# Patient Record
Sex: FEMALE | Race: WHITE | NOT HISPANIC OR LATINO | Employment: UNEMPLOYED | URBAN - METROPOLITAN AREA
[De-identification: names, ages, dates, MRNs, and addresses within clinical notes are randomized per-mention and may not be internally consistent; named-entity substitution may affect disease eponyms.]

---

## 2017-06-26 DIAGNOSIS — K21.9 GASTROESOPHAGEAL REFLUX DISEASE WITHOUT ESOPHAGITIS: ICD-10-CM

## 2017-06-26 RX ORDER — PANTOPRAZOLE SODIUM 40 MG/1
TABLET, DELAYED RELEASE ORAL
Qty: 30 TABLET | Refills: 0 | Status: SHIPPED | OUTPATIENT
Start: 2017-06-26 | End: 2017-08-16 | Stop reason: SDUPTHER

## 2017-07-27 DIAGNOSIS — K21.9 GASTROESOPHAGEAL REFLUX DISEASE WITHOUT ESOPHAGITIS: ICD-10-CM

## 2017-07-28 RX ORDER — PANTOPRAZOLE SODIUM 40 MG/1
TABLET, DELAYED RELEASE ORAL
Qty: 30 TABLET | Refills: 0 | OUTPATIENT
Start: 2017-07-28

## 2017-08-16 DIAGNOSIS — K21.9 GASTROESOPHAGEAL REFLUX DISEASE WITHOUT ESOPHAGITIS: ICD-10-CM

## 2017-08-16 RX ORDER — PANTOPRAZOLE SODIUM 40 MG/1
TABLET, DELAYED RELEASE ORAL
Qty: 30 TABLET | Refills: 0 | Status: SHIPPED | OUTPATIENT
Start: 2017-08-16 | End: 2017-09-07 | Stop reason: SDUPTHER

## 2017-09-07 DIAGNOSIS — K21.9 GASTROESOPHAGEAL REFLUX DISEASE WITHOUT ESOPHAGITIS: ICD-10-CM

## 2017-09-07 RX ORDER — PANTOPRAZOLE SODIUM 40 MG/1
TABLET, DELAYED RELEASE ORAL
Qty: 30 TABLET | Refills: 0 | Status: SHIPPED | OUTPATIENT
Start: 2017-09-07

## 2017-10-10 DIAGNOSIS — K21.9 GASTROESOPHAGEAL REFLUX DISEASE WITHOUT ESOPHAGITIS: ICD-10-CM

## 2017-10-11 RX ORDER — PANTOPRAZOLE SODIUM 40 MG/1
TABLET, DELAYED RELEASE ORAL
Qty: 30 TABLET | Refills: 0 | Status: SHIPPED | OUTPATIENT
Start: 2017-10-11 | End: 2020-08-18 | Stop reason: SDUPTHER

## 2018-10-01 ENCOUNTER — LAB VISIT (OUTPATIENT)
Dept: LAB | Facility: HOSPITAL | Age: 58
End: 2018-10-01
Payer: COMMERCIAL

## 2018-10-01 ENCOUNTER — OFFICE VISIT (OUTPATIENT)
Dept: DERMATOLOGY | Facility: CLINIC | Age: 58
End: 2018-10-01
Payer: COMMERCIAL

## 2018-10-01 DIAGNOSIS — L65.9 HAIR LOSS DISORDER: Primary | ICD-10-CM

## 2018-10-01 DIAGNOSIS — L65.9 HAIR LOSS DISORDER: ICD-10-CM

## 2018-10-01 LAB
25(OH)D3+25(OH)D2 SERPL-MCNC: 38 NG/ML
BASOPHILS # BLD AUTO: 0.07 K/UL
BASOPHILS NFR BLD: 1 %
DIFFERENTIAL METHOD: ABNORMAL
EOSINOPHIL # BLD AUTO: 0.5 K/UL
EOSINOPHIL NFR BLD: 6.9 %
ERYTHROCYTE [DISTWIDTH] IN BLOOD BY AUTOMATED COUNT: 13.9 %
HCT VFR BLD AUTO: 32.4 %
HGB BLD-MCNC: 9.9 G/DL
IMM GRANULOCYTES # BLD AUTO: 0.01 K/UL
IMM GRANULOCYTES NFR BLD AUTO: 0.1 %
LYMPHOCYTES # BLD AUTO: 1.7 K/UL
LYMPHOCYTES NFR BLD: 25 %
MCH RBC QN AUTO: 25 PG
MCHC RBC AUTO-ENTMCNC: 30.6 G/DL
MCV RBC AUTO: 82 FL
MONOCYTES # BLD AUTO: 0.5 K/UL
MONOCYTES NFR BLD: 7.8 %
NEUTROPHILS # BLD AUTO: 3.9 K/UL
NEUTROPHILS NFR BLD: 59.2 %
NRBC BLD-RTO: 0 /100 WBC
PLATELET # BLD AUTO: 264 K/UL
PMV BLD AUTO: 11 FL
RBC # BLD AUTO: 3.96 M/UL
TSH SERPL DL<=0.005 MIU/L-ACNC: 1.7 UIU/ML
WBC # BLD AUTO: 6.67 K/UL

## 2018-10-01 PROCEDURE — 99999 PR PBB SHADOW E&M-EST. PATIENT-LVL II: CPT | Mod: PBBFAC,,, | Performed by: NURSE PRACTITIONER

## 2018-10-01 PROCEDURE — 84443 ASSAY THYROID STIM HORMONE: CPT

## 2018-10-01 PROCEDURE — 82306 VITAMIN D 25 HYDROXY: CPT

## 2018-10-01 PROCEDURE — 36415 COLL VENOUS BLD VENIPUNCTURE: CPT

## 2018-10-01 PROCEDURE — 85025 COMPLETE CBC W/AUTO DIFF WBC: CPT

## 2018-10-01 PROCEDURE — 99202 OFFICE O/P NEW SF 15 MIN: CPT | Mod: S$GLB,,, | Performed by: NURSE PRACTITIONER

## 2018-10-01 RX ORDER — KETOCONAZOLE 20 MG/ML
SHAMPOO, SUSPENSION TOPICAL
Qty: 120 ML | Refills: 5 | Status: SHIPPED | OUTPATIENT
Start: 2018-10-01 | End: 2020-08-18

## 2018-10-01 NOTE — PROGRESS NOTES
Subjective:       Patient ID:  Bhavna Hancock is a 57 y.o. female who presents for   Chief Complaint   Patient presents with    Hair Loss     lots , [id number] , otc tx     Alopecia  - Initial  Affected locations: scalp  Duration: 2 years  Signs and Symptoms: tingling feeling on scalp   Severity: mild  Timing: constant  Aggravated by: nothing  Treatments tried: biotin, minixodil shampoo,       Reports last tsh was few months ago and patient reports was NL    Review of Systems   Genitourinary: Negative for irregular periods (post menapausal ).   Skin: Negative for daily sunscreen use and activity-related sunscreen use.   Endocrine:        H/o hypothyroid     Hematologic/Lymphatic:        Recently dx w/ Anemia. Dx x1 year ago.         Objective:    Physical Exam   Constitutional: She appears well-developed and well-nourished. No distress.   Neurological: She is alert and oriented to person, place, and time. She is not disoriented.   Psychiatric: She has a normal mood and affect.   Skin:   Areas Examined (abnormalities noted in diagram):   Scalp / Hair Palpated and Inspected  Head / Face Inspection Performed  Neck Inspection Performed  Nails and Digits Inspection Performed                  Diagram Legend     Erythematous scaling macule/papule c/w actinic keratosis       Vascular papule c/w angioma      Pigmented verrucoid papule/plaque c/w seborrheic keratosis      Yellow umbilicated papule c/w sebaceous hyperplasia      Irregularly shaped tan macule c/w lentigo     1-2 mm smooth white papules consistent with Milia      Movable subcutaneous cyst with punctum c/w epidermal inclusion cyst      Subcutaneous movable cyst c/w pilar cyst      Firm pink to brown papule c/w dermatofibroma      Pedunculated fleshy papule(s) c/w skin tag(s)      Evenly pigmented macule c/w junctional nevus     Mildly variegated pigmented, slightly irregular-bordered macule c/w mildly atypical nevus      Flesh colored to evenly pigmented  papule c/w intradermal nevus       Pink pearly papule/plaque c/w basal cell carcinoma      Erythematous hyperkeratotic cursted plaque c/w SCC      Surgical scar with no sign of skin cancer recurrence      Open and closed comedones      Inflammatory papules and pustules      Verrucoid papule consistent consistent with wart     Erythematous eczematous patches and plaques     Dystrophic onycholytic nail with subungual debris c/w onychomycosis     Umbilicated papule    Erythematous-base heme-crusted tan verrucoid plaque consistent with inflamed seborrheic keratosis     Erythematous Silvery Scaling Plaque c/w Psoriasis     See annotation        Assessment / Plan:        Hair loss disorder  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 Must use consistently and if you stop using, the hair it stimulated to grow may fall out over time. Generic acceptable.                Follow-up in about 6 months (around 4/1/2019).

## 2018-10-16 ENCOUNTER — TELEPHONE (OUTPATIENT)
Dept: DERMATOLOGY | Facility: CLINIC | Age: 58
End: 2018-10-16

## 2018-11-21 DIAGNOSIS — R10.2 PELVIC PAIN SYNDROME: Primary | ICD-10-CM

## 2020-08-18 ENCOUNTER — OFFICE VISIT (OUTPATIENT)
Dept: PAIN MEDICINE | Facility: CLINIC | Age: 60
End: 2020-08-18
Payer: COMMERCIAL

## 2020-08-18 VITALS
SYSTOLIC BLOOD PRESSURE: 122 MMHG | HEART RATE: 95 BPM | BODY MASS INDEX: 28.47 KG/M2 | HEIGHT: 65 IN | DIASTOLIC BLOOD PRESSURE: 66 MMHG | WEIGHT: 170.88 LBS

## 2020-08-18 DIAGNOSIS — G89.29 CHRONIC PAIN OF BOTH SHOULDERS: ICD-10-CM

## 2020-08-18 DIAGNOSIS — M25.512 CHRONIC PAIN OF BOTH SHOULDERS: ICD-10-CM

## 2020-08-18 DIAGNOSIS — M25.511 CHRONIC PAIN OF BOTH SHOULDERS: ICD-10-CM

## 2020-08-18 DIAGNOSIS — M67.912 BILATERAL ROTATOR CUFF DYSFUNCTION: ICD-10-CM

## 2020-08-18 DIAGNOSIS — M67.911 BILATERAL ROTATOR CUFF DYSFUNCTION: ICD-10-CM

## 2020-08-18 PROCEDURE — 99999 PR PBB SHADOW E&M-EST. PATIENT-LVL V: CPT | Mod: PBBFAC,,, | Performed by: PAIN MEDICINE

## 2020-08-18 PROCEDURE — 99204 PR OFFICE/OUTPT VISIT, NEW, LEVL IV, 45-59 MIN: ICD-10-PCS | Mod: S$GLB,,, | Performed by: PAIN MEDICINE

## 2020-08-18 PROCEDURE — 99204 OFFICE O/P NEW MOD 45 MIN: CPT | Mod: S$GLB,,, | Performed by: PAIN MEDICINE

## 2020-08-18 PROCEDURE — 99999 PR PBB SHADOW E&M-EST. PATIENT-LVL V: ICD-10-PCS | Mod: PBBFAC,,, | Performed by: PAIN MEDICINE

## 2020-08-18 RX ORDER — LOTEPREDNOL ETABONATE 3.8 MG/G
GEL OPHTHALMIC
COMMUNITY
Start: 2020-07-29 | End: 2022-02-21 | Stop reason: SDUPTHER

## 2020-08-18 RX ORDER — INSULIN LISPRO 100 [IU]/ML
INJECTION, SOLUTION INTRAVENOUS; SUBCUTANEOUS
Status: ON HOLD | COMMUNITY
Start: 2020-07-07 | End: 2021-02-03 | Stop reason: HOSPADM

## 2020-08-18 RX ORDER — LEVOTHYROXINE SODIUM 75 UG/1
TABLET ORAL
COMMUNITY
Start: 2020-06-28

## 2020-08-18 RX ORDER — TROSPIUM CHLORIDE 20 MG/1
20 TABLET, FILM COATED ORAL DAILY
COMMUNITY
Start: 2020-08-11

## 2020-08-18 RX ORDER — NAPROXEN SODIUM 220 MG
TABLET ORAL
COMMUNITY
Start: 2020-06-18

## 2020-08-18 RX ORDER — ESTRADIOL 1 MG/1
TABLET ORAL
COMMUNITY
Start: 2018-04-12

## 2020-08-18 RX ORDER — SULFAMETHOXAZOLE AND TRIMETHOPRIM 800; 160 MG/1; MG/1
TABLET ORAL
COMMUNITY
Start: 2020-06-11 | End: 2020-11-18

## 2020-08-18 RX ORDER — CYCLOSPORINE 0.5 MG/ML
EMULSION OPHTHALMIC
COMMUNITY
Start: 2020-07-01

## 2020-08-18 RX ORDER — BLOOD-GLUCOSE SENSOR
EACH MISCELLANEOUS
COMMUNITY
Start: 2020-07-31

## 2020-08-18 RX ORDER — AMOXICILLIN 875 MG/1
TABLET, FILM COATED ORAL
Status: ON HOLD | COMMUNITY
Start: 2020-06-11 | End: 2020-10-05 | Stop reason: CLARIF

## 2020-08-18 RX ORDER — MEDROXYPROGESTERONE ACETATE 2.5 MG/1
TABLET ORAL
COMMUNITY
Start: 2018-04-06

## 2020-08-18 RX ORDER — BLOOD-GLUCOSE TRANSMITTER
EACH MISCELLANEOUS
COMMUNITY
Start: 2020-07-29

## 2020-08-18 NOTE — PROGRESS NOTES
Subjective:     Patient ID: Bhavna Hancock is a 59 y.o. female    Chief Complaint: Shoulder Pain      Referred by: Self, Zeb      HPI:    Initial Encounter (8/18/20):  Bhavna Hancock is a 59 y.o. female who presents today with bilateral shoulder pain.  This pain has been present for years.  No specific inciting event or injury noted.  Patient has been previously diagnosed with bilateral subacromial bursitis.  She has undergone multiple injections into the bilateral shoulders without significant relief.  She has undergone physical therapy without significant relief.  The pain is located in the bilateral shoulder region.  The pain does not radiate.  Pain is worsened with overhead activities.  She denies any associated numbness, tingling, weakness, bowel bladder dysfunction.  She has a very active person and is concerned that her shoulder pain is limiting her ability to do activities that she likes to do.   This pain is described in detail below.    Physical Therapy:  Yes.    Non-pharmacologic Treatment:  Rest helps         · TENS?  No    Pain Medications:         · Currently taking:  Ibuprofen    · Has tried in the past:  Tylenol    · Has not tried:  Opioids, , Muscle relaxants, TCAs, SNRIs, anticonvulsants, topical creams    Blood thinners:  None    Interventional Therapies:  Previous shoulder injections    Relevant Surgeries:  None    Affecting sleep?  Yes    Affecting daily activities? yes    Depressive symptoms? no          · SI/HI? No    Work status: Unemployed    Pain Scores:    Best:       3/10  Worst:     10/10  Usually:   7/10  Today:    2/10    Review of Systems   Constitutional: Negative for activity change, appetite change, chills, fatigue, fever and unexpected weight change.   HENT: Negative for hearing loss.    Eyes: Negative for visual disturbance.   Respiratory: Negative for chest tightness and shortness of breath.    Cardiovascular: Negative for chest pain.   Gastrointestinal: Negative  for abdominal pain, constipation, diarrhea, nausea and vomiting.   Genitourinary: Negative for difficulty urinating.   Musculoskeletal: Positive for arthralgias and myalgias. Negative for back pain, gait problem and neck pain.   Skin: Negative for rash.   Neurological: Negative for dizziness, weakness, light-headedness, numbness and headaches.   Psychiatric/Behavioral: Positive for sleep disturbance. Negative for hallucinations and suicidal ideas. The patient is not nervous/anxious.        Past Medical History:   Diagnosis Date    Abdominal pain     Diabetes     GERD (gastroesophageal reflux disease)     Hx of colonic polyps     Hyperlipidemia     Hypertension     Nausea and vomiting        Past Surgical History:   Procedure Laterality Date    CRANIOTOMY  2013    DEBRIDEMENT TENNIS ELBOW      hip ascites      TRIGGER FINGER RELEASE         Social History     Socioeconomic History    Marital status:      Spouse name: Not on file    Number of children: Not on file    Years of education: Not on file    Highest education level: Not on file   Occupational History    Not on file   Social Needs    Financial resource strain: Not on file    Food insecurity     Worry: Not on file     Inability: Not on file    Transportation needs     Medical: Not on file     Non-medical: Not on file   Tobacco Use    Smoking status: Former Smoker   Substance and Sexual Activity    Alcohol use: No    Drug use: No    Sexual activity: Not on file   Lifestyle    Physical activity     Days per week: Not on file     Minutes per session: Not on file    Stress: Not on file   Relationships    Social connections     Talks on phone: Not on file     Gets together: Not on file     Attends Mandaeism service: Not on file     Active member of club or organization: Not on file     Attends meetings of clubs or organizations: Not on file     Relationship status: Not on file   Other Topics Concern    Not on file   Social History  Narrative    Not on file       Review of patient's allergies indicates:   Allergen Reactions    Codeine        Current Outpatient Medications on File Prior to Visit   Medication Sig Dispense Refill    amoxicillin (AMOXIL) 875 MG tablet TK 1 T PO BID      aspirin (ECOTRIN) 81 MG EC tablet Take 81 mg by mouth once daily.      blood sugar diagnostic (ONETOUCH ULTRA BLUE TEST STRIP) Strp Use to test blood sugar 5 times a day      DEXCOM G6 SENSOR Priscilla TEST BS FID      DEXCOM G6 TRANSMITTER Priscilla USE TO CHECK FID      estradioL (ESTRACE) 1 MG tablet TK 1 T PO QD      fish oil-omega-3 fatty acids 300-1,000 mg capsule Take 2 g by mouth once daily.      ibuprofen (ADVIL,MOTRIN) 600 MG tablet Take 1 tablet (600 mg total) by mouth every 6 (six) hours as needed for Pain. 20 tablet 0    insulin glargine (LANTUS) 100 unit/mL injection Inject 35 Units into the skin once daily. 30 units nightly       insulin lispro (HUMALOG KWIKPEN INSULIN) 100 unit/mL pen INJECT 14 UNITS UNDER THE SKIN WITH EVERY MEAL      insulin regular 100 unit/mL Inj injection Inject into the skin 3 (three) times daily before meals. 8 units per meal      insulin syringe-needle U-100 1/2 mL 30 gauge Syrg USE ONE SYRINGE PER DAY UTD      levothyroxine (SYNTHROID) 75 MCG tablet TAKE 1 TABLET BY MOUTH EVERY DAY      lisinopril (PRINIVIL,ZESTRIL) 2.5 MG tablet Take 2.5 mg by mouth once daily.      LOTEMAX SM 0.38 % DrpG INT 1 GTT IN OU BID UTD      medroxyPROGESTERone (PROVERA) 2.5 MG tablet TK 1 T PO QD      metformin (GLUCOPHAGE) 500 MG tablet Take 500 mg by mouth daily with breakfast.      pantoprazole (PROTONIX) 40 MG tablet TAKE 1 TABLET(40 MG) BY MOUTH EVERY DAY 30 tablet 0    RESTASIS 0.05 % ophthalmic emulsion INSTILL ONE DROP IN OU BID      simvastatin (ZOCOR) 40 MG tablet Take 40 mg by mouth every evening.      sulfamethoxazole-trimethoprim 800-160mg (BACTRIM DS) 800-160 mg Tab TK 1 T PO BID      trospium (SANCTURA) 20 mg Tab  "tablet TK 1 T PO BID 1 HOUR B MEALS      venlafaxine (EFFEXOR-XR) 150 MG Cp24 Take 225 mg by mouth once daily.       [DISCONTINUED] ketoconazole (NIZORAL) 2 % shampoo Wash hair with medicated shampoo at least 2x/week - let sit on scalp at least 5 minutes prior to rinsing 120 mL 5    [DISCONTINUED] levothyroxine (SYNTHROID) 25 MCG tablet Take 50 mcg by mouth once daily.      [DISCONTINUED] pantoprazole (PROTONIX) 40 MG tablet TAKE 1 TABLET(40 MG) BY MOUTH EVERY DAY 30 tablet 0     No current facility-administered medications on file prior to visit.        Objective:      /66 (BP Location: Right arm, Patient Position: Sitting, BP Method: Medium (Automatic))   Pulse 95   Ht 5' 5" (1.651 m)   Wt 77.5 kg (170 lb 13.7 oz)   BMI 28.43 kg/m²     Exam:  GEN:  Well developed, well nourished.  No acute distress.  Normal pain behavior.  HEENT:  No trauma.  Mucous membranes moist.  Nares patent bilaterally.  PSYCH: Normal affect. Thought content appropriate.  CHEST:  Breathing symmetric.  No audible wheezing.  ABD: Soft, non-distended.  SKIN:  Warm, pink, dry.  No rash on exposed areas.    EXT:  No cyanosis, clubbing, or edema.  No color change or changes in nail or hair growth.  NEURO/MUSCULOSKELETAL:  Fully alert, oriented, and appropriate. Speech normal albin. No cranial nerve deficits.   Gait:   normal.  5/5 motor strength throughout upper extremities.   Sensory:   no  sensory deficit in the upper extremities.   Reflexes:   2 + and symmetric throughout.   absent  Domingo's bilaterally.  C-Spine:   full  ROM without pain on  any movements.  negative  facet loading bilaterally.   negative  Spurling's bilaterally.    No  TTP over cervical facet joints, cervical paraspinal muscles, shoulders, elbows or hands.      Positive Maya bilaterally  Full active range of motion of bilateral shoulders with pain towards terminal degrees of abduction and flexion  Apprehension test positive bilaterally  Tenderness to " palpation at the insertion of the bilateral supraspinatus tendons  Shoulder pain reproduced with bilateral shoulder abduction against resistance        Imaging:  No pertinent imaging at this time    Assessment:       Encounter Diagnoses   Name Primary?    Chronic pain of both shoulders     Bilateral rotator cuff dysfunction          Plan:       Bhavna was seen today for shoulder pain.    Diagnoses and all orders for this visit:    Chronic pain of both shoulders    Bilateral rotator cuff dysfunction  -     Ambulatory referral/consult to Orthopedics; Future        Bhavna Hancock is a 59 y.o. female with chronic bilateral shoulder pain likely related to bilateral rotator cuff dysfunction..    1.  Referred to Orthopedic surgery (Jeanna) for further evaluation of chronic rotator cuff dysfunction.  2.  Return to clinic as needed.  Patient may be a candidate for shoulder radiofrequency ablation if no surgical options are deemed appropriate.

## 2020-09-02 DIAGNOSIS — M25.512 BILATERAL SHOULDER PAIN, UNSPECIFIED CHRONICITY: Primary | ICD-10-CM

## 2020-09-02 DIAGNOSIS — M25.511 BILATERAL SHOULDER PAIN, UNSPECIFIED CHRONICITY: Primary | ICD-10-CM

## 2020-09-03 ENCOUNTER — OFFICE VISIT (OUTPATIENT)
Dept: ORTHOPEDICS | Facility: CLINIC | Age: 60
End: 2020-09-03
Attending: ORTHOPAEDIC SURGERY
Payer: COMMERCIAL

## 2020-09-03 VITALS
TEMPERATURE: 98 F | HEART RATE: 87 BPM | HEIGHT: 65 IN | SYSTOLIC BLOOD PRESSURE: 122 MMHG | WEIGHT: 175.25 LBS | RESPIRATION RATE: 17 BRPM | OXYGEN SATURATION: 97 % | DIASTOLIC BLOOD PRESSURE: 60 MMHG | BODY MASS INDEX: 29.2 KG/M2

## 2020-09-03 DIAGNOSIS — M67.912 BILATERAL ROTATOR CUFF DYSFUNCTION: ICD-10-CM

## 2020-09-03 DIAGNOSIS — M67.911 BILATERAL ROTATOR CUFF DYSFUNCTION: ICD-10-CM

## 2020-09-03 DIAGNOSIS — G89.29 CHRONIC PAIN OF BOTH SHOULDERS: ICD-10-CM

## 2020-09-03 DIAGNOSIS — M25.511 CHRONIC PAIN OF BOTH SHOULDERS: ICD-10-CM

## 2020-09-03 DIAGNOSIS — M25.512 CHRONIC PAIN OF BOTH SHOULDERS: ICD-10-CM

## 2020-09-03 PROCEDURE — 99204 PR OFFICE/OUTPT VISIT, NEW, LEVL IV, 45-59 MIN: ICD-10-PCS | Mod: S$GLB,,, | Performed by: ORTHOPAEDIC SURGERY

## 2020-09-03 PROCEDURE — 99999 PR PBB SHADOW E&M-EST. PATIENT-LVL V: ICD-10-PCS | Mod: PBBFAC,,, | Performed by: ORTHOPAEDIC SURGERY

## 2020-09-03 PROCEDURE — 99999 PR PBB SHADOW E&M-EST. PATIENT-LVL V: CPT | Mod: PBBFAC,,, | Performed by: ORTHOPAEDIC SURGERY

## 2020-09-03 PROCEDURE — 99204 OFFICE O/P NEW MOD 45 MIN: CPT | Mod: S$GLB,,, | Performed by: ORTHOPAEDIC SURGERY

## 2020-09-03 NOTE — LETTER
September 3, 2020      Larry Fisher Jr., MD  [address]  [address]  [place] [address]           [facility]  [address]  [facility] [place] [address]  Phone: [phone]          Patient: Bhavna Hancock   MR Number: [id number]   YOB: 1960   Date of Visit: 9/3/2020       Dear Dr. Larry Fisher Jr.:    Thank you for referring Bhavna Hancock to me for evaluation. Attached you will find relevant portions of my assessment and plan of care.    If you have questions, please do not hesitate to call me. I look forward to following Bhavna Hancock along with you.    Sincerely,    Maddie Meeks MD    Enclosure  CC:  No Recipients    If you would like to receive this communication electronically, please contact [email or web address] or [phone] to request more information on [employer] Link access.    For providers and/or their staff who would like to refer a patient to [facility], please contact us through our one-stop-shop provider referral line, [facility], at [phone].    If you feel you have received this communication in error or would no longer like to receive these types of communications, please e-mail [email or web address]

## 2020-09-03 NOTE — PROGRESS NOTES
Chief Complaint   Patient presents with    Right Shoulder - Pain    Left Shoulder - Pain     This patient was seen in consultation at the request of Dr. Larry Fisher*     HPI (09/03/2020): Bhavna Hancock is a 59 y.o. female who presents today complaining of bilateral shoulder pain  Duration of symptoms:  About 1  year   Trauma or new activity Does a lot of lifting   Pain is intermittent  Aggravating factors: motion of the shoulder - raising the arm overhead, lifting, reaching behind her back   Relieving factors: rest   Night pain is present and is disruptive to sleep  Associated symptoms: None  Prior treatment:  OTC NSAIDs  and PT with mild improvement in pain.     Pain does interfere with sleep and activities of daily living .    This is the extent of the patient's complaints at this time.     Hand dominance: Right     Occupation: Not currently working, previously worked as an [employer] tech at [employer]    Review of Systems   All other systems reviewed and are negative.        Review of patient's allergies indicates:   Allergen Reactions    Codeine          Current Outpatient Medications:     amoxicillin (AMOXIL) 875 MG tablet, TK 1 T PO BID, Disp: , Rfl:     aspirin (ECOTRIN) 81 MG EC tablet, Take 81 mg by mouth once daily., Disp: , Rfl:     blood sugar diagnostic (ONETOUCH ULTRA BLUE TEST STRIP) Strp, Use to test blood sugar 5 times a day, Disp: , Rfl:     DEXCOM G6 SENSOR Priscilla, TEST BS FID, Disp: , Rfl:     DEXCOM G6 TRANSMITTER Priscilla, USE TO CHECK FID, Disp: , Rfl:     estradioL (ESTRACE) 1 MG tablet, TK 1 T PO QD, Disp: , Rfl:     fish oil-omega-3 fatty acids 300-1,000 mg capsule, Take 2 g by mouth once daily., Disp: , Rfl:     ibuprofen (ADVIL,MOTRIN) 600 MG tablet, Take 1 tablet (600 mg total) by mouth every 6 (six) hours as needed for Pain., Disp: 20 tablet, Rfl: 0    insulin glargine (LANTUS) 100 unit/mL injection, Inject 35 Units into the skin once daily. 30 units nightly , Disp: , Rfl:      insulin lispro (HUMALOG KWIKPEN INSULIN) 100 unit/mL pen, INJECT 14 UNITS UNDER THE SKIN WITH EVERY MEAL, Disp: , Rfl:     insulin regular 100 unit/mL Inj injection, Inject into the skin 3 (three) times daily before meals. 8 units per meal, Disp: , Rfl:     insulin syringe-needle U-100 1/2 mL 30 gauge Syrg, USE ONE SYRINGE PER DAY UTD, Disp: , Rfl:     levothyroxine (SYNTHROID) 75 MCG tablet, TAKE 1 TABLET BY MOUTH EVERY DAY, Disp: , Rfl:     lisinopril (PRINIVIL,ZESTRIL) 2.5 MG tablet, Take 2.5 mg by mouth once daily., Disp: , Rfl:     LOTEMAX SM 0.38 % DrpG, INT 1 GTT IN OU BID UTD, Disp: , Rfl:     medroxyPROGESTERone (PROVERA) 2.5 MG tablet, TK 1 T PO QD, Disp: , Rfl:     metformin (GLUCOPHAGE) 500 MG tablet, Take 500 mg by mouth daily with breakfast., Disp: , Rfl:     pantoprazole (PROTONIX) 40 MG tablet, TAKE 1 TABLET(40 MG) BY MOUTH EVERY DAY, Disp: 30 tablet, Rfl: 0    RESTASIS 0.05 % ophthalmic emulsion, INSTILL ONE DROP IN OU BID, Disp: , Rfl:     simvastatin (ZOCOR) 40 MG tablet, Take 40 mg by mouth every evening., Disp: , Rfl:     sulfamethoxazole-trimethoprim 800-160mg (BACTRIM DS) 800-160 mg Tab, TK 1 T PO BID, Disp: , Rfl:     trospium (SANCTURA) 20 mg Tab tablet, TK 1 T PO BID 1 HOUR B MEALS, Disp: , Rfl:     venlafaxine (EFFEXOR-XR) 150 MG Cp24, Take 225 mg by mouth once daily. , Disp: , Rfl:     Past Medical History:   Diagnosis Date    Abdominal pain     Diabetes     GERD (gastroesophageal reflux disease)     Hx of colonic polyps     Hyperlipidemia     Hypertension     Nausea and vomiting        Patient Active Problem List   Diagnosis    History of adenomatous polyp of colon    Chronic pain of both shoulders    Bilateral rotator cuff dysfunction       Past Surgical History:   Procedure Laterality Date    CRANIOTOMY  2013    DEBRIDEMENT TENNIS ELBOW      hip ascites      TRIGGER FINGER RELEASE         Social History     Tobacco Use    Smoking status: Former Smoker  "  Substance Use Topics    Alcohol use: No    Drug use: No       Family History   Problem Relation Age of Onset    Colon cancer Father 77        Rectal    Stomach cancer Neg Hx     Esophageal cancer Neg Hx        Physical Exam:   Vitals:    09/03/20 0934   BP: 122/60   Pulse: 87   Resp: 17   Temp: 97.9 °F (36.6 °C)   TempSrc: Oral   SpO2: 97%   Weight: 79.5 kg (175 lb 4.3 oz)   Height: 5' 5" (1.651 m)   PainSc: 0-No pain   PainLoc: Shoulder       General: Weight: 79.5 kg (175 lb 4.3 oz) Body mass index is 29.17 kg/m².  Patient is alert, awake and oriented to time, place and person. Mood and affect are appropriate.  Patient does not appear to be in any distress, denies any constitutional symptoms and appears stated age.   HEENT: Pupils are equal and round, sclera are not injected. External examination of ears and nose reveals no abnormalities. Cranial nerves II-X are grossly intact  Neck:  examination demonstrates painless  active range of motion. Spurling's sign is negative  Skin: no rashes, abrasions or open wounds on the affected extremity   Resp: No respiratory distress or audible wheezing   CV: 2+  pulses, all extremities warm and well perfused   Left and Right Shoulder    Shoulder Range of Motion    Right     Left   (Active/Passive)       Forward Elevation     150/160            150/160  External rotation (arm at side)  20/20             20/20   Internal rotation behind the back  hip             hip     Range of motion is painful     Scapular winging no  Scapular dyskinesia no    Examination of the back shows no atrophy     Acromioclavicular joint is not tender  Crossbody test: negative    Neer's positive  Hawkin's positive    Ovidio's positive  Drop arm negative  Belly press negative      Cuff Strength     Right     Left   Supraspinatus        5-/5    5-/5  Infraspinatus     5/5    5/5  Subscapularis     5/5    5/5    Deltoid testing            5/5    5/5    Speeds positive   Josergasons negative      Elbow " examination demonstrates no tenderness to palpation and has normal range of motion.     ltsi C5-T1  + epl, io, fds, fdp   2+ RP      Imaging:  3 views of the bilateral shoulder:  negative for degenerative changes of the AC joint. The humeral head is well centered on the AP and axillary views. No sclerosis or calcification at the rotator cuff insertion on the greater tuberosity. There is not significant degenerative change of the glenohumeral joint or posterior subluxation of the humeral head. No acute changes or fracture.      I personally reviewed and interpreted the patient's imaging obtained today in clinic     Assessment: 59 y.o. female with right subacromial bursitis  biceps tendinitis  rotator cuff tendinitis    I explained my diagnostic impression and the reasoning behind it in detail, using layman's terms.  Models and/or pictures were used to help in the explanation.      Plan:   - Has failed treatment with medications, rest, activity modification, and formal PT.  Would like to avoid injection given her dx of DM which is reasonable. Will get MRI of the bilateral shoulders. Further treatment plan pending results. RTC after MRI complete.       All questions were answered in detail. The patient is in full agreement with the treatment plan and will proceed accordingly.    A note notifying Dr. Lrary Fisher* of my findings was sent via the electronic medical record     This note was created by combination of typed  and M-Modal dictation. Transcription and phonetic errors may be present.  If there are any questions, please contact me.

## 2020-09-09 ENCOUNTER — HOSPITAL ENCOUNTER (OUTPATIENT)
Dept: RADIOLOGY | Facility: HOSPITAL | Age: 60
Discharge: HOME | End: 2020-09-09
Attending: ORTHOPAEDIC SURGERY
Payer: COMMERCIAL

## 2020-09-09 DIAGNOSIS — M25.511 CHRONIC PAIN OF BOTH SHOULDERS: ICD-10-CM

## 2020-09-09 DIAGNOSIS — M25.512 CHRONIC PAIN OF BOTH SHOULDERS: ICD-10-CM

## 2020-09-09 DIAGNOSIS — G89.29 CHRONIC PAIN OF BOTH SHOULDERS: ICD-10-CM

## 2020-09-09 PROCEDURE — 73221 MRI JOINT UPR EXTREM W/O DYE: CPT | Mod: TC,PO,RT

## 2020-09-09 PROCEDURE — 73221 MRI JOINT UPR EXTREM W/O DYE: CPT | Mod: TC,PO,LT

## 2020-09-09 PROCEDURE — 73221 MRI JOINT UPR EXTREM W/O DYE: CPT | Mod: 26,RT,, | Performed by: RADIOLOGY

## 2020-09-09 PROCEDURE — 73221 MRI SHOULDER WITHOUT CONTRAST LEFT: ICD-10-PCS | Mod: 26,LT,, | Performed by: RADIOLOGY

## 2020-09-09 PROCEDURE — 73221 MRI JOINT UPR EXTREM W/O DYE: CPT | Mod: 26,LT,, | Performed by: RADIOLOGY

## 2020-09-09 PROCEDURE — 73221 MRI SHOULDER WITHOUT CONTRAST RIGHT: ICD-10-PCS | Mod: 26,RT,, | Performed by: RADIOLOGY

## 2020-09-11 ENCOUNTER — TELEPHONE (OUTPATIENT)
Dept: ORTHOPEDICS | Facility: CLINIC | Age: 60
End: 2020-09-11

## 2020-09-14 ENCOUNTER — OFFICE VISIT (OUTPATIENT)
Dept: ORTHOPEDICS | Facility: CLINIC | Age: 60
End: 2020-09-14
Payer: COMMERCIAL

## 2020-09-14 VITALS
BODY MASS INDEX: 29.38 KG/M2 | SYSTOLIC BLOOD PRESSURE: 118 MMHG | WEIGHT: 176.38 LBS | HEIGHT: 65 IN | DIASTOLIC BLOOD PRESSURE: 60 MMHG | HEART RATE: 85 BPM | RESPIRATION RATE: 18 BRPM | OXYGEN SATURATION: 98 %

## 2020-09-14 DIAGNOSIS — M67.911 BILATERAL ROTATOR CUFF DYSFUNCTION: Primary | ICD-10-CM

## 2020-09-14 DIAGNOSIS — M67.912 BILATERAL ROTATOR CUFF DYSFUNCTION: Primary | ICD-10-CM

## 2020-09-14 PROCEDURE — 20610 LARGE JOINT ASPIRATION/INJECTION: R SUBACROMIAL BURSA: ICD-10-PCS | Mod: RT,S$GLB,, | Performed by: ORTHOPAEDIC SURGERY

## 2020-09-14 PROCEDURE — 99999 PR PBB SHADOW E&M-EST. PATIENT-LVL V: CPT | Mod: PBBFAC,,, | Performed by: ORTHOPAEDIC SURGERY

## 2020-09-14 PROCEDURE — 99999 PR PBB SHADOW E&M-EST. PATIENT-LVL V: ICD-10-PCS | Mod: PBBFAC,,, | Performed by: ORTHOPAEDIC SURGERY

## 2020-09-14 PROCEDURE — 99213 OFFICE O/P EST LOW 20 MIN: CPT | Mod: 25,S$GLB,, | Performed by: ORTHOPAEDIC SURGERY

## 2020-09-14 PROCEDURE — 20610 DRAIN/INJ JOINT/BURSA W/O US: CPT | Mod: RT,S$GLB,, | Performed by: ORTHOPAEDIC SURGERY

## 2020-09-14 PROCEDURE — 99213 PR OFFICE/OUTPT VISIT, EST, LEVL III, 20-29 MIN: ICD-10-PCS | Mod: 25,S$GLB,, | Performed by: ORTHOPAEDIC SURGERY

## 2020-09-14 RX ADMIN — TRIAMCINOLONE ACETONIDE 40 MG: 40 INJECTION, SUSPENSION INTRA-ARTICULAR; INTRAMUSCULAR at 11:09

## 2020-09-14 RX ADMIN — LIDOCAINE HYDROCHLORIDE 5 ML: 10 INJECTION INFILTRATION; PERINEURAL at 11:09

## 2020-09-14 NOTE — PROGRESS NOTES
"Follow up visit    Interval history (09/14/2020): Here for follow up of B shoulder pain   Improved with PT but she did not keep up with HEP and pain worsened  Has not had injection   Mild improvement in pain w NSAIDs  Here for MRI results        HPI (09/03/2020): Bhavna Hancock is a 59 y.o. female who presents today complaining of bilateral shoulder pain  Duration of symptoms:  About 1  year   Trauma or new activity Does a lot of lifting   Pain is intermittent  Aggravating factors: motion of the shoulder - raising the arm overhead, lifting, reaching behind her back   Relieving factors: rest   Night pain is present and is disruptive to sleep  Associated symptoms: None  Prior treatment:  OTC NSAIDs  and PT with mild improvement in pain.     Pain does interfere with sleep and activities of daily living .     This is the extent of the patient's complaints at this time.      Hand dominance: Right     Occupation: Not currently working, previously worked as an ER tech at [id number]    Review of Systems   Unremarkable, no changes since last visit.     No change in medical, surgical, family or surgical history except as stated above            Review of patient's allergies indicates:   Allergen Reactions    Codeine         Physical Exam:   Vitals:    09/14/20 1115   BP: 118/60   Pulse: 85   Resp: 18   SpO2: 98%   Weight: 80 kg (176 lb 5.9 oz)   Height: 5' 5" (1.651 m)   PainSc:   6   PainLoc: Shoulder         General: Weight: 79.5 kg (175 lb 4.3 oz) Body mass index is 29.17 kg/m².  Patient is alert, awake and oriented to time, place and person. Mood and affect are appropriate.  Patient does not appear to be in any distress, denies any constitutional symptoms and appears stated age.   HEENT: Pupils are equal and round, sclera are not injected. External examination of ears and nose reveals no abnormalities. Cranial nerves II-X are grossly intact  Neck:  examination demonstrates painless  active range of motion. Spurling's sign " is negative  Skin: no rashes, abrasions or open wounds on the affected extremity   Resp: No respiratory distress or audible wheezing   CV: 2+  pulses, all extremities warm and well perfused   Left and Right Shoulder     Shoulder Range of Motion                           Right                                       Left   (Active/Passive)                                        Forward Elevation                                           150/160                                 150/160  External rotation (arm at side)                        20/20                                     20/20       Internal rotation behind the back                      hip                                         hip              Range of motion is painful  - localizes pain to posterior shoulder and subdeltoid fossa     Scapular winging no  Scapular dyskinesia no     Examination of the back shows no atrophy      Acromioclavicular joint is not tender  Crossbody test: negative     Neer's positive  Hawkin's positive     Ovidio's positive  Drop arm negative  Belly press negative        Cuff Strength                                                 Right                                       Left   Supraspinatus                                                 5-/5                                          5-/5  Infraspinatus                                                   5/5                                           5/5  Subscapularis                                                 5/5                                           5/5     Deltoid testing                                                 5/5                                           5/5     Speeds positive - pain mostly posterior  Yergasons negative        Elbow examination demonstrates no tenderness to palpation and has normal range of motion.      ltsi C5-T1  + epl, io, fds, fdp   2+ RP      Imaging:  3 views of the bilateral shoulder:  negative for degenerative changes of the AC joint.  The humeral head is well centered on the AP and axillary views. No sclerosis or calcification at the rotator cuff insertion on the greater tuberosity. There is not significant degenerative change of the glenohumeral joint or posterior subluxation of the humeral head. No acute changes or fracture.    MRI: tendinitis of the cuff tendons and biceps       I personally reviewed and interpreted the patient's imaging obtained prior to visit     Assessment: 59 y.o. female with right subacromial bursitis  biceps tendinitis  rotator cuff tendinitis     I explained my diagnostic impression and the reasoning behind it in detail, using layman's terms.  Models and/or pictures were used to help in the explanation.        Plan:   - Injection of the right subacromial space  performed, please see procedure note for more details.  Prior to the injection risks and benefits of corticosteroid injection were discussed with the patient including pain, infection, bleeding, skin color changes, swelling, steroid flare. We discussed that over time injections can result in chondral damage, acceleration of arthritis formation, damage to tendons and damage to joints.  The patient consented for the procedure.  Post-injection instructions were given to the patient in writing.I discussed the risk of increased blood glucose following steroid injection in the setting of diabetes- the patient will monitor closely for the next 24 hours and call her primary care physician with any questions or concerns regarding blood glucose control   - RTC for left injection  And wrist pain        All questions were answered in detail. The patient is in full agreement with the treatment plan and will proceed accordingly.     A note notifying Dr. Larry Fisher* of my findings was sent via the electronic medical record      This note was created by combination of typed  and M-Modal dictation. Transcription and phonetic errors may be present.  If there are  any questions, please contact me.         Current Outpatient Medications:     amoxicillin (AMOXIL) 875 MG tablet, TK 1 T PO BID, Disp: , Rfl:     aspirin (ECOTRIN) 81 MG EC tablet, Take 81 mg by mouth once daily., Disp: , Rfl:     blood sugar diagnostic (ONETOUCH ULTRA BLUE TEST STRIP) Strp, Use to test blood sugar 5 times a day, Disp: , Rfl:     DEXCOM G6 SENSOR Priscilla, TEST BS FID, Disp: , Rfl:     DEXCOM G6 TRANSMITTER Priscilla, USE TO CHECK FID, Disp: , Rfl:     estradioL (ESTRACE) 1 MG tablet, TK 1 T PO QD, Disp: , Rfl:     fish oil-omega-3 fatty acids 300-1,000 mg capsule, Take 2 g by mouth once daily., Disp: , Rfl:     ibuprofen (ADVIL,MOTRIN) 600 MG tablet, Take 1 tablet (600 mg total) by mouth every 6 (six) hours as needed for Pain., Disp: 20 tablet, Rfl: 0    insulin glargine (LANTUS) 100 unit/mL injection, Inject 35 Units into the skin once daily. 30 units nightly , Disp: , Rfl:     insulin lispro (HUMALOG KWIKPEN INSULIN) 100 unit/mL pen, INJECT 14 UNITS UNDER THE SKIN WITH EVERY MEAL, Disp: , Rfl:     insulin regular 100 unit/mL Inj injection, Inject into the skin 3 (three) times daily before meals. 8 units per meal, Disp: , Rfl:     insulin syringe-needle U-100 1/2 mL 30 gauge Syrg, USE ONE SYRINGE PER DAY UTD, Disp: , Rfl:     levothyroxine (SYNTHROID) 75 MCG tablet, TAKE 1 TABLET BY MOUTH EVERY DAY, Disp: , Rfl:     lisinopril (PRINIVIL,ZESTRIL) 2.5 MG tablet, Take 2.5 mg by mouth once daily., Disp: , Rfl:     LOTEMAX SM 0.38 % DrpG, INT 1 GTT IN OU BID UTD, Disp: , Rfl:     medroxyPROGESTERone (PROVERA) 2.5 MG tablet, TK 1 T PO QD, Disp: , Rfl:     metformin (GLUCOPHAGE) 500 MG tablet, Take 500 mg by mouth daily with breakfast., Disp: , Rfl:     pantoprazole (PROTONIX) 40 MG tablet, TAKE 1 TABLET(40 MG) BY MOUTH EVERY DAY, Disp: 30 tablet, Rfl: 0    RESTASIS 0.05 % ophthalmic emulsion, INSTILL ONE DROP IN OU BID, Disp: , Rfl:     simvastatin (ZOCOR) 40 MG tablet, Take 40 mg by mouth  every evening., Disp: , Rfl:     sulfamethoxazole-trimethoprim 800-160mg (BACTRIM DS) 800-160 mg Tab, TK 1 T PO BID, Disp: , Rfl:     trospium (SANCTURA) 20 mg Tab tablet, TK 1 T PO BID 1 HOUR B MEALS, Disp: , Rfl:     venlafaxine (EFFEXOR-XR) 150 MG Cp24, Take 225 mg by mouth once daily. , Disp: , Rfl:           Past Medical History:   Diagnosis Date    Abdominal pain      Diabetes      GERD (gastroesophageal reflux disease)      Hx of colonic polyps      Hyperlipidemia      Hypertension      Nausea and vomiting               Patient Active Problem List   Diagnosis    History of adenomatous polyp of colon    Chronic pain of both shoulders    Bilateral rotator cuff dysfunction               Past Surgical History:   Procedure Laterality Date    CRANIOTOMY   2013    DEBRIDEMENT TENNIS ELBOW        hip ascites        TRIGGER FINGER RELEASE             Social History           Tobacco Use    Smoking status: Former Smoker   Substance Use Topics    Alcohol use: No    Drug use: No               Family History   Problem Relation Age of Onset    Colon cancer Father 77         Rectal    Stomach cancer Neg Hx      Esophageal cancer Neg Hx

## 2020-09-14 NOTE — PROCEDURES
Large Joint Aspiration/Injection: R subacromial bursa    Date/Time: 9/14/2020 11:00 AM  Performed by: Maddie Meeks MD  Authorized by: Maddie Meeks MD     Consent Done?:  Yes (Verbal)  Indications:  Pain  Timeout: prior to procedure the correct patient, procedure, and site was verified    Prep: patient was prepped and draped in usual sterile fashion      Local anesthesia used?: Yes    Local anesthetic:  Topical anesthetic    Details:  Needle Size:  22 G  Approach:  Posterior  Location:  Shoulder  Site:  R subacromial bursa  Medications:  5 mL lidocaine HCL 10 mg/ml (1%) 10 mg/mL (1 %); 40 mg triamcinolone acetonide 40 mg/mL  Patient tolerance:  Patient tolerated the procedure well with no immediate complications

## 2020-09-17 RX ORDER — LIDOCAINE HYDROCHLORIDE 10 MG/ML
5 INJECTION INFILTRATION; PERINEURAL
Status: SHIPPED | OUTPATIENT
Start: 2020-09-14

## 2020-09-17 RX ORDER — TRIAMCINOLONE ACETONIDE 40 MG/ML
40 INJECTION, SUSPENSION INTRA-ARTICULAR; INTRAMUSCULAR
Status: SHIPPED | OUTPATIENT
Start: 2020-09-14

## 2020-09-23 ENCOUNTER — TELEPHONE (OUTPATIENT)
Dept: ORTHOPEDICS | Facility: CLINIC | Age: 60
End: 2020-09-23

## 2020-09-23 NOTE — TELEPHONE ENCOUNTER
Spoke with Shanae.   Explained that Dr Veloz and [facility] perform total knee replacement surgery.   This type of injury is usually treated by the orthopedic trauma team.   Offered pt an appointment with Ariel Goodwin NP tomorrow    Shanae and pt states they would prefer to see Dr Iglesias as they have heard a lot of good things about him.    Scheduled pt with Dr Iglesias as requested.

## 2020-09-23 NOTE — TELEPHONE ENCOUNTER
----- Message from Yessy Allen LPN sent at 9/23/2020  9:32 AM CDT -----    ----- Message -----  From: Ji Arteaga  Sent: 9/23/2020   9:23 AM CDT  To: [facility]    Pt sister asking for a callback regarding to pt making an appt please contact pt                   Contact info

## 2020-09-24 ENCOUNTER — OFFICE VISIT (OUTPATIENT)
Dept: ORTHOPEDICS | Facility: CLINIC | Age: 60
End: 2020-09-24
Payer: COMMERCIAL

## 2020-09-24 ENCOUNTER — TELEPHONE (OUTPATIENT)
Dept: ORTHOPEDICS | Facility: CLINIC | Age: 60
End: 2020-09-24

## 2020-09-24 DIAGNOSIS — Z01.818 PRE-OP TESTING: Primary | ICD-10-CM

## 2020-09-24 DIAGNOSIS — S82.032A CLOSED DISPLACED TRANSVERSE FRACTURE OF LEFT PATELLA, INITIAL ENCOUNTER: Primary | ICD-10-CM

## 2020-09-24 PROCEDURE — 99999 PR PBB SHADOW E&M-EST. PATIENT-LVL V: ICD-10-PCS | Mod: PBBFAC,,, | Performed by: ORTHOPAEDIC SURGERY

## 2020-09-24 PROCEDURE — 99214 PR OFFICE/OUTPT VISIT, EST, LEVL IV, 30-39 MIN: ICD-10-PCS | Mod: S$GLB,,, | Performed by: ORTHOPAEDIC SURGERY

## 2020-09-24 PROCEDURE — 99999 PR PBB SHADOW E&M-EST. PATIENT-LVL V: CPT | Mod: PBBFAC,,, | Performed by: ORTHOPAEDIC SURGERY

## 2020-09-24 PROCEDURE — 99214 OFFICE O/P EST MOD 30 MIN: CPT | Mod: S$GLB,,, | Performed by: ORTHOPAEDIC SURGERY

## 2020-09-24 RX ORDER — SULFAMETHOXAZOLE AND TRIMETHOPRIM 800; 160 MG/1; MG/1
1 TABLET ORAL 2 TIMES DAILY
Qty: 14 TABLET | Refills: 0 | Status: SHIPPED | OUTPATIENT
Start: 2020-09-24 | End: 2020-10-01

## 2020-09-24 NOTE — TELEPHONE ENCOUNTER
Spoke with pt.   Advised that she cannot have a total knee replacement sx for treatment of a patella fx.   These are two totally different surgeries

## 2020-09-24 NOTE — TELEPHONE ENCOUNTER
----- Message from Ji Arteaga sent at 9/24/2020 12:13 PM CDT -----  Pt asking for a callback regarding to asking a few questions she would like to ask about her surgery next week please contact pt           Contact info [phone]

## 2020-09-27 PROBLEM — S82.032A CLOSED DISPLACED TRANSVERSE FRACTURE OF LEFT PATELLA: Status: ACTIVE | Noted: 2020-09-27

## 2020-09-27 RX ORDER — MUPIROCIN 20 MG/G
OINTMENT TOPICAL
Status: CANCELLED | OUTPATIENT
Start: 2020-09-27

## 2020-09-27 RX ORDER — SODIUM CHLORIDE 9 MG/ML
INJECTION, SOLUTION INTRAVENOUS CONTINUOUS
Status: CANCELLED | OUTPATIENT
Start: 2020-09-27

## 2020-09-28 NOTE — H&P
Orthopedics  H&P        Subjective:       Chief Complaint:   Chief Complaint   Patient presents with    Left Knee - Injury        HPI: 60 yo F fell onto left knee from standing height on 9/22/2020.  She was seen initially in the [place] ED, diagnosed with a displaced left patella fracture and placed into to knee immobilizer.  No other injuries/complaints.  No prior history of injury to that knee.    Past Medical History:   Diagnosis Date    Abdominal pain     Diabetes     GERD (gastroesophageal reflux disease)     Hx of colonic polyps     Hyperlipidemia     Hypertension     Nausea and vomiting        Past Surgical History:   Procedure Laterality Date    CRANIOTOMY  2013    DEBRIDEMENT TENNIS ELBOW      hip ascites      TRIGGER FINGER RELEASE         Review of patient's allergies indicates:   Allergen Reactions    Codeine        Current Outpatient Medications   Medication Sig    amoxicillin (AMOXIL) 875 MG tablet TK 1 T PO BID    aspirin (ECOTRIN) 81 MG EC tablet Take 81 mg by mouth once daily.    blood sugar diagnostic (ONETOUCH ULTRA BLUE TEST STRIP) Strp Use to test blood sugar 5 times a day    DEXCOM G6 SENSOR Priscilla TEST BS FID    DEXCOM G6 TRANSMITTER Priscilla USE TO CHECK FID    estradioL (ESTRACE) 1 MG tablet TK 1 T PO QD    fish oil-omega-3 fatty acids 300-1,000 mg capsule Take 2 g by mouth once daily.    ibuprofen (ADVIL,MOTRIN) 600 MG tablet Take 1 tablet (600 mg total) by mouth every 6 (six) hours as needed for Pain.    insulin glargine (LANTUS) 100 unit/mL injection Inject 35 Units into the skin once daily. 30 units nightly     insulin lispro (HUMALOG KWIKPEN INSULIN) 100 unit/mL pen INJECT 14 UNITS UNDER THE SKIN WITH EVERY MEAL    insulin regular 100 unit/mL Inj injection Inject into the skin 3 (three) times daily before meals. 8 units per meal    insulin syringe-needle U-100 1/2 mL 30 gauge Syrg USE ONE SYRINGE PER DAY UTD    levothyroxine (SYNTHROID) 75 MCG tablet TAKE 1  TABLET BY MOUTH EVERY DAY    lisinopril (PRINIVIL,ZESTRIL) 2.5 MG tablet Take 2.5 mg by mouth once daily.    LOTEMAX SM 0.38 % DrpG INT 1 GTT IN OU BID UTD    medroxyPROGESTERone (PROVERA) 2.5 MG tablet TK 1 T PO QD    metformin (GLUCOPHAGE) 500 MG tablet Take 500 mg by mouth daily with breakfast.    oxyCODONE-acetaminophen (PERCOCET) 5-325 mg per tablet Take 1 tablet by mouth every 8 (eight) hours as needed for Pain.    pantoprazole (PROTONIX) 40 MG tablet TAKE 1 TABLET(40 MG) BY MOUTH EVERY DAY    RESTASIS 0.05 % ophthalmic emulsion INSTILL ONE DROP IN OU BID    simvastatin (ZOCOR) 40 MG tablet Take 40 mg by mouth every evening.    sulfamethoxazole-trimethoprim 800-160mg (BACTRIM DS) 800-160 mg Tab TK 1 T PO BID    trospium (SANCTURA) 20 mg Tab tablet TK 1 T PO BID 1 HOUR B MEALS    venlafaxine (EFFEXOR-XR) 150 MG Cp24 Take 225 mg by mouth once daily.     sulfamethoxazole-trimethoprim 800-160mg (BACTRIM DS) 800-160 mg Tab Take 1 tablet by mouth 2 (two) times daily. for 7 days     No current facility-administered medications for this visit.      Facility-Administered Medications Ordered in Other Visits   Medication    lidocaine HCL 10 mg/ml (1%) injection 5 mL    triamcinolone acetonide injection 40 mg     Family History     Problem Relation (Age of Onset)    Colon cancer Father (77)        Tobacco Use    Smoking status: Former Smoker   Substance and Sexual Activity    Alcohol use: No    Drug use: No    Sexual activity: Not on file     ROS   ROS:  Patient denies constitutional symptoms, cardiac symptoms, respiratory symptoms, GI symptoms.  The remainder of the musculoskeletal ROS is included in the HPI.    Objective:     PE:    AA&O x 4.  NAD  HEENT:  NCAT, sclera nonicteric  Lungs:  Respirations are equal and unlabored.  CV:  2+ bilateral upper and lower extremity pulses.  Skin:  Intact throughout.    MS:  Left knee with anterior abrasion with good bleeding base at inferior portion of patellar  tendon, superficial.  2 small areas over patella with partial scab.  No surrounding erythema.  Moderate joint effusion.  NVI distal.    Rads:  Transverse left patella fracture with significant displacement.    Assessment/Plan:     Closed left patella fracture with significant displacement.  This needs operative fixation.  I will wait until next week to allow the overlying abrasions to heal a bit before surgery as they pose an increased risk for poor wound healing and infection.  The risks, benefits and alternatives to surgery were discussed with the patient at great length.  These include bleeding, infection, vessel/nerve damage, pain, numbness, tingling, complex regional pain syndrome, stiffness, patellofemoral arthritis, extension lag, hardware/surgical failure, need for further surgery, malunion, nonunion, DVT, PE, arthritis and death.  Patient states an understanding and wishes to proceed with surgery.   All questions were answered.  No guarantees were implied or stated.  Informed consent was obtained.        Patrice Iglesias MD  Orthopedics

## 2020-09-28 NOTE — H&P (VIEW-ONLY)
Orthopedics  H&P        Subjective:       Chief Complaint:   Chief Complaint   Patient presents with    Left Knee - Injury        HPI: 60 yo F fell onto left knee from standing height on 9/22/2020.  She was seen initially in the [place] ED, diagnosed with a displaced left patella fracture and placed into to knee immobilizer.  No other injuries/complaints.  No prior history of injury to that knee.    Past Medical History:   Diagnosis Date    Abdominal pain     Diabetes     GERD (gastroesophageal reflux disease)     Hx of colonic polyps     Hyperlipidemia     Hypertension     Nausea and vomiting        Past Surgical History:   Procedure Laterality Date    CRANIOTOMY  2013    DEBRIDEMENT TENNIS ELBOW      hip ascites      TRIGGER FINGER RELEASE         Review of patient's allergies indicates:   Allergen Reactions    Codeine        Current Outpatient Medications   Medication Sig    amoxicillin (AMOXIL) 875 MG tablet TK 1 T PO BID    aspirin (ECOTRIN) 81 MG EC tablet Take 81 mg by mouth once daily.    blood sugar diagnostic (ONETOUCH ULTRA BLUE TEST STRIP) Strp Use to test blood sugar 5 times a day    DEXCOM G6 SENSOR Priscilla TEST BS FID    DEXCOM G6 TRANSMITTER Priscilla USE TO CHECK FID    estradioL (ESTRACE) 1 MG tablet TK 1 T PO QD    fish oil-omega-3 fatty acids 300-1,000 mg capsule Take 2 g by mouth once daily.    ibuprofen (ADVIL,MOTRIN) 600 MG tablet Take 1 tablet (600 mg total) by mouth every 6 (six) hours as needed for Pain.    insulin glargine (LANTUS) 100 unit/mL injection Inject 35 Units into the skin once daily. 30 units nightly     insulin lispro (HUMALOG KWIKPEN INSULIN) 100 unit/mL pen INJECT 14 UNITS UNDER THE SKIN WITH EVERY MEAL    insulin regular 100 unit/mL Inj injection Inject into the skin 3 (three) times daily before meals. 8 units per meal    insulin syringe-needle U-100 1/2 mL 30 gauge Syrg USE ONE SYRINGE PER DAY UTD    levothyroxine (SYNTHROID) 75 MCG tablet TAKE 1  TABLET BY MOUTH EVERY DAY    lisinopril (PRINIVIL,ZESTRIL) 2.5 MG tablet Take 2.5 mg by mouth once daily.    LOTEMAX SM 0.38 % DrpG INT 1 GTT IN OU BID UTD    medroxyPROGESTERone (PROVERA) 2.5 MG tablet TK 1 T PO QD    metformin (GLUCOPHAGE) 500 MG tablet Take 500 mg by mouth daily with breakfast.    oxyCODONE-acetaminophen (PERCOCET) 5-325 mg per tablet Take 1 tablet by mouth every 8 (eight) hours as needed for Pain.    pantoprazole (PROTONIX) 40 MG tablet TAKE 1 TABLET(40 MG) BY MOUTH EVERY DAY    RESTASIS 0.05 % ophthalmic emulsion INSTILL ONE DROP IN OU BID    simvastatin (ZOCOR) 40 MG tablet Take 40 mg by mouth every evening.    sulfamethoxazole-trimethoprim 800-160mg (BACTRIM DS) 800-160 mg Tab TK 1 T PO BID    trospium (SANCTURA) 20 mg Tab tablet TK 1 T PO BID 1 HOUR B MEALS    venlafaxine (EFFEXOR-XR) 150 MG Cp24 Take 225 mg by mouth once daily.     sulfamethoxazole-trimethoprim 800-160mg (BACTRIM DS) 800-160 mg Tab Take 1 tablet by mouth 2 (two) times daily. for 7 days     No current facility-administered medications for this visit.      Facility-Administered Medications Ordered in Other Visits   Medication    lidocaine HCL 10 mg/ml (1%) injection 5 mL    triamcinolone acetonide injection 40 mg     Family History     Problem Relation (Age of Onset)    Colon cancer Father (77)        Tobacco Use    Smoking status: Former Smoker   Substance and Sexual Activity    Alcohol use: No    Drug use: No    Sexual activity: Not on file     ROS   ROS:  Patient denies constitutional symptoms, cardiac symptoms, respiratory symptoms, GI symptoms.  The remainder of the musculoskeletal ROS is included in the HPI.    Objective:     PE:    AA&O x 4.  NAD  HEENT:  NCAT, sclera nonicteric  Lungs:  Respirations are equal and unlabored.  CV:  2+ bilateral upper and lower extremity pulses.  Skin:  Intact throughout.    MS:  Left knee with anterior abrasion with good bleeding base at inferior portion of patellar  tendon, superficial.  2 small areas over patella with partial scab.  No surrounding erythema.  Moderate joint effusion.  NVI distal.    Rads:  Transverse left patella fracture with significant displacement.    Assessment/Plan:     Closed left patella fracture with significant displacement.  This needs operative fixation.  I will wait until next week to allow the overlying abrasions to heal a bit before surgery as they pose an increased risk for poor wound healing and infection.  The risks, benefits and alternatives to surgery were discussed with the patient at great length.  These include bleeding, infection, vessel/nerve damage, pain, numbness, tingling, complex regional pain syndrome, stiffness, patellofemoral arthritis, extension lag, hardware/surgical failure, need for further surgery, malunion, nonunion, DVT, PE, arthritis and death.  Patient states an understanding and wishes to proceed with surgery.   All questions were answered.  No guarantees were implied or stated.  Informed consent was obtained.        Patrice Iglesias MD  Orthopedics

## 2020-09-28 NOTE — PROGRESS NOTES
Orthopedics  H&P        Subjective:       Chief Complaint:   Chief Complaint   Patient presents with    Left Knee - Injury        HPI: 58 yo F fell onto left knee from standing height on 9/22/2020.  She was seen initially in the [facility] ED, diagnosed with a displaced left patella fracture and placed into to knee immobilizer.  No other injuries/complaints.  No prior history of injury to that knee.    Past Medical History:   Diagnosis Date    Abdominal pain     Diabetes     GERD (gastroesophageal reflux disease)     Hx of colonic polyps     Hyperlipidemia     Hypertension     Nausea and vomiting        Past Surgical History:   Procedure Laterality Date    CRANIOTOMY  2013    DEBRIDEMENT TENNIS ELBOW      hip ascites      TRIGGER FINGER RELEASE         Review of patient's allergies indicates:   Allergen Reactions    Codeine        Current Outpatient Medications   Medication Sig    amoxicillin (AMOXIL) 875 MG tablet TK 1 T PO BID    aspirin (ECOTRIN) 81 MG EC tablet Take 81 mg by mouth once daily.    blood sugar diagnostic (ONETOUCH ULTRA BLUE TEST STRIP) Strp Use to test blood sugar 5 times a day    DEXCOM G6 SENSOR Priscilla TEST BS FID    DEXCOM G6 TRANSMITTER Priscilla USE TO CHECK FID    estradioL (ESTRACE) 1 MG tablet TK 1 T PO QD    fish oil-omega-3 fatty acids 300-1,000 mg capsule Take 2 g by mouth once daily.    ibuprofen (ADVIL,MOTRIN) 600 MG tablet Take 1 tablet (600 mg total) by mouth every 6 (six) hours as needed for Pain.    insulin glargine (LANTUS) 100 unit/mL injection Inject 35 Units into the skin once daily. 30 units nightly     insulin lispro (HUMALOG KWIKPEN INSULIN) 100 unit/mL pen INJECT 14 UNITS UNDER THE SKIN WITH EVERY MEAL    insulin regular 100 unit/mL Inj injection Inject into the skin 3 (three) times daily before meals. 8 units per meal    insulin syringe-needle U-100 1/2 mL 30 gauge Syrg USE ONE SYRINGE PER DAY UTD    levothyroxine (SYNTHROID) 75 MCG tablet TAKE 1  TABLET BY MOUTH EVERY DAY    lisinopril (PRINIVIL,ZESTRIL) 2.5 MG tablet Take 2.5 mg by mouth once daily.    LOTEMAX SM 0.38 % DrpG INT 1 GTT IN OU BID UTD    medroxyPROGESTERone (PROVERA) 2.5 MG tablet TK 1 T PO QD    metformin (GLUCOPHAGE) 500 MG tablet Take 500 mg by mouth daily with breakfast.    oxyCODONE-acetaminophen (PERCOCET) 5-325 mg per tablet Take 1 tablet by mouth every 8 (eight) hours as needed for Pain.    pantoprazole (PROTONIX) 40 MG tablet TAKE 1 TABLET(40 MG) BY MOUTH EVERY DAY    RESTASIS 0.05 % ophthalmic emulsion INSTILL ONE DROP IN OU BID    simvastatin (ZOCOR) 40 MG tablet Take 40 mg by mouth every evening.    sulfamethoxazole-trimethoprim 800-160mg (BACTRIM DS) 800-160 mg Tab TK 1 T PO BID    trospium (SANCTURA) 20 mg Tab tablet TK 1 T PO BID 1 HOUR B MEALS    venlafaxine (EFFEXOR-XR) 150 MG Cp24 Take 225 mg by mouth once daily.     sulfamethoxazole-trimethoprim 800-160mg (BACTRIM DS) 800-160 mg Tab Take 1 tablet by mouth 2 (two) times daily. for 7 days     No current facility-administered medications for this visit.      Facility-Administered Medications Ordered in Other Visits   Medication    lidocaine HCL 10 mg/ml (1%) injection 5 mL    triamcinolone acetonide injection 40 mg     Family History     Problem Relation (Age of Onset)    Colon cancer Father (77)        Tobacco Use    Smoking status: Former Smoker   Substance and Sexual Activity    Alcohol use: No    Drug use: No    Sexual activity: Not on file     ROS   ROS:  Patient denies constitutional symptoms, cardiac symptoms, respiratory symptoms, GI symptoms.  The remainder of the musculoskeletal ROS is included in the HPI.    Objective:     PE:    AA&O x 4.  NAD  HEENT:  NCAT, sclera nonicteric  Lungs:  Respirations are equal and unlabored.  CV:  2+ bilateral upper and lower extremity pulses.  Skin:  Intact throughout.    MS:  Left knee with anterior abrasion with good bleeding base at inferior portion of patellar  tendon, superficial.  2 small areas over patella with partial scab.  No surrounding erythema.  Moderate joint effusion.  NVI distal.    Rads:  Transverse left patella fracture with significant displacement.    Assessment/Plan:     Closed left patella fracture with significant displacement.  This needs operative fixation.  I will wait until next week to allow the overlying abrasions to heal a bit before surgery as they pose an increased risk for poor wound healing and infection.  The risks, benefits and alternatives to surgery were discussed with the patient at great length.  These include bleeding, infection, vessel/nerve damage, pain, numbness, tingling, complex regional pain syndrome, stiffness, patellofemoral arthritis, extension lag, hardware/surgical failure, need for further surgery, malunion, nonunion, DVT, PE, arthritis and death.  Patient states an understanding and wishes to proceed with surgery.   All questions were answered.  No guarantees were implied or stated.  Informed consent was obtained.        Patrice Iglesias MD  Orthopedics

## 2020-09-29 ENCOUNTER — OFFICE VISIT (OUTPATIENT)
Dept: ORTHOPEDICS | Facility: CLINIC | Age: 60
End: 2020-09-29
Payer: COMMERCIAL

## 2020-09-29 VITALS
HEART RATE: 95 BPM | RESPIRATION RATE: 18 BRPM | SYSTOLIC BLOOD PRESSURE: 97 MMHG | DIASTOLIC BLOOD PRESSURE: 64 MMHG | TEMPERATURE: 97 F

## 2020-09-29 DIAGNOSIS — S82.032D CLOSED DISPLACED TRANSVERSE FRACTURE OF LEFT PATELLA WITH ROUTINE HEALING, SUBSEQUENT ENCOUNTER: Primary | ICD-10-CM

## 2020-09-29 PROCEDURE — 99499 UNLISTED E&M SERVICE: CPT | Mod: S$GLB,,, | Performed by: PHYSICIAN ASSISTANT

## 2020-09-29 PROCEDURE — 99999 PR PBB SHADOW E&M-EST. PATIENT-LVL V: CPT | Mod: PBBFAC,,, | Performed by: PHYSICIAN ASSISTANT

## 2020-09-29 PROCEDURE — 99999 PR PBB SHADOW E&M-EST. PATIENT-LVL V: ICD-10-PCS | Mod: PBBFAC,,, | Performed by: PHYSICIAN ASSISTANT

## 2020-09-29 PROCEDURE — 99499 NO LOS: ICD-10-PCS | Mod: S$GLB,,, | Performed by: PHYSICIAN ASSISTANT

## 2020-09-29 RX ORDER — SULFAMETHOXAZOLE AND TRIMETHOPRIM 800; 160 MG/1; MG/1
1 TABLET ORAL 2 TIMES DAILY
Qty: 8 TABLET | Refills: 0 | Status: SHIPPED | OUTPATIENT
Start: 2020-09-29 | End: 2020-10-03

## 2020-09-29 NOTE — PROGRESS NOTES
HPI: 60 yo F PMHX of HTN, DM( last A1C 8.3 )fell onto left knee from standing height on 9/22/2020.  She was seen initially in the [facility] ED, diagnosed with a displaced left patella fracture and placed into to knee immobilizer. She followed up in clinic on 09/24/2020, but was found to have abrasions to her knee. She is here for a wound check in preporation for surgery.  No other injuries/complaints.  No prior history of injury to that knee.    Past Medical History:   Diagnosis Date    Abdominal pain     Diabetes     GERD (gastroesophageal reflux disease)     Hx of colonic polyps     Hyperlipidemia     Hypertension     Nausea and vomiting        Past Surgical History:   Procedure Laterality Date    CRANIOTOMY  2013    DEBRIDEMENT TENNIS ELBOW      hip ascites      TRIGGER FINGER RELEASE         Review of patient's allergies indicates:   Allergen Reactions    Codeine        Current Outpatient Medications   Medication Sig    amoxicillin (AMOXIL) 875 MG tablet TK 1 T PO BID    aspirin (ECOTRIN) 81 MG EC tablet Take 81 mg by mouth once daily.    blood sugar diagnostic (ONETOUCH ULTRA BLUE TEST STRIP) Strp Use to test blood sugar 5 times a day    DEXCOM G6 SENSOR Priscilla TEST BS FID    DEXCOM G6 TRANSMITTER Priscilla USE TO CHECK FID    estradioL (ESTRACE) 1 MG tablet TK 1 T PO QD    fish oil-omega-3 fatty acids 300-1,000 mg capsule Take 2 g by mouth once daily.    ibuprofen (ADVIL,MOTRIN) 600 MG tablet Take 1 tablet (600 mg total) by mouth every 6 (six) hours as needed for Pain.    insulin glargine (LANTUS) 100 unit/mL injection Inject 35 Units into the skin once daily. 30 units nightly     insulin lispro (HUMALOG KWIKPEN INSULIN) 100 unit/mL pen INJECT 14 UNITS UNDER THE SKIN WITH EVERY MEAL    insulin regular 100 unit/mL Inj injection Inject into the skin 3 (three) times daily before meals. 8 units per meal    insulin syringe-needle U-100 1/2 mL 30 gauge Syrg USE ONE SYRINGE PER DAY UTD     levothyroxine (SYNTHROID) 75 MCG tablet TAKE 1 TABLET BY MOUTH EVERY DAY    lisinopril (PRINIVIL,ZESTRIL) 2.5 MG tablet Take 2.5 mg by mouth once daily.    LOTEMAX SM 0.38 % DrpG INT 1 GTT IN OU BID UTD    medroxyPROGESTERone (PROVERA) 2.5 MG tablet TK 1 T PO QD    metformin (GLUCOPHAGE) 500 MG tablet Take 500 mg by mouth daily with breakfast.    oxyCODONE-acetaminophen (PERCOCET) 5-325 mg per tablet Take 1 tablet by mouth every 8 (eight) hours as needed for Pain.    pantoprazole (PROTONIX) 40 MG tablet TAKE 1 TABLET(40 MG) BY MOUTH EVERY DAY    RESTASIS 0.05 % ophthalmic emulsion INSTILL ONE DROP IN OU BID    simvastatin (ZOCOR) 40 MG tablet Take 40 mg by mouth every evening.    sulfamethoxazole-trimethoprim 800-160mg (BACTRIM DS) 800-160 mg Tab TK 1 T PO BID    sulfamethoxazole-trimethoprim 800-160mg (BACTRIM DS) 800-160 mg Tab Take 1 tablet by mouth 2 (two) times daily. for 7 days    trospium (SANCTURA) 20 mg Tab tablet TK 1 T PO BID 1 HOUR B MEALS    venlafaxine (EFFEXOR-XR) 150 MG Cp24 Take 225 mg by mouth once daily.      No current facility-administered medications for this visit.      Facility-Administered Medications Ordered in Other Visits   Medication    lidocaine HCL 10 mg/ml (1%) injection 5 mL    triamcinolone acetonide injection 40 mg     Family History     Problem Relation (Age of Onset)    Colon cancer Father (77)        Tobacco Use    Smoking status: Former Smoker   Substance and Sexual Activity    Alcohol use: No    Drug use: No    Sexual activity: Not on file     ROS   ROS:  Patient denies constitutional symptoms, cardiac symptoms, respiratory symptoms, GI symptoms.  The remainder of the musculoskeletal ROS is included in the HPI.    Objective:     PE:    AA&O x 4.  NAD  HEENT:  NCAT, sclera nonicteric  Lungs:  Respirations are equal and unlabored.  CV:  2+ bilateral upper and lower extremity pulses.  Skin:  Intact throughout.    MS:  Left knee with anterior abrasion with good  bleeding base at inferior portion of patellar tendon, superficial.  2 small areas over patella with partial scab.  No surrounding erythema.  Moderate joint effusion.  NVI distal.    Assessment/Plan:   Discussed treatment options with patient. Operative vs non-operative treatment discussed with patient. Recommend operative fixation per . Patient agreed. Plan is for ORIF on 10/05/2020. Patient informed to be NPO after midnight.      - continue brace in extension  - willc ontionue bactrim.   - rest ice and elevation to reduce swelling.    Discussed proper and consistent elevation of lower extremities, above the level of the heart, while at rest, to help control/improve edema and decrease pain.  - Pain medication: refill needed, no    - Discussed pain medication refill policy.      - consents signed, previously  - lab, chest x-ray and EKG ordered previously , results in chart  - not taking blood thinners       -Discussed COVID19 with the patient, they are aware of our current policies and procedures, were given the option of delaying surgery, and they elect to proceed. Covid testing to be done 48 hours prior to surgery.  Appointment made,     Pre, doris, and post operative procedure and expectations were discussed.  Questions were answered. The patient has been educated and is ready to proceed with surgery.  Approximately 30 minutes was spent discussing surgical outcomes, plans, procedures, pre, doris, and post operative expectations and care. The risks, benefits and alternatives to surgery were discussed with the patient at great length.  These include bleeding, infection, vessel/nerve damage, pain, numbness, tingling, complex regional pain syndrome, hardware/surgical failure, need for further surgery, malunion, nonunion, DVT, PE, arthritis and death. He also understands that the risks of surgery may be greater for some patients due to health or lifestyle issues, such as a current condition or a history of  heart disease, obesity, clotting disorders, recurrent infections, smoking, sedentary lifestyle, or noncompliance with medications, therapy, or followup. The degree of the increased risk is hard to estimate with any degree of precision.  Patient states an understanding and wishes to proceed with surgery.   All questions were answered.  No guarantees were implied or stated.  Informed consent was obtained  The patient will contact us if the have any questions, concerns, and changes in their medical condition prior to surgery.

## 2020-10-02 ENCOUNTER — ANESTHESIA EVENT (OUTPATIENT)
Dept: SURGERY | Facility: HOSPITAL | Age: 60
End: 2020-10-02
Payer: COMMERCIAL

## 2020-10-02 ENCOUNTER — TELEPHONE (OUTPATIENT)
Dept: ORTHOPEDICS | Facility: CLINIC | Age: 60
End: 2020-10-02

## 2020-10-02 NOTE — TELEPHONE ENCOUNTER
Spoke with pt.    Reminded her of her COVID appointment tomorrow.   Advised pt NPO after midnight on Sunday.  Arrival time of 5 am Monday at [facility].   Pt verbalized understanding

## 2020-10-02 NOTE — ANESTHESIA PREPROCEDURE EVALUATION
10/02/2020  Bhavna Hancock is a 59 y.o., female presents for ORIF of Left Patella fracture.      Patient Active Problem List   Diagnosis    History of adenomatous polyp of colon    Chronic pain of both shoulders    Bilateral rotator cuff dysfunction    Closed displaced transverse fracture of left patella       Past Surgical History:   Procedure Laterality Date    CRANIOTOMY  2013    DEBRIDEMENT TENNIS ELBOW      hip ascites      TRIGGER FINGER RELEASE          Tobacco Use:  The patient  reports that she has quit smoking. She does not have any smokeless tobacco history on file.     No results found for this or any previous visit.          Lab Results   Component Value Date    WBC 6.67 10/01/2018    HGB 9.9 (L) 10/01/2018    HCT 32.4 (L) 10/01/2018    MCV 82 10/01/2018     10/01/2018     BMP  Lab Results   Component Value Date     05/13/2015    K 4.3 05/13/2015     05/13/2015    CO2 22 (L) 05/13/2015    BUN 10 05/13/2015    CREATININE 1.0 05/13/2015    CALCIUM 9.7 05/13/2015    ANIONGAP 11 05/13/2015     (H) 05/13/2015       No results found for this or any previous visit.          Anesthesia Evaluation    I have reviewed the Patient Summary Reports.    I have reviewed the Nursing Notes. I have reviewed the NPO Status.      Review of Systems  Anesthesia Hx:  No problems with previous Anesthesia    Social:  No Alcohol Use, Non-Smoker    Hematology/Oncology:     Oncology Normal    -- Anemia:   EENT/Dental:EENT/Dental Normal   Pulmonary:  Pulmonary Normal    Renal/:  Renal/ Normal     Musculoskeletal:  Musculoskeletal Normal    Neurological:  Neurology Normal    Dermatological:  Skin Normal    Psych:  Psychiatric Normal           Physical Exam  General:  Well nourished    Airway/Jaw/Neck:  Airway Findings: Mouth Opening: Normal Tongue: Normal  General Airway Assessment:  Adult  Mallampati: III  Improves to II with phonation.  TM Distance: Normal, at least 6 cm  Jaw/Neck Findings:     Neck ROM: Normal ROM      Dental:  Dental Findings: In tact   Chest/Lungs:  Chest/Lungs Findings: Clear to auscultation, Normal Respiratory Rate     Heart/Vascular:  Heart Findings: Rate: Normal  Rhythm: Regular Rhythm  Sounds: Normal        Mental Status:  Mental Status Findings:  Cooperative, Alert and Oriented         Anesthesia Plan  Type of Anesthesia, risks & benefits discussed:  Anesthesia Type:  general  Patient's Preference:   Intra-op Monitoring Plan: standard ASA monitors  Intra-op Monitoring Plan Comments:   Post Op Pain Control Plan: per primary service following discharge from PACU, IV/PO Opioids PRN, multimodal analgesia and peripheral nerve block  Post Op Pain Control Plan Comments:   Induction:   IV  Beta Blocker:  Patient is not currently on a Beta-Blocker (No further documentation required).       Informed Consent: Patient understands risks and agrees with Anesthesia plan.  Questions answered. Anesthesia consent signed with patient.  ASA Score: 3     Day of Surgery Review of History & Physical: I have interviewed and examined the patient. I have reviewed the patient's H&P dated:            Ready For Surgery From Anesthesia Perspective.

## 2020-10-03 ENCOUNTER — LAB VISIT (OUTPATIENT)
Dept: SPORTS MEDICINE | Facility: CLINIC | Age: 60
End: 2020-10-03
Payer: COMMERCIAL

## 2020-10-03 DIAGNOSIS — Z01.818 PRE-OP TESTING: ICD-10-CM

## 2020-10-03 PROCEDURE — U0003 INFECTIOUS AGENT DETECTION BY NUCLEIC ACID (DNA OR RNA); SEVERE ACUTE RESPIRATORY SYNDROME CORONAVIRUS 2 (SARS-COV-2) (CORONAVIRUS DISEASE [COVID-19]), AMPLIFIED PROBE TECHNIQUE, MAKING USE OF HIGH THROUGHPUT TECHNOLOGIES AS DESCRIBED BY CMS-2020-01-R: HCPCS

## 2020-10-04 LAB — SARS-COV-2 RNA RESP QL NAA+PROBE: NOT DETECTED

## 2020-10-05 ENCOUNTER — HOSPITAL ENCOUNTER (OUTPATIENT)
Facility: HOSPITAL | Age: 60
Discharge: HOME | End: 2020-10-05
Attending: ORTHOPAEDIC SURGERY | Admitting: ORTHOPAEDIC SURGERY
Payer: COMMERCIAL

## 2020-10-05 ENCOUNTER — ANESTHESIA (OUTPATIENT)
Dept: SURGERY | Facility: HOSPITAL | Age: 60
End: 2020-10-05
Payer: COMMERCIAL

## 2020-10-05 VITALS
WEIGHT: 170 LBS | RESPIRATION RATE: 18 BRPM | BODY MASS INDEX: 28.32 KG/M2 | SYSTOLIC BLOOD PRESSURE: 154 MMHG | OXYGEN SATURATION: 100 % | DIASTOLIC BLOOD PRESSURE: 84 MMHG | TEMPERATURE: 98 F | HEIGHT: 65 IN | HEART RATE: 71 BPM

## 2020-10-05 DIAGNOSIS — S82.032A CLOSED DISPLACED TRANSVERSE FRACTURE OF LEFT PATELLA, INITIAL ENCOUNTER: ICD-10-CM

## 2020-10-05 DIAGNOSIS — S82.032D CLOSED DISPLACED TRANSVERSE FRACTURE OF LEFT PATELLA WITH ROUTINE HEALING, SUBSEQUENT ENCOUNTER: Primary | ICD-10-CM

## 2020-10-05 DIAGNOSIS — Z01.818 PREOPERATIVE CLEARANCE: ICD-10-CM

## 2020-10-05 LAB
POCT GLUCOSE: 150 MG/DL (ref 70–110)
POCT GLUCOSE: 216 MG/DL (ref 70–110)

## 2020-10-05 PROCEDURE — 27201423 OPTIME MED/SURG SUP & DEVICES STERILE SUPPLY: Performed by: ORTHOPAEDIC SURGERY

## 2020-10-05 PROCEDURE — D9220A PRA ANESTHESIA: ICD-10-PCS | Mod: ,,, | Performed by: ANESTHESIOLOGY

## 2020-10-05 PROCEDURE — 71000016 HC POSTOP RECOV ADDL HR: Performed by: ORTHOPAEDIC SURGERY

## 2020-10-05 PROCEDURE — 25000003 PHARM REV CODE 250: Performed by: ANESTHESIOLOGY

## 2020-10-05 PROCEDURE — 36000711: Performed by: ORTHOPAEDIC SURGERY

## 2020-10-05 PROCEDURE — 71000044 HC DOSC ROUTINE RECOVERY FIRST HOUR: Performed by: ORTHOPAEDIC SURGERY

## 2020-10-05 PROCEDURE — 37000009 HC ANESTHESIA EA ADD 15 MINS: Performed by: ORTHOPAEDIC SURGERY

## 2020-10-05 PROCEDURE — 37000008 HC ANESTHESIA 1ST 15 MINUTES: Performed by: ORTHOPAEDIC SURGERY

## 2020-10-05 PROCEDURE — 25000003 PHARM REV CODE 250: Performed by: STUDENT IN AN ORGANIZED HEALTH CARE EDUCATION/TRAINING PROGRAM

## 2020-10-05 PROCEDURE — 76942 ECHO GUIDE FOR BIOPSY: CPT | Mod: 59 | Performed by: STUDENT IN AN ORGANIZED HEALTH CARE EDUCATION/TRAINING PROGRAM

## 2020-10-05 PROCEDURE — 63600175 PHARM REV CODE 636 W HCPCS: Performed by: ANESTHESIOLOGY

## 2020-10-05 PROCEDURE — 76942: ICD-10-PCS | Mod: 26,,, | Performed by: ANESTHESIOLOGY

## 2020-10-05 PROCEDURE — D9220A PRA ANESTHESIA: Mod: ,,, | Performed by: ANESTHESIOLOGY

## 2020-10-05 PROCEDURE — 27524 TREAT KNEECAP FRACTURE: CPT | Mod: LT,,, | Performed by: ORTHOPAEDIC SURGERY

## 2020-10-05 PROCEDURE — 64448 NJX AA&/STRD FEM NRV NFS IMG: CPT | Mod: 59,LT,, | Performed by: ANESTHESIOLOGY

## 2020-10-05 PROCEDURE — C1769 GUIDE WIRE: HCPCS | Performed by: ORTHOPAEDIC SURGERY

## 2020-10-05 PROCEDURE — 25000003 PHARM REV CODE 250: Performed by: ORTHOPAEDIC SURGERY

## 2020-10-05 PROCEDURE — 93010 EKG 12-LEAD: ICD-10-PCS | Mod: ,,, | Performed by: INTERNAL MEDICINE

## 2020-10-05 PROCEDURE — 93010 ELECTROCARDIOGRAM REPORT: CPT | Mod: ,,, | Performed by: INTERNAL MEDICINE

## 2020-10-05 PROCEDURE — S0028 INJECTION, FAMOTIDINE, 20 MG: HCPCS | Performed by: ANESTHESIOLOGY

## 2020-10-05 PROCEDURE — 64448 NJX AA&/STRD FEM NRV NFS IMG: CPT | Performed by: STUDENT IN AN ORGANIZED HEALTH CARE EDUCATION/TRAINING PROGRAM

## 2020-10-05 PROCEDURE — 63600175 PHARM REV CODE 636 W HCPCS: Performed by: STUDENT IN AN ORGANIZED HEALTH CARE EDUCATION/TRAINING PROGRAM

## 2020-10-05 PROCEDURE — 36000710: Performed by: ORTHOPAEDIC SURGERY

## 2020-10-05 PROCEDURE — 63600175 PHARM REV CODE 636 W HCPCS: Performed by: ORTHOPAEDIC SURGERY

## 2020-10-05 PROCEDURE — 64448: ICD-10-PCS | Mod: 59,LT,, | Performed by: ANESTHESIOLOGY

## 2020-10-05 PROCEDURE — C1751 CATH, INF, PER/CENT/MIDLINE: HCPCS | Performed by: STUDENT IN AN ORGANIZED HEALTH CARE EDUCATION/TRAINING PROGRAM

## 2020-10-05 PROCEDURE — 71000015 HC POSTOP RECOV 1ST HR: Performed by: ORTHOPAEDIC SURGERY

## 2020-10-05 PROCEDURE — 76942 ECHO GUIDE FOR BIOPSY: CPT | Mod: 26,,, | Performed by: ANESTHESIOLOGY

## 2020-10-05 PROCEDURE — 82962 GLUCOSE BLOOD TEST: CPT | Performed by: ORTHOPAEDIC SURGERY

## 2020-10-05 PROCEDURE — 27524 PR OPEN RX PATELLA FX: ICD-10-PCS | Mod: LT,,, | Performed by: ORTHOPAEDIC SURGERY

## 2020-10-05 PROCEDURE — C1713 ANCHOR/SCREW BN/BN,TIS/BN: HCPCS | Performed by: ORTHOPAEDIC SURGERY

## 2020-10-05 PROCEDURE — 93005 ELECTROCARDIOGRAM TRACING: CPT | Mod: 59

## 2020-10-05 DEVICE — IMPLANTABLE DEVICE: Type: IMPLANTABLE DEVICE | Site: PATELLA | Status: FUNCTIONAL

## 2020-10-05 DEVICE — CABLE 1.0 W/CRIMP: Type: IMPLANTABLE DEVICE | Site: PATELLA | Status: FUNCTIONAL

## 2020-10-05 DEVICE — SCREW CANN 4.0MM 32MM: Type: IMPLANTABLE DEVICE | Site: PATELLA | Status: FUNCTIONAL

## 2020-10-05 RX ORDER — CLINDAMYCIN PHOSPHATE 900 MG/50ML
900 INJECTION, SOLUTION INTRAVENOUS
Status: DISCONTINUED | OUTPATIENT
Start: 2020-10-05 | End: 2020-10-12 | Stop reason: HOSPADM

## 2020-10-05 RX ORDER — ROCURONIUM BROMIDE 10 MG/ML
INJECTION, SOLUTION INTRAVENOUS
Status: DISCONTINUED | OUTPATIENT
Start: 2020-10-05 | End: 2020-10-05

## 2020-10-05 RX ORDER — ROPIVACAINE HYDROCHLORIDE 5 MG/ML
INJECTION, SOLUTION EPIDURAL; INFILTRATION; PERINEURAL
Status: DISCONTINUED | OUTPATIENT
Start: 2020-10-05 | End: 2020-10-05

## 2020-10-05 RX ORDER — MIDAZOLAM HYDROCHLORIDE 1 MG/ML
0.5 INJECTION INTRAMUSCULAR; INTRAVENOUS
Status: DISCONTINUED | OUTPATIENT
Start: 2020-10-05 | End: 2020-10-12 | Stop reason: HOSPADM

## 2020-10-05 RX ORDER — CEFAZOLIN SODIUM 1 G/3ML
2 INJECTION, POWDER, FOR SOLUTION INTRAMUSCULAR; INTRAVENOUS
Status: DISCONTINUED | OUTPATIENT
Start: 2020-10-05 | End: 2020-10-12 | Stop reason: HOSPADM

## 2020-10-05 RX ORDER — ONDANSETRON 8 MG/1
8 TABLET, ORALLY DISINTEGRATING ORAL EVERY 8 HOURS PRN
Status: DISCONTINUED | OUTPATIENT
Start: 2020-10-05 | End: 2020-10-12 | Stop reason: HOSPADM

## 2020-10-05 RX ORDER — ACETAMINOPHEN 10 MG/ML
1000 INJECTION, SOLUTION INTRAVENOUS ONCE
Status: CANCELLED | OUTPATIENT
Start: 2020-10-05 | End: 2020-10-05

## 2020-10-05 RX ORDER — ACETAMINOPHEN 325 MG/1
650 TABLET ORAL EVERY 4 HOURS PRN
Status: DISCONTINUED | OUTPATIENT
Start: 2020-10-05 | End: 2020-10-05

## 2020-10-05 RX ORDER — NEOSTIGMINE METHYLSULFATE 0.5 MG/ML
INJECTION, SOLUTION INTRAVENOUS
Status: DISCONTINUED | OUTPATIENT
Start: 2020-10-05 | End: 2020-10-05

## 2020-10-05 RX ORDER — ASPIRIN 81 MG/1
81 TABLET ORAL 2 TIMES DAILY
Qty: 84 TABLET | Refills: 0 | Status: SHIPPED | OUTPATIENT
Start: 2020-10-05 | End: 2020-11-16

## 2020-10-05 RX ORDER — METHOCARBAMOL 750 MG/1
750 TABLET, FILM COATED ORAL 3 TIMES DAILY PRN
Qty: 21 TABLET | Refills: 0 | Status: SHIPPED | OUTPATIENT
Start: 2020-10-05 | End: 2020-10-20

## 2020-10-05 RX ORDER — FENTANYL CITRATE 50 UG/ML
INJECTION, SOLUTION INTRAMUSCULAR; INTRAVENOUS
Status: DISCONTINUED | OUTPATIENT
Start: 2020-10-05 | End: 2020-10-05

## 2020-10-05 RX ORDER — ONDANSETRON 2 MG/ML
4 INJECTION INTRAMUSCULAR; INTRAVENOUS DAILY PRN
Status: DISCONTINUED | OUTPATIENT
Start: 2020-10-05 | End: 2020-10-12 | Stop reason: HOSPADM

## 2020-10-05 RX ORDER — CLINDAMYCIN PHOSPHATE 900 MG/50ML
INJECTION, SOLUTION INTRAVENOUS
Status: DISCONTINUED | OUTPATIENT
Start: 2020-10-05 | End: 2020-10-05

## 2020-10-05 RX ORDER — OXYCODONE HYDROCHLORIDE 5 MG/1
5 TABLET ORAL EVERY 4 HOURS PRN
Status: DISCONTINUED | OUTPATIENT
Start: 2020-10-05 | End: 2020-10-12 | Stop reason: HOSPADM

## 2020-10-05 RX ORDER — ONDANSETRON 2 MG/ML
INJECTION INTRAMUSCULAR; INTRAVENOUS
Status: DISCONTINUED | OUTPATIENT
Start: 2020-10-05 | End: 2020-10-05

## 2020-10-05 RX ORDER — SCOLOPAMINE TRANSDERMAL SYSTEM 1 MG/1
1 PATCH, EXTENDED RELEASE TRANSDERMAL
Status: DISCONTINUED | OUTPATIENT
Start: 2020-10-05 | End: 2020-10-12 | Stop reason: HOSPADM

## 2020-10-05 RX ORDER — ACETAMINOPHEN 500 MG
1000 TABLET ORAL EVERY 6 HOURS
Status: CANCELLED | OUTPATIENT
Start: 2020-10-05 | End: 2020-10-08

## 2020-10-05 RX ORDER — MIDAZOLAM HYDROCHLORIDE 1 MG/ML
INJECTION INTRAMUSCULAR; INTRAVENOUS
Status: DISCONTINUED | OUTPATIENT
Start: 2020-10-05 | End: 2020-10-05

## 2020-10-05 RX ORDER — ACETAMINOPHEN 500 MG
1000 TABLET ORAL EVERY 6 HOURS
Status: ON HOLD | COMMUNITY
Start: 2020-10-05 | End: 2021-02-02 | Stop reason: HOSPADM

## 2020-10-05 RX ORDER — OXYCODONE HYDROCHLORIDE 5 MG/1
10 TABLET ORAL EVERY 4 HOURS PRN
Status: DISCONTINUED | OUTPATIENT
Start: 2020-10-05 | End: 2020-10-12 | Stop reason: HOSPADM

## 2020-10-05 RX ORDER — PHENYLEPHRINE HYDROCHLORIDE 10 MG/ML
INJECTION INTRAVENOUS
Status: DISCONTINUED | OUTPATIENT
Start: 2020-10-05 | End: 2020-10-05

## 2020-10-05 RX ORDER — FENTANYL CITRATE 50 UG/ML
25 INJECTION, SOLUTION INTRAMUSCULAR; INTRAVENOUS EVERY 5 MIN PRN
Status: DISCONTINUED | OUTPATIENT
Start: 2020-10-05 | End: 2020-10-12 | Stop reason: HOSPADM

## 2020-10-05 RX ORDER — PROPOFOL 10 MG/ML
VIAL (ML) INTRAVENOUS
Status: DISCONTINUED | OUTPATIENT
Start: 2020-10-05 | End: 2020-10-05

## 2020-10-05 RX ORDER — CELECOXIB 200 MG/1
200 CAPSULE ORAL DAILY
Status: DISCONTINUED | OUTPATIENT
Start: 2020-10-06 | End: 2020-10-12 | Stop reason: HOSPADM

## 2020-10-05 RX ORDER — SODIUM CHLORIDE 9 MG/ML
INJECTION, SOLUTION INTRAVENOUS CONTINUOUS PRN
Status: DISCONTINUED | OUTPATIENT
Start: 2020-10-05 | End: 2020-10-05

## 2020-10-05 RX ORDER — OXYCODONE HYDROCHLORIDE 5 MG/1
5 TABLET ORAL EVERY 6 HOURS PRN
Qty: 20 TABLET | Refills: 0 | Status: ON HOLD | OUTPATIENT
Start: 2020-10-05 | End: 2020-11-25 | Stop reason: HOSPADM

## 2020-10-05 RX ORDER — LIDOCAINE HYDROCHLORIDE 10 MG/ML
1 INJECTION, SOLUTION EPIDURAL; INFILTRATION; INTRACAUDAL; PERINEURAL ONCE
Status: DISCONTINUED | OUTPATIENT
Start: 2020-10-05 | End: 2020-10-12 | Stop reason: HOSPADM

## 2020-10-05 RX ORDER — MUPIROCIN 20 MG/G
OINTMENT TOPICAL
Status: DISCONTINUED | OUTPATIENT
Start: 2020-10-05 | End: 2020-10-12 | Stop reason: HOSPADM

## 2020-10-05 RX ORDER — ACETAMINOPHEN 10 MG/ML
INJECTION, SOLUTION INTRAVENOUS
Status: DISCONTINUED | OUTPATIENT
Start: 2020-10-05 | End: 2020-10-05

## 2020-10-05 RX ORDER — KETAMINE HCL IN 0.9 % NACL 50 MG/5 ML
SYRINGE (ML) INTRAVENOUS
Status: DISCONTINUED | OUTPATIENT
Start: 2020-10-05 | End: 2020-10-05

## 2020-10-05 RX ORDER — CELECOXIB 200 MG/1
200 CAPSULE ORAL DAILY
Qty: 14 CAPSULE | Refills: 0 | Status: SHIPPED | OUTPATIENT
Start: 2020-10-05 | End: 2020-10-19

## 2020-10-05 RX ORDER — OXYCODONE HYDROCHLORIDE 5 MG/1
5 TABLET ORAL
Status: DISCONTINUED | OUTPATIENT
Start: 2020-10-05 | End: 2020-10-12 | Stop reason: HOSPADM

## 2020-10-05 RX ORDER — FAMOTIDINE 10 MG/ML
INJECTION INTRAVENOUS
Status: DISCONTINUED | OUTPATIENT
Start: 2020-10-05 | End: 2020-10-05

## 2020-10-05 RX ORDER — ROPIVACAINE HYDROCHLORIDE 2 MG/ML
8 INJECTION, SOLUTION EPIDURAL; INFILTRATION; PERINEURAL CONTINUOUS
Status: DISCONTINUED | OUTPATIENT
Start: 2020-10-05 | End: 2020-10-05

## 2020-10-05 RX ORDER — SODIUM CHLORIDE 0.9 % (FLUSH) 0.9 %
10 SYRINGE (ML) INJECTION
Status: DISCONTINUED | OUTPATIENT
Start: 2020-10-05 | End: 2020-10-12 | Stop reason: HOSPADM

## 2020-10-05 RX ORDER — CEFAZOLIN SODIUM 1 G/3ML
INJECTION, POWDER, FOR SOLUTION INTRAMUSCULAR; INTRAVENOUS
Status: DISCONTINUED | OUTPATIENT
Start: 2020-10-05 | End: 2020-10-05

## 2020-10-05 RX ORDER — VANCOMYCIN HYDROCHLORIDE 1 G/20ML
INJECTION, POWDER, LYOPHILIZED, FOR SOLUTION INTRAVENOUS
Status: DISCONTINUED | OUTPATIENT
Start: 2020-10-05 | End: 2020-10-05 | Stop reason: HOSPADM

## 2020-10-05 RX ORDER — LIDOCAINE HCL/PF 100 MG/5ML
SYRINGE (ML) INTRAVENOUS
Status: DISCONTINUED | OUTPATIENT
Start: 2020-10-05 | End: 2020-10-05

## 2020-10-05 RX ORDER — SUCCINYLCHOLINE CHLORIDE 20 MG/ML
INJECTION INTRAMUSCULAR; INTRAVENOUS
Status: DISCONTINUED | OUTPATIENT
Start: 2020-10-05 | End: 2020-10-05

## 2020-10-05 RX ORDER — SODIUM CHLORIDE 9 MG/ML
INJECTION, SOLUTION INTRAVENOUS CONTINUOUS
Status: DISCONTINUED | OUTPATIENT
Start: 2020-10-05 | End: 2020-10-12 | Stop reason: HOSPADM

## 2020-10-05 RX ORDER — CELECOXIB 200 MG/1
400 CAPSULE ORAL ONCE
Status: COMPLETED | OUTPATIENT
Start: 2020-10-05 | End: 2020-10-05

## 2020-10-05 RX ORDER — SULFAMETHOXAZOLE AND TRIMETHOPRIM 800; 160 MG/1; MG/1
1 TABLET ORAL 2 TIMES DAILY
Qty: 20 TABLET | Refills: 0 | Status: SHIPPED | OUTPATIENT
Start: 2020-10-05 | End: 2020-10-15

## 2020-10-05 RX ADMIN — FENTANYL CITRATE 50 MCG: 50 INJECTION INTRAMUSCULAR; INTRAVENOUS at 06:10

## 2020-10-05 RX ADMIN — CEFAZOLIN 2 G: 330 INJECTION, POWDER, FOR SOLUTION INTRAMUSCULAR; INTRAVENOUS at 07:10

## 2020-10-05 RX ADMIN — ROCURONIUM BROMIDE 25 MG: 10 INJECTION, SOLUTION INTRAVENOUS at 07:10

## 2020-10-05 RX ADMIN — PROPOFOL 150 MG: 10 INJECTION, EMULSION INTRAVENOUS at 07:10

## 2020-10-05 RX ADMIN — OXYCODONE HYDROCHLORIDE 10 MG: 5 TABLET ORAL at 02:10

## 2020-10-05 RX ADMIN — FENTANYL CITRATE 50 MCG: 50 INJECTION, SOLUTION INTRAMUSCULAR; INTRAVENOUS at 07:10

## 2020-10-05 RX ADMIN — SODIUM CHLORIDE: 0.9 INJECTION, SOLUTION INTRAVENOUS at 05:10

## 2020-10-05 RX ADMIN — NEOSTIGMINE METHYLSULFATE 5 MG: 0.5 INJECTION INTRAVENOUS at 09:10

## 2020-10-05 RX ADMIN — ROCURONIUM BROMIDE 5 MG: 10 INJECTION, SOLUTION INTRAVENOUS at 07:10

## 2020-10-05 RX ADMIN — MIDAZOLAM 2 MG: 1 INJECTION INTRAMUSCULAR; INTRAVENOUS at 06:10

## 2020-10-05 RX ADMIN — FENTANYL CITRATE 50 MCG: 50 INJECTION, SOLUTION INTRAMUSCULAR; INTRAVENOUS at 08:10

## 2020-10-05 RX ADMIN — SUCCINYLCHOLINE CHLORIDE 140 MG: 20 INJECTION, SOLUTION INTRAMUSCULAR; INTRAVENOUS at 07:10

## 2020-10-05 RX ADMIN — SODIUM CHLORIDE: 0.9 INJECTION, SOLUTION INTRAVENOUS at 06:10

## 2020-10-05 RX ADMIN — Medication 10 MG: at 08:10

## 2020-10-05 RX ADMIN — ROPIVACAINE HYDROCHLORIDE: 2 INJECTION, SOLUTION EPIDURAL; INFILTRATION at 02:10

## 2020-10-05 RX ADMIN — ACETAMINOPHEN 1000 MG: 10 INJECTION, SOLUTION INTRAVENOUS at 08:10

## 2020-10-05 RX ADMIN — MUPIROCIN: 20 OINTMENT TOPICAL at 06:10

## 2020-10-05 RX ADMIN — ONDANSETRON 4 MG: 2 INJECTION, SOLUTION INTRAMUSCULAR; INTRAVENOUS at 07:10

## 2020-10-05 RX ADMIN — MIDAZOLAM HYDROCHLORIDE 1 MG: 1 INJECTION, SOLUTION INTRAMUSCULAR; INTRAVENOUS at 07:10

## 2020-10-05 RX ADMIN — SODIUM CHLORIDE, SODIUM GLUCONATE, SODIUM ACETATE, POTASSIUM CHLORIDE, MAGNESIUM CHLORIDE, SODIUM PHOSPHATE, DIBASIC, AND POTASSIUM PHOSPHATE: .53; .5; .37; .037; .03; .012; .00082 INJECTION, SOLUTION INTRAVENOUS at 07:10

## 2020-10-05 RX ADMIN — FAMOTIDINE 20 MG: 10 INJECTION, SOLUTION INTRAVENOUS at 07:10

## 2020-10-05 RX ADMIN — SCOPALAMINE 1 PATCH: 1 PATCH, EXTENDED RELEASE TRANSDERMAL at 07:10

## 2020-10-05 RX ADMIN — PHENYLEPHRINE HYDROCHLORIDE 100 MCG: 10 INJECTION INTRAVENOUS at 07:10

## 2020-10-05 RX ADMIN — ONDANSETRON 4 MG: 2 INJECTION INTRAMUSCULAR; INTRAVENOUS at 10:10

## 2020-10-05 RX ADMIN — CELECOXIB 400 MG: 200 CAPSULE ORAL at 12:10

## 2020-10-05 RX ADMIN — LIDOCAINE HYDROCHLORIDE 75 MG: 20 INJECTION, SOLUTION INTRAVENOUS at 07:10

## 2020-10-05 RX ADMIN — ONDANSETRON 8 MG: 8 TABLET, ORALLY DISINTEGRATING ORAL at 02:10

## 2020-10-05 RX ADMIN — INSULIN HUMAN 5 UNITS: 100 INJECTION, SOLUTION PARENTERAL at 07:10

## 2020-10-05 RX ADMIN — ROPIVACAINE HYDROCHLORIDE 75 MG: 5 INJECTION, SOLUTION EPIDURAL; INFILTRATION; PERINEURAL at 06:10

## 2020-10-05 RX ADMIN — Medication 20 MG: at 07:10

## 2020-10-05 RX ADMIN — ROCURONIUM BROMIDE 10 MG: 10 INJECTION, SOLUTION INTRAVENOUS at 08:10

## 2020-10-05 RX ADMIN — CLINDAMYCIN PHOSPHATE 900 MG: 18 INJECTION, SOLUTION INTRAVENOUS at 07:10

## 2020-10-05 NOTE — DISCHARGE INSTRUCTIONS
Patella Fracture  Your kneecap bone (patella) is broken (fractured). This causes pain, swelling, and sometimes bruising. Depending on how severe the fracture is, it may take about 6 to 12 weeks, or longer to heal. A knee brace (immobilizer), splint, or cast will be put on.     Home care  Follow these guidelines when caring for yourself at home:  · You will be given a splint, cast, or knee brace to keep your knee joint from moving. Unless you were told otherwise, use crutches or a walker. Dont put weight on the injured leg until your healthcare provider tells you to do so. You can rent crutches or a walker at many pharmacies and surgical or orthopedic supply stores.  · Keep your leg elevated to reduce pain and swelling. When sleeping, put a pillow under the injured leg. When sitting, support the injured leg so it is above your waist. This is very important during the first 2 days (48 hours).  · Put an ice pack on the injured area. Do this for 20 minutes every 1 to 2 hours the first day for pain relief. You can make an ice pack by wrapping a plastic bag of ice cubes in a thin towel. As the ice melts, be careful that the cast, splint, or brace doesnt get wet. You can place the ice pack directly over the splint, cast, or brace. Continue using the ice pack 3 to 4 times a day for the next 2 days. Then use the ice pack as needed to ease pain and swelling.  · Keep the cast, splint, or brace completely dry at all times. Bathe with your cast, splint, or brace out of the water. Protect it with a large plastic bag, rubber-banded at the top end. If a brace or fiberglass cast, splint, or brace gets wet, you can dry it with a hair dryer.  · You may use acetaminophen or ibuprofen to control pain, unless another pain medicine was prescribed. If you have chronic liver or kidney disease, talk with your healthcare provider before using these medicines. Also talk with your provider if youve had a stomach ulcer or gastrointestinal  bleeding.  · Your healthcare provider may tell you to do strengthening exercises once you can put weight on the injured leg. The exercises will limit bone and muscle loss.  · You may need surgery if the bone is pulled too far apart or is displaced.  · Its very important to follow up with your healthcare provider. This is because a kneecap fracture can cause long-term problems like arthritis, chronic pain, and weakness.  Follow-up care  Follow up with your healthcare provider, or as advised. This is to make sure the bone is healing the way it should.  X-rays may be taken. You will be told of any new findings that may affect your care.  When to seek medical advice  Call your healthcare provider right away if any of these occur:  · The plaster cast or splint becomes wet or soft  · The cast or splint cracks  · The fiberglass cast or splint stays wet for more than 24 hours  · Knee pain or tightness under the brace, splint, or cast gets worse  · Toes become swollen, cold, blue, numb, or tingly  · You cant move your toes or your leg becomes weak  · Skin around cast, splint, or brace becomes red  Date Last Reviewed: 2/1/2017  © 3558-3626 [employer]. [address] [place] [address]. All rights reserved. This information is not intended as a substitute for professional medical care. Always follow your healthcare professional's instructions.          Knee Immobilizer  A knee immobilizer is a type of brace used to provide support and limit movement of the knee. This will make you more comfortable as your injury heals.  Home care  · The knee brace should be worn whenever you are out of bed, unless told otherwise. You may wear it in bed while asleep for the first few nights or until the pain starts to go away. Otherwise, remove the brace at night to avoid muscle stiffness from lack of joint movement.  · You can open the hook-and-loop brace to dress, bathe, and apply ice or heat packs as directed.  Call  911  Call 911 if you have:  · Shortness of breath  · Chest pain  When to seek medical advice  Call your healthcare provider right away if any of these occur:  · Worsening pain in the knee  · Weakness, numbness, or tingling in the foot  · Increased swelling, redness or warmth of the knee joint  Date Last Reviewed: 11/21/2015 © 2000-2017 [employer]. [address] [place] [address]. All rights reserved. This information is not intended as a substitute for professional medical care. Always follow your healthcare professional's instructions.                Vacuum-Assisted Closure of a Wound  Vacuum-assisted closure (VAC) of a wound is a type of treatment to help wounds heal. Its also known as negative pressure wound therapy. During the treatment, a device lowers air pressure on the wound. This can help the wound heal more quickly.  Understanding the wound VAC system  A wound VAC system has several parts. A foam or gauze dressing is put directly on the wound. The dressing is changed every 24 to 72 hours. An adhesive film covers and seals the dressing and wound. A drainage tube leads from under the adhesive film and connects to a portable vacuum pump. This pump removes air pressure over the wound. It may do this constantly. Or it may do it in cycles. During the treatment, youll need to carry the portable pump everywhere you go.  Why wound VAC is used  You might need this therapy for a recent traumatic wound. Or you may need it for a chronic wound. This is a wound that does not heal the way it should over time. This can happen with wounds in people who have diabetes. You may need a wound VAC if youve had a recent skin graft. And you may need a wound VAC for a large wound. Large wounds can take a longer time to heal.  A wound vacuum system may help your wound heal more quickly by:  · Draining extra fluid from the wound  · Reducing swelling  · Reducing bacteria in the wound  · Keeping your wound  moist and warm  · Helping draw together wound edges  · Increasing blood flow to your wound  · Decreasing inflammation  Wound VAC offers some other advantages over other types of wound care. It may decrease your overall discomfort. The dressings usually need to be changed less often. And they may be easier to keep in place.  Risks of wound VAC  Wound VAC has some rare risks, such as:  · Bleeding (which may be severe)  · Wound infection  · An abnormal connection between the intestinal tract and the skin (enteric fistula)  Proper training in dressing changes can help reduce the risk for these complications. Also, your healthcare provider will carefully evaluate you to make sure you are a good candidate for the therapy. Certain problems can increase your risk for complications. These include:  · Exposed organs or blood vessels  · High risk of bleeding from another medical problem  · Wound infection  · Nearby bone infection  · Dead wound tissue  · Cancer tissue  · Fragile skin, such as from aging or longtime use of topical steroids  · Allergy to adhesive  · Very poor blood flow to your wound  · Wounds close to joints that may reopen because of movement  Your healthcare provider will discuss the risks that apply to you. Make sure to talk with him or her about all of your questions and concerns.  Getting ready for wound VAC  You likely wont need to do much to get ready for wound VAC. In some cases, you may need to wait a while before having this therapy. For example, your healthcare provider may first need to treat an infection in your wound. Dead or damaged tissue may also need to be removed from your wound.  You or a caregiver may need training on how to use the wound VAC device. This is done if you will be able to have your wound vacuum therapy at home. In other cases, you may need to have your wound vacuum therapy in a healthcare facility.  On the day of your procedure  A healthcare provider will cover your wound with  foam or gauze wound dressing. An adhesive film will be put over the dressing and wound. This seals the wound. The foam connects to a drainage tube, which leads to a vacuum pump. This pump is portable. When the pump is turned on, it draws fluid through the foam and out the drainage tubing. The pump may run constantly, or it may cycle off and on. Your exact setup will depend on the specific type of wound vacuum system that you use.  Managing your wound  You may need the dressing changed about once a day. You may need it changed more or less often, depending on your wound. You or your caregiver may be trained to do this at home. Or it may be done by a visiting healthcare provider. Your provider may prescribe a pain medicine. This is to prevent or reduce pain during the dressing change.  You will likely need to use the wound VAC system for several weeks or months. During this time, youll carry the portable pump everywhere you go.  Nutrition for wound healing  During this time, make sure you follow a healthy diet. This is needed so the wound can heal and to prevent infection. Your healthcare provider can tell you more about what to include in your diet during this time.  Follow up with your healthcare provider if you have a medical condition that led to your wound, such as diabetes. Your provider can help you prevent future wounds.  Follow-up care  Your healthcare provider will carefully keep track of your healing. Make sure to keep all follow-up appointments.  When to call your healthcare provider  Call your healthcare provider right away if you have any of these:  · Fever of 100.4°F (38.0°C) or higher  · Increased redness, swelling, or warmth around wound  · Increased pain  · Bright red blood or blood clots in tubing or the collection chamber of the vacuum   Date Last Reviewed: 2/1/2017  © 1350-6998 [employer]. [address] [place] [address], [place] [address]. All rights reserved. This information is not  intended as a substitute for professional medical care. Always follow your healthcare professional's instructions.      PATIENT INSTRUCTIONS  POST-ANESTHESIA    IMMEDIATELY FOLLOWING SURGERY:  Do not drive or operate machinery for the first twenty four hours after surgery.  Do not make any important decisions for twenty four hours after surgery or while taking narcotic pain medications or sedatives.  If you develop intractable nausea and vomiting or a severe headache please notify your doctor immediately.    FOLLOW-UP:  Please make an appointment with your surgeon as instructed. You do not need to follow up with anesthesia unless specifically instructed to do so.    WOUND CARE INSTRUCTIONS (if applicable):  Keep a dry clean dressing on the anesthesia/puncture wound site if there is drainage.  Once the wound has quit draining you may leave it open to air.  Generally you should leave the bandage intact for twenty four hours unless there is drainage.  If the epidural site drains for more than 36-48 hours please call the anesthesia department.    QUESTIONS?:  Please feel free to call your physician or the hospital  if you have any questions, and they will be happy to assist you.

## 2020-10-05 NOTE — PLAN OF CARE
Patient prepared for surgery.    Patient said she gets nauseated with Anesthesia meds.    Patient needs Site denise and update to H&P.

## 2020-10-05 NOTE — PROGRESS NOTES
I called EKG tech (Respiratory) to do EKG as ordered.    Dr Chow ordered Scopolamine patch and insulin for [id number]

## 2020-10-05 NOTE — PROGRESS NOTES
EKG completed.  Scopolamine patch applied.    Dr. Chow said he will given insulin in the OR. Regular insulin was not available in the 3 Pyxis machines on post op side.

## 2020-10-05 NOTE — OP NOTE
OP NOTE    DOS:  10/05/2020    Preop Dx: Transverse left patella fracture    Postop Dx: Transverse left patella fracture    Procedure: Open reduction internal fixation of left patella fracture    Surgeon: Patrice Iglesias M.D.    Asst:  Larry Orona M.D    Anesthesia: General endotracheal    EBL:  20 cc    IVF:  1500 cc crystalloid    Implants: Synthes 4.0 mm cannulated screws x2 with 1 mm cable    Specimens: None    Findings: Stable fixation    Dispo:  To PACU extubated/stable       Indications for Procedure:      59-year-old female fell on her left knee sustaining a transverse left patella fracture with significant displacement.  The patient also had abrasions across the area of the knee and I have given them time to heal with some prophylactic antibiotic.  Her date of injury was 09/22/2020.  The risks, benefits and alternatives to surgery discussed the patient at length prior to going the operating room.  This includes increased risk for wound healing problems infection given her high blood sugar.  Informed consent was obtained.    Procedure in Detail:    Patient was identified in preop holding area the site was marked.  Femoral catheter was placed by Anesthesia.  Patient was wheeled to the operating room and placed on the operating table in a supine position.  General endotracheal anesthesia was induced and preoperative antibiotics were administered to include Ancef and clindamycin.  The left lower extremities placed into a nonsterile tourniquet and prepped draped sterile fashion.  A time-out was undertaken to confirm patient, side, site, surgery, surgeon and the administration of preoperative antibiotics.  All agreed we proceeded.  The leg was exsanguinated the tourniquet was raised.    Made an anterior incision overlying the patella and dissected full-thickness skin flaps medial lateral.  I dissected down level the fracture site which was still significantly displaced.  I had to excise some attenuated  anterior retinaculum and elucidate the fracture edges.  I used several clamps to reduce the fracture into good position.    At this point I placed guidewires for two 4.0 mm cannulated screws, measured and then overdrilled.  I placed both screws getting good compression most notably on the lateral 1.  I then repaired the anterior retinaculum directly over the patella with 0 Vicryl suture.  I then placed a 1 mm cable in a figure-of-eight fashion through the screws and tensioned this several times to let the overlying soft tissue attenuate and get the cable down on bone.  After final tightening and confirmation of appropriate screw and cable placement I crimped and cut the cable.    At this point I repaired the cuts made in the patellar and quadriceps tendons for wire passage as well as the medial and lateral retinaculum with 0 Vicryl suture.  I imbricated soft tissue over the top of the grommet for crimping.  The tourniquet was let down hemostasis was obtained electrocautery.  Wound was copiously irrigated normal saline solution.  I closed the fascia with 0 Vicryl suture over 1 g of vancomycin powder.  Subcutaneous tissue was closed with 3-0 Vicryl suture and the skin with skin staples.  Given her very thin skin in the area and her prior abrasions I placed a Prevena incisional wound VAC to increase blood flow to the skin and healing potential.  Again a good suction seal.  I then placed her in a hinged knee brace locked in extension.    All instrument sponge counts were reported correct in the case.  There no complications.  The patient was extubated, awakened and taken to recovery room stable condition.    Plan for the patient:    I will allow her weight-bearing as tolerated locked in extension with a hinged knee brace.  She will return to clinic in 1 weeks time for removal of the incisional wound VAC and then a 2 weeks for staple removal.  I will begin bending the knee progressively at the 3-4 week denise.  She will  take 81 mg aspirin b.i.d.      Patrice Iglesias MD

## 2020-10-05 NOTE — INTERVAL H&P NOTE
No significant change.  Patient with high blood sugar which increases infection risk.  I spoke with her about the increased risk and the importance of acute BS control.  Wounds to anterior knee healing nicely but not completely healed.  Will circumvent them. To OR for ORIF patella.  The risks, benefits and alternatives to surgery were discussed with the patient at great length.  These include bleeding, infection, stiffness, vessel/nerve damage, pain, numbness, tingling, complex regional pain syndrome, hardware/surgical failure, need for further surgery, malunion, nonunion, DVT, PE, arthritis and death.  Patient states an understanding and wishes to proceed with surgery.   All questions were answered.  No guarantees were implied or stated.  Informed consent was obtained.          Active Hospital Problems    Diagnosis  POA    Closed displaced transverse fracture of left patella [S82.032A]  Yes      Resolved Hospital Problems   No resolved problems to display.

## 2020-10-05 NOTE — ANESTHESIA PROCEDURE NOTES
Femoral Nerve Block with catheter    Patient location during procedure: pre-op   Block not for primary anesthetic.  Reason for block: at surgeon's request and post-op pain management   Post-op Pain Location: Left knee  Start time: 10/5/2020 6:28 AM  Timeout: 10/5/2020 6:27 AM   End time: 10/5/2020 6:48 AM    Staffing  Authorizing Provider: Beth Cool MD  Performing Provider: Bryon Billingsley MD    Staffing  Other anesthesia staff: Toney Aguayo MD  Preanesthetic Checklist  Completed: patient identified, site marked, surgical consent, pre-op evaluation, timeout performed, IV checked, risks and benefits discussed and monitors and equipment checked  Peripheral Block  Patient position: supine  Prep: ChloraPrep and site prepped and draped  Patient monitoring: heart rate, cardiac monitor, continuous pulse ox, continuous capnometry and frequent blood pressure checks  Block type: femoral  Laterality: left  Injection technique: continuous  Needle  Needle type: Tuohy   Needle gauge: 17 G  Needle length: 3.5 in  Needle localization: anatomical landmarks and ultrasound guidance  Catheter type: spring wound  Catheter size: 19 G  Test dose: lidocaine 1.5% with Epi 1-to-200,000 and negative   -ultrasound image captured on disc.  Assessment  Injection assessment: negative aspiration, negative parasthesia and local visualized surrounding nerve  Paresthesia pain: none  Heart rate change: no  Slow fractionated injection: yes  Additional Notes  VSS.  [facility] RN monitoring vitals throughout procedure.  Patient tolerated procedure well.  30 mL 0.25% ropivacaine with epinephrine injected.

## 2020-10-05 NOTE — ANESTHESIA PROCEDURE NOTES
Intubation  Performed by: Kar Chow MD  Authorized by: Kar Chow MD     Intubation:     Induction:  Intravenous    Intubated:  Postinduction    Mask Ventilation:  Easy mask    Attempts:  1    Attempted By:  Staff anesthesiologist    Method of Intubation:  Direct    Blade:  Brink 2    Laryngeal View Grade: Grade I - full view of chords      Difficult Airway Encountered?: No      Complications:  None    Airway Device:  Oral endotracheal tube    Airway Device Size:  7.5    Style/Cuff Inflation:  Cuffed    Inflation Amount (mL):  8    Tube secured:  22    Secured at:  The teeth    Placement Verified By:  Capnometry, Revisualization with laryngoscopy and Colorimetric ETCO2 device    Complicating Factors:  None    Findings Post-Intubation:  BS equal bilateral

## 2020-10-05 NOTE — TRANSFER OF CARE
"Anesthesia Transfer of Care Note    Patient: Bhavna Hancock    Procedure(s) Performed: Procedure(s) (LRB):  ORIF, FRACTURE, PATELLA - Femoral catheter ok (Left)    Patient location: [facility]    Transport from OR: Transported from OR on 6-10 L/min O2 by face mask with adequate spontaneous ventilation    Post pain: adequate analgesia    Post assessment: no apparent anesthetic complications    Post vital signs: stable    Level of consciousness: awake, alert and oriented    Nausea/Vomiting: no nausea/vomiting    Complications: none    Transfer of care protocol was followed      Last vitals:   Visit Vitals  BP (!) 145/83   Pulse 88   Temp 36.6 °C (97.9 °F) (Oral)   Resp 14   Ht 5' 5" (1.651 m)   Wt 77.1 kg (170 lb)   SpO2 100%   Breastfeeding No   BMI 28.29 kg/m²     "

## 2020-10-06 NOTE — ANESTHESIA POSTPROCEDURE EVALUATION
Anesthesia Post Evaluation    Patient: Bhavna Hancock    Procedure(s) Performed: Procedure(s) (LRB):  ORIF, FRACTURE, PATELLA - Femoral catheter ok (Left)    Final Anesthesia Type: general    Patient location during evaluation: PACU  Patient participation: Yes- Able to Participate  Level of consciousness: awake and alert and oriented  Post-procedure vital signs: reviewed and stable  Pain management: adequate  Airway patency: patent    PONV status at discharge: No PONV  Anesthetic complications: no      Cardiovascular status: blood pressure returned to baseline and hemodynamically stable  Respiratory status: unassisted, spontaneous ventilation and room air  Hydration status: euvolemic  Follow-up not needed.          Vitals Value Taken Time   /84 10/05/20 1502   Temp 36.4 °C (97.5 °F) 10/05/20 1500   Pulse 68 10/05/20 1506   Resp 18 10/05/20 1500   SpO2 100 % 10/05/20 1505   Vitals shown include unvalidated device data.      No case tracking events are documented in the log.      Pain/Erica Score: Pain Rating Prior to Med Admin: 7 (10/5/2020  2:10 PM)  Pain Rating Post Med Admin: 0 (10/5/2020  3:00 PM)  Erica Score: 10 (10/5/2020  3:15 PM)

## 2020-10-06 NOTE — ANESTHESIA POST-OP PAIN MANAGEMENT
Called patient at [phone]. PNC infusing without issues. Reiterated plan for PNC removal tomorrow. Patient stated understanding. Will follow up.

## 2020-10-06 NOTE — DISCHARGE SUMMARY
BRIEF DISCHARGE SUMMARY    Admit Date:   10/5/2020    D/C Date:  10/5/2020    D/C to:   Home with family    Diagnosis:  Left patella fracture    Procdure:  ORIF left patella    Hospital Course: Uncomplicated outpatient surgery.    Condition:  Good    Diet:   Regular    Activity:  WBAT in extension in [facility]    Medications:  Celebrex 200mg daily, Methocarbamol 750 TID, Tylenol 650mg TID, Roxicodone 5mg prn #20, ASA 81mg BID      Follow up:  As scheduled in clinic.

## 2020-10-07 ENCOUNTER — PATIENT MESSAGE (OUTPATIENT)
Dept: ORTHOPEDICS | Facility: CLINIC | Age: 60
End: 2020-10-07

## 2020-10-07 NOTE — ANESTHESIA POST-OP PAIN MANAGEMENT
Called patient at [phone]. PNC removed with blue tip intact. Encouraged patient to call with any questions/concerns.

## 2020-10-07 NOTE — ADDENDUM NOTE
Addendum  created 10/07/20 1134 by Liudmila Bellamy DO    Clinical Note Signed, Intraprocedure Event edited

## 2020-10-08 ENCOUNTER — TELEPHONE (OUTPATIENT)
Dept: ORTHOPEDICS | Facility: CLINIC | Age: 60
End: 2020-10-08

## 2020-10-08 NOTE — TELEPHONE ENCOUNTER
Discussed pain with the patient. She reports the oxycodone is wearing off early. She is takign 10 mg and is still having the issue. She will now take 1 5mg po q4 hours instead to see if this controlls her pain.

## 2020-10-12 ENCOUNTER — OFFICE VISIT (OUTPATIENT)
Dept: ORTHOPEDICS | Facility: CLINIC | Age: 60
End: 2020-10-12
Payer: COMMERCIAL

## 2020-10-12 VITALS
HEART RATE: 91 BPM | SYSTOLIC BLOOD PRESSURE: 125 MMHG | DIASTOLIC BLOOD PRESSURE: 73 MMHG | RESPIRATION RATE: 18 BRPM | TEMPERATURE: 97 F

## 2020-10-12 DIAGNOSIS — S82.032D CLOSED DISPLACED TRANSVERSE FRACTURE OF LEFT PATELLA WITH ROUTINE HEALING, SUBSEQUENT ENCOUNTER: Primary | ICD-10-CM

## 2020-10-12 PROCEDURE — 99024 PR POST-OP FOLLOW-UP VISIT: ICD-10-PCS | Mod: S$GLB,,, | Performed by: PHYSICIAN ASSISTANT

## 2020-10-12 PROCEDURE — 99999 PR PBB SHADOW E&M-EST. PATIENT-LVL V: CPT | Mod: PBBFAC,,, | Performed by: PHYSICIAN ASSISTANT

## 2020-10-12 PROCEDURE — 99999 PR PBB SHADOW E&M-EST. PATIENT-LVL V: ICD-10-PCS | Mod: PBBFAC,,, | Performed by: PHYSICIAN ASSISTANT

## 2020-10-12 PROCEDURE — 99024 POSTOP FOLLOW-UP VISIT: CPT | Mod: S$GLB,,, | Performed by: PHYSICIAN ASSISTANT

## 2020-10-12 NOTE — PROGRESS NOTES
Principal Orthopedic Problem: Transverse left patella fracture     Relevant Medical History:  PMHX of HTN, DM( last A1C 8.3 )    HPI: Ms. Hancock is 60 yo F fell onto left knee from standing height on 9/22/2020.  She was seen initially in the [place] ED, diagnosed with a displaced left patella fracture and placed into to knee immobilizer. She followed up in clinic on 09/24/2020, but was found to have abrasions to her knee. She was treated with ORIF on 10/05/2020    Ms. Hancock is here today for a post-operative visit after a   Open reduction internal fixation of left patella fracture  by Dr. Iglesias on 10/05/2020.    Interval History:  she reports that she is doing ok.   she is at home. she is not participating in PT/OT.   Pain is controlled.  she is  taking pain medication.  Aspirin and oxycodone 1 po bid.   she denies fever, chills, and sweats since the time of the surgery.     Physical exam:  Dressing taken down.  Incision is clean, dry and intact.  Staples left in place     RADS: All pertinent images were reviewed by myself:   none done today    Assessment:  Post-op visit ( 1 weeks)    Plan:  Current care, treatment plan, precautions, activity level/ modifications, limitations, rehabilitation exercises and proposed future treatment were discussed with the patient. We discussed the need to monitor for changes in symptoms and condition and report them to the physician.  Discussed importance of compliance with all appointments and follow up examinations.   - keep area covered  - weight bearing as tolerated with knee brace locked straight  - pain medication: no refill needed,    Pain medication refill policy provided to patient for review.      Patient understands that we will use a multimodal approach to pain control Tylenol, Celebrex, Robaxin, and gabapentin with roxicodone 5 mg for breakthrough pain.  We will continue this regimen scheduled for 2-3 weeks post op and try and limit narcotic use.   - Patient  is to return to clinic in 1 weeks  At time x-ray of her knee is NOT needed  At time consider removal of staples     If there are any questions or concerns prior to scheduled follow up, the patient was instructed to contact the office

## 2020-10-16 ENCOUNTER — PATIENT MESSAGE (OUTPATIENT)
Dept: ORTHOPEDICS | Facility: CLINIC | Age: 60
End: 2020-10-16

## 2020-10-16 ENCOUNTER — TELEPHONE (OUTPATIENT)
Dept: ORTHOPEDICS | Facility: CLINIC | Age: 60
End: 2020-10-16

## 2020-10-16 DIAGNOSIS — M25.562 LEFT KNEE PAIN, UNSPECIFIED CHRONICITY: Primary | ICD-10-CM

## 2020-10-16 NOTE — TELEPHONE ENCOUNTER
Called and spoke with pt in regards to scheduling x-rays for her left knee at the [facility] location. Scheduled pt today @ 5:30pm. Pt was satisfied with appt date/time.

## 2020-10-16 NOTE — TELEPHONE ENCOUNTER
Called and spoke with pt in regards to a knot on her knee. Informed pt I will message Ms. Chaidez in regrads to pt's knee issue and have Ms. Chaidez give her a callback on Monday. Pt verbally understand and was satisfied.

## 2020-10-19 ENCOUNTER — TELEPHONE (OUTPATIENT)
Dept: ORTHOPEDICS | Facility: CLINIC | Age: 60
End: 2020-10-19

## 2020-10-19 NOTE — TELEPHONE ENCOUNTER
----- Message from Kiya Chaidez PA-C sent at 10/19/2020  6:23 AM CDT -----  Please see if patient can come in today for evaluation.   Kiya Dawkins   ----- Message -----  From: Trupti Golden MA  Sent: 10/16/2020   4:58 PM CDT  To: Kiya Chaidez PA-C    Pt have concerns with a knot she found today on her knee 4 inches down from  sx area before getting x-rays done. Pt would like a call back on Monday.  ----- Message -----  From: Chalino Becker  Sent: 10/16/2020   4:36 PM CDT  To: [facility] Staff    Pt is requesting a call back regarding xray. Please call back.    Contact Info [phone] (home)

## 2020-10-19 NOTE — TELEPHONE ENCOUNTER
Spoke with pt. Per YAKOV Chaidez PA-C stated that the pt x-ray is normal. Pt stated that she is glad.  Pt stated that on her left patella, she don't feel the knot as much and she feels better. Pt states that she will keep her appointment on Thursday 10/22/20.

## 2020-10-20 ENCOUNTER — PATIENT MESSAGE (OUTPATIENT)
Dept: ORTHOPEDICS | Facility: CLINIC | Age: 60
End: 2020-10-20

## 2020-10-21 ENCOUNTER — OFFICE VISIT (OUTPATIENT)
Dept: ORTHOPEDICS | Facility: CLINIC | Age: 60
End: 2020-10-21
Payer: COMMERCIAL

## 2020-10-21 ENCOUNTER — LAB VISIT (OUTPATIENT)
Dept: LAB | Facility: HOSPITAL | Age: 60
End: 2020-10-21
Payer: COMMERCIAL

## 2020-10-21 DIAGNOSIS — S82.032D CLOSED DISPLACED TRANSVERSE FRACTURE OF LEFT PATELLA WITH ROUTINE HEALING, SUBSEQUENT ENCOUNTER: Primary | ICD-10-CM

## 2020-10-21 DIAGNOSIS — S82.032D CLOSED DISPLACED TRANSVERSE FRACTURE OF LEFT PATELLA WITH ROUTINE HEALING, SUBSEQUENT ENCOUNTER: ICD-10-CM

## 2020-10-21 DIAGNOSIS — Z87.81 S/P ORIF (OPEN REDUCTION INTERNAL FIXATION) FRACTURE: ICD-10-CM

## 2020-10-21 DIAGNOSIS — Z98.890 S/P ORIF (OPEN REDUCTION INTERNAL FIXATION) FRACTURE: ICD-10-CM

## 2020-10-21 LAB
CRP SERPL-MCNC: 14.2 MG/L (ref 0–8.2)
ERYTHROCYTE [SEDIMENTATION RATE] IN BLOOD BY WESTERGREN METHOD: 27 MM/HR (ref 0–36)

## 2020-10-21 PROCEDURE — 99024 PR POST-OP FOLLOW-UP VISIT: ICD-10-PCS | Mod: S$GLB,,, | Performed by: PHYSICIAN ASSISTANT

## 2020-10-21 PROCEDURE — 99999 PR PBB SHADOW E&M-EST. PATIENT-LVL III: ICD-10-PCS | Mod: PBBFAC,,, | Performed by: PHYSICIAN ASSISTANT

## 2020-10-21 PROCEDURE — 36415 COLL VENOUS BLD VENIPUNCTURE: CPT

## 2020-10-21 PROCEDURE — 99999 PR PBB SHADOW E&M-EST. PATIENT-LVL III: CPT | Mod: PBBFAC,,, | Performed by: PHYSICIAN ASSISTANT

## 2020-10-21 PROCEDURE — 99024 POSTOP FOLLOW-UP VISIT: CPT | Mod: S$GLB,,, | Performed by: PHYSICIAN ASSISTANT

## 2020-10-21 PROCEDURE — 86140 C-REACTIVE PROTEIN: CPT

## 2020-10-21 PROCEDURE — 85652 RBC SED RATE AUTOMATED: CPT

## 2020-10-21 RX ORDER — MELOXICAM 15 MG/1
15 TABLET ORAL DAILY
Qty: 30 TABLET | Refills: 0 | Status: SHIPPED | OUTPATIENT
Start: 2020-10-21 | End: 2020-11-20

## 2020-10-21 NOTE — PROGRESS NOTES
Principal Orthopedic Problem: Transverse left patella fracture     Relevant Medical History:  PMHX of HTN, DM( last A1C 8.3 )    HPI: Ms. Hancock is 58 yo F fell onto left knee from standing height on 9/22/2020.  She was seen initially in the [place] ED, diagnosed with a displaced left patella fracture and placed into to knee immobilizer. She followed up in clinic on 09/24/2020, but was found to have abrasions to her knee. She was treated with ORIF on 10/05/2020    Ms. Hancock is here today for a post-operative visit after a   Open reduction internal fixation of left patella fracture  by Dr. Iglesias on 10/05/2020.    Interval History:  she reports that she is doing ok.   she is at home. she is not participating in PT/OT.   Pain is somewhat controlled.  she is  taking pain medication.  Aspirin 800 mg.  She stated that she is having some anterior knee pain. She is also having some knots to her medial thigh which is concerning.    she denies fever, chills, and sweats since the time of the surgery.     Physical exam:  Dressing taken down.  Incision is clean, dry and intact.  Staples removed without difficulty, mild swelling with some TTP to anterior lateral, no erythema, no increased warmth no drainage.     RADS: All pertinent images were reviewed by myself:   none done today    Assessment:  Post-op visit ( 2 weeks)    Plan:  Current care, treatment plan, precautions, activity level/ modifications, limitations, rehabilitation exercises and proposed future treatment were discussed with the patient. We discussed the need to monitor for changes in symptoms and condition and report them to the physician.  Discussed importance of compliance with all appointments and follow up examinations.   - The patient was advised to keep the incision clean and dry for the next 24 hours after which she may wash the area with antibacterial soap in the shower. Will not submerge until the incision is completely healed  -Patient was  advised to monitor wound closely and multiple times daily for any problems. Call clinic immediately or report to ED for immediate medical attention for any complications including reopening of wound, drainage, purulence, redness, streaking, odor, pain out of proportion, fever, chills, etc.   - weight bearing as tolerated with knee brace locked straight  - pain medication: no refill needed,    Pain medication refill policy provided to patient for review.    - will change to mobic  - Order placed for CRP and Sed rate  - Order placed for ultrasound  - Patient is to return to clinic in 2 weeks  At time x-ray of her knee is 2 vie AP lat needed  At time consider removal of staples     If there are any questions or concerns prior to scheduled follow up, the patient was instructed to contact the office

## 2020-11-04 ENCOUNTER — OFFICE VISIT (OUTPATIENT)
Dept: ORTHOPEDICS | Facility: CLINIC | Age: 60
End: 2020-11-04
Payer: COMMERCIAL

## 2020-11-04 ENCOUNTER — HOSPITAL ENCOUNTER (OUTPATIENT)
Dept: RADIOLOGY | Facility: HOSPITAL | Age: 60
Discharge: HOME | End: 2020-11-04
Attending: PHYSICIAN ASSISTANT
Payer: COMMERCIAL

## 2020-11-04 VITALS — TEMPERATURE: 97 F

## 2020-11-04 DIAGNOSIS — S82.032D CLOSED DISPLACED TRANSVERSE FRACTURE OF LEFT PATELLA WITH ROUTINE HEALING, SUBSEQUENT ENCOUNTER: ICD-10-CM

## 2020-11-04 DIAGNOSIS — S82.032D CLOSED DISPLACED TRANSVERSE FRACTURE OF LEFT PATELLA WITH ROUTINE HEALING, SUBSEQUENT ENCOUNTER: Primary | ICD-10-CM

## 2020-11-04 PROCEDURE — 73560 X-RAY EXAM OF KNEE 1 OR 2: CPT | Mod: 26,LT,, | Performed by: RADIOLOGY

## 2020-11-04 PROCEDURE — 99024 POSTOP FOLLOW-UP VISIT: CPT | Mod: S$GLB,,, | Performed by: PHYSICIAN ASSISTANT

## 2020-11-04 PROCEDURE — 99024 PR POST-OP FOLLOW-UP VISIT: ICD-10-PCS | Mod: S$GLB,,, | Performed by: PHYSICIAN ASSISTANT

## 2020-11-04 PROCEDURE — 73560 XR KNEE 1 OR 2 VIEW LEFT: ICD-10-PCS | Mod: 26,LT,, | Performed by: RADIOLOGY

## 2020-11-04 PROCEDURE — 73560 X-RAY EXAM OF KNEE 1 OR 2: CPT | Mod: TC,LT

## 2020-11-04 PROCEDURE — 99999 PR PBB SHADOW E&M-EST. PATIENT-LVL V: ICD-10-PCS | Mod: PBBFAC,,, | Performed by: PHYSICIAN ASSISTANT

## 2020-11-04 PROCEDURE — 99999 PR PBB SHADOW E&M-EST. PATIENT-LVL V: CPT | Mod: PBBFAC,,, | Performed by: PHYSICIAN ASSISTANT

## 2020-11-04 NOTE — PROGRESS NOTES
Principal Orthopedic Problem: Transverse left patella fracture     Relevant Medical History:  PMHX of HTN, DM( last A1C 8.3 )    HPI: Ms. Hancock is 60 yo F fell onto left knee from standing height on 9/22/2020.  She was seen initially in the [place] ED, diagnosed with a displaced left patella fracture and placed into to knee immobilizer. She followed up in clinic on 09/24/2020, but was found to have abrasions to her knee. She was treated with ORIF on 10/05/2020    Ms. Hancock is here today for a post-operative visit after a   Open reduction internal fixation of left patella fracture  by Dr. Iglesias on 10/05/2020.    Interval History:  she reports that she is doing ok.   she is at home. she is not participating in PT/OT.   Pain is somewhat controlled.  she is taking pain medication.  Aspirin 800 mg.  She stated that she is having some anterior knee pain, which is better than last visit.   She was concerned that her wound appeared to be opening yeaterday at the proximal end.     she denies fever, chills, and sweats since the time of the surgery.     Physical exam:  Dressing taken down.  Incision is clean, dry and intact. No breakdown , mild swelling with some TTP to anterior lateral, no erythema, no increased warmth no drainage.     RADS: All pertinent images were reviewed by myself:   Postoperative changes from reduction of the fracture of the patella remain without significant change.  The transverse fracture through the patella in near normal anatomic alignment.  There is no significant callus at the site of the fracture    Assessment:  Post-op visit ( 4 weeks)    Plan:  Current care, treatment plan, precautions, activity level/ modifications, limitations, rehabilitation exercises and proposed future treatment were discussed with the patient. We discussed the need to monitor for changes in symptoms and condition and report them to the physician.  Discussed importance of compliance with all appointments and  follow up examinations.   -Patient was advised to monitor wound closely and multiple times daily for any problems. Call clinic immediately or report to ED for immediate medical attention for any complications including reopening of wound, drainage, purulence, redness, streaking, odor, pain out of proportion, fever, chills, etc.   -PT/OT, home health , Patient is responsible to establish and continue care   -progressive range of motion 30 degrees x 2 weeks, then 60 degrees x 2 weeks Then 90   - weight bearing as tolerated with knee brace in place   - pain medication: no refill needed,    Pain medication refill policy provided to patient for review.   - Patient is to return to clinic in 6 weeks  At time x-ray of her knee is 2 vie AP lat needed     If there are any questions or concerns prior to scheduled follow up, the patient was instructed to contact the office

## 2020-11-06 ENCOUNTER — TELEPHONE (OUTPATIENT)
Dept: ORTHOPEDICS | Facility: CLINIC | Age: 60
End: 2020-11-06

## 2020-11-06 ENCOUNTER — PATIENT MESSAGE (OUTPATIENT)
Dept: ORTHOPEDICS | Facility: CLINIC | Age: 60
End: 2020-11-06

## 2020-11-06 DIAGNOSIS — Z87.81 S/P ORIF (OPEN REDUCTION INTERNAL FIXATION) FRACTURE: ICD-10-CM

## 2020-11-06 DIAGNOSIS — Z98.890 S/P ORIF (OPEN REDUCTION INTERNAL FIXATION) FRACTURE: ICD-10-CM

## 2020-11-06 DIAGNOSIS — S82.032D CLOSED DISPLACED TRANSVERSE FRACTURE OF LEFT PATELLA WITH ROUTINE HEALING, SUBSEQUENT ENCOUNTER: Primary | ICD-10-CM

## 2020-11-06 NOTE — TELEPHONE ENCOUNTER
Therapy orders fax to:  [facility] [id number] [place] [id number]. Office   [phone].  Fax [phone]. Done.

## 2020-11-12 ENCOUNTER — OFFICE VISIT (OUTPATIENT)
Dept: ORTHOPEDICS | Facility: CLINIC | Age: 60
End: 2020-11-12
Payer: COMMERCIAL

## 2020-11-12 ENCOUNTER — PATIENT MESSAGE (OUTPATIENT)
Dept: ORTHOPEDICS | Facility: CLINIC | Age: 60
End: 2020-11-12

## 2020-11-12 VITALS
DIASTOLIC BLOOD PRESSURE: 75 MMHG | SYSTOLIC BLOOD PRESSURE: 124 MMHG | TEMPERATURE: 98 F | RESPIRATION RATE: 18 BRPM | HEART RATE: 100 BPM

## 2020-11-12 DIAGNOSIS — Z87.81 S/P ORIF (OPEN REDUCTION INTERNAL FIXATION) FRACTURE: ICD-10-CM

## 2020-11-12 DIAGNOSIS — Z51.89 VISIT FOR WOUND CHECK: Primary | ICD-10-CM

## 2020-11-12 DIAGNOSIS — Z98.890 S/P ORIF (OPEN REDUCTION INTERNAL FIXATION) FRACTURE: ICD-10-CM

## 2020-11-12 DIAGNOSIS — T14.8XXA HEMATOMA: ICD-10-CM

## 2020-11-12 DIAGNOSIS — S82.032D CLOSED DISPLACED TRANSVERSE FRACTURE OF LEFT PATELLA WITH ROUTINE HEALING, SUBSEQUENT ENCOUNTER: ICD-10-CM

## 2020-11-12 PROCEDURE — 99999 PR PBB SHADOW E&M-EST. PATIENT-LVL V: ICD-10-PCS | Mod: PBBFAC,,, | Performed by: PHYSICIAN ASSISTANT

## 2020-11-12 PROCEDURE — 99024 PR POST-OP FOLLOW-UP VISIT: ICD-10-PCS | Mod: S$GLB,,, | Performed by: PHYSICIAN ASSISTANT

## 2020-11-12 PROCEDURE — 99999 PR PBB SHADOW E&M-EST. PATIENT-LVL V: CPT | Mod: PBBFAC,,, | Performed by: PHYSICIAN ASSISTANT

## 2020-11-12 PROCEDURE — 99024 POSTOP FOLLOW-UP VISIT: CPT | Mod: S$GLB,,, | Performed by: PHYSICIAN ASSISTANT

## 2020-11-13 NOTE — PROGRESS NOTES
Principal Orthopedic Problem: Transverse left patella fracture     Relevant Medical History:  PMHX of HTN, DM( last A1C 8.3 )    HPI: Ms. Hancock is 60 yo F fell onto left knee from standing height on 9/22/2020.  She was seen initially in the [place] ED, diagnosed with a displaced left patella fracture and placed into to knee immobilizer. She followed up in clinic on 09/24/2020, but was found to have abrasions to her knee. She was treated with ORIF on 10/05/2020    Ms. Hancock is here today for a post-operative visit after a   Open reduction internal fixation of left patella fracture  by Dr. Iglesias on 10/05/2020.    Interval History:  she reports that she is doing ok. She presents early due to some bleeding at her incision site over the past day. She was recently increased to bending her knee to 30 degrees She reports that she has started PT, but is only able to go to 23 degrees. She stated that her knee is very hard an painful.   Pain is somewhat controlled.  she is taking pain medication.  Aspirin 800 mg.  She stated that she is having some anterior knee pain, which is better than last visit.   she denies fever, chills, and sweats since the time of the surgery.     Physical exam:  Dressing taken down.  Incision is clean, dry and intact. No breakdown , mild hard swelling with some TTP to anterior lateral, no erythema, no increased warmth. what appears to be an area to distal incision with a pin point area where there is some blood like fluid coming out. Unable to express anything from her wound.  No erythema or increased warmth.     RADS: All pertinent images were reviewed by myself:   None done at this visit.     Assessment:  Post-op visit ( 5 weeks)    Plan:  Current care, treatment plan, precautions, activity level/ modifications, limitations, rehabilitation exercises and proposed future treatment were discussed with the patient. We discussed the need to monitor for changes in symptoms and condition and  report them to the physician.  Discussed importance of compliance with all appointments and follow up examinations.   -Patient was advised to monitor wound closely and multiple times daily for any problems. Call clinic immediately or report to ED for immediate medical attention for any complications including reopening of wound, drainage, purulence, redness, streaking, odor, pain out of proportion, fever, chills, etc.   -PT/OT, home health , Patient is responsible to establish and continue care   -progressive range of motion 30 degrees x 2 weeks, then 60 degrees x 2 weeks Then 90   - weight bearing as tolerated with knee brace in place   - pain medication: no refill needed,    Pain medication refill policy provided to patient for review.   - Wound will paint with betadine once daily and keep covered. She will use a heating pad daily to help to decrease the hematoma.   - Patient is to return to clinic in 6 weeks, though she will contact our office next week for an update on how her incision and knee is doing.   At time x-ray of her knee is 2 view AP lat needed     If there are any questions or concerns prior to scheduled follow up, the patient was instructed to contact the office

## 2020-11-18 ENCOUNTER — PATIENT MESSAGE (OUTPATIENT)
Dept: ORTHOPEDICS | Facility: CLINIC | Age: 60
End: 2020-11-18

## 2020-11-18 ENCOUNTER — TELEPHONE (OUTPATIENT)
Dept: ORTHOPEDICS | Facility: CLINIC | Age: 60
End: 2020-11-18

## 2020-11-18 DIAGNOSIS — Z51.89 VISIT FOR WOUND CHECK: Primary | ICD-10-CM

## 2020-11-18 RX ORDER — SULFAMETHOXAZOLE AND TRIMETHOPRIM 800; 160 MG/1; MG/1
1 TABLET ORAL 2 TIMES DAILY
Qty: 20 TABLET | Refills: 0 | Status: ON HOLD | OUTPATIENT
Start: 2020-11-18 | End: 2020-11-25 | Stop reason: HOSPADM

## 2020-11-18 NOTE — PROGRESS NOTES
Spoke with the patient. She stated that her hard swelling is about the same maybe a little softer. She does have serious drainage from the small area that was discovered last week. She is still unable to bend the knee. She denied fever or chills. Order placed for CRP and Sed rate to be done today.

## 2020-11-18 NOTE — TELEPHONE ENCOUNTER
Spoke to pt. Come  11/18/20 @ 2683. Patient states verbal understanding and has no further questions.

## 2020-11-18 NOTE — TELEPHONE ENCOUNTER
----- Message from Jessica Alejandre sent at 11/18/2020 12:25 PM CST -----  Pt states she would like to speak with nurse states she can come in today but what time please call back to discuss

## 2020-11-19 ENCOUNTER — OFFICE VISIT (OUTPATIENT)
Dept: ORTHOPEDICS | Facility: CLINIC | Age: 60
End: 2020-11-19
Payer: COMMERCIAL

## 2020-11-19 ENCOUNTER — HOSPITAL ENCOUNTER (OUTPATIENT)
Dept: RADIOLOGY | Facility: OTHER | Age: 60
Discharge: HOME | End: 2020-11-19
Attending: PHYSICIAN ASSISTANT
Payer: COMMERCIAL

## 2020-11-19 VITALS
TEMPERATURE: 98 F | WEIGHT: 170 LBS | DIASTOLIC BLOOD PRESSURE: 84 MMHG | HEART RATE: 97 BPM | RESPIRATION RATE: 18 BRPM | HEIGHT: 65 IN | SYSTOLIC BLOOD PRESSURE: 137 MMHG | BODY MASS INDEX: 28.32 KG/M2

## 2020-11-19 DIAGNOSIS — S82.032D CLOSED DISPLACED TRANSVERSE FRACTURE OF LEFT PATELLA WITH ROUTINE HEALING, SUBSEQUENT ENCOUNTER: ICD-10-CM

## 2020-11-19 DIAGNOSIS — Z98.890 S/P ORIF (OPEN REDUCTION INTERNAL FIXATION) FRACTURE: ICD-10-CM

## 2020-11-19 DIAGNOSIS — S82.032D CLOSED DISPLACED TRANSVERSE FRACTURE OF LEFT PATELLA WITH ROUTINE HEALING, SUBSEQUENT ENCOUNTER: Primary | ICD-10-CM

## 2020-11-19 DIAGNOSIS — Z51.89 VISIT FOR WOUND CHECK: ICD-10-CM

## 2020-11-19 DIAGNOSIS — Z87.81 S/P ORIF (OPEN REDUCTION INTERNAL FIXATION) FRACTURE: ICD-10-CM

## 2020-11-19 PROCEDURE — 99999 PR PBB SHADOW E&M-EST. PATIENT-LVL V: ICD-10-PCS | Mod: PBBFAC,,, | Performed by: PHYSICIAN ASSISTANT

## 2020-11-19 PROCEDURE — 73721 MRI JNT OF LWR EXTRE W/O DYE: CPT | Mod: TC,LT

## 2020-11-19 PROCEDURE — 73721 MRI KNEE WITHOUT CONTRAST LEFT: ICD-10-PCS | Mod: 26,LT,, | Performed by: RADIOLOGY

## 2020-11-19 PROCEDURE — 73721 MRI JNT OF LWR EXTRE W/O DYE: CPT | Mod: 26,LT,, | Performed by: RADIOLOGY

## 2020-11-19 PROCEDURE — 99024 PR POST-OP FOLLOW-UP VISIT: ICD-10-PCS | Mod: S$GLB,,, | Performed by: PHYSICIAN ASSISTANT

## 2020-11-19 PROCEDURE — 99999 PR PBB SHADOW E&M-EST. PATIENT-LVL V: CPT | Mod: PBBFAC,,, | Performed by: PHYSICIAN ASSISTANT

## 2020-11-19 PROCEDURE — 99024 POSTOP FOLLOW-UP VISIT: CPT | Mod: S$GLB,,, | Performed by: PHYSICIAN ASSISTANT

## 2020-11-19 RX ORDER — MUPIROCIN 20 MG/G
OINTMENT TOPICAL
Status: CANCELLED | OUTPATIENT
Start: 2020-11-19

## 2020-11-19 NOTE — PROGRESS NOTES
Principal Orthopedic Problem: Transverse left patella fracture     Relevant Medical History:  PMHX of HTN, DM( last A1C 8.3 )    HPI: Ms. Hancock is 58 yo F fell onto left knee from standing height on 9/22/2020.  She was seen initially in the [place] ED, diagnosed with a displaced left patella fracture and placed into to knee immobilizer. She followed up in clinic on 09/24/2020, but was found to have abrasions to her knee. She was treated with ORIF on 10/05/2020    Ms. Hancock is here today for a post-operative visit after a   Open reduction internal fixation of left patella fracture  by Dr. Iglesias on 10/05/2020.    Interval History:  she reports that she is doing ok. She presents early due to some drainage at her incision site over the past day.  She reports that she has started PT, but is only able to go to 30 degrees. She stated that her knee is very hard an painful. She is having a lot of trouble with range of motion.  She has been using compression, heating pad and elevation without complete relief. She stated that she has increased drainage from the incision and pain. CRP (<0.50) and SED rate (35) . She was prophylactic started on Bactrim yesterday.   she denies fever, chills, and sweats since the time of the surgery.     Physical exam:  Dressing taken down.  Incision is clean,and intact.  mild hard swelling with some TTP to anterior lateral, no erythema, no increased warmth. what appears to be an area to distal incision with some serious draiange. PROM to 45 degrees flexion with a lot of effort.      RADS: All pertinent images were reviewed by myself:   None done at this visit.     Assessment:  Post-op visit (6 weeks)    Plan:  Discussed treatment options with patient. Operative vs non-operative treatment discussed with patient. Patient agreed to plan. Plan is for manipulation under anesthesia with possible washout on 11/24/2020.      - rest, compression, heat and elevation to reduce swelling.     Discussed proper and consistent elevation of lower extremities, above the level of the heart, while at rest, to help control/improve edema and decrease pain.  - weight bearing as tolerated with knee brace  - Pain medication: refill needed, no   - Discussed pain medication refill policy.      - consents signed,   - lab, chest x-ray and EKG ordered  previously , results in chart  - 81 mg aspirin,  taking blood thinners       -Discussed COVID19 with the patient, they are aware of our current policies and procedures, were given the option of delaying surgery, and they elect to proceed. Covid testing to be done 48 hours prior to surgery. Appointment made     Pre, doris, and post operative procedure and expectations were discussed.  Questions were answered. The patient has been educated and is ready to proceed with surgery.  Approximately 30 minutes was spent discussing surgical outcomes, plans, procedures, pre, doris, and post operative expectations and care. The risks, benefits and alternatives to surgery were discussed with the patient at great length.  These include bleeding, infection, vessel/nerve damage, pain, numbness, tingling, complex regional pain syndrome, hardware/surgical failure, need for further surgery, malunion, nonunion, DVT, PE, arthritis and death. He also understands that the risks of surgery may be greater for some patients due to health or lifestyle issues, such as a current condition or a history of heart disease, obesity, clotting disorders, recurrent infections, smoking, sedentary lifestyle, or noncompliance with medications, therapy, or followup. The degree of the increased risk is hard to estimate with any degree of precision.  Patient states an understanding and wishes to proceed with surgery.   All questions were answered.  No guarantees were implied or stated.  Informed consent was obtained  The patient will contact us if the have any questions, concerns, and changes in their medical  condition prior to surgery.

## 2020-11-19 NOTE — H&P (VIEW-ONLY)
Subjective:      Patient ID: Bhavna Hancock is a 60 y.o. female.    Chief Complaint: Follow-up (LEFT KNEE)    Principal Orthopedic Problem: Transverse left patella fracture     Relevant Medical History:  PMHX of HTN, DM( last A1C 8.3 )    HPI: Ms. Hancock is 60 yo F fell onto left knee from standing height on 9/22/2020.  She was seen initially in the [place] ED, diagnosed with a displaced left patella fracture and placed into to knee immobilizer. She followed up in clinic on 09/24/2020, but was found to have abrasions to her knee. She was treated with ORIF on 10/05/2020. She has had continued issues with range of motion. She also developed some serious drainage from her incision.       Past Medical History:   Diagnosis Date    Abdominal pain     Diabetes     GERD (gastroesophageal reflux disease)     Hx of colonic polyps     Hyperlipidemia     Hypertension     Nausea and vomiting      Past Surgical History:   Procedure Laterality Date    CRANIOTOMY  2013    DEBRIDEMENT TENNIS ELBOW      hip ascites      OPEN REDUCTION AND INTERNAL FIXATION (ORIF) OF FRACTURE OF PATELLA Left 10/5/2020    Procedure: ORIF, FRACTURE, PATELLA - Femoral catheter ok;  Surgeon: Patrice Iglesias MD;  Location: [facility] OR [address] [place] [address];  Service: Orthopedics;  Laterality: Left;    TRIGGER FINGER RELEASE       Social History     Occupational History    Not on file   Tobacco Use    Smoking status: Former Smoker     Quit date: 10/5/2005     Years since quitting: 15.1    Smokeless tobacco: Never Used   Substance and Sexual Activity    Alcohol use: No    Drug use: No    Sexual activity: Not on file      ROS  Current Outpatient Medications on File Prior to Visit   Medication Sig Dispense Refill    acetaminophen (TYLENOL) 500 MG tablet Take 2 tablets (1,000 mg total) by mouth every 6 (six) hours.      blood sugar diagnostic (ONETOUCH ULTRA BLUE TEST STRIP) Strp Use to test blood sugar 5 times a day      DEXCOM G6 SENSOR Priscilla  TEST BS FID      DEXCOM G6 TRANSMITTER Priscilla USE TO CHECK FID      estradioL (ESTRACE) 1 MG tablet TK 1 T PO QD      fish oil-omega-3 fatty acids 300-1,000 mg capsule Take 2 g by mouth once daily.      ibuprofen (ADVIL,MOTRIN) 600 MG tablet Take 1 tablet (600 mg total) by mouth every 6 (six) hours as needed for Pain. 20 tablet 0    insulin glargine (LANTUS) 100 unit/mL injection Inject 35 Units into the skin once daily. 30 units nightly       insulin lispro (HUMALOG KWIKPEN INSULIN) 100 unit/mL pen INJECT 14 UNITS UNDER THE SKIN WITH EVERY MEAL      insulin regular 100 unit/mL Inj injection Inject into the skin 3 (three) times daily before meals. 8 units per meal      insulin syringe-needle U-100 1/2 mL 30 gauge Syrg USE ONE SYRINGE PER DAY UTD      levothyroxine (SYNTHROID) 75 MCG tablet TAKE 1 TABLET BY MOUTH EVERY DAY      lisinopril (PRINIVIL,ZESTRIL) 2.5 MG tablet Take 2.5 mg by mouth once daily.      LOTEMAX SM 0.38 % DrpG INT 1 GTT IN OU BID UTD      medroxyPROGESTERone (PROVERA) 2.5 MG tablet TK 1 T PO QD      meloxicam (MOBIC) 15 MG tablet Take 1 tablet (15 mg total) by mouth once daily. 30 tablet 0    metformin (GLUCOPHAGE) 500 MG tablet Take 500 mg by mouth daily with breakfast.      oxyCODONE (ROXICODONE) 5 MG immediate release tablet Take 1 tablet (5 mg total) by mouth every 6 (six) hours as needed for Pain. May take 1-2 tablets every 6 hours as needed for pain 20 tablet 0    pantoprazole (PROTONIX) 40 MG tablet TAKE 1 TABLET(40 MG) BY MOUTH EVERY DAY 30 tablet 0    RESTASIS 0.05 % ophthalmic emulsion INSTILL ONE DROP IN OU BID      simvastatin (ZOCOR) 40 MG tablet Take 40 mg by mouth every evening.      sulfamethoxazole-trimethoprim 800-160mg (BACTRIM DS) 800-160 mg Tab Take 1 tablet by mouth 2 (two) times daily. for 10 days 20 tablet 0    trospium (SANCTURA) 20 mg Tab tablet TK 1 T PO BID 1 HOUR B MEALS      venlafaxine (EFFEXOR-XR) 150 MG Cp24 Take 225 mg by mouth once daily.     "   aspirin (ECOTRIN) 81 MG EC tablet Take 1 tablet (81 mg total) by mouth 2 (two) times a day. 84 tablet 0     Current Facility-Administered Medications on File Prior to Visit   Medication Dose Route Frequency Provider Last Rate Last Dose    lidocaine HCL 10 mg/ml (1%) injection 5 mL  5 mL   Maddie Meeks MD   5 mL at 09/14/20 1100    triamcinolone acetonide injection 40 mg  40 mg Intra-articular  Maddie Meeks MD   40 mg at 09/14/20 1100     Review of patient's allergies indicates:   Allergen Reactions    Codeine Nausea And Vomiting         Objective:      Vitals:    11/19/20 1001   BP: 137/84   Pulse: 97   Resp: 18   Temp: 98.2 °F (36.8 °C)   TempSrc: Oral   Weight: 77.1 kg (169 lb 15.6 oz)   Height: 5' 5" (1.651 m)     Ortho Exam     Gen: WD, WN in NAD   HEENT: NC/AT   Heart: RR   Resp: unlabored breathing   Skin: intact, no lesions pertinent to the surgery site    Assessment:       1. Closed displaced transverse fracture of left patella    2. S/P ORIF (open reduction internal fixation) fracture    3. Visit for wound check          Plan:       Surgical intervention per       "

## 2020-11-23 ENCOUNTER — PATIENT MESSAGE (OUTPATIENT)
Dept: ORTHOPEDICS | Facility: CLINIC | Age: 60
End: 2020-11-23

## 2020-11-23 ENCOUNTER — PATIENT MESSAGE (OUTPATIENT)
Dept: SURGERY | Facility: HOSPITAL | Age: 60
End: 2020-11-23

## 2020-11-23 ENCOUNTER — ANESTHESIA EVENT (OUTPATIENT)
Dept: SURGERY | Facility: HOSPITAL | Age: 60
DRG: 902 | End: 2020-11-23
Payer: COMMERCIAL

## 2020-11-23 NOTE — PRE-PROCEDURE INSTRUCTIONS
PREOP INSTRUCTIONS:No solid food ,milk or milk products for 8 hours prior to procedure.Clear liquids are allowed up to 2 hours before procedure.Clear liquids are:water,apple juice,gatorade & powerade.Instructed to follow the surgeon's instructions if they differ from these.Shower instructions as well as directions to the Surgery Center were given.Encouraged to wear loose fitting,comfortable clothing.Medication instructions for pm prior to and am of procedure reviewed.Instructed to avoid taking vitamins,supplements,aspirin and ibuprofen the morning of surgery. Patient's questions and concerns addressed .Patient informed of the current visitor policy and advised patient that one visitor may accompany each patient into the hospital and wait (socially distanced) until a member of the medical team provides an update at the conclusion of the procedure.When they enter the hospital both patient and visitor will have their temperature checked.All visitors are asked to arrive with a mask and to keep their mask on throughout the visit.      Patient denies any side effects or issues with anesthesia or sedation.      Patient does not know arrival time.Explained that this information comes from the surgeon's office and if they haven't heard from them by 2 or 3 pm to call the office.Patient stated an understanding.      - [facility] WILL BE PROVIDING TRANSPORTATION HOME UPON DISCHARGE.

## 2020-11-24 ENCOUNTER — ANESTHESIA (OUTPATIENT)
Dept: SURGERY | Facility: HOSPITAL | Age: 60
DRG: 902 | End: 2020-11-24
Payer: COMMERCIAL

## 2020-11-24 ENCOUNTER — HOSPITAL ENCOUNTER (INPATIENT)
Facility: HOSPITAL | Age: 60
LOS: 2 days | Discharge: HOME HEALTH | DRG: 902 | End: 2020-11-26
Attending: ORTHOPAEDIC SURGERY | Admitting: ORTHOPAEDIC SURGERY
Payer: COMMERCIAL

## 2020-11-24 DIAGNOSIS — M25.662 STIFFNESS OF LEFT KNEE: ICD-10-CM

## 2020-11-24 DIAGNOSIS — S82.032D CLOSED DISPLACED TRANSVERSE FRACTURE OF LEFT PATELLA WITH ROUTINE HEALING, SUBSEQUENT ENCOUNTER: ICD-10-CM

## 2020-11-24 DIAGNOSIS — S82.032A CLOSED DISPLACED TRANSVERSE FRACTURE OF LEFT PATELLA: ICD-10-CM

## 2020-11-24 DIAGNOSIS — Z87.81 S/P ORIF (OPEN REDUCTION INTERNAL FIXATION) FRACTURE: ICD-10-CM

## 2020-11-24 DIAGNOSIS — Z51.89 VISIT FOR WOUND CHECK: ICD-10-CM

## 2020-11-24 DIAGNOSIS — Z98.890 S/P ORIF (OPEN REDUCTION INTERNAL FIXATION) FRACTURE: ICD-10-CM

## 2020-11-24 PROBLEM — I10 HYPERTENSION: Status: ACTIVE | Noted: 2020-11-24

## 2020-11-24 PROBLEM — E11.9 TYPE 2 DIABETES MELLITUS: Status: ACTIVE | Noted: 2020-11-24

## 2020-11-24 PROBLEM — E03.9 HYPOTHYROIDISM: Status: ACTIVE | Noted: 2020-11-24

## 2020-11-24 LAB
ALBUMIN SERPL BCP-MCNC: 3.3 G/DL (ref 3.5–5.2)
ALP SERPL-CCNC: 114 U/L (ref 55–135)
ALT SERPL W/O P-5'-P-CCNC: 19 U/L (ref 10–44)
ANION GAP SERPL CALC-SCNC: 5 MMOL/L (ref 8–16)
AST SERPL-CCNC: 30 U/L (ref 10–40)
BASOPHILS # BLD AUTO: 0.05 K/UL (ref 0–0.2)
BASOPHILS NFR BLD: 0.9 % (ref 0–1.9)
BILIRUB SERPL-MCNC: 0.2 MG/DL (ref 0.1–1)
BUN SERPL-MCNC: 11 MG/DL (ref 6–20)
CALCIUM SERPL-MCNC: 8.4 MG/DL (ref 8.7–10.5)
CHLORIDE SERPL-SCNC: 109 MMOL/L (ref 95–110)
CO2 SERPL-SCNC: 25 MMOL/L (ref 23–29)
CREAT SERPL-MCNC: 0.8 MG/DL (ref 0.5–1.4)
DIFFERENTIAL METHOD: ABNORMAL
EOSINOPHIL # BLD AUTO: 0.2 K/UL (ref 0–0.5)
EOSINOPHIL NFR BLD: 3 % (ref 0–8)
ERYTHROCYTE [DISTWIDTH] IN BLOOD BY AUTOMATED COUNT: 12.4 % (ref 11.5–14.5)
EST. GFR  (AFRICAN AMERICAN): >60 ML/MIN/1.73 M^2
EST. GFR  (NON AFRICAN AMERICAN): >60 ML/MIN/1.73 M^2
GLUCOSE SERPL-MCNC: 163 MG/DL (ref 70–110)
GRAM STN SPEC: NORMAL
GRAM STN SPEC: NORMAL
HCT VFR BLD AUTO: 36.5 % (ref 37–48.5)
HGB BLD-MCNC: 11.1 G/DL (ref 12–16)
IMM GRANULOCYTES # BLD AUTO: 0.01 K/UL (ref 0–0.04)
IMM GRANULOCYTES NFR BLD AUTO: 0.2 % (ref 0–0.5)
LYMPHOCYTES # BLD AUTO: 1.4 K/UL (ref 1–4.8)
LYMPHOCYTES NFR BLD: 25.4 % (ref 18–48)
MCH RBC QN AUTO: 28.4 PG (ref 27–31)
MCHC RBC AUTO-ENTMCNC: 30.4 G/DL (ref 32–36)
MCV RBC AUTO: 93 FL (ref 82–98)
MONOCYTES # BLD AUTO: 0.2 K/UL (ref 0.3–1)
MONOCYTES NFR BLD: 4.4 % (ref 4–15)
NEUTROPHILS # BLD AUTO: 3.6 K/UL (ref 1.8–7.7)
NEUTROPHILS NFR BLD: 66.1 % (ref 38–73)
NRBC BLD-RTO: 0 /100 WBC
PLATELET # BLD AUTO: 252 K/UL (ref 150–350)
PMV BLD AUTO: 10.5 FL (ref 9.2–12.9)
POCT GLUCOSE: 138 MG/DL (ref 70–110)
POCT GLUCOSE: 176 MG/DL (ref 70–110)
POCT GLUCOSE: 331 MG/DL (ref 70–110)
POCT GLUCOSE: 397 MG/DL (ref 70–110)
POCT GLUCOSE: 417 MG/DL (ref 70–110)
POTASSIUM SERPL-SCNC: 4.6 MMOL/L (ref 3.5–5.1)
PROT SERPL-MCNC: 6.5 G/DL (ref 6–8.4)
RBC # BLD AUTO: 3.91 M/UL (ref 4–5.4)
SODIUM SERPL-SCNC: 139 MMOL/L (ref 136–145)
WBC # BLD AUTO: 5.4 K/UL (ref 3.9–12.7)

## 2020-11-24 PROCEDURE — 99253 IP/OBS CNSLTJ NEW/EST LOW 45: CPT | Mod: ,,, | Performed by: INTERNAL MEDICINE

## 2020-11-24 PROCEDURE — 63600175 PHARM REV CODE 636 W HCPCS: Performed by: ANESTHESIOLOGY

## 2020-11-24 PROCEDURE — 64447 NJX AA&/STRD FEMORAL NRV IMG: CPT | Mod: 59,LT,, | Performed by: ANESTHESIOLOGY

## 2020-11-24 PROCEDURE — 25000003 PHARM REV CODE 250: Performed by: STUDENT IN AN ORGANIZED HEALTH CARE EDUCATION/TRAINING PROGRAM

## 2020-11-24 PROCEDURE — 87070 CULTURE OTHR SPECIMN AEROBIC: CPT

## 2020-11-24 PROCEDURE — 63600175 PHARM REV CODE 636 W HCPCS: Performed by: STUDENT IN AN ORGANIZED HEALTH CARE EDUCATION/TRAINING PROGRAM

## 2020-11-24 PROCEDURE — D9220A PRA ANESTHESIA: Mod: ANES,,, | Performed by: ANESTHESIOLOGY

## 2020-11-24 PROCEDURE — 37000009 HC ANESTHESIA EA ADD 15 MINS: Performed by: ORTHOPAEDIC SURGERY

## 2020-11-24 PROCEDURE — 25000003 PHARM REV CODE 250: Performed by: PHYSICIAN ASSISTANT

## 2020-11-24 PROCEDURE — 25000003 PHARM REV CODE 250: Performed by: NURSE ANESTHETIST, CERTIFIED REGISTERED

## 2020-11-24 PROCEDURE — D9220A PRA ANESTHESIA: ICD-10-PCS | Mod: ANES,,, | Performed by: ANESTHESIOLOGY

## 2020-11-24 PROCEDURE — 94761 N-INVAS EAR/PLS OXIMETRY MLT: CPT

## 2020-11-24 PROCEDURE — 64447 NJX AA&/STRD FEMORAL NRV IMG: CPT | Performed by: STUDENT IN AN ORGANIZED HEALTH CARE EDUCATION/TRAINING PROGRAM

## 2020-11-24 PROCEDURE — 97605 PR NEG PRESS WOUND THERAPY (NPWT) W/NON-DISPOSABLE WOUND VAC DEVICE (DME), <=50 CM: ICD-10-PCS | Mod: 78,,, | Performed by: ORTHOPAEDIC SURGERY

## 2020-11-24 PROCEDURE — 63600175 PHARM REV CODE 636 W HCPCS: Performed by: NURSE ANESTHETIST, CERTIFIED REGISTERED

## 2020-11-24 PROCEDURE — 27570 FIXATION OF KNEE JOINT: CPT | Mod: 78,51,LT, | Performed by: ORTHOPAEDIC SURGERY

## 2020-11-24 PROCEDURE — 97605 NEG PRS WND THER DME<=50SQCM: CPT | Mod: 78,,, | Performed by: ORTHOPAEDIC SURGERY

## 2020-11-24 PROCEDURE — D9220A PRA ANESTHESIA: ICD-10-PCS | Mod: CRNA,,, | Performed by: NURSE ANESTHETIST, CERTIFIED REGISTERED

## 2020-11-24 PROCEDURE — 87075 CULTR BACTERIA EXCEPT BLOOD: CPT

## 2020-11-24 PROCEDURE — 64447 ADDUCTOR CANAL SS: ICD-10-PCS | Mod: 59,LT,, | Performed by: ANESTHESIOLOGY

## 2020-11-24 PROCEDURE — 36000706: Performed by: ORTHOPAEDIC SURGERY

## 2020-11-24 PROCEDURE — 99223 1ST HOSP IP/OBS HIGH 75: CPT | Mod: ,,, | Performed by: PHYSICIAN ASSISTANT

## 2020-11-24 PROCEDURE — 63600175 PHARM REV CODE 636 W HCPCS: Performed by: PHYSICIAN ASSISTANT

## 2020-11-24 PROCEDURE — 87107 FUNGI IDENTIFICATION MOLD: CPT

## 2020-11-24 PROCEDURE — 20680 REMOVAL OF IMPLANT DEEP: CPT | Mod: 78,,, | Performed by: ORTHOPAEDIC SURGERY

## 2020-11-24 PROCEDURE — 80053 COMPREHEN METABOLIC PANEL: CPT

## 2020-11-24 PROCEDURE — 27570 PR MANIPULATN KNEE JT+ANESTHESIA: ICD-10-PCS | Mod: 78,51,LT, | Performed by: ORTHOPAEDIC SURGERY

## 2020-11-24 PROCEDURE — 82962 GLUCOSE BLOOD TEST: CPT | Performed by: ORTHOPAEDIC SURGERY

## 2020-11-24 PROCEDURE — 20680 PR REMOVAL DEEP IMPLANT: ICD-10-PCS | Mod: 78,,, | Performed by: ORTHOPAEDIC SURGERY

## 2020-11-24 PROCEDURE — 99223 PR INITIAL HOSPITAL CARE,LEVL III: ICD-10-PCS | Mod: ,,, | Performed by: PHYSICIAN ASSISTANT

## 2020-11-24 PROCEDURE — 63600175 PHARM REV CODE 636 W HCPCS: Performed by: ORTHOPAEDIC SURGERY

## 2020-11-24 PROCEDURE — 27201423 OPTIME MED/SURG SUP & DEVICES STERILE SUPPLY: Performed by: ORTHOPAEDIC SURGERY

## 2020-11-24 PROCEDURE — 87205 SMEAR GRAM STAIN: CPT

## 2020-11-24 PROCEDURE — 11000001 HC ACUTE MED/SURG PRIVATE ROOM

## 2020-11-24 PROCEDURE — 27000221 HC OXYGEN, UP TO 24 HOURS

## 2020-11-24 PROCEDURE — 71000033 HC RECOVERY, INTIAL HOUR: Performed by: ORTHOPAEDIC SURGERY

## 2020-11-24 PROCEDURE — 85025 COMPLETE CBC W/AUTO DIFF WBC: CPT

## 2020-11-24 PROCEDURE — 76942 ADDUCTOR CANAL SS: ICD-10-PCS | Mod: 26,,, | Performed by: ANESTHESIOLOGY

## 2020-11-24 PROCEDURE — 94799 UNLISTED PULMONARY SVC/PX: CPT

## 2020-11-24 PROCEDURE — 76942 ECHO GUIDE FOR BIOPSY: CPT | Mod: 26,,, | Performed by: ANESTHESIOLOGY

## 2020-11-24 PROCEDURE — C9399 UNCLASSIFIED DRUGS OR BIOLOG: HCPCS | Performed by: STUDENT IN AN ORGANIZED HEALTH CARE EDUCATION/TRAINING PROGRAM

## 2020-11-24 PROCEDURE — D9220A PRA ANESTHESIA: Mod: CRNA,,, | Performed by: NURSE ANESTHETIST, CERTIFIED REGISTERED

## 2020-11-24 PROCEDURE — 25000003 PHARM REV CODE 250: Performed by: ORTHOPAEDIC SURGERY

## 2020-11-24 PROCEDURE — 36000707: Performed by: ORTHOPAEDIC SURGERY

## 2020-11-24 PROCEDURE — 71000015 HC POSTOP RECOV 1ST HR: Performed by: ORTHOPAEDIC SURGERY

## 2020-11-24 PROCEDURE — 37000008 HC ANESTHESIA 1ST 15 MINUTES: Performed by: ORTHOPAEDIC SURGERY

## 2020-11-24 PROCEDURE — 99253 PR INITIAL INPATIENT CONSULT,LEVL III: ICD-10-PCS | Mod: ,,, | Performed by: INTERNAL MEDICINE

## 2020-11-24 PROCEDURE — 87102 FUNGUS ISOLATION CULTURE: CPT

## 2020-11-24 PROCEDURE — 36415 COLL VENOUS BLD VENIPUNCTURE: CPT

## 2020-11-24 PROCEDURE — 87116 MYCOBACTERIA CULTURE: CPT

## 2020-11-24 PROCEDURE — 87206 SMEAR FLUORESCENT/ACID STAI: CPT

## 2020-11-24 PROCEDURE — 71000016 HC POSTOP RECOV ADDL HR: Performed by: ORTHOPAEDIC SURGERY

## 2020-11-24 RX ORDER — LEVOTHYROXINE SODIUM 75 UG/1
75 TABLET ORAL DAILY
Status: DISCONTINUED | OUTPATIENT
Start: 2020-11-25 | End: 2020-11-26 | Stop reason: HOSPADM

## 2020-11-24 RX ORDER — OXYCODONE HYDROCHLORIDE 5 MG/1
5 TABLET ORAL
Status: DISCONTINUED | OUTPATIENT
Start: 2020-11-24 | End: 2020-11-24 | Stop reason: HOSPADM

## 2020-11-24 RX ORDER — FENTANYL CITRATE 50 UG/ML
25 INJECTION, SOLUTION INTRAMUSCULAR; INTRAVENOUS EVERY 5 MIN PRN
Status: COMPLETED | OUTPATIENT
Start: 2020-11-24 | End: 2020-11-24

## 2020-11-24 RX ORDER — CEFAZOLIN SODIUM 1 G/3ML
2 INJECTION, POWDER, FOR SOLUTION INTRAMUSCULAR; INTRAVENOUS
Status: COMPLETED | OUTPATIENT
Start: 2020-11-24 | End: 2020-11-24

## 2020-11-24 RX ORDER — KETOROLAC TROMETHAMINE 30 MG/ML
15 INJECTION, SOLUTION INTRAMUSCULAR; INTRAVENOUS ONCE AS NEEDED
Status: COMPLETED | OUTPATIENT
Start: 2020-11-24 | End: 2020-11-24

## 2020-11-24 RX ORDER — ACETAMINOPHEN 10 MG/ML
1000 INJECTION, SOLUTION INTRAVENOUS ONCE
Status: DISCONTINUED | OUTPATIENT
Start: 2020-11-24 | End: 2020-11-24 | Stop reason: CLARIF

## 2020-11-24 RX ORDER — SODIUM CHLORIDE 9 MG/ML
INJECTION, SOLUTION INTRAVENOUS CONTINUOUS PRN
Status: DISCONTINUED | OUTPATIENT
Start: 2020-11-24 | End: 2020-11-24

## 2020-11-24 RX ORDER — ROPIVACAINE HYDROCHLORIDE 5 MG/ML
INJECTION, SOLUTION EPIDURAL; INFILTRATION; PERINEURAL
Status: COMPLETED | OUTPATIENT
Start: 2020-11-24 | End: 2020-11-24

## 2020-11-24 RX ORDER — OXYCODONE HYDROCHLORIDE 5 MG/1
5 TABLET ORAL EVERY 4 HOURS PRN
Status: DISCONTINUED | OUTPATIENT
Start: 2020-11-24 | End: 2020-11-26 | Stop reason: HOSPADM

## 2020-11-24 RX ORDER — ONDANSETRON 2 MG/ML
INJECTION INTRAMUSCULAR; INTRAVENOUS
Status: DISCONTINUED | OUTPATIENT
Start: 2020-11-24 | End: 2020-11-24

## 2020-11-24 RX ORDER — FENTANYL CITRATE 50 UG/ML
INJECTION, SOLUTION INTRAMUSCULAR; INTRAVENOUS
Status: DISCONTINUED | OUTPATIENT
Start: 2020-11-24 | End: 2020-11-24

## 2020-11-24 RX ORDER — LISINOPRIL 2.5 MG/1
2.5 TABLET ORAL DAILY
Status: DISCONTINUED | OUTPATIENT
Start: 2020-11-25 | End: 2020-11-26 | Stop reason: HOSPADM

## 2020-11-24 RX ORDER — FENTANYL CITRATE 50 UG/ML
25 INJECTION, SOLUTION INTRAMUSCULAR; INTRAVENOUS EVERY 5 MIN PRN
Status: DISCONTINUED | OUTPATIENT
Start: 2020-11-24 | End: 2020-11-24 | Stop reason: HOSPADM

## 2020-11-24 RX ORDER — CEFEPIME HYDROCHLORIDE 2 G/1
2 INJECTION, POWDER, FOR SOLUTION INTRAVENOUS
Status: DISCONTINUED | OUTPATIENT
Start: 2020-11-24 | End: 2020-11-26 | Stop reason: HOSPADM

## 2020-11-24 RX ORDER — DIPHENHYDRAMINE HCL 25 MG
25 CAPSULE ORAL EVERY 6 HOURS PRN
Status: DISCONTINUED | OUTPATIENT
Start: 2020-11-24 | End: 2020-11-26 | Stop reason: HOSPADM

## 2020-11-24 RX ORDER — ACETAMINOPHEN 325 MG/1
650 TABLET ORAL EVERY 6 HOURS PRN
Status: DISCONTINUED | OUTPATIENT
Start: 2020-11-24 | End: 2020-11-24 | Stop reason: HOSPADM

## 2020-11-24 RX ORDER — VANCOMYCIN HYDROCHLORIDE 1 G/20ML
INJECTION, POWDER, LYOPHILIZED, FOR SOLUTION INTRAVENOUS
Status: DISCONTINUED | OUTPATIENT
Start: 2020-11-24 | End: 2020-11-24 | Stop reason: HOSPADM

## 2020-11-24 RX ORDER — MIDAZOLAM HYDROCHLORIDE 1 MG/ML
0.5 INJECTION INTRAMUSCULAR; INTRAVENOUS
Status: DISCONTINUED | OUTPATIENT
Start: 2020-11-24 | End: 2020-11-24 | Stop reason: HOSPADM

## 2020-11-24 RX ORDER — DEXAMETHASONE SODIUM PHOSPHATE 4 MG/ML
INJECTION, SOLUTION INTRA-ARTICULAR; INTRALESIONAL; INTRAMUSCULAR; INTRAVENOUS; SOFT TISSUE
Status: DISCONTINUED | OUTPATIENT
Start: 2020-11-24 | End: 2020-11-24

## 2020-11-24 RX ORDER — PROCHLORPERAZINE EDISYLATE 5 MG/ML
10 INJECTION INTRAMUSCULAR; INTRAVENOUS EVERY 6 HOURS PRN
Status: COMPLETED | OUTPATIENT
Start: 2020-11-24 | End: 2020-11-24

## 2020-11-24 RX ORDER — PANTOPRAZOLE SODIUM 40 MG/1
40 TABLET, DELAYED RELEASE ORAL DAILY
Status: DISCONTINUED | OUTPATIENT
Start: 2020-11-25 | End: 2020-11-26 | Stop reason: HOSPADM

## 2020-11-24 RX ORDER — LIDOCAINE HYDROCHLORIDE 10 MG/ML
1 INJECTION, SOLUTION EPIDURAL; INFILTRATION; INTRACAUDAL; PERINEURAL ONCE
Status: COMPLETED | OUTPATIENT
Start: 2020-11-24 | End: 2020-11-24

## 2020-11-24 RX ORDER — IBUPROFEN 200 MG
16 TABLET ORAL
Status: DISCONTINUED | OUTPATIENT
Start: 2020-11-24 | End: 2020-11-26 | Stop reason: HOSPADM

## 2020-11-24 RX ORDER — DIPHENHYDRAMINE HYDROCHLORIDE 50 MG/ML
INJECTION INTRAMUSCULAR; INTRAVENOUS
Status: DISCONTINUED | OUTPATIENT
Start: 2020-11-24 | End: 2020-11-24

## 2020-11-24 RX ORDER — IBUPROFEN 200 MG
24 TABLET ORAL
Status: DISCONTINUED | OUTPATIENT
Start: 2020-11-24 | End: 2020-11-26 | Stop reason: HOSPADM

## 2020-11-24 RX ORDER — PHENYLEPHRINE HYDROCHLORIDE 10 MG/ML
INJECTION INTRAVENOUS
Status: DISCONTINUED | OUTPATIENT
Start: 2020-11-24 | End: 2020-11-24

## 2020-11-24 RX ORDER — ACETAMINOPHEN 500 MG
1000 TABLET ORAL EVERY 6 HOURS
Status: DISCONTINUED | OUTPATIENT
Start: 2020-11-24 | End: 2020-11-24 | Stop reason: CLARIF

## 2020-11-24 RX ORDER — GLUCAGON 1 MG
1 KIT INJECTION
Status: DISCONTINUED | OUTPATIENT
Start: 2020-11-24 | End: 2020-11-26 | Stop reason: HOSPADM

## 2020-11-24 RX ORDER — POLYETHYLENE GLYCOL 3350 17 G/17G
17 POWDER, FOR SOLUTION ORAL 2 TIMES DAILY PRN
Status: DISCONTINUED | OUTPATIENT
Start: 2020-11-24 | End: 2020-11-26 | Stop reason: HOSPADM

## 2020-11-24 RX ORDER — LIDOCAINE HYDROCHLORIDE 20 MG/ML
INJECTION INTRAVENOUS
Status: DISCONTINUED | OUTPATIENT
Start: 2020-11-24 | End: 2020-11-24

## 2020-11-24 RX ORDER — MUPIROCIN 20 MG/G
OINTMENT TOPICAL
Status: DISCONTINUED | OUTPATIENT
Start: 2020-11-24 | End: 2020-11-24 | Stop reason: HOSPADM

## 2020-11-24 RX ORDER — VANCOMYCIN HCL IN 5 % DEXTROSE 1G/250ML
1000 PLASTIC BAG, INJECTION (ML) INTRAVENOUS
Status: DISCONTINUED | OUTPATIENT
Start: 2020-11-24 | End: 2020-11-26 | Stop reason: HOSPADM

## 2020-11-24 RX ORDER — CLINDAMYCIN PHOSPHATE 900 MG/50ML
INJECTION, SOLUTION INTRAVENOUS
Status: DISCONTINUED | OUTPATIENT
Start: 2020-11-24 | End: 2020-11-24

## 2020-11-24 RX ORDER — ASPIRIN 81 MG/1
81 TABLET ORAL 2 TIMES DAILY
Status: DISCONTINUED | OUTPATIENT
Start: 2020-11-24 | End: 2020-11-26 | Stop reason: HOSPADM

## 2020-11-24 RX ORDER — ACETAMINOPHEN 325 MG/1
650 TABLET ORAL EVERY 6 HOURS
Status: DISCONTINUED | OUTPATIENT
Start: 2020-11-24 | End: 2020-11-26 | Stop reason: HOSPADM

## 2020-11-24 RX ORDER — SIMVASTATIN 20 MG/1
40 TABLET, FILM COATED ORAL NIGHTLY
Status: DISCONTINUED | OUTPATIENT
Start: 2020-11-24 | End: 2020-11-26 | Stop reason: HOSPADM

## 2020-11-24 RX ORDER — VENLAFAXINE HYDROCHLORIDE 75 MG/1
225 CAPSULE, EXTENDED RELEASE ORAL DAILY
Status: DISCONTINUED | OUTPATIENT
Start: 2020-11-25 | End: 2020-11-26 | Stop reason: HOSPADM

## 2020-11-24 RX ORDER — PROPOFOL 10 MG/ML
VIAL (ML) INTRAVENOUS
Status: DISCONTINUED | OUTPATIENT
Start: 2020-11-24 | End: 2020-11-24

## 2020-11-24 RX ORDER — ONDANSETRON 2 MG/ML
4 INJECTION INTRAMUSCULAR; INTRAVENOUS DAILY PRN
Status: DISCONTINUED | OUTPATIENT
Start: 2020-11-24 | End: 2020-11-24 | Stop reason: HOSPADM

## 2020-11-24 RX ORDER — INSULIN ASPART 100 [IU]/ML
1-10 INJECTION, SOLUTION INTRAVENOUS; SUBCUTANEOUS
Status: DISCONTINUED | OUTPATIENT
Start: 2020-11-24 | End: 2020-11-26 | Stop reason: HOSPADM

## 2020-11-24 RX ORDER — HYDROMORPHONE HYDROCHLORIDE 1 MG/ML
0.5 INJECTION, SOLUTION INTRAMUSCULAR; INTRAVENOUS; SUBCUTANEOUS
Status: COMPLETED | OUTPATIENT
Start: 2020-11-24 | End: 2020-11-24

## 2020-11-24 RX ORDER — CEFTRIAXONE 1 G/1
1 INJECTION, POWDER, FOR SOLUTION INTRAMUSCULAR; INTRAVENOUS
Status: DISCONTINUED | OUTPATIENT
Start: 2020-11-24 | End: 2020-11-24

## 2020-11-24 RX ORDER — OXYCODONE HYDROCHLORIDE 10 MG/1
10 TABLET ORAL EVERY 4 HOURS PRN
Status: DISCONTINUED | OUTPATIENT
Start: 2020-11-24 | End: 2020-11-26 | Stop reason: HOSPADM

## 2020-11-24 RX ADMIN — HYDROMORPHONE HYDROCHLORIDE 0.5 MG: 1 INJECTION, SOLUTION INTRAMUSCULAR; INTRAVENOUS; SUBCUTANEOUS at 09:11

## 2020-11-24 RX ADMIN — INSULIN ASPART 8 UNITS: 100 INJECTION, SOLUTION INTRAVENOUS; SUBCUTANEOUS at 05:11

## 2020-11-24 RX ADMIN — FENTANYL CITRATE 25 MCG: 50 INJECTION, SOLUTION INTRAMUSCULAR; INTRAVENOUS at 07:11

## 2020-11-24 RX ADMIN — SODIUM CHLORIDE: 0.9 INJECTION, SOLUTION INTRAVENOUS at 06:11

## 2020-11-24 RX ADMIN — CEFTRIAXONE SODIUM 1 G: 1 INJECTION, POWDER, FOR SOLUTION INTRAMUSCULAR; INTRAVENOUS at 12:11

## 2020-11-24 RX ADMIN — ROPIVACAINE HYDROCHLORIDE 10 ML: 5 INJECTION, SOLUTION EPIDURAL; INFILTRATION; PERINEURAL at 06:11

## 2020-11-24 RX ADMIN — PROPOFOL 30 MG: 10 INJECTION, EMULSION INTRAVENOUS at 07:11

## 2020-11-24 RX ADMIN — CEFEPIME 2 G: 2 INJECTION, POWDER, FOR SOLUTION INTRAVENOUS at 09:11

## 2020-11-24 RX ADMIN — VANCOMYCIN HYDROCHLORIDE 1000 MG: 1 INJECTION, POWDER, LYOPHILIZED, FOR SOLUTION INTRAVENOUS at 10:11

## 2020-11-24 RX ADMIN — FENTANYL CITRATE 25 MCG: 50 INJECTION INTRAMUSCULAR; INTRAVENOUS at 09:11

## 2020-11-24 RX ADMIN — INSULIN ASPART 6 UNITS: 100 INJECTION, SOLUTION INTRAVENOUS; SUBCUTANEOUS at 01:11

## 2020-11-24 RX ADMIN — CLINDAMYCIN PHOSPHATE 900 MG: 18 INJECTION, SOLUTION INTRAVENOUS at 07:11

## 2020-11-24 RX ADMIN — PHENYLEPHRINE HYDROCHLORIDE 50 MCG: 10 INJECTION INTRAVENOUS at 07:11

## 2020-11-24 RX ADMIN — ACETAMINOPHEN 650 MG: 325 TABLET ORAL at 07:11

## 2020-11-24 RX ADMIN — SODIUM CHLORIDE: 0.9 INJECTION, SOLUTION INTRAVENOUS at 07:11

## 2020-11-24 RX ADMIN — SIMVASTATIN 40 MG: 20 TABLET, FILM COATED ORAL at 09:11

## 2020-11-24 RX ADMIN — ACETAMINOPHEN 650 MG: 325 TABLET ORAL at 01:11

## 2020-11-24 RX ADMIN — PHENYLEPHRINE HYDROCHLORIDE 100 MCG: 10 INJECTION INTRAVENOUS at 08:11

## 2020-11-24 RX ADMIN — PROPOFOL 70 MG: 10 INJECTION, EMULSION INTRAVENOUS at 07:11

## 2020-11-24 RX ADMIN — PROPOFOL 50 MG: 10 INJECTION, EMULSION INTRAVENOUS at 07:11

## 2020-11-24 RX ADMIN — HYDROMORPHONE HYDROCHLORIDE 0.5 MG: 1 INJECTION, SOLUTION INTRAMUSCULAR; INTRAVENOUS; SUBCUTANEOUS at 10:11

## 2020-11-24 RX ADMIN — INSULIN DETEMIR 10 UNITS: 100 INJECTION, SOLUTION SUBCUTANEOUS at 03:11

## 2020-11-24 RX ADMIN — DOCUSATE SODIUM 50 MG: 50 CAPSULE, LIQUID FILLED ORAL at 09:11

## 2020-11-24 RX ADMIN — OXYCODONE HYDROCHLORIDE 10 MG: 10 TABLET ORAL at 09:11

## 2020-11-24 RX ADMIN — ONDANSETRON 4 MG: 2 INJECTION, SOLUTION INTRAMUSCULAR; INTRAVENOUS at 08:11

## 2020-11-24 RX ADMIN — MIDAZOLAM 2 MG: 1 INJECTION INTRAMUSCULAR; INTRAVENOUS at 06:11

## 2020-11-24 RX ADMIN — FENTANYL CITRATE 50 MCG: 50 INJECTION, SOLUTION INTRAMUSCULAR; INTRAVENOUS at 07:11

## 2020-11-24 RX ADMIN — PROCHLORPERAZINE EDISYLATE 10 MG: 5 INJECTION INTRAMUSCULAR; INTRAVENOUS at 09:11

## 2020-11-24 RX ADMIN — LIDOCAINE HYDROCHLORIDE 50 MG: 20 INJECTION, SOLUTION INTRAVENOUS at 07:11

## 2020-11-24 RX ADMIN — KETOROLAC TROMETHAMINE 15 MG: 30 INJECTION, SOLUTION INTRAMUSCULAR at 09:11

## 2020-11-24 RX ADMIN — ONDANSETRON 4 MG: 2 INJECTION, SOLUTION INTRAMUSCULAR; INTRAVENOUS at 07:11

## 2020-11-24 RX ADMIN — VANCOMYCIN HYDROCHLORIDE 1750 MG: 750 INJECTION, POWDER, LYOPHILIZED, FOR SOLUTION INTRAVENOUS at 10:11

## 2020-11-24 RX ADMIN — CEFAZOLIN 2 G: 330 INJECTION, POWDER, FOR SOLUTION INTRAMUSCULAR; INTRAVENOUS at 07:11

## 2020-11-24 RX ADMIN — ASPIRIN 81 MG: 81 TABLET, COATED ORAL at 09:11

## 2020-11-24 RX ADMIN — DIPHENHYDRAMINE HYDROCHLORIDE 12.5 MG: 50 INJECTION INTRAMUSCULAR; INTRAVENOUS at 08:11

## 2020-11-24 RX ADMIN — LIDOCAINE HYDROCHLORIDE 0.4 MG: 10 INJECTION, SOLUTION EPIDURAL; INFILTRATION; INTRACAUDAL at 05:11

## 2020-11-24 RX ADMIN — DEXAMETHASONE SODIUM PHOSPHATE 8 MG: 4 INJECTION, SOLUTION INTRAMUSCULAR; INTRAVENOUS at 07:11

## 2020-11-24 RX ADMIN — MUPIROCIN: 20 OINTMENT TOPICAL at 05:11

## 2020-11-24 RX ADMIN — OXYCODONE HYDROCHLORIDE 5 MG: 5 TABLET ORAL at 05:11

## 2020-11-24 NOTE — CONSULTS
[facility] Hospital Medicine  Consult Note    Patient Name: Bhavna Hancock  MRN: [id number]  Admission Date: 11/24/2020  Hospital Length of Stay: 0 days  Attending Physician: Patrice Iglesias MD   Primary Care Provider: Apurva Abbott MD     [facility] Medicine Team: Networked reference to record PCT  Arie Silver MD      Patient information was obtained from patient and ER records.     Consults  Subjective:     Principal Problem: Closed displaced transverse fracture of left patella    Chief Complaint: No chief complaint on file.       HPI: Ms. Hancock is a 59yo female with history of T2DM on insulin, Hypothyroidism, HTN, HLD who is s/p I&D of Left Knee on 11/24/2020. Patient initially had a fall on 9/22 with resulting patellar fracture. She underwent ORIF of L Patella on 10/5 which was complicated by drainage and wound dehiscence. Hospital medicine consulted for assistance with medical management. Last A1C reported to be 8.3.    Past Medical History:   Diagnosis Date    Abdominal pain     Diabetes     GERD (gastroesophageal reflux disease)     Hx of colonic polyps     Hyperlipidemia     Hypertension     Nausea and vomiting        Past Surgical History:   Procedure Laterality Date    CRANIOTOMY  2013    DEBRIDEMENT TENNIS ELBOW      hip ascites      OPEN REDUCTION AND INTERNAL FIXATION (ORIF) OF FRACTURE OF PATELLA Left 10/5/2020    Procedure: ORIF, FRACTURE, PATELLA - Femoral catheter ok;  Surgeon: Patrice Iglesias MD;  Location: [facility] OR [address] [place] [address];  Service: Orthopedics;  Laterality: Left;    TRIGGER FINGER RELEASE         Review of patient's allergies indicates:   Allergen Reactions    Codeine Nausea And Vomiting       Current Facility-Administered Medications on File Prior to Encounter   Medication    lidocaine HCL 10 mg/ml (1%) injection 5 mL    triamcinolone acetonide injection 40 mg     Current Outpatient Medications on File Prior to Encounter   Medication Sig     estradioL (ESTRACE) 1 MG tablet TK 1 T PO QD    fish oil-omega-3 fatty acids 300-1,000 mg capsule Take 2 g by mouth once daily.    ibuprofen (ADVIL,MOTRIN) 600 MG tablet Take 1 tablet (600 mg total) by mouth every 6 (six) hours as needed for Pain.    insulin glargine (LANTUS) 100 unit/mL injection Inject 35 Units into the skin once daily.     insulin lispro (HUMALOG KWIKPEN INSULIN) 100 unit/mL pen INJECT 14 UNITS UNDER THE SKIN WITH EVERY MEAL    levothyroxine (SYNTHROID) 75 MCG tablet TAKE 1 TABLET BY MOUTH EVERY DAY    lisinopril (PRINIVIL,ZESTRIL) 2.5 MG tablet Take 2.5 mg by mouth once daily.    medroxyPROGESTERone (PROVERA) 2.5 MG tablet TK 1 T PO QD    metformin (GLUCOPHAGE) 500 MG tablet Take 500 mg by mouth daily with breakfast.    pantoprazole (PROTONIX) 40 MG tablet TAKE 1 TABLET(40 MG) BY MOUTH EVERY DAY    RESTASIS 0.05 % ophthalmic emulsion INSTILL ONE DROP IN OU BID    simvastatin (ZOCOR) 40 MG tablet Take 40 mg by mouth every evening.    sulfamethoxazole-trimethoprim 800-160mg (BACTRIM DS) 800-160 mg Tab Take 1 tablet by mouth 2 (two) times daily. for 10 days    trospium (SANCTURA) 20 mg Tab tablet Take 20 mg by mouth once daily.     venlafaxine (EFFEXOR-XR) 150 MG Cp24 Take 225 mg by mouth once daily.     acetaminophen (TYLENOL) 500 MG tablet Take 2 tablets (1,000 mg total) by mouth every 6 (six) hours.    aspirin (ECOTRIN) 81 MG EC tablet Take 1 tablet (81 mg total) by mouth 2 (two) times a day.    blood sugar diagnostic (ONETOUCH ULTRA BLUE TEST STRIP) Strp Use to test blood sugar 5 times a day    DEXCOM G6 SENSOR Priscilla TEST BS FID    DEXCOM G6 TRANSMITTER Priscilla USE TO CHECK FID    insulin syringe-needle U-100 1/2 mL 30 gauge Syrg USE ONE SYRINGE PER DAY UTD    LOTEMAX SM 0.38 % DrpG INT 1 GTT IN OU BID UTD    oxyCODONE (ROXICODONE) 5 MG immediate release tablet Take 1 tablet (5 mg total) by mouth every 6 (six) hours as needed for Pain. May take 1-2 tablets every 6 hours as  needed for pain     Family History     Problem Relation (Age of Onset)    Colon cancer Father (77)        Tobacco Use    Smoking status: Former Smoker     Quit date: 10/5/2005     Years since quitting: 15.1    Smokeless tobacco: Never Used   Substance and Sexual Activity    Alcohol use: No    Drug use: No    Sexual activity: Not on file     Review of Systems   Constitutional: Negative for activity change, appetite change, chills and fever.   HENT: Negative for congestion.    Respiratory: Negative for cough and shortness of breath.    Cardiovascular: Negative for chest pain, palpitations and leg swelling.   Gastrointestinal: Negative for abdominal pain, constipation, diarrhea, nausea and vomiting.   Genitourinary: Negative for difficulty urinating and dysuria.   Musculoskeletal: Negative for arthralgias and myalgias.   Neurological: Negative for dizziness, weakness, light-headedness and numbness.   Psychiatric/Behavioral: Negative for confusion.     Objective:     Vital Signs (Most Recent):  Temp: 97.2 °F (36.2 °C) (11/24/20 0900)  Pulse: 86 (11/24/20 1100)  Resp: 20 (11/24/20 1100)  BP: 135/63 (11/24/20 1100)  SpO2: 96 % (11/24/20 1100) Vital Signs (24h Range):  Temp:  [97.2 °F (36.2 °C)-98.2 °F (36.8 °C)] 97.2 °F (36.2 °C)  Pulse:  [78-99] 86  Resp:  [11-26] 20  SpO2:  [93 %-100 %] 96 %  BP: (132-165)/(60-78) 135/63     Weight: 77.1 kg (170 lb)  Body mass index is 28.29 kg/m².    Physical Exam  HENT:      Head: Normocephalic and atraumatic.      Mouth/Throat:      Mouth: Mucous membranes are moist.   Eyes:      Pupils: Pupils are equal, round, and reactive to light.   Cardiovascular:      Rate and Rhythm: Normal rate and regular rhythm.      Heart sounds: No murmur. No gallop.    Pulmonary:      Effort: Pulmonary effort is normal. No respiratory distress.      Breath sounds: Normal breath sounds.   Abdominal:      General: Bowel sounds are normal.      Tenderness: There is no abdominal tenderness. There is no  guarding.   Skin:     General: Skin is warm and dry.   Neurological:      Mental Status: She is alert and oriented to person, place, and time.   Psychiatric:         Mood and Affect: Mood normal.         Behavior: Behavior normal.         Significant Labs: All pertinent labs within the past 24 hours have been reviewed.    Significant Imaging: I have reviewed and interpreted all pertinent imaging results/findings within the past 24 hours.    Assessment/Plan:     Type 2 diabetes mellitus  61yo female with history of T2DM on insulin, Hypothyroidism, HTN, HLD who is s/p I&D of Left Knee on 11/24/2020. Hospital medicine consulted for assistance with medical management. Last A1C reported to be 8.3. Patient on 35u AM Lantus at home with 14u Lispro with meals.     Plan:   - Glucose goal of 140-180. It has been 176, 138, 163 this admission  - Recommend LDSSI for now, will re-access patient's glucose trend tomorrow morning before resuming any basal insulin.        Hypothyroidism  Prior TSH of 3.35 on 8/31 per chart review    - Continue synthroid    Hypertension  Patient with reported history of Hypertension but on Lisinopril (2.5mg). Unclear if lisinopril is for hypertension or for microalbuminuria from DM2 as dose is small.     - Continue Lisinopril    VTE Risk Mitigation (From admission, onward)    None              Thank you for your consult. I will follow-up with patient. Please contact us if you have any additional questions.    Arie Silver MD  Department of Hospital Medicine   [facility]

## 2020-11-24 NOTE — SUBJECTIVE & OBJECTIVE
Past Medical History:   Diagnosis Date    Abdominal pain     Diabetes     GERD (gastroesophageal reflux disease)     Hx of colonic polyps     Hyperlipidemia     Hypertension     Nausea and vomiting        Past Surgical History:   Procedure Laterality Date    CRANIOTOMY  2013    DEBRIDEMENT TENNIS ELBOW      hip ascites      OPEN REDUCTION AND INTERNAL FIXATION (ORIF) OF FRACTURE OF PATELLA Left 10/5/2020    Procedure: ORIF, FRACTURE, PATELLA - Femoral catheter ok;  Surgeon: Patrice Iglesias MD;  Location: [facility] OR [address] [place] [address];  Service: Orthopedics;  Laterality: Left;    TRIGGER FINGER RELEASE         Review of patient's allergies indicates:   Allergen Reactions    Codeine Nausea And Vomiting       Medications:  Medications Prior to Admission   Medication Sig    estradioL (ESTRACE) 1 MG tablet TK 1 T PO QD    fish oil-omega-3 fatty acids 300-1,000 mg capsule Take 2 g by mouth once daily.    ibuprofen (ADVIL,MOTRIN) 600 MG tablet Take 1 tablet (600 mg total) by mouth every 6 (six) hours as needed for Pain.    insulin glargine (LANTUS) 100 unit/mL injection Inject 35 Units into the skin once daily.     insulin lispro (HUMALOG KWIKPEN INSULIN) 100 unit/mL pen INJECT 14 UNITS UNDER THE SKIN WITH EVERY MEAL    levothyroxine (SYNTHROID) 75 MCG tablet TAKE 1 TABLET BY MOUTH EVERY DAY    lisinopril (PRINIVIL,ZESTRIL) 2.5 MG tablet Take 2.5 mg by mouth once daily.    medroxyPROGESTERone (PROVERA) 2.5 MG tablet TK 1 T PO QD    metformin (GLUCOPHAGE) 500 MG tablet Take 500 mg by mouth daily with breakfast.    pantoprazole (PROTONIX) 40 MG tablet TAKE 1 TABLET(40 MG) BY MOUTH EVERY DAY    RESTASIS 0.05 % ophthalmic emulsion INSTILL ONE DROP IN OU BID    simvastatin (ZOCOR) 40 MG tablet Take 40 mg by mouth every evening.    sulfamethoxazole-trimethoprim 800-160mg (BACTRIM DS) 800-160 mg Tab Take 1 tablet by mouth 2 (two) times daily. for 10 days    trospium (SANCTURA) 20 mg Tab tablet Take 20 mg by mouth  once daily.     venlafaxine (EFFEXOR-XR) 150 MG Cp24 Take 225 mg by mouth once daily.     acetaminophen (TYLENOL) 500 MG tablet Take 2 tablets (1,000 mg total) by mouth every 6 (six) hours.    aspirin (ECOTRIN) 81 MG EC tablet Take 1 tablet (81 mg total) by mouth 2 (two) times a day.    blood sugar diagnostic (ONETOUCH ULTRA BLUE TEST STRIP) Strp Use to test blood sugar 5 times a day    DEXCOM G6 SENSOR Priscilla TEST BS FID    DEXCOM G6 TRANSMITTER Priscilla USE TO CHECK FID    insulin syringe-needle U-100 1/2 mL 30 gauge Syrg USE ONE SYRINGE PER DAY UTD    LOTEMAX SM 0.38 % DrpG INT 1 GTT IN OU BID UTD    oxyCODONE (ROXICODONE) 5 MG immediate release tablet Take 1 tablet (5 mg total) by mouth every 6 (six) hours as needed for Pain. May take 1-2 tablets every 6 hours as needed for pain     Antibiotics (From admission, onward)    Start     Stop Route Frequency Ordered    11/24/20 2230  vancomycin in dextrose 5 % 1 gram/250 mL IVPB 1,000 mg      -- IV Every 12 hours (non-standard times) 11/24/20 1113    11/24/20 1015  cefTRIAXone injection 1 g      -- IV Every 24 hours (non-standard times) 11/24/20 0910    11/24/20 1006  vancomycin - pharmacy to dose  (vancomycin IVPB)      -- IV pharmacy to manage frequency 11/24/20 0910        Antifungals (From admission, onward)    None        Antivirals (From admission, onward)    None           Immunization History   Administered Date(s) Administered    Td - PF (ADULT) 05/28/2016       Family History     Problem Relation (Age of Onset)    Colon cancer Father (77)        Social History     Socioeconomic History    Marital status:      Spouse name: Not on file    Number of children: Not on file    Years of education: Not on file    Highest education level: Not on file   Occupational History    Not on file   Social Needs    Financial resource strain: Not on file    Food insecurity     Worry: Not on file     Inability: Not on file    Transportation needs     Medical:  Not on file     Non-medical: Not on file   Tobacco Use    Smoking status: Former Smoker     Quit date: 10/5/2005     Years since quitting: 15.1    Smokeless tobacco: Never Used   Substance and Sexual Activity    Alcohol use: No    Drug use: No    Sexual activity: Not on file   Lifestyle    Physical activity     Days per week: Not on file     Minutes per session: Not on file    Stress: Not on file   Relationships    Social connections     Talks on phone: Not on file     Gets together: Not on file     Attends Zoroastrian service: Not on file     Active member of club or organization: Not on file     Attends meetings of clubs or organizations: Not on file     Relationship status: Not on file   Other Topics Concern    Not on file   Social History Narrative    Not on file     Review of Systems   Constitutional: Negative for activity change, appetite change, chills and fever.   HENT: Negative for congestion.    Respiratory: Negative for cough and shortness of breath.    Cardiovascular: Negative for chest pain, palpitations and leg swelling.   Gastrointestinal: Negative for abdominal pain, constipation, diarrhea, nausea and vomiting.   Genitourinary: Negative for difficulty urinating, dysuria and hematuria.   Musculoskeletal: Negative for arthralgias and myalgias.   Skin: Negative for color change, rash and wound.   Neurological: Negative for dizziness, weakness, light-headedness and numbness.   Psychiatric/Behavioral: Negative for confusion.   All other systems reviewed and are negative.    Objective:     Vital Signs (Most Recent):  Temp: 97.2 °F (36.2 °C) (11/24/20 0900)  Pulse: 87 (11/24/20 1443)  Resp: 17 (11/24/20 1443)  BP: 135/63 (11/24/20 1100)  SpO2: 95 % (11/24/20 1443) Vital Signs (24h Range):  Temp:  [97.2 °F (36.2 °C)-98.2 °F (36.8 °C)] 97.2 °F (36.2 °C)  Pulse:  [78-99] 87  Resp:  [11-26] 17  SpO2:  [93 %-100 %] 95 %  BP: (132-165)/(60-78) 135/63     Weight: 77.1 kg (170 lb)  Body mass index is 28.29  kg/m².    Estimated Creatinine Clearance: 76.7 mL/min (based on SCr of 0.8 mg/dL).    Physical Exam  Constitutional:       Appearance: Normal appearance.   HENT:      Head: Normocephalic and atraumatic.      Mouth/Throat:      Mouth: Mucous membranes are moist.   Eyes:      Pupils: Pupils are equal, round, and reactive to light.   Cardiovascular:      Rate and Rhythm: Normal rate and regular rhythm.      Heart sounds: No murmur. No friction rub. No gallop.    Pulmonary:      Effort: Pulmonary effort is normal. No respiratory distress.      Breath sounds: Normal breath sounds. No stridor. No wheezing or rhonchi.   Abdominal:      General: Bowel sounds are normal.      Tenderness: There is no abdominal tenderness. There is no guarding.      Hernia: No hernia is present.   Skin:     General: Skin is warm and dry.      Coloration: Skin is not jaundiced or pale.      Findings: No rash.      Comments: L leg w/ brace in place   Neurological:      Mental Status: She is alert and oriented to person, place, and time.   Psychiatric:         Mood and Affect: Mood normal.         Behavior: Behavior normal.         Significant Labs: All pertinent labs within the past 24 hours have been reviewed.    Significant Imaging: I have reviewed all pertinent imaging results/findings within the past 24 hours.

## 2020-11-24 NOTE — ASSESSMENT & PLAN NOTE
Bhavna Hancock is a 60 y.o. female s/p L patella ORIF 10/2020 with subsequent wound dehiscence and L knee stiffness s/p manipulation, I&D, and removal of hardware DOS:11/24/20    Pain control  PT/OT: WBAT LLE, HKB locked in extension  DVT PPx: ASA 81mg BID, SCDs at all times when not ambulating  Abx: Vanc and cefepime per ID reccs  Cx: NGTD  Labs: WBC 5.4, Hg 11.1, POCT 334  Incisional wound vac in place  Conn: n/a    Dispo: pending BG control, mobilization with PT/OT, PICC line, and final ID reccs

## 2020-11-24 NOTE — HPI
"Pt is a 59yo F w/ pmhx of htn DM, hld, hypothyroidism, who previously sustained a left patellar fracture 9/22 after a fall and underwent ORIF of left patella 10/5.  She developed stiffness and knee as well as wound dehisence w/ drainage of serous fluid.  Started on Bactrim as outpt w/o improvement.  CRP normal.  MRI of her knee was performed showing no fluid collections.        Patient 11/24  underwent excisional debridement and irrigation of the anterior left knee incision.  Per notes no purulence were seen but cables were exposed so were removed.    Per Dr. Iglesias's note "Her CRP and sed rate were normal and she is not any constitutional symptoms.  However, after excising the area of drainage I think it is clear that there is at least a communication with the cable and although I washed it with Bactisure and quite well with dilute peroxide and diluted Betadine, I would like to put her on IV antibiotics for at least 4-6 weeks.  I would put her on Bactrim in clinic which may inhibit cultures.  I think it would likely be safe to leave the deep screws in the patella ultimately but want to be aggressive with antibiotics while she is healing this wound early given the communication."    ID consult for antibiotic recommendations.  Patient currently on vancomycin and ceftriaxone.     "

## 2020-11-24 NOTE — INTERVAL H&P NOTE
No significant change in history and physical.  The patient does state that she has a more discharge from her incision.  This is still clear yellow.  CRP and ESR were normal.  MRI of the knee shows effusion/hemarthrosis as expected.  No distinct fluid collection.  Stiff.  To OR for manipulation under anesthesia.  Also comes out at that point and decide whether over the incision and wash.  The risks, benefits and alternatives to surgery were discussed with the patient at great length.  These include bleeding, infection, vessel/nerve damage, pain, numbness, tingling, complex regional pain syndrome, hardware/surgical failure, need for further surgery, malunion, nonunion, DVT, PE, arthritis and death.  Patient states an understanding and wishes to proceed with surgery.   All questions were answered.  No guarantees were implied or stated.  Informed consent was obtained.      Active Hospital Problems    Diagnosis  POA    *Stiffness of left knee [M25.662]  Yes    Closed displaced transverse fracture of left patella [S82.032A]  Yes      Resolved Hospital Problems   No resolved problems to display.

## 2020-11-24 NOTE — NURSING TRANSFER
Nursing Transfer Note      11/24/2020     Transfer To: 505    Transfer via stretcher    Transported by PCT    Medicines sent: Vanc infusing    Chart send with patient: Yes    Notified: spouse    Patient reassessed at: 11/24/2020 @ 1030    Transported with belongings at bedside, crutches, brace & personal bag.

## 2020-11-24 NOTE — SUBJECTIVE & OBJECTIVE
Past Medical History:   Diagnosis Date    Abdominal pain     Diabetes     GERD (gastroesophageal reflux disease)     Hx of colonic polyps     Hyperlipidemia     Hypertension     Nausea and vomiting        Past Surgical History:   Procedure Laterality Date    CRANIOTOMY  2013    DEBRIDEMENT TENNIS ELBOW      hip ascites      OPEN REDUCTION AND INTERNAL FIXATION (ORIF) OF FRACTURE OF PATELLA Left 10/5/2020    Procedure: ORIF, FRACTURE, PATELLA - Femoral catheter ok;  Surgeon: Patrice Iglesias MD;  Location: [facility] OR [address] [place] [address];  Service: Orthopedics;  Laterality: Left;    TRIGGER FINGER RELEASE         Review of patient's allergies indicates:   Allergen Reactions    Codeine Nausea And Vomiting       Current Facility-Administered Medications on File Prior to Encounter   Medication    lidocaine HCL 10 mg/ml (1%) injection 5 mL    triamcinolone acetonide injection 40 mg     Current Outpatient Medications on File Prior to Encounter   Medication Sig    estradioL (ESTRACE) 1 MG tablet TK 1 T PO QD    fish oil-omega-3 fatty acids 300-1,000 mg capsule Take 2 g by mouth once daily.    ibuprofen (ADVIL,MOTRIN) 600 MG tablet Take 1 tablet (600 mg total) by mouth every 6 (six) hours as needed for Pain.    insulin glargine (LANTUS) 100 unit/mL injection Inject 35 Units into the skin once daily.     insulin lispro (HUMALOG KWIKPEN INSULIN) 100 unit/mL pen INJECT 14 UNITS UNDER THE SKIN WITH EVERY MEAL    levothyroxine (SYNTHROID) 75 MCG tablet TAKE 1 TABLET BY MOUTH EVERY DAY    lisinopril (PRINIVIL,ZESTRIL) 2.5 MG tablet Take 2.5 mg by mouth once daily.    medroxyPROGESTERone (PROVERA) 2.5 MG tablet TK 1 T PO QD    metformin (GLUCOPHAGE) 500 MG tablet Take 500 mg by mouth daily with breakfast.    pantoprazole (PROTONIX) 40 MG tablet TAKE 1 TABLET(40 MG) BY MOUTH EVERY DAY    RESTASIS 0.05 % ophthalmic emulsion INSTILL ONE DROP IN OU BID    simvastatin (ZOCOR) 40 MG tablet Take 40 mg by mouth every evening.     sulfamethoxazole-trimethoprim 800-160mg (BACTRIM DS) 800-160 mg Tab Take 1 tablet by mouth 2 (two) times daily. for 10 days    trospium (SANCTURA) 20 mg Tab tablet Take 20 mg by mouth once daily.     venlafaxine (EFFEXOR-XR) 150 MG Cp24 Take 225 mg by mouth once daily.     acetaminophen (TYLENOL) 500 MG tablet Take 2 tablets (1,000 mg total) by mouth every 6 (six) hours.    aspirin (ECOTRIN) 81 MG EC tablet Take 1 tablet (81 mg total) by mouth 2 (two) times a day.    blood sugar diagnostic (ONETOUCH ULTRA BLUE TEST STRIP) Strp Use to test blood sugar 5 times a day    DEXCOM G6 SENSOR Priscilla TEST BS FID    DEXCOM G6 TRANSMITTER Priscilla USE TO CHECK FID    insulin syringe-needle U-100 1/2 mL 30 gauge Syrg USE ONE SYRINGE PER DAY UTD    LOTEMAX SM 0.38 % DrpG INT 1 GTT IN OU BID UTD    oxyCODONE (ROXICODONE) 5 MG immediate release tablet Take 1 tablet (5 mg total) by mouth every 6 (six) hours as needed for Pain. May take 1-2 tablets every 6 hours as needed for pain     Family History     Problem Relation (Age of Onset)    Colon cancer Father (77)        Tobacco Use    Smoking status: Former Smoker     Quit date: 10/5/2005     Years since quitting: 15.1    Smokeless tobacco: Never Used   Substance and Sexual Activity    Alcohol use: No    Drug use: No    Sexual activity: Not on file     Review of Systems   Constitutional: Negative for activity change, appetite change, chills and fever.   HENT: Negative for congestion.    Respiratory: Negative for cough and shortness of breath.    Cardiovascular: Negative for chest pain, palpitations and leg swelling.   Gastrointestinal: Negative for abdominal pain, constipation, diarrhea, nausea and vomiting.   Genitourinary: Negative for difficulty urinating and dysuria.   Musculoskeletal: Negative for arthralgias and myalgias.   Neurological: Negative for dizziness, weakness, light-headedness and numbness.   Psychiatric/Behavioral: Negative for confusion.     Objective:      Vital Signs (Most Recent):  Temp: 97.2 °F (36.2 °C) (11/24/20 0900)  Pulse: 86 (11/24/20 1100)  Resp: 20 (11/24/20 1100)  BP: 135/63 (11/24/20 1100)  SpO2: 96 % (11/24/20 1100) Vital Signs (24h Range):  Temp:  [97.2 °F (36.2 °C)-98.2 °F (36.8 °C)] 97.2 °F (36.2 °C)  Pulse:  [78-99] 86  Resp:  [11-26] 20  SpO2:  [93 %-100 %] 96 %  BP: (132-165)/(60-78) 135/63     Weight: 77.1 kg (170 lb)  Body mass index is 28.29 kg/m².    Physical Exam  HENT:      Head: Normocephalic and atraumatic.      Mouth/Throat:      Mouth: Mucous membranes are moist.   Eyes:      Pupils: Pupils are equal, round, and reactive to light.   Cardiovascular:      Rate and Rhythm: Normal rate and regular rhythm.      Heart sounds: No murmur. No gallop.    Pulmonary:      Effort: Pulmonary effort is normal. No respiratory distress.      Breath sounds: Normal breath sounds.   Abdominal:      General: Bowel sounds are normal.      Tenderness: There is no abdominal tenderness. There is no guarding.   Skin:     General: Skin is warm and dry.   Neurological:      Mental Status: She is alert and oriented to person, place, and time.   Psychiatric:         Mood and Affect: Mood normal.         Behavior: Behavior normal.         Significant Labs: All pertinent labs within the past 24 hours have been reviewed.    Significant Imaging: I have reviewed and interpreted all pertinent imaging results/findings within the past 24 hours.

## 2020-11-24 NOTE — ASSESSMENT & PLAN NOTE
Patient with reported history of Hypertension but on Lisinopril (2.5mg). Unclear if lisinopril is for hypertension or for microalbuminuria from DM2 as dose is small.     - Continue Lisinopril

## 2020-11-24 NOTE — OP NOTE
OP NOTE    DOS:  11/24/2020    Preop Dx: Left knee stiffness status post ORIF of patella fracture    Poor wound healing anterior incision left knee with serous drainage    Postop Dx: Left knee stiffness status post ORIF of patella fracture    Poor wound healing anterior incision left knee with serous drainage    Procedure: 1.  Manipulation under anesthesia and fluoroscopy left knee contracture    2.  Excisional debridement and irrigation of the anterior left knee incision    3.  Removal hardware, deep left patella    4.  Complex closure left knee wound 7 cm      Surgeon: Patrice Iglesias M.D.    Asst:  Sury Mckay M.D    Anesthesia: Regional plus G LMA    EBL:  Minimal    IVF:  1000 cc crystalloid    Specimens: Cultures    Findings: Small area with serous drainage.  No purulence.  Cables visible after excisional debridement and slightly loose.  Removed.    Dispo:  To PACU awake/stable       Indications for Procedure:      60-year-old female with diabetes underwent open reduction internal fixation of left patella fracture 10/05/2020.  She has been moving the knee for several weeks now but has been very stiff.  She had an area poor wound healing in the distal portion of her incision that has been draining serous fluid.  Her CRP and ESR are normal.  CRP is less than 0.5.  MRI of her knee was performed showing no fluid collections.  I am bring her to the operating room for manipulation under anesthesia and quite likely will explore the wound given her serous drainage and diabetes.  The risks, benefits and alternatives to surgery discussed the patient prior to going the operating room.  Informed consent was obtained.    Procedure in Detail:    Patient was identified in preop holding area the site was marked.  Regional analgesia was administered.  Patient was wheeled to the operating room and placed on the operating table in supine position.  General mask anesthesia was induced.  A time-out was undertaken to confirm  patient, side, site, surgery, surgeon.  All agreed we proceeded.    At this point she had a range of motion from full extension to about 30° of flexion.  I slowly bend her knee and broke through some scar tissue.  I went through many cycles of this and was able to get her to about 120° of flexion.  I continued to cycle through this to keep the motion smooth.  I then visualized the patella under fluoroscopy and the reduction was maintained.    At this point I visualized anterior portion the knee and was able to squeeze some clear to slightly serous fluid but decided that with her poor wound healing and needed to explore this.  The left lower extremity was then placed in a nonsterile tourniquet and prepped draped sterile fashion.  2 g Ancef were given.  The leg was elevated but not exsanguinated and tourniquet was raised.    I used a 10 blade to ellipse a full-thickness layer skin subcutaneous tissue from around the area of drainage.  At this point I got all the way through the subcutaneous tissue.  At this point I took cultures.  There was, however no purulence.  This did show the cables which were still around the patella.  There was no purulence in the area.  However the cables are exposed.  They have a little bit of plain them as expected 2 months post surgery.  At this point I decided to remove the cables but keep screws.    I then copiously irrigated normal saline solution via pulse lavage followed by 500 cc of Bactisure, followed by dilute peroxide, followed by more saline, followed by dilute Betadine and then more saline.  At this point I cut the cable and pulled this through removing in its entirety.  I did this after placing some peroxide on it to have this peroxide dragging through any track created by the removal of the cable.    I then performed the irrigation again in the same manner as before using the other half of the Bactisure, saline, peroxide, saline, Betadine, saline.    At this point again flexed  the knee to about 120° to ensure that the patella would remain intact which is the screws.    At this point after a ellipsing the skin and subcutaneous tissues I had to mobilize it tissue little bit for closure.  I mobilized the full-thickness skin flap with its superficial fascia using electrocautery.  I then placed 1 g of vancomycin powder deep and closed the fascia with 0 Vicryl suture, subcutaneous tissue with inverted 3-0 Vicryl suture and the skin with 3-0 nylon suture in vertical mattress fashion.  Tourniquet had been let down and hemostasis obtained with electrocautery.  Given her diabetes and poor healing I placed an incisional wound VAC and put her into a hinged knee brace locked in extension.    All instrument sponge counts were reported correct in the case.  There no complications.  Patient was awakened and taken to recovery room stable condition.    Plan the patient:    Her CRP and sed rate were normal and she is not any constitutional symptoms.  However, after excising the area of drainage I think it is clear that there is at least a communication with the cable and although I washed it with Bactisure and quite well with dilute peroxide and diluted Betadine, I would like to put her on IV antibiotics for at least 4-6 weeks.  I would put her on Bactrim in clinic which may inhibit cultures.  I think it would likely be safe to leave the deep screws in the patella ultimately but want to be aggressive with antibiotics while she is healing this wound early given the communication.    Patrice Iglesias MD

## 2020-11-24 NOTE — ANESTHESIA PROCEDURE NOTES
Adductor canal SS    Patient location during procedure: pre-op   Block not for primary anesthetic.  Reason for block: at surgeon's request and post-op pain management   Post-op Pain Location: Left knee pain  Start time: 11/24/2020 6:36 AM  Timeout: 11/24/2020 6:35 AM   End time: 11/24/2020 6:40 AM    Staffing  Authorizing Provider: Radha Brambila MD  Performing Provider: Joanna Mac MD    Preanesthetic Checklist  Completed: patient identified, site marked, surgical consent, pre-op evaluation, timeout performed, IV checked, risks and benefits discussed and monitors and equipment checked  Peripheral Block  Patient position: supine  Prep: ChloraPrep and site prepped and draped  Patient monitoring: heart rate, cardiac monitor, continuous pulse ox, continuous capnometry and frequent blood pressure checks  Block type: adductor canal  Laterality: left  Injection technique: single shot  Needle  Needle type: Stimuplex   Needle gauge: 21 G  Needle length: 4 in  Needle localization: anatomical landmarks and ultrasound guidance  Catheter type: spring wound  Catheter size: 19 G  Test dose: lidocaine 1.5% with Epi 1-to-200,000 and negative   -ultrasound image captured on disc.  Assessment  Injection assessment: negative aspiration, negative parasthesia and local visualized surrounding nerve  Paresthesia pain: none  Heart rate change: no  Slow fractionated injection: yes  Additional Notes  VSS.  [facility] RN monitoring vitals throughout procedure.  Patient tolerated procedure well. Diluted to 0.25% ropivacaine w/ 20 ml

## 2020-11-24 NOTE — TRANSFER OF CARE
"Anesthesia Transfer of Care Note    Patient: Bhavna Hancock    Procedure(s) Performed: Procedure(s) (LRB):  MANIPULATION, KNEE (Left)  IRRIGATION AND DEBRIDEMENT, LOWER EXTREMITY (Left)  APPLICATION, WOUND VAC (Left)  REMOVAL, HARDWARE, LOWER EXTREMITY (Left)    Patient location: PACU    Anesthesia Type: general    Transport from OR: Transported from OR on 6-10 L/min O2 by face mask with adequate spontaneous ventilation    Post pain: adequate analgesia    Post assessment: no apparent anesthetic complications    Post vital signs: stable    Level of consciousness: awake    Nausea/Vomiting: no nausea/vomiting    Complications: none    Transfer of care protocol was followed      Last vitals:   Visit Vitals  BP (!) 142/65 (BP Location: Right arm, Patient Position: Lying)   Pulse 97   Temp 36.8 °C (98.2 °F) (Oral)   Resp 17   Ht 5' 5" (1.651 m)   Wt 77.1 kg (170 lb)   SpO2 99%   Breastfeeding No   BMI 28.29 kg/m²     "

## 2020-11-24 NOTE — ANESTHESIA PREPROCEDURE EVALUATION
[facility]  Anesthesia Pre-Operative Evaluation         Patient Name: Bhavna Hancock  YOB: 1960  MRN: [id number]    SUBJECTIVE:     11/24/2020    Procedure(s) (LRB):  MANIPULATION, KNEE - anesthesia.  Possilbe washout.  Don't open back intially.. (Left)    Bhavna Hancock is a 60 y.o. female here for above procedure    Drips:     Patient Active Problem List   Diagnosis    History of adenomatous polyp of colon    Chronic pain of both shoulders    Bilateral rotator cuff dysfunction    Closed displaced transverse fracture of left patella       Review of patient's allergies indicates:   Allergen Reactions    Codeine Nausea And Vomiting       Current Facility-Administered Medications on File Prior to Encounter   Medication Dose Route Frequency Provider Last Rate Last Dose    lidocaine HCL 10 mg/ml (1%) injection 5 mL  5 mL   Maddie Meeks MD   5 mL at 09/14/20 1100    triamcinolone acetonide injection 40 mg  40 mg Intra-articular  Maddie Meeks MD   40 mg at 09/14/20 1100     Current Outpatient Medications on File Prior to Encounter   Medication Sig Dispense Refill    estradioL (ESTRACE) 1 MG tablet TK 1 T PO QD      fish oil-omega-3 fatty acids 300-1,000 mg capsule Take 2 g by mouth once daily.      ibuprofen (ADVIL,MOTRIN) 600 MG tablet Take 1 tablet (600 mg total) by mouth every 6 (six) hours as needed for Pain. 20 tablet 0    insulin glargine (LANTUS) 100 unit/mL injection Inject 35 Units into the skin once daily.       insulin lispro (HUMALOG KWIKPEN INSULIN) 100 unit/mL pen INJECT 14 UNITS UNDER THE SKIN WITH EVERY MEAL      levothyroxine (SYNTHROID) 75 MCG tablet TAKE 1 TABLET BY MOUTH EVERY DAY      lisinopril (PRINIVIL,ZESTRIL) 2.5 MG tablet Take 2.5 mg by mouth once daily.      medroxyPROGESTERone (PROVERA) 2.5 MG tablet TK 1 T PO QD      metformin (GLUCOPHAGE) 500 MG tablet Take 500 mg by mouth daily with breakfast.      pantoprazole (PROTONIX)  40 MG tablet TAKE 1 TABLET(40 MG) BY MOUTH EVERY DAY 30 tablet 0    RESTASIS 0.05 % ophthalmic emulsion INSTILL ONE DROP IN OU BID      simvastatin (ZOCOR) 40 MG tablet Take 40 mg by mouth every evening.      sulfamethoxazole-trimethoprim 800-160mg (BACTRIM DS) 800-160 mg Tab Take 1 tablet by mouth 2 (two) times daily. for 10 days 20 tablet 0    trospium (SANCTURA) 20 mg Tab tablet Take 20 mg by mouth once daily.       venlafaxine (EFFEXOR-XR) 150 MG Cp24 Take 225 mg by mouth once daily.       acetaminophen (TYLENOL) 500 MG tablet Take 2 tablets (1,000 mg total) by mouth every 6 (six) hours.      aspirin (ECOTRIN) 81 MG EC tablet Take 1 tablet (81 mg total) by mouth 2 (two) times a day. 84 tablet 0    blood sugar diagnostic (ONETOUCH ULTRA BLUE TEST STRIP) Strp Use to test blood sugar 5 times a day      DEXCOM G6 SENSOR Priscilla TEST BS FID      DEXCOM G6 TRANSMITTER Priscilla USE TO CHECK FID      insulin syringe-needle U-100 1/2 mL 30 gauge Syrg USE ONE SYRINGE PER DAY UTD      LOTEMAX SM 0.38 % DrpG INT 1 GTT IN OU BID UTD      oxyCODONE (ROXICODONE) 5 MG immediate release tablet Take 1 tablet (5 mg total) by mouth every 6 (six) hours as needed for Pain. May take 1-2 tablets every 6 hours as needed for pain 20 tablet 0       Past Surgical History:   Procedure Laterality Date    CRANIOTOMY  2013    DEBRIDEMENT TENNIS ELBOW      hip ascites      OPEN REDUCTION AND INTERNAL FIXATION (ORIF) OF FRACTURE OF PATELLA Left 10/5/2020    Procedure: ORIF, FRACTURE, PATELLA - Femoral catheter ok;  Surgeon: Patrice Iglesias MD;  Location: [address];  Service: Orthopedics;  Laterality: Left;    TRIGGER FINGER RELEASE         Social History     Socioeconomic History    Marital status:      Spouse name: Not on file    Number of children: Not on file    Years of education: Not on file    Highest education level: Not on file   Occupational History    Not on file   Social Needs    Financial resource  strain: Not on file    Food insecurity     Worry: Not on file     Inability: Not on file    Transportation needs     Medical: Not on file     Non-medical: Not on file   Tobacco Use    Smoking status: Former Smoker     Quit date: 10/5/2005     Years since quitting: 15.1    Smokeless tobacco: Never Used   Substance and Sexual Activity    Alcohol use: No    Drug use: No    Sexual activity: Not on file   Lifestyle    Physical activity     Days per week: Not on file     Minutes per session: Not on file    Stress: Not on file   Relationships    Social connections     Talks on phone: Not on file     Gets together: Not on file     Attends [employer] service: Not on file     Active member of club or organization: Not on file     Attends meetings of clubs or organizations: Not on file     Relationship status: Not on file   Other Topics Concern    Not on file   Social History Narrative    Not on file         OBJECTIVE:     Vital Signs Range (Last 24H):  Temp:  [36.8 °C (98.2 °F)] 36.8 °C (98.2 °F)  Pulse:  [78-83] (P) 83  Resp:  [17-18] (P) 17  SpO2:  [100 %] (P) 100 %  BP: (139)/(68) (P) 158/75    Significant Labs:  Lab Results   Component Value Date    WBC 6.67 10/01/2018    HGB 9.9 (L) 10/01/2018    HCT 32.4 (L) 10/01/2018     10/01/2018    ALT 16 05/13/2015    AST 19 05/13/2015     05/13/2015    K 4.3 05/13/2015     05/13/2015    CREATININE 1.0 05/13/2015    BUN 10 05/13/2015    CO2 22 (L) 05/13/2015    TSH 1.696 10/01/2018       Diagnostic Studies:    EKG:   Results for orders placed or performed during the hospital encounter of 10/05/20   EKG 12-lead    Collection Time: 10/05/20  7:02 AM    Narrative    Test Reason : Z01.818,    Vent. Rate : 081 BPM     Atrial Rate : 081 BPM     P-R Int : 140 ms          QRS Dur : 088 ms      QT Int : 380 ms       P-R-T Axes : 057 043 066 degrees     QTc Int : 441 ms    Normal sinus rhythm  Normal ECG  When compared with ECG of 22-JAN-1993 11:07,  No  significant change was found  Confirmed by NORMAN MARTIN MD ([id number]) on 10/5/2020 5:07:29 PM    Referred By: SERGIO GUIDO           Confirmed By:NORMAN MARTIN MD       2D ECHO:  TTE:a  No results found for this or any previous visit.      LITA:  No results found for this or any previous visit.      Anesthesia Evaluation    I have reviewed the Patient Summary Reports.    I have reviewed the Nursing Notes. I have reviewed the NPO Status.   I have reviewed the Medications.     Review of Systems  Anesthesia Hx:  No problems with previous Anesthesia  History of prior surgery of interest to airway management or planning: Previous anesthesia: General   Cardiovascular:  Functional Capacity low / < 4 METS        Physical Exam  General:  Well nourished    Airway/Jaw/Neck:  Airway Findings: Mouth Opening: Normal Tongue: Normal  General Airway Assessment: Adult  Mallampati: II  TM Distance: Normal, at least 6 cm  Jaw/Neck Findings:  Neck ROM: Normal ROM     Eyes/Ears/Nose:  EYES/EARS/NOSE FINDINGS: Normal   Dental:  Dental Findings: In tact   Chest/Lungs:  Chest/Lungs Findings: Clear to auscultation, Normal Respiratory Rate     Heart/Vascular:  Heart Findings: Rate: Normal  Rhythm: Regular Rhythm  Sounds: Normal  Vascular Findings: Normal    Abdomen:  Abdomen Findings:  Normal, Soft, Nontender      Skin:  Skin Findings: Normal    Mental Status:  Mental Status Findings:  Cooperative, Alert and Oriented         Anesthesia Plan  Type of Anesthesia, risks & benefits discussed:  Anesthesia Type:  general  Patient's Preference:   Intra-op Monitoring Plan: standard ASA monitors  Intra-op Monitoring Plan Comments:   Post Op Pain Control Plan: multimodal analgesia, IV/PO Opioids PRN and per primary service following discharge from PACU  Post Op Pain Control Plan Comments:   Induction:   IV  Beta Blocker:  Patient is not currently on a Beta-Blocker (No further documentation required).       Informed Consent: Patient understands risks and  agrees with Anesthesia plan.  Questions answered. Anesthesia consent signed with patient.  ASA Score: 2     Day of Surgery Review of History & Physical:    H&P update referred to the surgeon.         Ready For Surgery From Anesthesia Perspective.

## 2020-11-24 NOTE — ASSESSMENT & PLAN NOTE
"Pt is a 61yo F w/ pmhx of htn DM, hld, hypothyroidism, who previously sustained a left patellar fracture 9/22 after a fall and underwent ORIF of left patella 10/5.  She developed stiffness and knee as well as wound dehisence w/ drainage of serous fluid.  Started on Bactrim as outpt w/o improvement.  CRP normal.  MRI of her knee was performed showing no fluid collections.        Patient 11/24  underwent excisional debridement and irrigation of the anterior left knee incision.  Per notes no purulence were seen but cables were exposed so were removed.    Per Dr. Iglesias's note "there is at least a communication with the cable and although I washed it with Bactisure and quite well with dilute peroxide and diluted Betadine, I would like to put her on IV antibiotics for at least 4-6 weeks... think it would likely be safe to leave the deep screws in the patella ultimately but want to be aggressive with antibiotics while she is healing this wound early given the communication."    ID consult for antibiotic recommendations.  Patient currently on vancomycin and ceftriaxone.    Plan  -Continue Vancomycin.  Pharmacy to dose.  Trough goal 15-20  -Change the Ceftriaxone to Cefepime 2g q8h  -Will follow cxs and tailor abx accordingly  -Anticipate 6 week course of therapy    -Pt abx regimen discussed with primary team  -ID will continue to follow.  "

## 2020-11-24 NOTE — PLAN OF CARE
Spoke with patient at bedside to complete d/c planning assessment. Patient lives with spouse in a two story home with no steps to enter. Master is located on the second floor but patient reports sleeping downstairs in a recliner prior to admission. Patient reports that IVAB's could be needed at discharge based on culture results. Patient reports having IVAB in the past with [facility] agency. Will follow and send referrals if needed. PCP and Pharmacy verified. PT/OT to eval for post-acute needs on 11/25/20. Discharge planning booklet provided and questions answered. Will continue to follow.   11/24/20 6006   Discharge Assessment   Assessment Type Discharge Planning Assessment   Confirmed/corrected address and phone number on facesheet? Yes   Assessment information obtained from? Patient   Communicated expected length of stay with patient/caregiver yes   Prior to hospitilization cognitive status: Alert/Oriented   Prior to hospitalization functional status: Independent   Current cognitive status: Alert/Oriented   Current Functional Status: Independent   Facility Arrived From: Home   Lives With spouse   Able to Return to Prior Arrangements yes   Is patient able to care for self after discharge? Yes   Who are your caregiver(s) and their phone number(s)? Zhang Hancock (Spouse) [phone]   Patient's perception of discharge disposition home or selfcare   Readmission Within the Last 30 Days no previous admission in last 30 days   Patient currently being followed by outpatient case management? No   Patient currently receives any other outside agency services? No   Equipment Currently Used at Home crutches, axillary   Do you have any problems affording any of your prescribed medications? No   Is the patient taking medications as prescribed? yes   Does the patient have transportation home? Yes   Transportation Anticipated family or friend will provide   Does the patient receive services at the [facility]? Yes    Discharge Plan A Home with family;Home Health   Discharge Plan B Home with family;Hospice/home   DME Needed Upon Discharge  none   Patient/Family in Agreement with Plan yes

## 2020-11-24 NOTE — ANESTHESIA PROCEDURE NOTES
Intubation  Performed by: Namita Alba CRNA  Authorized by: Terry Clay MD     Intubation:     Induction:  Intravenous    Intubated:  Postinduction    Mask Ventilation:  Easy mask    Attempts:  1    Attempted By:  CRNA    Difficult Airway Encountered?: No      Complications:  None    Airway Device:  Supraglottic airway/LMA    Airway Device Size:  4.0    Style/Cuff Inflation:  Cuffed    Secured at:  The lips    Placement Verified By:  Capnometry    Complicating Factors:  None    Findings Post-Intubation:  BS equal bilateral

## 2020-11-24 NOTE — PROGRESS NOTES
Pharmacokinetic Initial Assessment: IV Vancomycin    Assessment/Plan:    Initiate intravenous vancomycin with loading dose of 1750 mg once followed by a maintenance dose of vancomycin 1000 mg IV every 12 hours  Desired empiric serum trough concentration is 15 to 20 mcg/mL  Draw vancomycin trough level 30 min prior to fourth dose on 11/25/20 at approximately 2130  Pharmacy will continue to follow and monitor vancomycin.      Please contact pharmacy at extension [phone] with any questions regarding this assessment.     Thank you for the consult,   Lakshmi Vázquez       Patient brief summary:  Bhavna Hancock is a 60 y.o. female initiated on antimicrobial therapy with IV Vancomycin for treatment of suspected bone/joint infection    Drug Allergies:   Review of patient's allergies indicates:   Allergen Reactions    Codeine Nausea And Vomiting       Actual Body Weight:   77.1 kg    Renal Function:   Estimated Creatinine Clearance: 76.7 mL/min (based on SCr of 0.8 mg/dL).,     Dialysis Method (if applicable):  N/A    CBC (last 72 hours):  Recent Labs   Lab Result Units 11/24/20  0929   WBC K/uL 5.40   Hemoglobin g/dL 11.1*   Hematocrit % 36.5*   Platelets K/uL 252   Gran % % 66.1   Lymph % % 25.4   Mono % % 4.4   Eosinophil % % 3.0   Basophil % % 0.9   Differential Method  Automated       Metabolic Panel (last 72 hours):  Recent Labs   Lab Result Units 11/24/20  0929   Sodium mmol/L 139   Potassium mmol/L 4.6   Chloride mmol/L 109   CO2 mmol/L 25   Glucose mg/dL 163*   BUN mg/dL 11   Creatinine mg/dL 0.8   Albumin g/dL 3.3*   Total Bilirubin mg/dL 0.2   Alkaline Phosphatase U/L 114   AST U/L 30   ALT U/L 19       Drug levels (last 3 results):  No results for input(s): VANCOMYCINRA, VANCOMYCINPE, VANCOMYCINTR in the last 72 hours.    Microbiologic Results:  Microbiology Results (last 7 days)     Procedure Component Value Units Date/Time    Fungus culture [[id number]] Collected: 11/24/20 0804    Order Status: Sent Specimen:  Wound from Leg, Left Updated: 11/24/20 0815    AFB Culture & Smear [[id number]] Collected: 11/24/20 0804    Order Status: Sent Specimen: Wound from Leg, Left Updated: 11/24/20 0814    Gram stain [[id number]] Collected: 11/24/20 0804    Order Status: Sent Specimen: Wound from Leg, Left Updated: 11/24/20 0814    Aerobic culture [[id number]] Collected: 11/24/20 0804    Order Status: Sent Specimen: Wound from Leg, Left Updated: 11/24/20 0813    Culture, Anaerobe [[id number]] Collected: 11/24/20 0804    Order Status: Sent Specimen: Wound from Leg, Left Updated: 11/24/20 0812

## 2020-11-24 NOTE — HPI
Ms. Hancock is a 59yo female with history of T2DM on insulin, Hypothyroidism, HTN, HLD who is s/p I&D of Left Knee on 11/24/2020. Patient initially had a fall on 9/22 with resulting patellar fracture. She underwent ORIF of L Patella on 10/5 which was complicated by drainage and wound dehiscence. Hospital medicine consulted for assistance with medical management. Last A1C reported to be 8.3.

## 2020-11-24 NOTE — ASSESSMENT & PLAN NOTE
59yo female with history of T2DM on insulin, Hypothyroidism, HTN, HLD who is s/p I&D of Left Knee on 11/24/2020. Hospital medicine consulted for assistance with medical management. Last A1C reported to be 8.3. Patient on 35u AM Lantus at home with 14u Lispro with meals.     Plan:   - Glucose goal of 140-180. It has been 176, 138, 163 this admission  - Recommend LDSSI for now, will re-access patient's glucose trend tomorrow morning before resuming any basal insulin.

## 2020-11-24 NOTE — ANESTHESIA POSTPROCEDURE EVALUATION
Anesthesia Post Evaluation    Patient: Bhavna Hancock    Procedure(s) Performed: Procedure(s) (LRB):  MANIPULATION, KNEE (Left)  IRRIGATION AND DEBRIDEMENT, LOWER EXTREMITY (Left)  APPLICATION, WOUND VAC (Left)  REMOVAL, HARDWARE, LOWER EXTREMITY (Left)    Final Anesthesia Type: general    Patient location during evaluation: PACU  Patient participation: Yes- Able to Participate  Level of consciousness: awake and alert and oriented  Post-procedure vital signs: reviewed and stable  Pain management: adequate  Airway patency: patent    PONV status at discharge: No PONV  Anesthetic complications: no      Cardiovascular status: blood pressure returned to baseline  Respiratory status: unassisted, spontaneous ventilation and room air  Hydration status: euvolemic  Follow-up not needed.          Vitals Value Taken Time   /66 11/24/20 1046   Temp 36.2 °C (97.2 °F) 11/24/20 0900   Pulse 93 11/24/20 1047   Resp 15 11/24/20 1047   SpO2 95 % 11/24/20 1047   Vitals shown include unvalidated device data.      Event Time   Out of Recovery 09:30:00         Pain/Erica Score: Pain Rating Prior to Med Admin: 8 (11/24/2020 10:35 AM)  Erica Score: 9 (11/24/2020 10:40 AM)

## 2020-11-25 ENCOUNTER — DOCUMENTATION ONLY (OUTPATIENT)
Dept: ORTHOPEDICS | Facility: CLINIC | Age: 60
End: 2020-11-25

## 2020-11-25 LAB
POCT GLUCOSE: 112 MG/DL (ref 70–110)
POCT GLUCOSE: 120 MG/DL (ref 70–110)
POCT GLUCOSE: 230 MG/DL (ref 70–110)
POCT GLUCOSE: 300 MG/DL (ref 70–110)
POCT GLUCOSE: 334 MG/DL (ref 70–110)
POCT GLUCOSE: 444 MG/DL (ref 70–110)
VANCOMYCIN TROUGH SERPL-MCNC: 16 UG/ML (ref 10–22)

## 2020-11-25 PROCEDURE — 25000003 PHARM REV CODE 250: Performed by: STUDENT IN AN ORGANIZED HEALTH CARE EDUCATION/TRAINING PROGRAM

## 2020-11-25 PROCEDURE — 76937 US GUIDE VASCULAR ACCESS: CPT

## 2020-11-25 PROCEDURE — 11000001 HC ACUTE MED/SURG PRIVATE ROOM

## 2020-11-25 PROCEDURE — 97161 PT EVAL LOW COMPLEX 20 MIN: CPT

## 2020-11-25 PROCEDURE — 36573 INSJ PICC RS&I 5 YR+: CPT

## 2020-11-25 PROCEDURE — 25000003 PHARM REV CODE 250: Performed by: ORTHOPAEDIC SURGERY

## 2020-11-25 PROCEDURE — 80202 ASSAY OF VANCOMYCIN: CPT

## 2020-11-25 PROCEDURE — 97530 THERAPEUTIC ACTIVITIES: CPT

## 2020-11-25 PROCEDURE — 99233 SBSQ HOSP IP/OBS HIGH 50: CPT | Mod: ,,, | Performed by: PHYSICIAN ASSISTANT

## 2020-11-25 PROCEDURE — C1751 CATH, INF, PER/CENT/MIDLINE: HCPCS

## 2020-11-25 PROCEDURE — 63600175 PHARM REV CODE 636 W HCPCS: Performed by: STUDENT IN AN ORGANIZED HEALTH CARE EDUCATION/TRAINING PROGRAM

## 2020-11-25 PROCEDURE — 99233 PR SUBSEQUENT HOSPITAL CARE,LEVL III: ICD-10-PCS | Mod: ,,, | Performed by: PHYSICIAN ASSISTANT

## 2020-11-25 PROCEDURE — C9399 UNCLASSIFIED DRUGS OR BIOLOG: HCPCS | Performed by: STUDENT IN AN ORGANIZED HEALTH CARE EDUCATION/TRAINING PROGRAM

## 2020-11-25 PROCEDURE — 63600175 PHARM REV CODE 636 W HCPCS: Performed by: PHYSICIAN ASSISTANT

## 2020-11-25 PROCEDURE — 36415 COLL VENOUS BLD VENIPUNCTURE: CPT

## 2020-11-25 PROCEDURE — 99233 PR SUBSEQUENT HOSPITAL CARE,LEVL III: ICD-10-PCS | Mod: ,,, | Performed by: INTERNAL MEDICINE

## 2020-11-25 PROCEDURE — 97116 GAIT TRAINING THERAPY: CPT

## 2020-11-25 PROCEDURE — 97165 OT EVAL LOW COMPLEX 30 MIN: CPT

## 2020-11-25 PROCEDURE — 99233 SBSQ HOSP IP/OBS HIGH 50: CPT | Mod: ,,, | Performed by: INTERNAL MEDICINE

## 2020-11-25 PROCEDURE — A4216 STERILE WATER/SALINE, 10 ML: HCPCS | Performed by: ORTHOPAEDIC SURGERY

## 2020-11-25 RX ORDER — INSULIN ASPART 100 [IU]/ML
8 INJECTION, SOLUTION INTRAVENOUS; SUBCUTANEOUS
Status: DISCONTINUED | OUTPATIENT
Start: 2020-11-25 | End: 2020-11-26 | Stop reason: HOSPADM

## 2020-11-25 RX ORDER — SODIUM CHLORIDE 0.9 % (FLUSH) 0.9 %
10 SYRINGE (ML) INJECTION EVERY 6 HOURS
Status: DISCONTINUED | OUTPATIENT
Start: 2020-11-25 | End: 2020-11-26 | Stop reason: HOSPADM

## 2020-11-25 RX ORDER — SODIUM CHLORIDE 0.9 % (FLUSH) 0.9 %
10 SYRINGE (ML) INJECTION
Status: DISCONTINUED | OUTPATIENT
Start: 2020-11-25 | End: 2020-11-26 | Stop reason: HOSPADM

## 2020-11-25 RX ORDER — OXYCODONE HYDROCHLORIDE 5 MG/1
5 TABLET ORAL EVERY 4 HOURS PRN
Qty: 44 TABLET | Refills: 0 | Status: ON HOLD | OUTPATIENT
Start: 2020-11-25 | End: 2021-01-04 | Stop reason: SDUPTHER

## 2020-11-25 RX ADMIN — CEFEPIME 2 G: 2 INJECTION, POWDER, FOR SOLUTION INTRAVENOUS at 08:11

## 2020-11-25 RX ADMIN — INSULIN ASPART 6 UNITS: 100 INJECTION, SOLUTION INTRAVENOUS; SUBCUTANEOUS at 09:11

## 2020-11-25 RX ADMIN — SIMVASTATIN 40 MG: 20 TABLET, FILM COATED ORAL at 08:11

## 2020-11-25 RX ADMIN — VANCOMYCIN HYDROCHLORIDE 1000 MG: 1 INJECTION, POWDER, LYOPHILIZED, FOR SOLUTION INTRAVENOUS at 11:11

## 2020-11-25 RX ADMIN — INSULIN DETEMIR 25 UNITS: 100 INJECTION, SOLUTION SUBCUTANEOUS at 09:11

## 2020-11-25 RX ADMIN — INSULIN ASPART 4 UNITS: 100 INJECTION, SOLUTION INTRAVENOUS; SUBCUTANEOUS at 04:11

## 2020-11-25 RX ADMIN — INSULIN ASPART 5 UNITS: 100 INJECTION, SOLUTION INTRAVENOUS; SUBCUTANEOUS at 12:11

## 2020-11-25 RX ADMIN — VENLAFAXINE HYDROCHLORIDE 225 MG: 75 CAPSULE, EXTENDED RELEASE ORAL at 09:11

## 2020-11-25 RX ADMIN — ACETAMINOPHEN 650 MG: 325 TABLET ORAL at 12:11

## 2020-11-25 RX ADMIN — LEVOTHYROXINE SODIUM 75 MCG: 75 TABLET ORAL at 09:11

## 2020-11-25 RX ADMIN — ASPIRIN 81 MG: 81 TABLET, COATED ORAL at 08:11

## 2020-11-25 RX ADMIN — INSULIN ASPART 4 UNITS: 100 INJECTION, SOLUTION INTRAVENOUS; SUBCUTANEOUS at 12:11

## 2020-11-25 RX ADMIN — PANTOPRAZOLE SODIUM 40 MG: 40 TABLET, DELAYED RELEASE ORAL at 09:11

## 2020-11-25 RX ADMIN — DOCUSATE SODIUM 50 MG: 50 CAPSULE, LIQUID FILLED ORAL at 09:11

## 2020-11-25 RX ADMIN — INSULIN ASPART 8 UNITS: 100 INJECTION, SOLUTION INTRAVENOUS; SUBCUTANEOUS at 12:11

## 2020-11-25 RX ADMIN — LISINOPRIL 2.5 MG: 2.5 TABLET ORAL at 09:11

## 2020-11-25 RX ADMIN — CEFEPIME 2 G: 2 INJECTION, POWDER, FOR SOLUTION INTRAVENOUS at 04:11

## 2020-11-25 RX ADMIN — ACETAMINOPHEN 650 MG: 325 TABLET ORAL at 06:11

## 2020-11-25 RX ADMIN — CEFEPIME 2 G: 2 INJECTION, POWDER, FOR SOLUTION INTRAVENOUS at 11:11

## 2020-11-25 RX ADMIN — ASPIRIN 81 MG: 81 TABLET, COATED ORAL at 09:11

## 2020-11-25 RX ADMIN — VANCOMYCIN HYDROCHLORIDE 1000 MG: 1 INJECTION, POWDER, LYOPHILIZED, FOR SOLUTION INTRAVENOUS at 10:11

## 2020-11-25 RX ADMIN — Medication 10 ML: at 12:11

## 2020-11-25 RX ADMIN — Medication 10 ML: at 08:11

## 2020-11-25 RX ADMIN — DOCUSATE SODIUM 50 MG: 50 CAPSULE, LIQUID FILLED ORAL at 08:11

## 2020-11-25 RX ADMIN — OXYCODONE HYDROCHLORIDE 10 MG: 10 TABLET ORAL at 08:11

## 2020-11-25 RX ADMIN — INSULIN ASPART 8 UNITS: 100 INJECTION, SOLUTION INTRAVENOUS; SUBCUTANEOUS at 05:11

## 2020-11-25 NOTE — NURSING
Bedtime BS was 417, gave pt 5 units, called ortho gave an additional 3 units. (Pt stated she takes 8 units when over 400 at home via her sliding scale), rechecked pts , called ortho gave an addition 5 units. Will continue to monitor.

## 2020-11-25 NOTE — PLAN OF CARE
[facility]    HOME HEALTH ORDERS  FACE TO FACE ENCOUNTER    Patient Name: Bhavna Hancock  YOB: 1960    PCP: Apurva Abbott MD   PCP Address: [address] [place] [address] / [facility] [address]  PCP Phone Number: [phone]  PCP Fax: [phone]    Encounter Date: 11/25/2020    Admit to Home Health    Diagnoses:  Active Hospital Problems    Diagnosis  POA    *Closed displaced transverse fracture of left patella [S82.032A]  Yes    Stiffness of left knee [M25.662]  Yes    Type 2 diabetes mellitus [E11.9]  Unknown    Hypertension [I10]  Unknown    Hypothyroidism [E03.9]  Unknown      Resolved Hospital Problems   No resolved problems to display.       Future Appointments   Date Time Provider Department Center   12/1/2020 10:00 AM CADEN Parker   12/16/2020 10:00 AM CADEN Parker           I have seen and examined this patient face to face today. My clinical findings that support the need for the home health skilled services and home bound status are the following:  Weakness/numbness causing balance and gait disturbance due to Fracture making it taxing to leave home.    Allergies:  Review of patient's allergies indicates:   Allergen Reactions    Codeine Nausea And Vomiting       Diet: regular diet    Activities: WBAT LLE, HKB locked in extension x 1 week, will begin ROM after followup appt December 1    Nursing:   SN to complete comprehensive assessment including routine vital signs. Instruct on disease process and s/s of complications to report to MD. Follow specific home health arthoplasty protocol. Review/verify medication list sent home with the patient at time of discharge  and instruct patient/caregiver as needed. If coumadin ordered, coumadin clinic to manage INR with INR draws 2x per week with a goal to maintain INR between 1.8 and 2.2. Frequency may be adjusted depending on start of care date.    Notify MD if  SBP > 160 or < 90; DBP > 90 or < 50; HR > 120 or < 50; Temp > 101    Home Medical Equipment:  Walker, 3-1 bedside commode, transfer tub bench    CONSULTS:    Physical Therapy to evaluate and treat. Evaluate for home safety and equipment needs; Establish/upgrade home exercise program. Perform / instruct on therapeutic exercises, gait training, transfer training, and Range of Motion.    OTHER:  Occupational Therapy to evaluate and treat. Evaluate home environment for safety and equipment needs. Perform/Instruct on transfers, ADL training, ROM, and therapeutic exercises.   to evaluate for community resources/long-range planning.  Aide to provide assistance with personal care, ADLs, and vital signs.    MISCELLANEOUS CARE:  LABS:  SN to perform labs:  CBC, CMP, ESR and CRP;  Frequency: Weekly ; Duration:   .     Weekly outpatient laboratory on Monday or Tuesday while on IV antibiotics. CBC, CMP,ESR and CRP, Vancomycin trough. Target 15-20.  If vancomycin trough is not at target (15-20) prior to discharge, the please perform vancomycin trough before their fourth outpatient dose.  Fax laboratory results to [facility] at [phone] with attn: Sharif Crowley    HOME INFUSION THERAPY:   SN to perform Infusion Therapy/Central Line Care.  Review Central Line Care & Central Line Flush with patient.    Administer (drug and dose):       - Continue Vancomycin 1000mg, trough 15-20  - Continue Cefepime 2g q8h  - Plan to treat with above empiric abx regimen for 6 week course of therapy (est end date 1/5/21)  -  Scrub the Hub: Prior to accessing the line, always perform a 30 second alcohol scrub  Each lumen of the central line is to be flushed at least daily with 10 mL Normal Saline and 3 mL Heparin flush (10 units/mL)  Skilled Nurse (SN) may draw blood from IV access  Blood Draw Procedure:   - Aspirate at least 5 mL of blood   - Discard   - Obtain specimen   - Change injection cap   - Flush with 20 mL Normal  Saline followed by a                 3-5 mL Heparin flush (10 units/mL)  Central :   - Sterile dressing changes are done weekly and as needed.   - Use chlor-hexadine scrub to cleanse site, apply Biopatch to insertion site,       apply securement device dressing   - Injection caps are changed weekly and after EVERY lab draw.   - If sterile gauze is under dressing to control oozing,                 dressing change must be performed every 24 hours until gauze is not needed.    WOUND CARE ORDERS  Follow Home Health specific protocol.      Medications: Review discharge medications with patient and family and provide education.      Current Discharge Medication List      CONTINUE these medications which have CHANGED    Details   oxyCODONE (ROXICODONE) 5 MG immediate release tablet Take 1 tablet (5 mg total) by mouth every 4 (four) hours as needed for Pain.  Qty: 44 tablet, Refills: 0    Comments: Bedside deivery         CONTINUE these medications which have NOT CHANGED    Details   estradioL (ESTRACE) 1 MG tablet TK 1 T PO QD      fish oil-omega-3 fatty acids 300-1,000 mg capsule Take 2 g by mouth once daily.      ibuprofen (ADVIL,MOTRIN) 600 MG tablet Take 1 tablet (600 mg total) by mouth every 6 (six) hours as needed for Pain.  Qty: 20 tablet, Refills: 0      insulin glargine (LANTUS) 100 unit/mL injection Inject 35 Units into the skin once daily.       insulin lispro (HUMALOG KWIKPEN INSULIN) 100 unit/mL pen INJECT 14 UNITS UNDER THE SKIN WITH EVERY MEAL      levothyroxine (SYNTHROID) 75 MCG tablet TAKE 1 TABLET BY MOUTH EVERY DAY      lisinopril (PRINIVIL,ZESTRIL) 2.5 MG tablet Take 2.5 mg by mouth once daily.      medroxyPROGESTERone (PROVERA) 2.5 MG tablet TK 1 T PO QD      metformin (GLUCOPHAGE) 500 MG tablet Take 500 mg by mouth daily with breakfast.      pantoprazole (PROTONIX) 40 MG tablet TAKE 1 TABLET(40 MG) BY MOUTH EVERY DAY  Qty: 30 tablet, Refills: 0    Associated Diagnoses:  Gastroesophageal reflux disease without esophagitis      RESTASIS 0.05 % ophthalmic emulsion INSTILL ONE DROP IN OU BID      simvastatin (ZOCOR) 40 MG tablet Take 40 mg by mouth every evening.      trospium (SANCTURA) 20 mg Tab tablet Take 20 mg by mouth once daily.       venlafaxine (EFFEXOR-XR) 150 MG Cp24 Take 225 mg by mouth once daily.       acetaminophen (TYLENOL) 500 MG tablet Take 2 tablets (1,000 mg total) by mouth every 6 (six) hours.      aspirin (ECOTRIN) 81 MG EC tablet Take 1 tablet (81 mg total) by mouth 2 (two) times a day.  Qty: 84 tablet, Refills: 0      blood sugar diagnostic (ONETOUCH ULTRA BLUE TEST STRIP) Strp Use to test blood sugar 5 times a day      DEXCOM G6 SENSOR Priscilla TEST BS FID      DEXCOM G6 TRANSMITTER Priscilla USE TO CHECK FID      insulin syringe-needle U-100 1/2 mL 30 gauge Syrg USE ONE SYRINGE PER DAY UTD      LOTEMAX SM 0.38 % DrpG INT 1 GTT IN OU BID UTD         STOP taking these medications       sulfamethoxazole-trimethoprim 800-160mg (BACTRIM DS) 800-160 mg Tab Comments:   Reason for Stopping:               I certify that this patient is confined to her home and needs intermittent skilled nursing care, physical therapy and occupational therapy.

## 2020-11-25 NOTE — PLAN OF CARE
OT Acute eval complete. Pt is SBA in adls and functional mobility and does not required Acute OT at this time. Pt ambulated ~100ft SBA using axillary crutches with no LOB or SOB.

## 2020-11-25 NOTE — PLAN OF CARE
Spoke with Angela from [facility]. Patient has received antibiotic teaching and is anticipated for discharge in am. Dr. Mckeon to provide HH orders for infusion. Will continue to follow for needs.

## 2020-11-25 NOTE — SUBJECTIVE & OBJECTIVE
Interval History:   Patient's glucose has been elevated. Will adjust medications.    Review of Systems   Constitutional: Negative for activity change, appetite change, chills and fever.   HENT: Negative for congestion.    Respiratory: Negative for cough and shortness of breath.    Cardiovascular: Negative for chest pain, palpitations and leg swelling.   Gastrointestinal: Negative for abdominal pain, constipation, diarrhea, nausea and vomiting.   Genitourinary: Negative for difficulty urinating and dysuria.   Musculoskeletal: Negative for arthralgias and myalgias.   Neurological: Negative for dizziness, weakness, light-headedness and numbness.   Psychiatric/Behavioral: Negative for confusion.     Objective:     Vital Signs (Most Recent):  Temp: 97.6 °F (36.4 °C) (11/25/20 0746)  Pulse: 83 (11/25/20 0746)  Resp: 14 (11/25/20 0746)  BP: 132/68 (11/25/20 0746)  SpO2: 99 % (11/25/20 0746) Vital Signs (24h Range):  Temp:  [97.6 °F (36.4 °C)] 97.6 °F (36.4 °C)  Pulse:  [83-99] 83  Resp:  [11-26] 14  SpO2:  [93 %-100 %] 99 %  BP: (132-165)/(63-78) 132/68     Weight: 77.1 kg (170 lb)  Body mass index is 28.29 kg/m².    Intake/Output Summary (Last 24 hours) at 11/25/2020 0904  Last data filed at 11/24/2020 1800  Gross per 24 hour   Intake 1300 ml   Output 0 ml   Net 1300 ml      Physical Exam  HENT:      Head: Normocephalic and atraumatic.      Mouth/Throat:      Mouth: Mucous membranes are moist.   Eyes:      Pupils: Pupils are equal, round, and reactive to light.   Cardiovascular:      Rate and Rhythm: Normal rate and regular rhythm.      Heart sounds: No murmur. No gallop.    Pulmonary:      Effort: Pulmonary effort is normal. No respiratory distress.      Breath sounds: Normal breath sounds.   Abdominal:      General: Bowel sounds are normal.      Tenderness: There is no abdominal tenderness. There is no guarding.   Skin:     General: Skin is warm and dry.   Neurological:      Mental Status: She is alert and oriented to  person, place, and time.   Psychiatric:         Mood and Affect: Mood normal.         Behavior: Behavior normal.         Significant Labs: All pertinent labs within the past 24 hours have been reviewed.    Significant Imaging: I have reviewed and interpreted all pertinent imaging results/findings within the past 24 hours.

## 2020-11-25 NOTE — PT/OT/SLP EVAL
"Occupational Therapy   Evaluation and Discharge Note    Name: Bhavna Hancock  MRN: [id number]  Admitting Diagnosis:  Closed displaced transverse fracture of left patella 1 Day Post-Op    Recommendations:     Discharge Recommendations: outpatient PT  Discharge Equipment Recommendations:  none  Barriers to discharge:  None    Assessment:     Bhavna Hancock is a 60 y.o. female with a medical diagnosis of Closed displaced transverse fracture of left patella. At this time, patient is functioning at their prior level of function and does not require further acute OT services.     OT Acute eval complete. Pt is SBA in adls and functional mobility and does not required Acute OT at this time. Pt ambulated ~100ft SBA using axillary crutches with no LOB or SOB.     Plan:     During this hospitalization, patient does not require further acute OT services.  Please re-consult if situation changes.    · Plan of Care Reviewed with: patient    Subjective     Chief Complaint: "I really want to get out of here"  Patient/Family Comments/goals: to go home    Occupational Profile:  Living Environment: Pt lives with spouse in 2SH with 1 PAULINA. Bed/bath downstairs  Previous level of function: Mod I  Equipment Used at home:  crutches, axillary  Assistance upon Discharge: Pt will have assistance from spouse upon d/c.    Pain/Comfort:  · Pain Rating 1: 0/10  · Location - Side 1: Left  · Location 1: knee  · Pain Addressed 1: Pre-medicate for activity, Reposition, Distraction    Patients cultural, spiritual, Advent conflicts given the current situation: no    Objective:     Communicated with: RN prior to session.  Patient found supine with FCD upon OT entry to room.    General Precautions: Standard, fall   Orthopedic Precautions:LLE weight bearing as tolerated   Braces: Hinged knee brace     Occupational Performance:    Bed Mobility:    · Patient completed Scooting/Bridging with stand by assistance  · Patient completed Supine to Sit with " stand by assistance  · Patient completed Sit to Supine with stand by assistance    Functional Mobility/Transfers:  · Patient completed Sit <> Stand Transfer with stand by assistance  with  axillary crutches   · Functional Mobility: OT Acute eval complete. Pt is SBA in adls and functional mobility and does not required Acute OT at this time. Pt ambulated ~100ft SBA using axillary crutches with no LOB or SOB.     Activities of Daily Living:  · Upper Body Dressing: minimum assistance cecily gown  · Lower Body Dressing: total assistance cecily socks    Cognitive/Visual Perceptual:  Cognitive/Psychosocial Skills:     -       Oriented to: Person, Place, Time and Situation   -       Safety awareness/insight to disability: intact     Physical Exam:  Upper Extremity Range of Motion:     -       Right Upper Extremity: WFL  -       Left Upper Extremity: WFL  Upper Extremity Strength:    -       Right Upper Extremity: WFL  -       Left Upper Extremity: WFL   Strength:    -       Right Upper Extremity: WFL  -       Left Upper Extremity: WFL    AMPAC 6 Click ADL:  AMPAC Total Score: 24    Treatment & Education:  - OT/POC-  - Importance of mobility to maximize recovery.  - safety w/ functional mobility; hand placement to ensure safe transfers to various surfaces in prep for ADLs  - Reviewed gait sequence and RW management via verbalization and demonstration   - encouraged to ambulate within household environment at least every hour to hour 1/2    Education:    Patient left supine with all lines intact, call button in reach, RN notified and spouse present    GOALS:   Multidisciplinary Problems     Occupational Therapy Goals     Not on file                History:     Past Medical History:   Diagnosis Date    Abdominal pain     Diabetes     GERD (gastroesophageal reflux disease)     Hx of colonic polyps     Hyperlipidemia     Hypertension     Nausea and vomiting        Past Surgical History:   Procedure Laterality Date     APPLICATION OF WOUND VACUUM-ASSISTED CLOSURE DEVICE Left 11/24/2020    Procedure: APPLICATION, WOUND VAC;  Surgeon: Patrice Iglesias MD;  Location: [facility] OR [address] [place] [address];  Service: Orthopedics;  Laterality: Left;    CRANIOTOMY  2013    DEBRIDEMENT TENNIS ELBOW      hip ascites      IRRIGATION AND DEBRIDEMENT OF LOWER EXTREMITY Left 11/24/2020    Procedure: IRRIGATION AND DEBRIDEMENT, LOWER EXTREMITY;  Surgeon: Patrice Iglesias MD;  Location: [facility] OR [address] [place] [address];  Service: Orthopedics;  Laterality: Left;    KNEE JOINT MANIPULATION Left 11/24/2020    Procedure: MANIPULATION, KNEE;  Surgeon: Patrice Iglesias MD;  Location: [facility] OR [address] [place] [address];  Service: Orthopedics;  Laterality: Left;    OPEN REDUCTION AND INTERNAL FIXATION (ORIF) OF FRACTURE OF PATELLA Left 10/5/2020    Procedure: ORIF, FRACTURE, PATELLA - Femoral catheter ok;  Surgeon: Patrice Iglesias MD;  Location: [address];  Service: Orthopedics;  Laterality: Left;    REMOVAL OF HARDWARE FROM LOWER EXTREMITY Left 11/24/2020    Procedure: REMOVAL, HARDWARE, LOWER EXTREMITY;  Surgeon: Patrice Iglesias MD;  Location: [address];  Service: Orthopedics;  Laterality: Left;    TRIGGER FINGER RELEASE         Time Tracking:     OT Date of Treatment: 11/25/20  OT Start Time: 0936  OT Stop Time: 0959  OT Total Time (min): 23 min    Billable Minutes:Evaluation 10  Therapeutic Activity 13      Pily Flower OT  11/25/2020

## 2020-11-25 NOTE — PROGRESS NOTES
"[facility]  Infectious Disease  Progress Note    Patient Name: Bhavna Hancock  MRN: [id number]  Admission Date: 11/24/2020  Length of Stay: 1 days  Attending Physician: Patrice Iglesias MD  Primary Care Provider: Apurva Abbott MD    Isolation Status: No active isolations  Assessment/Plan:      Stiffness of left knee  Pt is a 59yo F w/ pmhx of htn DM, hld, hypothyroidism, who previously sustained a left patellar fracture 9/22 after a fall and underwent ORIF of left patella 10/5.  She developed stiffness and knee as well as wound dehisence w/ drainage of serous fluid.  Started on Bactrim as outpt w/o improvement.  CRP normal.  MRI of her knee was performed showing no fluid collections.        Patient 11/24  underwent excisional debridement and irrigation of the anterior left knee incision.  Per notes no purulence were seen but cables were exposed so were removed.    Per Dr. Iglesias's note "there is at least a communication with the cable and although I washed it with Bactisure and quite well with dilute peroxide and diluted Betadine, I would like to put her on IV antibiotics for at least 4-6 weeks... think it would likely be safe to leave the deep screws in the patella ultimately but want to be aggressive with antibiotics while she is healing this wound early given the communication."    ID consult for antibiotic recommendations.      Plan  - Continue Vancomycin.  Pharmacy to dose.  Trough goal 15-20  - Continue Cefepime 2g q8h  - Plan to treat with above empiric abx regimen for 6 week course of therapy (est end date 1/5/21) and may need further suppression afterwards given hardware-screws remain in place.  -Would consider Rifampin for biofilm but pt with multiple medications with potential interactions (Estrace, Provera, Simvastatin, Oxycodone and Synthroid).    -If growth seen on cxs, will tailor abx accordingly.  -Weekly outpatient laboratory on Monday or Tuesday while on IV antibiotics. " "CBC, CMP,ESR and CRP, Vancomycin trough. Target 15-20.  If vancomycin trough is not at target (15-20) prior to discharge, the please perform vancomycin trough before their fourth outpatient dose.  Fax laboratory results to [facility] at [phone] with attn: Sharif Crowley  -ID f/u in 2-3 weeks.  Outpatient Infectious Diseases clinic follow up will be arranged and found in patient calendar.  Prior to discharge, please ensure the patient's follow-up has been scheduled.  If there is still no follow-up scheduled in Infectious Diseases clinic, please send an EPIC message to Camilla Watters in Infectious Diseases.  -Pt abx regimen and plan discussed with orthosx team and ID staff.  -ID will sign off      Thank you for your consult. I will sign off. Please contact us if you have any additional questions.    Travis Crowley PA-C  Infectious Disease  [facility]    Subjective:     Principal Problem:Closed displaced transverse fracture of left patella    HPI: Pt is a 59yo F w/ pmhx of htn DM, hld, hypothyroidism, who previously sustained a left patellar fracture 9/22 after a fall and underwent ORIF of left patella 10/5.  She developed stiffness and knee as well as wound dehisence w/ drainage of serous fluid.  Started on Bactrim as outpt w/o improvement.  CRP normal.  MRI of her knee was performed showing no fluid collections.        Patient 11/24  underwent excisional debridement and irrigation of the anterior left knee incision.  Per notes no purulence were seen but cables were exposed so were removed.    Per Dr. Iglesias's note "Her CRP and sed rate were normal and she is not any constitutional symptoms.  However, after excising the area of drainage I think it is clear that there is at least a communication with the cable and although I washed it with Bactisure and quite well with dilute peroxide and diluted Betadine, I would like to put her on IV antibiotics for at least 4-6 weeks.  I " "would put her on Bactrim in clinic which may inhibit cultures.  I think it would likely be safe to leave the deep screws in the patella ultimately but want to be aggressive with antibiotics while she is healing this wound early given the communication."    ID consult for antibiotic recommendations.  Patient currently on vancomycin and ceftriaxone.     Interval History: Pt afebrile w/o white count.  Pt  S/p I and D yesterday w/ cable removal.  Cxs taken w/o growth.  Aerobic cx listed as further report to follow.    Review of Systems   Constitutional: Negative for activity change, appetite change, chills and fever.   HENT: Negative for congestion.    Respiratory: Negative for cough and shortness of breath.    Cardiovascular: Negative for chest pain, palpitations and leg swelling.   Gastrointestinal: Negative for abdominal pain, constipation, diarrhea, nausea and vomiting.   Genitourinary: Negative for difficulty urinating, dysuria and hematuria.   Musculoskeletal: Negative for arthralgias and myalgias.   Skin: Negative for color change, rash and wound.   Neurological: Negative for dizziness, weakness, light-headedness and numbness.   Psychiatric/Behavioral: Negative for confusion.   All other systems reviewed and are negative.    Objective:     Vital Signs (Most Recent):  Temp: 97.6 °F (36.4 °C) (11/25/20 0746)  Pulse: 83 (11/25/20 0746)  Resp: 14 (11/25/20 0746)  BP: 132/68 (11/25/20 0746)  SpO2: 99 % (11/25/20 0746) Vital Signs (24h Range):  Temp:  [97.2 °F (36.2 °C)-97.6 °F (36.4 °C)] 97.6 °F (36.4 °C)  Pulse:  [83-99] 83  Resp:  [11-26] 14  SpO2:  [93 %-100 %] 99 %  BP: (132-165)/(63-78) 132/68     Weight: 77.1 kg (170 lb)  Body mass index is 28.29 kg/m².    Estimated Creatinine Clearance: 76.7 mL/min (based on SCr of 0.8 mg/dL).    Physical Exam  Constitutional:       Appearance: Normal appearance.   HENT:      Head: Normocephalic and atraumatic.      Mouth/Throat:      Mouth: Mucous membranes are moist.   Eyes: "      Pupils: Pupils are equal, round, and reactive to light.   Cardiovascular:      Rate and Rhythm: Normal rate and regular rhythm.      Heart sounds: No murmur. No friction rub. No gallop.    Pulmonary:      Effort: Pulmonary effort is normal. No respiratory distress.      Breath sounds: Normal breath sounds. No stridor. No wheezing or rhonchi.   Abdominal:      General: Bowel sounds are normal.      Tenderness: There is no abdominal tenderness. There is no guarding.      Hernia: No hernia is present.   Skin:     General: Skin is warm and dry.      Coloration: Skin is not jaundiced or pale.      Findings: No rash.      Comments: L leg w/ brace in place   Neurological:      Mental Status: She is alert and oriented to person, place, and time.   Psychiatric:         Mood and Affect: Mood normal.         Behavior: Behavior normal.         Significant Labs: All pertinent labs within the past 24 hours have been reviewed.    Significant Imaging: I have reviewed all pertinent imaging results/findings within the past 24 hours.

## 2020-11-25 NOTE — SUBJECTIVE & OBJECTIVE
"Principal Problem:Closed displaced transverse fracture of left patella    Principal Orthopedic Problem: s/p I&D and revision wound closure L knee    Interval History: NAEON. Pt doing well this am. Pain well controlled in LLE. POCT glucose >300 this am.      Review of patient's allergies indicates:   Allergen Reactions    Codeine Nausea And Vomiting       Current Facility-Administered Medications   Medication    acetaminophen tablet 650 mg    aspirin EC tablet 81 mg    ceFEPIme injection 2 g    dextrose 50% injection 12.5 g    dextrose 50% injection 25 g    diphenhydrAMINE capsule 25 mg    docusate sodium capsule 50 mg    glucagon (human recombinant) injection 1 mg    glucose chewable tablet 16 g    glucose chewable tablet 24 g    insulin aspart U-100 pen 1-10 Units    insulin detemir U-100 pen 20 Units    levothyroxine tablet 75 mcg    lisinopriL tablet 2.5 mg    oxyCODONE immediate release tablet 10 mg    oxyCODONE immediate release tablet 5 mg    pantoprazole EC tablet 40 mg    polyethylene glycol packet 17 g    simvastatin tablet 40 mg    vancomycin - pharmacy to dose    vancomycin in dextrose 5 % 1 gram/250 mL IVPB 1,000 mg    venlafaxine 24 hr capsule 225 mg     Facility-Administered Medications Ordered in Other Encounters   Medication    lidocaine HCL 10 mg/ml (1%) injection 5 mL    triamcinolone acetonide injection 40 mg     Objective:     Vital Signs (Most Recent):  Temp: 97.2 °F (36.2 °C) (11/24/20 0900)  Pulse: 87 (11/24/20 1443)  Resp: 16 (11/24/20 1747)  BP: 135/63 (11/24/20 1100)  SpO2: 95 % (11/24/20 1443) Vital Signs (24h Range):  Temp:  [97.2 °F (36.2 °C)] 97.2 °F (36.2 °C)  Pulse:  [78-99] 87  Resp:  [11-26] 16  SpO2:  [93 %-100 %] 95 %  BP: (132-165)/(60-78) 135/63     Weight: 77.1 kg (170 lb)  Height: 5' 5" (165.1 cm)  Body mass index is 28.29 kg/m².      Intake/Output Summary (Last 24 hours) at 11/25/2020 0639  Last data filed at 11/24/2020 1800  Gross per 24 hour "   Intake 2300 ml   Output 0 ml   Net 2300 ml       Ortho/SPM Exam    AAOx4  NAD  Reg rate  No increased WOB  L knee incisional WV in place with no outpt  HKB locked in extension  SILT T/SP/DP/Lloyd/Sa  Motor intact T/SP/DP  WWP extremities  FCDs in place and functioning      Significant Labs:   CBC:   Recent Labs   Lab 11/24/20  0929   WBC 5.40   HGB 11.1*   HCT 36.5*        CMP:   Recent Labs   Lab 11/24/20  0929      K 4.6      CO2 25   *   BUN 11   CREATININE 0.8   CALCIUM 8.4*   PROT 6.5   ALBUMIN 3.3*   BILITOT 0.2   ALKPHOS 114   AST 30   ALT 19   ANIONGAP 5*   EGFRNONAA >60.0     POCT Glucose:   Recent Labs   Lab 11/24/20  2138 11/24/20  2353 11/25/20  0436   POCTGLUCOSE 417* 444* 334*     All pertinent labs within the past 24 hours have been reviewed.    Significant Imaging: I have reviewed and interpreted all pertinent imaging results/findings.

## 2020-11-25 NOTE — DISCHARGE INSTRUCTIONS
[facility]  PHONE: [phone] FAX:[phone]  Physician: Patrice Iglesias MD  Physician Assistant: Kiya Chaidez PA-C; Ariel Goodwin NP  Nurse/ Medical Assistant: Verito Mann  SURGERY: 11/24/2020, manipulation under anesthesia, irrigation and debridement, removal of hardware   WEIGHTBEARING: as tolerated with knee brace  RANGE OF MOTION: locked in extension   FOLLOW UP: 1 weeks post op for wound check/ possible wound vac removal pending healing.   OTHER: please continue antibiotics as prescribed.   WHAT ARE MY DISCHARGE INSTRUCTIONS? FOLLOW THESE GUIDELINES  WOUND CARE:    Keep wounds bandage, splint, cast or external fixator clean and dry until otherwise instructed by the physician at least until your first postoperative visit.    If you have been told you can shower, do not soak your incision or wound. Use a mild soap and allow the water to run over your wounds, etc. Do not put any ointments, powder or lotions on the wound   If you have a surgical incision, drain, splint/ cast, external fixator, or IV device report to your physician:  o Any redness, or red streaks around the site or changes in appearance that concerns you  o Change in usual type of drainage or if drainage starts again after it had previously stopped  o Discomfort (soreness and pain) becomes worse or changes.  o If you have fever greater than 101? F   PAIN REDUCTION:    We will use a multimodal approach to control your pain.    Elevate the injured extremity as much as possible following discharge to reduce swelling and to help with pain reduction.     Do not exceed 3000 mg of Tylenol a day.   Please take all prescriptions as prescribed.    Please review pain medication refill policy.    Please call a few days ahead of time before you run out of pain medication for refills   If you are having problems with pain, you need to call our orthopedic office and talk to us and allow us to appropriately and safely treat your  pain     FOLLOW UP:    Return to clinic: 2-3 weeks postoperatively for suture removal or as determined by surgeon, appointment is made prior to leaving hospital    Wear comfortable clothing to your follow-up appointments in order for the physician to examine your injury.     Keep a list of questions that you may have for your physician and bring that list with you to your appointments so that we may address all of your concerns while you are here.    If you have forms, need refills, handicap parking tag, please request those things at your appointment.    If you had multiple injuries and have multiple appointments and/or live several hours away from the trauma service please let our office specifically know, so that we can assist you to coordinate all the appointments on the same day to minimize travel        COMMON ASKED QUESTIONS  How can I improve my healing?    Risk factors that have been associated with problems with fracture healing include tobacco use, diabetes, vascular issues, vitamin D deficiency, poor nutrition, prolonged use of anti-inflammatories and infection.   Eat a healthy nutritional diet. Consider using nutritional drinks and vitamins to supplement your diet if you have difficulty eating healthy.   If you have diabetes, be sure to control your blood sugar as previously directed by physician.   Keep your incision, external fixator pins, wires, splint, cast clean and dry     What are my restrictions?    WEIGHTBEARING: Follow weight bearing and activity restrictions given by your physician. These instructions have been given to you to provide you with the best chance for you to have a good outcome. If you have any questions about anything you have been told or read, please call our office so that we can clarify any of this information for you.      DRIVING: No driving until your physician approves. Driving while on narcotics should be avoided. You should be able to walk a block without an  assistive device before you can be expected to drive safely   STAIRS: Stair climbing should be done with caution, using guidelines taught by the physical therapist.    When can I return to work?    Discuss return to work/ school status with your physician. When you return to work or school varies depending on your injury and your job. You should contact your employer and tell them that have had a surgery and have them send any disability or FMLA forms.     What do I do with disability and FMLA form request?    Please send all request to :  Fax number: [phone] or e-mail  [email or web address]  you may follow up on your request by contacting their office at [phone] (Select Option 2)  The disability desk is located at the [facility] on the 1st floor next to the Blood bank and is open   Monday - Friday 8am -5pm  Please have them specify if you have to return to work with no restrictions or if you can return with modifications.  Once the office has received the form they will be filled out completely and the original form will be returned to the company. If you would like a copy, they can mail one to you if requested.    They disability office requires no less than 14 days time to compete these forms.              MEDICATION REFILL POLICY  At [facility], patient safety is a top priority. To ensure the safety of all patients, [facility] has a comprehensive policy for medication refills. It is very important to plan ahead.    Thank you for choosing [facility] for your orthopedic needs. We will supply appropriate narcotic pain medication for certain acute injuries and post-operative care. Narcotics are considered safe when used appropriately, but side effects may occur, as well as potential risk of an addictive disorder developing. Side effects for these medications can include, but are not limited to drowsiness, constipation, itching, nausea and confusion. We weigh the risks vs.  benefits before utilizing these medications.       Pain medication may be supplied for acute fracture and/or postoperative period following surgery.  Time frame may vary based on condition and will be determined by the provider but will not exceed three months.   Please understand that pain medications are only prescribed to patients undergoing surgery or a procedure. If you do not require either of these, you may be referred to Pain management for pain control.   If you pain persists more than 2 months after your date of surgery, you will need to return to your primary care physician for refill consideration, make an appointment with Pain Management or Physical Medicine and Rehab for continued care.       It takes 1-3 business days to refill your prescriptions. We must review your medical records, check for expiration dates, verify number of refills and ensure refill eligibility. Contact us 3 days before your medication runs out to request a refill. Prescriptions are not refilled on weekends or after office hours on weekdays.       Refill request can be made through your pharmacy. The pharmacy forwards the information we need and after confirmation, it is presented to the provider for final authorization.       It is important for patients taking opioid medications take them as prescribed. You must maintain the dosage schedule prescribed by our clinic. Any changes must have prior approval from out clinic. Refills will not be considered until 3 days prior to the end of the dosage schedule.       Refills on medication can only be authorized on medications prescribed by providers in our office. We will not refill medications prescribed by other providers.       Prescriptions cannot be mailed or shipped. Controlled substance prescriptions must be picked up in the office.        You understand that prescriptions that are lost, stolen, accidently disposed of, or consumed before appropriate date will not be refilled.        Thank you for understanding and complying with these medication policies.    Call [phone] for problems or questions  We will contact you as soon as possible. Please allow 24 to 48 hours for us to return your call.   We appreciate your patience

## 2020-11-25 NOTE — ASSESSMENT & PLAN NOTE
59yo female with history of T2DM on insulin, Hypothyroidism, HTN, HLD who is s/p I&D of Left Knee on 11/24/2020. Hospital medicine consulted for assistance with medical management. Last A1C reported to be 8.3. Patient on 35u AM Lantus at home with 14u Lispro with meals.     Plan:   - Glucose goal of 140-180. Has been up to 444 the past 24 hours. Most recently 300.  - 25u detemir starting this morning, 8u aspart with meals as well as SSI  - Will continue to monitor patient's glucose, would not recommend discharge until tomorrow  - Diabetic Diet

## 2020-11-25 NOTE — CONSULTS
Double lumen PICC to right brachial vein.  33 cm in length, 29 cm arm circumference and 0 cm exposed.   Lot # [id number].

## 2020-11-25 NOTE — SUBJECTIVE & OBJECTIVE
Interval History: Pt afebrile w/o white count.  Pt  S/p I and D yesterday w/ cable removal.  Cxs taken w/o growth.  Aerobic cx listed as further report to follow.    Review of Systems   Constitutional: Negative for activity change, appetite change, chills and fever.   HENT: Negative for congestion.    Respiratory: Negative for cough and shortness of breath.    Cardiovascular: Negative for chest pain, palpitations and leg swelling.   Gastrointestinal: Negative for abdominal pain, constipation, diarrhea, nausea and vomiting.   Genitourinary: Negative for difficulty urinating, dysuria and hematuria.   Musculoskeletal: Negative for arthralgias and myalgias.   Skin: Negative for color change, rash and wound.   Neurological: Negative for dizziness, weakness, light-headedness and numbness.   Psychiatric/Behavioral: Negative for confusion.   All other systems reviewed and are negative.    Objective:     Vital Signs (Most Recent):  Temp: 97.6 °F (36.4 °C) (11/25/20 0746)  Pulse: 83 (11/25/20 0746)  Resp: 14 (11/25/20 0746)  BP: 132/68 (11/25/20 0746)  SpO2: 99 % (11/25/20 0746) Vital Signs (24h Range):  Temp:  [97.2 °F (36.2 °C)-97.6 °F (36.4 °C)] 97.6 °F (36.4 °C)  Pulse:  [83-99] 83  Resp:  [11-26] 14  SpO2:  [93 %-100 %] 99 %  BP: (132-165)/(63-78) 132/68     Weight: 77.1 kg (170 lb)  Body mass index is 28.29 kg/m².    Estimated Creatinine Clearance: 76.7 mL/min (based on SCr of 0.8 mg/dL).    Physical Exam  Constitutional:       Appearance: Normal appearance.   HENT:      Head: Normocephalic and atraumatic.      Mouth/Throat:      Mouth: Mucous membranes are moist.   Eyes:      Pupils: Pupils are equal, round, and reactive to light.   Cardiovascular:      Rate and Rhythm: Normal rate and regular rhythm.      Heart sounds: No murmur. No friction rub. No gallop.    Pulmonary:      Effort: Pulmonary effort is normal. No respiratory distress.      Breath sounds: Normal breath sounds. No stridor. No wheezing or rhonchi.    Abdominal:      General: Bowel sounds are normal.      Tenderness: There is no abdominal tenderness. There is no guarding.      Hernia: No hernia is present.   Skin:     General: Skin is warm and dry.      Coloration: Skin is not jaundiced or pale.      Findings: No rash.      Comments: L leg w/ brace in place   Neurological:      Mental Status: She is alert and oriented to person, place, and time.   Psychiatric:         Mood and Affect: Mood normal.         Behavior: Behavior normal.         Significant Labs: All pertinent labs within the past 24 hours have been reviewed.    Significant Imaging: I have reviewed all pertinent imaging results/findings within the past 24 hours.

## 2020-11-25 NOTE — PROGRESS NOTES
[facility] Hospital Medicine  Progress Note    Patient Name: Bhavna Hancock  MRN: [id number]  Patient Class: IP- Inpatient   Admission Date: 11/24/2020  Length of Stay: 1 days  Attending Physician: Patrice Iglesias MD  Primary Care Provider: Apurva Abbott MD    Hospital Medicine Team: Networked reference to record PCT  Arie Silver MD    Subjective:     Principal Problem:Closed displaced transverse fracture of left patella        HPI:  Ms. Hancock is a 59yo female with history of T2DM on insulin, Hypothyroidism, HTN, HLD who is s/p I&D of Left Knee on 11/24/2020. Patient initially had a fall on 9/22 with resulting patellar fracture. She underwent ORIF of L Patella on 10/5 which was complicated by drainage and wound dehiscence. Hospital medicine consulted for assistance with medical management. Last A1C reported to be 8.3.    Overview/Hospital Course:  No notes on file    Interval History:   Patient's glucose has been elevated. Will adjust medications.    Review of Systems   Constitutional: Negative for activity change, appetite change, chills and fever.   HENT: Negative for congestion.    Respiratory: Negative for cough and shortness of breath.    Cardiovascular: Negative for chest pain, palpitations and leg swelling.   Gastrointestinal: Negative for abdominal pain, constipation, diarrhea, nausea and vomiting.   Genitourinary: Negative for difficulty urinating and dysuria.   Musculoskeletal: Negative for arthralgias and myalgias.   Neurological: Negative for dizziness, weakness, light-headedness and numbness.   Psychiatric/Behavioral: Negative for confusion.     Objective:     Vital Signs (Most Recent):  Temp: 97.6 °F (36.4 °C) (11/25/20 0746)  Pulse: 83 (11/25/20 0746)  Resp: 14 (11/25/20 0746)  BP: 132/68 (11/25/20 0746)  SpO2: 99 % (11/25/20 0746) Vital Signs (24h Range):  Temp:  [97.6 °F (36.4 °C)] 97.6 °F (36.4 °C)  Pulse:  [83-99] 83  Resp:  [11-26] 14  SpO2:  [93 %-100 %]  99 %  BP: (132-165)/(63-78) 132/68     Weight: 77.1 kg (170 lb)  Body mass index is 28.29 kg/m².    Intake/Output Summary (Last 24 hours) at 11/25/2020 0904  Last data filed at 11/24/2020 1800  Gross per 24 hour   Intake 1300 ml   Output 0 ml   Net 1300 ml      Physical Exam  HENT:      Head: Normocephalic and atraumatic.      Mouth/Throat:      Mouth: Mucous membranes are moist.   Eyes:      Pupils: Pupils are equal, round, and reactive to light.   Cardiovascular:      Rate and Rhythm: Normal rate and regular rhythm.      Heart sounds: No murmur. No gallop.    Pulmonary:      Effort: Pulmonary effort is normal. No respiratory distress.      Breath sounds: Normal breath sounds.   Abdominal:      General: Bowel sounds are normal.      Tenderness: There is no abdominal tenderness. There is no guarding.   Skin:     General: Skin is warm and dry.   Neurological:      Mental Status: She is alert and oriented to person, place, and time.   Psychiatric:         Mood and Affect: Mood normal.         Behavior: Behavior normal.         Significant Labs: All pertinent labs within the past 24 hours have been reviewed.    Significant Imaging: I have reviewed and interpreted all pertinent imaging results/findings within the past 24 hours.      Assessment/Plan:      Type 2 diabetes mellitus  59yo female with history of T2DM on insulin, Hypothyroidism, HTN, HLD who is s/p I&D of Left Knee on 11/24/2020. Hospital medicine consulted for assistance with medical management. Last A1C reported to be 8.3. Patient on 35u AM Lantus at home with 14u Lispro with meals.     Plan:   - Glucose goal of 140-180. Has been up to 444 the past 24 hours. Most recently 300.  - 25u detemir starting this morning, 8u aspart with meals as well as SSI  - Will continue to monitor patient's glucose, would not recommend discharge until tomorrow  - Diabetic Diet      Hypothyroidism  Prior TSH of 3.35 on 8/31 per chart review    - Continue  synthroid    Hypertension  Patient with reported history of Hypertension but on Lisinopril (2.5mg). Unclear if lisinopril is for hypertension or for microalbuminuria from DM2 as dose is small.     - Continue Lisinopril      VTE Risk Mitigation (From admission, onward)    None          Discharge Planning   LACEY: 11/25/2020     Code Status: Prior   Is the patient medically ready for discharge?:     Reason for patient still in hospital (select all that apply): Treatment  Discharge Plan A: Home with family, Home Health                  Arie Silver MD  Department of Hospital Medicine   [facility]

## 2020-11-25 NOTE — PLAN OF CARE
11/25/20 0839   Post-Acute Status   Post-Acute Authorization Medications   Medication Status Rx fund Referral     SW faxed referral to [facility] via  for review. SW will follow up as needed.    Justine Ibrahim LMSW  Case Management   [facility]   Ext. [phone]

## 2020-11-25 NOTE — PROGRESS NOTES
[facility]  Orthopedics  Progress Note    Patient Name: Bhavna Hancock  MRN: [id number]  Admission Date: 11/24/2020  Hospital Length of Stay: 1 days  Attending Provider: Patrice Iglesias MD  Primary Care Provider: Apurva Abbott MD  Follow-up For: Procedure(s) (LRB):  MANIPULATION, KNEE (Left)  IRRIGATION AND DEBRIDEMENT, LOWER EXTREMITY (Left)  APPLICATION, WOUND VAC (Left)  REMOVAL, HARDWARE, LOWER EXTREMITY (Left)    Post-Operative Day: 1 Day Post-Op  Subjective:     Principal Problem:Closed displaced transverse fracture of left patella    Principal Orthopedic Problem: s/p I&D and revision wound closure L knee    Interval History: NAEON. Pt doing well this am. Pain well controlled in LLE. POCT glucose >300 this am.      Review of patient's allergies indicates:   Allergen Reactions    Codeine Nausea And Vomiting       Current Facility-Administered Medications   Medication    acetaminophen tablet 650 mg    aspirin EC tablet 81 mg    ceFEPIme injection 2 g    dextrose 50% injection 12.5 g    dextrose 50% injection 25 g    diphenhydrAMINE capsule 25 mg    docusate sodium capsule 50 mg    glucagon (human recombinant) injection 1 mg    glucose chewable tablet 16 g    glucose chewable tablet 24 g    insulin aspart U-100 pen 1-10 Units    insulin detemir U-100 pen 20 Units    levothyroxine tablet 75 mcg    lisinopriL tablet 2.5 mg    oxyCODONE immediate release tablet 10 mg    oxyCODONE immediate release tablet 5 mg    pantoprazole EC tablet 40 mg    polyethylene glycol packet 17 g    simvastatin tablet 40 mg    vancomycin - pharmacy to dose    vancomycin in dextrose 5 % 1 gram/250 mL IVPB 1,000 mg    venlafaxine 24 hr capsule 225 mg     Facility-Administered Medications Ordered in Other Encounters   Medication    lidocaine HCL 10 mg/ml (1%) injection 5 mL    triamcinolone acetonide injection 40 mg     Objective:     Vital Signs (Most Recent):  Temp: 97.2 °F (36.2 °C)  "(11/24/20 0900)  Pulse: 87 (11/24/20 1443)  Resp: 16 (11/24/20 1747)  BP: 135/63 (11/24/20 1100)  SpO2: 95 % (11/24/20 1443) Vital Signs (24h Range):  Temp:  [97.2 °F (36.2 °C)] 97.2 °F (36.2 °C)  Pulse:  [78-99] 87  Resp:  [11-26] 16  SpO2:  [93 %-100 %] 95 %  BP: (132-165)/(60-78) 135/63     Weight: 77.1 kg (170 lb)  Height: 5' 5" (165.1 cm)  Body mass index is 28.29 kg/m².      Intake/Output Summary (Last 24 hours) at 11/25/2020 0639  Last data filed at 11/24/2020 1800  Gross per 24 hour   Intake 2300 ml   Output 0 ml   Net 2300 ml       Ortho/SPM Exam    AAOx4  NAD  Reg rate  No increased WOB  L knee incisional WV in place with no outpt  HKB locked in extension  SILT T/SP/DP/Lloyd/Sa  Motor intact T/SP/DP  WWP extremities  FCDs in place and functioning      Significant Labs:   CBC:   Recent Labs   Lab 11/24/20  0929   WBC 5.40   HGB 11.1*   HCT 36.5*        CMP:   Recent Labs   Lab 11/24/20  0929      K 4.6      CO2 25   *   BUN 11   CREATININE 0.8   CALCIUM 8.4*   PROT 6.5   ALBUMIN 3.3*   BILITOT 0.2   ALKPHOS 114   AST 30   ALT 19   ANIONGAP 5*   EGFRNONAA >60.0     POCT Glucose:   Recent Labs   Lab 11/24/20  2138 11/24/20  2353 11/25/20  0436   POCTGLUCOSE 417* 444* 334*     All pertinent labs within the past 24 hours have been reviewed.    Significant Imaging: I have reviewed and interpreted all pertinent imaging results/findings.    Assessment/Plan:     Stiffness of left knee  Bhavna Hancock is a 60 y.o. female s/p L patella ORIF 10/2020 with subsequent wound dehiscence and L knee stiffness s/p manipulation, I&D, and removal of hardware DOS:11/24/20    Pain control  PT/OT: WBAT LLE, HKB locked in extension  DVT PPx: ASA 81mg BID, SCDs at all times when not ambulating  Abx: Vanc and cefepime per ID reccs  Cx: NGTD  Labs: WBC 5.4, Hg 11.1, POCT 334  Incisional wound vac in place  Conn: n/a    Dispo: pending BG control, mobilization with PT/OT, PICC line, and final ID " reccs          Sury Mckeon MD  Orthopedics  [facility]

## 2020-11-25 NOTE — PLAN OF CARE
Problem: Physical Therapy Goal  Goal: Physical Therapy Goal  Description: Goals to be met by: 20    Patient will increase functional independence with mobility by performin. Supine to sit with [place]  2. Sit to supine with [place]  3. Sit to stand transfer with Supervision  4. Gait  x 150 feet with Supervision using Crutches.   5.  Patient will demonstrate independence with a home exercise program  6. Patient's balance will be GOOD: Needs SUPERVISION only during gait and able to self right with moderate LOB.  7. Ascend/descend 1 stair without Handrails Contact Guard Assistance using Crutches.   Outcome: Ongoing, Progressing     PT evaluation completed, goals established and plan of care reviewed with patient.  Patient demonstrates good activity tolerance secondary to appropriate pain control.  Patient demonstrates mildly decreased independence secondary to pain and weakness.  Patient required between SBA and CGA for bed mobility, transfers and ambulation.  Patient ambulated 150 ft with axillary crutches with CGA for balance.  Patient required readjustment of crutches to improve hand placement and safety.  Patient will benefit from skilled PT for strengthening and mobility training.      Tomas Butler, PT, DPT  2020  Pager #: [phone]

## 2020-11-25 NOTE — PT/OT/SLP EVAL
"Physical Therapy Evaluation    Patient Name:  Bhavna Hancock   MRN:  [id number]    Recommendations:     Discharge Recommendations:  outpatient PT   Discharge Equipment Recommendations: none   Barriers to discharge: None    Assessment:       Bhavna Hancock is a 60 y.o. female admitted with a medical diagnosis of Closed displaced transverse fracture of left patella.  She presents with the following impairments/functional limitations:  weakness, impaired endurance, impaired self care skills, impaired functional mobilty, gait instability, orthopedic precautions, decreased lower extremity function, impaired balance, pain, impaired muscle length, decreased ROM.      Patient demonstrates good activity tolerance secondary to appropriate pain control.  Patient demonstrates mildly decreased independence secondary to pain and weakness.  Patient required between SBA and CGA for bed mobility, transfers and ambulation.  Patient ambulated 150 ft with axillary crutches with CGA for balance.  Patient required readjustment of crutches to improve hand placement and safety.  Patient will benefit from skilled PT for strengthening and mobility training.      Rehab Prognosis: Good; patient would benefit from acute skilled PT services 6 x/week to address these deficits and reach maximum level of function.  Patient is most appropriate to go to outpatient PT .  Recent Surgery: Procedure(s) (LRB):  MANIPULATION, KNEE (Left)  IRRIGATION AND DEBRIDEMENT, LOWER EXTREMITY (Left)  APPLICATION, WOUND VAC (Left)  REMOVAL, HARDWARE, LOWER EXTREMITY (Left) 1 Day Post-Op    Plan:     During this hospitalization, patient to be seen 6 x/week to address the identified rehab impairments via gait training, therapeutic activities, therapeutic exercises, neuromuscular re-education and progress toward the following goals:    · Plan of Care Expires:  11/26/20    Subjective     Subjective:"I was progressing to walking without an assistive " "device"  Pain/Comfort:  Pain Rating 1: (did not rate)  Location - Side 1: Left  Location - Orientation 1: generalized  Location 1: knee  Pain Addressed 1: Pre-medicate for activity, Reposition, Cessation of Activity  Pain Rating Post-Intervention 1: (did not rate)    Patients cultural, spiritual, Shinto conflicts given the current situation: no    Living Environment:  Prior level of function: Patient ambulates with crutches and independent with ADLs.    Residence: lives with their spouse 2-story house, number of outside stairs: 1, number of inside stairs: 12 with rails, bedroom on 1st floor   Support available upon discharge: family  Equipment owned: crutches, axillary  Objective:     Communicated with RN prior to session.  Patient found supine with FCD, wound vac  upon PT entry to room.    General Precautions: Standard, fall   Orthopedic Precautions:LLE weight bearing as tolerated   Braces: Hinged knee brace     Exams:  · RLE ROM: decreased secondary to precautions  · RLE Strength: grossly decreased secondary to pain  · LLE ROM: WFL  · LLE Strength: WFL  · Cognitive Exam:  Patient is oriented to Person, Place, Time and Situation      Functional Mobility:  · Bed Mobility:     · Supine to Sit: stand by assistance  · Sit to Supine: stand by assistance  · Transfers:     · Sit to Stand: contact guard assistance with axillary crutches  · Gait: Patient ambulated 150 ft with axillary crutches and contact guard assistance.  · Balance:   · Sitting: GOOD: Maintains balance through MODERATE excursions of active trunk movement  · Standing: FAIR+: Needs CLOSE SUPERVISION during gait and is able to right self with minor LOB      Therapeutic Activities and Exercises:   Gait training:  Patient required cues for sequencing and upright posture to increase independence and safety.  Patient required cues ~ 25% of the time. Adjusted crutches as handles too low for patient.     Patient Education:    Patient educated on assistive " device use, bed mobility training, Fall risk, gait training, home safety, Home exercise program, plan of care and transfer training by explanation.  Patient was receptive to education and needs reinforcement.     AM-PAC 6 CLICK MOBILITY  Total Score:19     Patient left up in chair with all lines intact and call button in reach.    GOALS:   Multidisciplinary Problems     Physical Therapy Goals        Problem: Physical Therapy Goal    Goal Priority Disciplines Outcome Goal Variances Interventions   Physical Therapy Goal     PT, PT/OT Ongoing, Progressing     Description: Goals to be met by: 20    Patient will increase functional independence with mobility by performin. Supine to sit with [place]  2. Sit to supine with [place]  3. Sit to stand transfer with Supervision  4. Gait  x 150 feet with Supervision using Crutches.   5.  Patient will demonstrate independence with a home exercise program  6. Patient's balance will be GOOD: Needs SUPERVISION only during gait and able to self right with moderate LOB.  7. Ascend/descend 1 stair without Handrails Contact Guard Assistance using Crutches.                    History:     Past Medical History:   Diagnosis Date    Abdominal pain     Diabetes     GERD (gastroesophageal reflux disease)     Hx of colonic polyps     Hyperlipidemia     Hypertension     Nausea and vomiting        Past Surgical History:   Procedure Laterality Date    APPLICATION OF WOUND VACUUM-ASSISTED CLOSURE DEVICE Left 2020    Procedure: APPLICATION, WOUND VAC;  Surgeon: Patrice Iglesias MD;  Location: [address];  Service: Orthopedics;  Laterality: Left;    CRANIOTOMY  2013    DEBRIDEMENT TENNIS ELBOW      hip ascites      IRRIGATION AND DEBRIDEMENT OF LOWER EXTREMITY Left 2020    Procedure: IRRIGATION AND DEBRIDEMENT, LOWER EXTREMITY;  Surgeon: Patrice Iglesias MD;  Location: [address];  Service: Orthopedics;  Laterality: Left;    KNEE JOINT  MANIPULATION Left 11/24/2020    Procedure: MANIPULATION, KNEE;  Surgeon: Ptarice Iglesias MD;  Location: [facility] OR [address] [place] [address];  Service: Orthopedics;  Laterality: Left;    OPEN REDUCTION AND INTERNAL FIXATION (ORIF) OF FRACTURE OF PATELLA Left 10/5/2020    Procedure: ORIF, FRACTURE, PATELLA - Femoral catheter ok;  Surgeon: Patrice Iglesias MD;  Location: [facility] OR [address] [place] [address];  Service: Orthopedics;  Laterality: Left;    REMOVAL OF HARDWARE FROM LOWER EXTREMITY Left 11/24/2020    Procedure: REMOVAL, HARDWARE, LOWER EXTREMITY;  Surgeon: Patrice Iglesias MD;  Location: [address];  Service: Orthopedics;  Laterality: Left;    TRIGGER FINGER RELEASE         Time Tracking:     PT Received On: 11/25/20  PT Start Time: 0937     PT Stop Time: 0958  PT Total Time (min): 21 min     Billable Minutes: Evaluation 10 min Gait Training 11 min      Tomas Butler, PT  11/25/2020    Co-treatment performed for this visit due to patient need for two skilled therapists to ensure patient and staff safety and to accommodate for patient activity tolerance/pain management

## 2020-11-25 NOTE — PROGRESS NOTES
Ms. Hancock is 61 yo F fell onto left knee from standing height on 9/22/2020.  She was seen initially in the [place] ED, diagnosed with a displaced left patella fracture and placed into to knee immobilizer.   10/05/2020: : ORIF left patella  - Has trouble with range of motion and stiffness.   - developed some serious drainage, no fluid collection of MRI, CRP and Sed rate normal.   11/24/2020: : manipulation under anesthesia, irrigation and debridement, removal or hardware.     Plan:  Current care, treatment plan, precautions, activity level/ modifications, limitations, rehabilitation exercises and proposed future treatment were discussed with the patient. We discussed the need to monitor for changes in symptoms and condition and report them to the physician.  Discussed importance of compliance with all appointments and follow up examinations.     - Keep wound vac in place until 1 week after discharge    -PT/OT,   -knee brace locked in extension   - weight bearing as tolerated with knee brace in extension     - pain medication:     Pain medication refill policy provided to patient for review. In discharge paperwork     Patient understands that we will use a multimodal approach to pain control Tylenol, Celebrex, Robaxin, and gabapentin with roxicodone 5 mg for breakthrough pain.  We will continue this regimen scheduled for 2-3 weeks post op and try and limit narcotic use.     -  IV antibiotics for at least 4-6 weeks, will need to taper pending culture results, at this time NGTD - followed by ID, antibiotics per ID rec.        -Vancomycin; Cefepime 2g q8h    - Patient is to return to clinic in 1 week for would vac removal and wound check  - appointment to be made.   Will need ID follow up also      If there are any questions or concerns prior to scheduled follow up, the patient was instructed to contact the office

## 2020-11-25 NOTE — ASSESSMENT & PLAN NOTE
"Pt is a 61yo F w/ pmhx of htn DM, hld, hypothyroidism, who previously sustained a left patellar fracture 9/22 after a fall and underwent ORIF of left patella 10/5.  She developed stiffness and knee as well as wound dehisence w/ drainage of serous fluid.  Started on Bactrim as outpt w/o improvement.  CRP normal.  MRI of her knee was performed showing no fluid collections.        Patient 11/24  underwent excisional debridement and irrigation of the anterior left knee incision.  Per notes no purulence were seen but cables were exposed so were removed.    Per Dr. Iglesias's note "there is at least a communication with the cable and although I washed it with Bactisure and quite well with dilute peroxide and diluted Betadine, I would like to put her on IV antibiotics for at least 4-6 weeks... think it would likely be safe to leave the deep screws in the patella ultimately but want to be aggressive with antibiotics while she is healing this wound early given the communication."    ID consult for antibiotic recommendations.      Plan  - Continue Vancomycin.  Pharmacy to dose.  Trough goal 15-20  - Continue Cefepime 2g q8h  - Plan to treat with above empiric abx regimen for 6 week course of therapy (est end date 1/5/21) and may need further suppression afterwards given hardware-screws remain in place.  -Would consider Rifampin for biofilm but pt with multiple medications with potential interactions (Estrace, Provera, Simvastatin, Oxycodone and Synthroid).    -If growth seen on cxs, will tailor abx accordingly.  -Weekly outpatient laboratory on Monday or Tuesday while on IV antibiotics. CBC, CMP,ESR and CRP, Vancomycin trough. Target 15-20.  If vancomycin trough is not at target (15-20) prior to discharge, the please perform vancomycin trough before their fourth outpatient dose.  Fax laboratory results to [facility] at [phone] with attn: Sharif Crowley  -ID f/u in 2-3 weeks.  Outpatient Infectious Diseases clinic " follow up will be arranged and found in patient calendar.  Prior to discharge, please ensure the patient's follow-up has been scheduled.  If there is still no follow-up scheduled in Infectious Diseases clinic, please send an EPIC message to Camilla Watters in Infectious Diseases.  -Pt abx regimen and plan discussed with orthosx team and ID staff.  -ID will sign off

## 2020-11-26 VITALS
HEART RATE: 87 BPM | HEIGHT: 65 IN | BODY MASS INDEX: 28.32 KG/M2 | WEIGHT: 170 LBS | DIASTOLIC BLOOD PRESSURE: 71 MMHG | SYSTOLIC BLOOD PRESSURE: 126 MMHG | TEMPERATURE: 98 F | RESPIRATION RATE: 15 BRPM | OXYGEN SATURATION: 98 %

## 2020-11-26 LAB — POCT GLUCOSE: 265 MG/DL (ref 70–110)

## 2020-11-26 PROCEDURE — 97110 THERAPEUTIC EXERCISES: CPT | Mod: CQ

## 2020-11-26 PROCEDURE — 25000003 PHARM REV CODE 250: Performed by: STUDENT IN AN ORGANIZED HEALTH CARE EDUCATION/TRAINING PROGRAM

## 2020-11-26 PROCEDURE — 99900035 HC TECH TIME PER 15 MIN (STAT)

## 2020-11-26 PROCEDURE — 25000003 PHARM REV CODE 250: Performed by: ORTHOPAEDIC SURGERY

## 2020-11-26 PROCEDURE — 63600175 PHARM REV CODE 636 W HCPCS: Performed by: PHYSICIAN ASSISTANT

## 2020-11-26 PROCEDURE — A4216 STERILE WATER/SALINE, 10 ML: HCPCS | Performed by: ORTHOPAEDIC SURGERY

## 2020-11-26 PROCEDURE — 99232 SBSQ HOSP IP/OBS MODERATE 35: CPT | Mod: ,,, | Performed by: HOSPITALIST

## 2020-11-26 PROCEDURE — 94761 N-INVAS EAR/PLS OXIMETRY MLT: CPT

## 2020-11-26 PROCEDURE — 27000221 HC OXYGEN, UP TO 24 HOURS

## 2020-11-26 PROCEDURE — 97116 GAIT TRAINING THERAPY: CPT | Mod: CQ

## 2020-11-26 PROCEDURE — 99232 PR SUBSEQUENT HOSPITAL CARE,LEVL II: ICD-10-PCS | Mod: ,,, | Performed by: HOSPITALIST

## 2020-11-26 RX ORDER — ASPIRIN 81 MG/1
81 TABLET ORAL 2 TIMES DAILY
Qty: 60 TABLET | Refills: 0 | Status: ON HOLD | OUTPATIENT
Start: 2020-11-26 | End: 2021-02-02 | Stop reason: HOSPADM

## 2020-11-26 RX ADMIN — INSULIN ASPART 8 UNITS: 100 INJECTION, SOLUTION INTRAVENOUS; SUBCUTANEOUS at 08:11

## 2020-11-26 RX ADMIN — CEFEPIME 2 G: 2 INJECTION, POWDER, FOR SOLUTION INTRAVENOUS at 04:11

## 2020-11-26 RX ADMIN — PANTOPRAZOLE SODIUM 40 MG: 40 TABLET, DELAYED RELEASE ORAL at 08:11

## 2020-11-26 RX ADMIN — ACETAMINOPHEN 650 MG: 325 TABLET ORAL at 12:11

## 2020-11-26 RX ADMIN — ASPIRIN 81 MG: 81 TABLET, COATED ORAL at 08:11

## 2020-11-26 RX ADMIN — LEVOTHYROXINE SODIUM 75 MCG: 75 TABLET ORAL at 08:11

## 2020-11-26 RX ADMIN — Medication 10 ML: at 01:11

## 2020-11-26 RX ADMIN — LISINOPRIL 2.5 MG: 2.5 TABLET ORAL at 08:11

## 2020-11-26 RX ADMIN — VENLAFAXINE HYDROCHLORIDE 225 MG: 75 CAPSULE, EXTENDED RELEASE ORAL at 08:11

## 2020-11-26 RX ADMIN — INSULIN DETEMIR 25 UNITS: 100 INJECTION, SOLUTION SUBCUTANEOUS at 08:11

## 2020-11-26 RX ADMIN — INSULIN ASPART 6 UNITS: 100 INJECTION, SOLUTION INTRAVENOUS; SUBCUTANEOUS at 08:11

## 2020-11-26 RX ADMIN — DOCUSATE SODIUM 50 MG: 50 CAPSULE, LIQUID FILLED ORAL at 08:11

## 2020-11-26 RX ADMIN — Medication 10 ML: at 06:11

## 2020-11-26 RX ADMIN — ACETAMINOPHEN 650 MG: 325 TABLET ORAL at 06:11

## 2020-11-26 NOTE — PROGRESS NOTES
Pharmacokinetic Assessment Follow Up: IV Vancomycin    Vancomycin serum concentration assessment(s):    The trough level was drawn correctly and can be used to guide therapy at this time. The measurement is within the desired definitive target range of 15 to 20 mcg/mL.    Vancomycin Regimen Plan:    Continue regimen to Vancomycin 1000 mg IV every 12 hours with next serum trough concentration measured at 0930 prior to 4th dose on 11/27    Drug levels (last 3 results):  Recent Labs   Lab Result Units 11/25/20  2118   Vancomycin-Trough ug/mL 16.0       Pharmacy will continue to follow and monitor vancomycin.    Please contact pharmacy at extension [phone] for questions regarding this assessment.    Thank you for the consult,   Ashley Butler       Patient brief summary:  Bhavna Hancock is a 60 y.o. female initiated on antimicrobial therapy with IV Vancomycin for treatment of bone/joint infection    The patient's current regimen is vancomycin 1000mg q12h    Drug Allergies:   Review of patient's allergies indicates:   Allergen Reactions    Codeine Nausea And Vomiting       Actual Body Weight:   77.1kg    Renal Function:   Estimated Creatinine Clearance: 76.7 mL/min (based on SCr of 0.8 mg/dL).,     Dialysis Method (if applicable):  N/A    CBC (last 72 hours):  Recent Labs   Lab Result Units 11/24/20  0929   WBC K/uL 5.40   Hemoglobin g/dL 11.1*   Hematocrit % 36.5*   Platelets K/uL 252   Gran % % 66.1   Lymph % % 25.4   Mono % % 4.4   Eosinophil % % 3.0   Basophil % % 0.9   Differential Method  Automated       Metabolic Panel (last 72 hours):  Recent Labs   Lab Result Units 11/24/20  0929   Sodium mmol/L 139   Potassium mmol/L 4.6   Chloride mmol/L 109   CO2 mmol/L 25   Glucose mg/dL 163*   BUN mg/dL 11   Creatinine mg/dL 0.8   Albumin g/dL 3.3*   Total Bilirubin mg/dL 0.2   Alkaline Phosphatase U/L 114   AST U/L 30   ALT U/L 19       Vancomycin Administrations:  vancomycin given in the last 96 hours                    vancomycin in dextrose 5 % 1 gram/250 mL IVPB 1,000 mg (mg) 1,000 mg New Bag 11/25/20 2243     1,000 mg New Bag  1140     1,000 mg New Bag 11/24/20 2215    vancomycin 1.75 g in 5 % dextrose 500 mL IVPB (mg) 1,750 mg New Bag 11/24/20 1027    vancomycin injection (g) 1 g Given 11/24/20 0827                Microbiologic Results:  Microbiology Results (last 7 days)     Procedure Component Value Units Date/Time    AFB Culture & Smear [[id number]] Collected: 11/24/20 0804    Order Status: Completed Specimen: Wound from Leg, Left Updated: 11/25/20 1530     AFB Culture & Smear Culture in progress     AFB CULTURE STAIN No acid fast bacilli seen.    Aerobic culture [[id number]] Collected: 11/24/20 0804    Order Status: Completed Specimen: Wound from Leg, Left Updated: 11/25/20 0903     Aerobic Bacterial Culture Further report to follow    Culture, Anaerobe [[id number]] Collected: 11/24/20 0804    Order Status: Completed Specimen: Wound from Leg, Left Updated: 11/25/20 0703     Anaerobic Culture Culture in progress    Gram stain [[id number]] Collected: 11/24/20 0804    Order Status: Completed Specimen: Wound from Leg, Left Updated: 11/24/20 1150     Gram Stain Result No WBC's      No organisms seen    Fungus culture [[id number]] Collected: 11/24/20 0804    Order Status: Sent Specimen: Wound from Leg, Left Updated: 11/24/20 0815

## 2020-11-26 NOTE — NURSING
Pt ready for discharge. Discharge instructions and prescriptions given and explained to pt. Pt verbalizes understanding. PICC in place. Wound vac on and working. No further questions from pt at this time. Pt to be discharged via wheelchair. Will cont to monitor until discharge.

## 2020-11-26 NOTE — PT/OT/SLP PROGRESS
Physical Therapy Treatment    Patient Name:  Bhavna Hancock   MRN:  [id number]    Recommendations:     Discharge Recommendations:  outpatient PT   Discharge Equipment Recommendations: none   Barriers to discharge: None    Assessment:     Bhavna Hancock is a 60 y.o. female admitted with a medical diagnosis of Closed displaced transverse fracture of left patella.  She presents with the following impairments/functional limitations:  weakness, decreased ROM, pain, edema, impaired skin, orthopedic precautions . Patient showed proper ability to transfer and postural awareness while ambulating. Min antalgic gait pattern noted, but overall safety and balance was good..    Rehab Prognosis: Good; patient would benefit from acute skilled PT services to address these deficits and reach maximum level of function.    Recent Surgery: Procedure(s) (LRB):  MANIPULATION, KNEE (Left)  IRRIGATION AND DEBRIDEMENT, LOWER EXTREMITY (Left)  APPLICATION, WOUND VAC (Left)  REMOVAL, HARDWARE, LOWER EXTREMITY (Left) 2 Days Post-Op    Plan:     During this hospitalization, patient to be seen 6 x/week to address the identified rehab impairments via gait training, therapeutic activities, therapeutic exercises, neuromuscular re-education and progress toward the following goals:    · Plan of Care Expires:  11/26/20    Subjective     Chief Complaint: none stated  Patient/Family Comments/goals: to go home  Pain/Comfort:  · Pain Rating 1: 0/10  · Location - Side 1: Left  · Location - Orientation 1: generalized  · Location 1: knee  · Pain Addressed 1: Pre-medicate for activity      Objective:     Communicated with NSG prior to session.  Patient found HOB elevated with wound vac, FCD upon PT entry to room.     General Precautions: Standard, fall   Orthopedic Precautions:LLE weight bearing as tolerated   Braces: Hinged knee brace     Functional Mobility:  · Bed Mobility:     · Rolling Left:  modified independence  · Scooting: modified  independence  · Supine to Sit: modified independence  · Sit to Supine: modified independence  · Transfers:     · Sit to Stand:  stand by assistance with axillary crutches  · Gait: ~400 ft with Axillary crutches with close SBA and L LE WBAT in Hinged knee brace.      AM-PAC 6 CLICK MOBILITY  Turning over in bed (including adjusting bedclothes, sheets and blankets)?: 4  Sitting down on and standing up from a chair with arms (e.g., wheelchair, bedside commode, etc.): 4  Moving from lying on back to sitting on the side of the bed?: 4  Moving to and from a bed to a chair (including a wheelchair)?: 4  Need to walk in hospital room?: 3  Climbing 3-5 steps with a railing?: 3  Basic Mobility Total Score: 22       Therapeutic Activities and Exercises:   Donned a second gown. There ex: GLUT SETS, AP AND QUAD SETS 2X10 REPS .    Patient left with bed in chair position with all lines intact, call button in reach and SPOUSE present..    GOALS:   Multidisciplinary Problems     Physical Therapy Goals        Problem: Physical Therapy Goal    Goal Priority Disciplines Outcome Goal Variances Interventions   Physical Therapy Goal     PT, PT/OT Ongoing, Progressing     Description: Goals to be met by: 20    Patient will increase functional independence with mobility by performin. Supine to sit with [place]  2. Sit to supine with [place]  3. Sit to stand transfer with Supervision  4. Gait  x 150 feet with Supervision using Crutches.   5.  Patient will demonstrate independence with a home exercise program  6. Patient's balance will be GOOD: Needs SUPERVISION only during gait and able to self right with moderate LOB.  7. Ascend/descend 1 stair without Handrails Contact Guard Assistance using Crutches.                    Time Tracking:     PT Received On: 20  PT Start Time: 942     PT Stop Time: 1006  PT Total Time (min): 24 min     Billable Minutes: Gait Training 12 and Therapeutic Exercise 12    Treatment  Type: Treatment  PT/PTA: PTA     PTA Visit Number: 1     Jarett Cárdenas, PTA  11/26/2020

## 2020-11-26 NOTE — SUBJECTIVE & OBJECTIVE
Interval History: No acute events overnight, BG over 24hrs , 112- 265.     Review of Systems   Constitutional: Negative for chills and fever.   Respiratory: Negative for chest tightness and shortness of breath.    Cardiovascular: Negative for chest pain and palpitations.   Gastrointestinal: Negative for abdominal pain, constipation, diarrhea, nausea and vomiting.   Endocrine: Negative for polyuria.   Psychiatric/Behavioral: Negative for behavioral problems. The patient is not nervous/anxious.      Objective:     Vital Signs (Most Recent):  Temp: 97.4 °F (36.3 °C) (11/26/20 0449)  Pulse: 86 (11/26/20 0500)  Resp: 16 (11/26/20 0500)  BP: 130/69 (11/26/20 0500)  SpO2: 97 % (11/26/20 0500) Vital Signs (24h Range):  Temp:  [97.4 °F (36.3 °C)-97.9 °F (36.6 °C)] 97.4 °F (36.3 °C)  Pulse:  [86] 86  Resp:  [16-18] 16  SpO2:  [97 %] 97 %  BP: (130-135)/(69-78) 130/69     Weight: 77.1 kg (170 lb)  Body mass index is 28.29 kg/m².    Intake/Output Summary (Last 24 hours) at 11/26/2020 0825  Last data filed at 11/25/2020 0900  Gross per 24 hour   Intake --   Output 0 ml   Net 0 ml      Physical Exam  HENT:      Head: Normocephalic and atraumatic.      Mouth/Throat:      Mouth: Mucous membranes are moist.   Eyes:      Pupils: Pupils are equal, round, and reactive to light.   Cardiovascular:      Rate and Rhythm: Normal rate and regular rhythm.      Heart sounds: No murmur. No gallop.    Pulmonary:      Effort: Pulmonary effort is normal. No respiratory distress.      Breath sounds: Normal breath sounds.   Abdominal:      General: Bowel sounds are normal.      Tenderness: There is no abdominal tenderness. There is no guarding.   Skin:     General: Skin is warm and dry.   Neurological:      Mental Status: She is alert and oriented to person, place, and time.   Psychiatric:         Mood and Affect: Mood normal.         Behavior: Behavior normal.         Significant Labs: None    Significant Imaging: n/a

## 2020-11-26 NOTE — ASSESSMENT & PLAN NOTE
61yo female with history of T2DM on insulin, Hypothyroidism, HTN, HLD who is s/p I&D of Left Knee on 11/24/2020. Hospital medicine consulted for assistance with medical management. Last A1C reported to be 8.3. Patient on 35u AM Lantus at home with 14u Lispro with meals.     Plan:   - Glucose goal of 140-180 in hospital. Over 24hr blood glucose 112- 265  - Recommend resuming patient's home regimen. Lantus 35U, Insulin Lispro 14U TIDWM, Metformin  - Pt Is comfortable managing her BG at home, typically 120-160s.  - Recommend close f/u with PCP  - Diabetic Diet

## 2020-11-26 NOTE — SUBJECTIVE & OBJECTIVE
Principal Problem:Closed displaced transverse fracture of left patella    Principal Orthopedic Problem: s/p I&D and revision wound closure L knee    Interval History: NAEON. Pt doing well this am. Pain well controlled in LLE. POCT glucose 112-120 this am.  She has completed abx teaching for  abx.    Review of patient's allergies indicates:   Allergen Reactions    Codeine Nausea And Vomiting       Current Facility-Administered Medications   Medication    acetaminophen tablet 650 mg    aspirin EC tablet 81 mg    ceFEPIme injection 2 g    dextrose 50% injection 12.5 g    dextrose 50% injection 25 g    diphenhydrAMINE capsule 25 mg    docusate sodium capsule 50 mg    glucagon (human recombinant) injection 1 mg    glucose chewable tablet 16 g    glucose chewable tablet 24 g    insulin aspart U-100 pen 1-10 Units    insulin aspart U-100 pen 8 Units    insulin detemir U-100 pen 25 Units    levothyroxine tablet 75 mcg    lisinopriL tablet 2.5 mg    oxyCODONE immediate release tablet 10 mg    oxyCODONE immediate release tablet 5 mg    pantoprazole EC tablet 40 mg    polyethylene glycol packet 17 g    simvastatin tablet 40 mg    sodium chloride 0.9% flush 10 mL    And    sodium chloride 0.9% flush 10 mL    vancomycin - pharmacy to dose    vancomycin in dextrose 5 % 1 gram/250 mL IVPB 1,000 mg    venlafaxine 24 hr capsule 225 mg     Facility-Administered Medications Ordered in Other Encounters   Medication    lidocaine HCL 10 mg/ml (1%) injection 5 mL    triamcinolone acetonide injection 40 mg     Objective:     Vital Signs (Most Recent):  Temp: 97.4 °F (36.3 °C) (11/26/20 0449)  Pulse: 86 (11/26/20 0500)  Resp: 16 (11/26/20 0500)  BP: 130/69 (11/26/20 0500)  SpO2: 97 % (11/26/20 0500) Vital Signs (24h Range):  Temp:  [97.4 °F (36.3 °C)-97.9 °F (36.6 °C)] 97.4 °F (36.3 °C)  Pulse:  [83-86] 86  Resp:  [14-18] 16  SpO2:  [97 %-99 %] 97 %  BP: (130-135)/(68-78) 130/69     Weight: 77.1 kg (170  "lb)  Height: 5' 5" (165.1 cm)  Body mass index is 28.29 kg/m².      Intake/Output Summary (Last 24 hours) at 11/26/2020 0716  Last data filed at 11/25/2020 0900  Gross per 24 hour   Intake --   Output 0 ml   Net 0 ml       Ortho/SPM Exam      AAOx4  NAD  Reg rate  No increased WOB  L knee incisional WV in place with no outpt  HKB locked in extension  SILT T/SP/DP/Lloyd/Sa  Motor intact T/SP/DP  WWP extremities  FCDs in place and functioning      Significant Labs:   CBC:   Recent Labs   Lab 11/24/20  0929   WBC 5.40   HGB 11.1*   HCT 36.5*        CMP:   Recent Labs   Lab 11/24/20  0929      K 4.6      CO2 25   *   BUN 11   CREATININE 0.8   CALCIUM 8.4*   PROT 6.5   ALBUMIN 3.3*   BILITOT 0.2   ALKPHOS 114   AST 30   ALT 19   ANIONGAP 5*   EGFRNONAA >60.0     POCT Glucose:   Recent Labs   Lab 11/25/20  1216 11/25/20  1646 11/25/20  2241   POCTGLUCOSE 230* 112* 120*     All pertinent labs within the past 24 hours have been reviewed.    Significant Imaging: I have reviewed and interpreted all pertinent imaging results/findings.  "

## 2020-11-26 NOTE — PROGRESS NOTES
[facility] Hospital Medicine  Progress Note    Patient Name: Bhavna Hancock  MRN: [id number]  Patient Class: IP- Inpatient   Admission Date: 11/24/2020  Length of Stay: 2 days  Attending Physician: Patrice Iglesias MD  Primary Care Provider: Apurva Abbott MD    Hospital Medicine Team: Networked reference to record PCT  Carmen Ortiz DO    Subjective:     Principal Problem:Closed displaced transverse fracture of left patella        HPI:  Ms. Hancock is a 59yo female with history of T2DM on insulin, Hypothyroidism, HTN, HLD who is s/p I&D of Left Knee on 11/24/2020. Patient initially had a fall on 9/22 with resulting patellar fracture. She underwent ORIF of L Patella on 10/5 which was complicated by drainage and wound dehiscence. Hospital medicine consulted for assistance with medical management. Last A1C reported to be 8.3.    Overview/Hospital Course:  No notes on file    Interval History: No acute events overnight, BG over 24hrs , 112- 265.     Review of Systems   Constitutional: Negative for chills and fever.   Respiratory: Negative for chest tightness and shortness of breath.    Cardiovascular: Negative for chest pain and palpitations.   Gastrointestinal: Negative for abdominal pain, constipation, diarrhea, nausea and vomiting.   Endocrine: Negative for polyuria.   Psychiatric/Behavioral: Negative for behavioral problems. The patient is not nervous/anxious.      Objective:     Vital Signs (Most Recent):  Temp: 97.4 °F (36.3 °C) (11/26/20 0449)  Pulse: 86 (11/26/20 0500)  Resp: 16 (11/26/20 0500)  BP: 130/69 (11/26/20 0500)  SpO2: 97 % (11/26/20 0500) Vital Signs (24h Range):  Temp:  [97.4 °F (36.3 °C)-97.9 °F (36.6 °C)] 97.4 °F (36.3 °C)  Pulse:  [86] 86  Resp:  [16-18] 16  SpO2:  [97 %] 97 %  BP: (130-135)/(69-78) 130/69     Weight: 77.1 kg (170 lb)  Body mass index is 28.29 kg/m².    Intake/Output Summary (Last 24 hours) at 11/26/2020 0825  Last data filed at 11/25/2020 0900  Gross  per 24 hour   Intake --   Output 0 ml   Net 0 ml      Physical Exam  HENT:      Head: Normocephalic and atraumatic.      Mouth/Throat:      Mouth: Mucous membranes are moist.   Eyes:      Pupils: Pupils are equal, round, and reactive to light.   Cardiovascular:      Rate and Rhythm: Normal rate and regular rhythm.      Heart sounds: No murmur. No gallop.    Pulmonary:      Effort: Pulmonary effort is normal. No respiratory distress.      Breath sounds: Normal breath sounds.   Abdominal:      General: Bowel sounds are normal.      Tenderness: There is no abdominal tenderness. There is no guarding.   Skin:     General: Skin is warm and dry.   Neurological:      Mental Status: She is alert and oriented to person, place, and time.   Psychiatric:         Mood and Affect: Mood normal.         Behavior: Behavior normal.         Significant Labs: None    Significant Imaging: n/a      Assessment/Plan:      Hypothyroidism  Prior TSH of 3.35 on 8/31 per chart review    - Continue synthroid    Hypertension  Patient with reported history of Hypertension but on Lisinopril (2.5mg). Unclear if lisinopril is for hypertension or for microalbuminuria from DM2 as dose is small.     - Continue Lisinopril    Type 2 diabetes mellitus  59yo female with history of T2DM on insulin, Hypothyroidism, HTN, HLD who is s/p I&D of Left Knee on 11/24/2020. Hospital medicine consulted for assistance with medical management. Last A1C reported to be 8.3. Patient on 35u AM Lantus at home with 14u Lispro with meals.     Plan:   - Glucose goal of 140-180 in hospital. Over 24hr blood glucose 112- 265  - Recommend resuming patient's home regimen. Lantus 35U, Insulin Lispro 14U TIDWM, Metformin  - Pt Is comfortable managing her BG at home, typically 120-160s.  - Recommend close f/u with PCP  - Diabetic Diet        VTE Risk Mitigation (From admission, onward)    None          Discharge Planning   LACEY: 11/26/2020     Code Status: Prior   Is the patient  medically ready for discharge?:     Reason for patient still in hospital (select all that apply): Pending disposition  Discharge Plan A: Home with family, Home Health        Thank you for your consult. Please call back for any questions or concerns.          Carmen Ortiz DO  Department of Hospital Medicine   [facility]

## 2020-11-26 NOTE — ASSESSMENT & PLAN NOTE
Bhavna Hancock is a 60 y.o. female s/p L patella ORIF 10/2020 with subsequent wound dehiscence and L knee stiffness s/p manipulation, I&D, and removal of hardware DOS:11/24/20    Pain control  PT/OT: WBAT LLE, HKB locked in extension  DVT PPx: ASA 81mg BID while inpatient and x 30 days at [facility], SCDs at all times when not ambulating  Abx: Vanc and cefepime per ID reccs, HH abx therapy set up  Cx: NGTD  Labs: POC glucose 112-120  Incisional wound vac in place, home vac charging in room, nurse to switch to home vac prior to [facility]  Conn: n/a    Dispo: Home with home health IV abx. I called Medicine team for final recs regarding insulin regimen. Patient can [facility] home once Medicine makes final recs for insulin regimen at [facility]

## 2020-11-26 NOTE — PROGRESS NOTES
[facility]  Orthopedics  Progress Note    Attg Note:  I agree with the resident's assessment and plan.  Incisional wound VAC in place.  Will keep knee locked in extension for least the 1st week or 2.  Plan is for 6 weeks IV antibiotics broad-spectrum pending cultures.    Patrice Iglesias MD      Patient Name: Bhavna Hancock  MRN: [id number]  Admission Date: 11/24/2020  Hospital Length of Stay: 2 days  Attending Provider: Patrice Iglesias MD  Primary Care Provider: Apurva Abbott MD  Follow-up For: Procedure(s) (LRB):  MANIPULATION, KNEE (Left)  IRRIGATION AND DEBRIDEMENT, LOWER EXTREMITY (Left)  APPLICATION, WOUND VAC (Left)  REMOVAL, HARDWARE, LOWER EXTREMITY (Left)    Post-Operative Day: 2 Days Post-Op  Subjective:     Principal Problem:Closed displaced transverse fracture of left patella    Principal Orthopedic Problem: s/p I&D and revision wound closure L knee    Interval History: NAEON. Pt doing well this am. Pain well controlled in LLE. POCT glucose 112-120 this am.  She has completed abx teaching for  abx.    Review of patient's allergies indicates:   Allergen Reactions    Codeine Nausea And Vomiting       Current Facility-Administered Medications   Medication    acetaminophen tablet 650 mg    aspirin EC tablet 81 mg    ceFEPIme injection 2 g    dextrose 50% injection 12.5 g    dextrose 50% injection 25 g    diphenhydrAMINE capsule 25 mg    docusate sodium capsule 50 mg    glucagon (human recombinant) injection 1 mg    glucose chewable tablet 16 g    glucose chewable tablet 24 g    insulin aspart U-100 pen 1-10 Units    insulin aspart U-100 pen 8 Units    insulin detemir U-100 pen 25 Units    levothyroxine tablet 75 mcg    lisinopriL tablet 2.5 mg    oxyCODONE immediate release tablet 10 mg    oxyCODONE immediate release tablet 5 mg    pantoprazole EC tablet 40 mg    polyethylene glycol packet 17 g    simvastatin tablet 40 mg    sodium chloride 0.9% flush 10 mL  "   And    sodium chloride 0.9% flush 10 mL    vancomycin - pharmacy to dose    vancomycin in dextrose 5 % 1 gram/250 mL IVPB 1,000 mg    venlafaxine 24 hr capsule 225 mg     Facility-Administered Medications Ordered in Other Encounters   Medication    lidocaine HCL 10 mg/ml (1%) injection 5 mL    triamcinolone acetonide injection 40 mg     Objective:     Vital Signs (Most Recent):  Temp: 97.4 °F (36.3 °C) (11/26/20 0449)  Pulse: 86 (11/26/20 0500)  Resp: 16 (11/26/20 0500)  BP: 130/69 (11/26/20 0500)  SpO2: 97 % (11/26/20 0500) Vital Signs (24h Range):  Temp:  [97.4 °F (36.3 °C)-97.9 °F (36.6 °C)] 97.4 °F (36.3 °C)  Pulse:  [83-86] 86  Resp:  [14-18] 16  SpO2:  [97 %-99 %] 97 %  BP: (130-135)/(68-78) 130/69     Weight: 77.1 kg (170 lb)  Height: 5' 5" (165.1 cm)  Body mass index is 28.29 kg/m².      Intake/Output Summary (Last 24 hours) at 11/26/2020 0716  Last data filed at 11/25/2020 0900  Gross per 24 hour   Intake --   Output 0 ml   Net 0 ml       Ortho/SPM Exam      AAOx4  NAD  Reg rate  No increased WOB  L knee incisional WV in place with no outpt  HKB locked in extension  SILT T/SP/DP/Lloyd/Sa  Motor intact T/SP/DP  WWP extremities  FCDs in place and functioning      Significant Labs:   CBC:   Recent Labs   Lab 11/24/20  0929   WBC 5.40   HGB 11.1*   HCT 36.5*        CMP:   Recent Labs   Lab 11/24/20  0929      K 4.6      CO2 25   *   BUN 11   CREATININE 0.8   CALCIUM 8.4*   PROT 6.5   ALBUMIN 3.3*   BILITOT 0.2   ALKPHOS 114   AST 30   ALT 19   ANIONGAP 5*   EGFRNONAA >60.0     POCT Glucose:   Recent Labs   Lab 11/25/20  1216 11/25/20  1646 11/25/20  2241   POCTGLUCOSE 230* 112* 120*     All pertinent labs within the past 24 hours have been reviewed.    Significant Imaging: I have reviewed and interpreted all pertinent imaging results/findings.    Assessment/Plan:     Stiffness of left knee  Bhavna Hancock is a 60 y.o. female s/p L patella ORIF 10/2020 with subsequent wound " dehiscence and L knee stiffness s/p manipulation, I&D, and removal of hardware DOS:11/24/20    Pain control  PT/OT: WBAT LLE, HKB locked in extension  DVT PPx: ASA 81mg BID while inpatient and x 30 days at [facility], SCDs at all times when not ambulating  Abx: Vanc and cefepime per ID reccs, HH abx therapy set up  Cx: NGTD  Labs: POC glucose 112-120  Incisional wound vac in place, home vac charging in room, nurse to switch to home vac prior to [facility]  Conn: n/a    Dispo: Home with home health IV abx. I called Medicine team for final recs regarding insulin regimen. Patient can [facility] home once Medicine makes final recs for insulin regimen at [facility]          Chelsea Cintron MD  Orthopedics  [facility]

## 2020-11-27 NOTE — DISCHARGE SUMMARY
[facility]  Orthopedics  Discharge Summary      Patient Name: Bhavna Hancock  MRN: [id number]  Admission Date: 11/24/2020  Hospital Length of Stay: 2 days  Discharge Date and Time: 11/26/2020 11:20 AM  Attending Physician: No att. providers found   Discharging Provider: Patrice Iglesias MD  Primary Care Provider: Apurva Abbott MD    HPI:  60-year-old female 2 months status post ORIF of left patella fracture.  Patient with diabetes.  Poor wound healing in the anterior part of the incision with some serous drainage.  Also with stiffness.  To OR for manipulation under anesthesia with possible washout of the knee.    Procedure(s) (LRB):  MANIPULATION, KNEE (Left)  IRRIGATION AND DEBRIDEMENT, LOWER EXTREMITY (Left)  APPLICATION, WOUND VAC (Left)  REMOVAL, HARDWARE, LOWER EXTREMITY (Left)      Hospital Course:  Patient underwent procedure without difficulty.  I removed the cable and irrigated her with Bactisure, Betadine, peroxide.  No evidence of overt infection but colonization suspected.  ESR and CRP normal preop period No growth to date on cultures at discharge.  Plan is for vancomycin and cefepime IV x6 weeks followed by suppressive antibiotics.  We did also discuss stopping antibiotics some point leaving the screws in the patella given the outside in nature of the problem and use of Bactisure.  Incisional wound VAC in place for 1 week.  Sutures remain in for 2-3 weeks.  I will go easy on bending the knee in the 1st few weeks to let the soft tissues heal although I did get her to 120° in the operating room.    Consults (From admission, onward)        Status Ordering Provider     Inpatient consult to Infectious Diseases  Once     Provider:  (Not yet assigned)    Completed ROGER DAVILA     Inpatient consult to PICC team ([facility])  Once     Provider:  (Not yet assigned)    Completed ROGER DAVILA            Pending Diagnostic Studies:     None        Final Active Diagnoses:    Diagnosis Date  Noted POA    PRINCIPAL PROBLEM:  Closed displaced transverse fracture of left patella [S82.032A] 09/27/2020 Yes    S/P ORIF (open reduction internal fixation) fracture [Z98.890, Z87.81]  Not Applicable    Stiffness of left knee [M25.662] 11/24/2020 Yes    Type 2 diabetes mellitus [E11.9] 11/24/2020 Unknown    Hypertension [I10] 11/24/2020 Unknown    Hypothyroidism [E03.9] 11/24/2020 Unknown      Problems Resolved During this Admission:      Discharged Condition: good    Disposition: Home or Self Care    Follow Up:    Patient Instructions:   No discharge procedures on file.  Medications:  Reconciled Home Medications:      Medication List      CHANGE how you take these medications    * aspirin 81 MG EC tablet  Commonly known as: ECOTRIN  Take 1 tablet (81 mg total) by mouth 2 (two) times a day.  What changed: Another medication with the same name was added. Make sure you understand how and when to take each.     * aspirin 81 MG EC tablet  Commonly known as: ECOTRIN  Take 1 tablet (81 mg total) by mouth 2 (two) times daily.  What changed: You were already taking a medication with the same name, and this prescription was added. Make sure you understand how and when to take each.     oxyCODONE 5 MG immediate release tablet  Commonly known as: ROXICODONE  Take 1 tablet (5 mg total) by mouth every 4 (four) hours as needed for Pain.  What changed:   · when to take this  · additional instructions         * This list has 2 medication(s) that are the same as other medications prescribed for you. Read the directions carefully, and ask your doctor or other care provider to review them with you.            CONTINUE taking these medications    acetaminophen 500 MG tablet  Commonly known as: TYLENOL  Take 2 tablets (1,000 mg total) by mouth every 6 (six) hours.     DEXCOM G6 SENSOR Priscilla  Generic drug: blood-glucose sensor  TEST BS FID     DEXCOM G6 TRANSMITTER Priscilla  Generic drug: blood-glucose transmitter  USE TO CHECK FID      estradioL 1 MG tablet  Commonly known as: ESTRACE  TK 1 T PO QD     fish oil-omega-3 fatty acids 300-1,000 mg capsule  Take 2 g by mouth once daily.     HumaLOG KwikPen Insulin 100 unit/mL pen  Generic drug: insulin lispro  INJECT 14 UNITS UNDER THE SKIN WITH EVERY MEAL     ibuprofen 600 MG tablet  Commonly known as: ADVIL,MOTRIN  Take 1 tablet (600 mg total) by mouth every 6 (six) hours as needed for Pain.     insulin glargine 100 unit/mL injection  Commonly known as: LANTUS  Inject 35 Units into the skin once daily.     insulin syringe-needle U-100 1/2 mL 30 gauge Syrg  USE ONE SYRINGE PER DAY UTD     levothyroxine 75 MCG tablet  Commonly known as: SYNTHROID  TAKE 1 TABLET BY MOUTH EVERY DAY     lisinopriL 2.5 MG tablet  Commonly known as: PRINIVIL,ZESTRIL  Take 2.5 mg by mouth once daily.     LOTEMAX SM 0.38 % Drpg  Generic drug: loteprednol etabonate  INT 1 GTT IN OU BID UTD     medroxyPROGESTERone 2.5 MG tablet  Commonly known as: PROVERA  TK 1 T PO QD     metFORMIN 500 MG tablet  Commonly known as: GLUCOPHAGE  Take 500 mg by mouth daily with breakfast.     ONETOUCH ULTRA BLUE TEST STRIP Strp  Generic drug: blood sugar diagnostic  Use to test blood sugar 5 times a day     pantoprazole 40 MG tablet  Commonly known as: PROTONIX  TAKE 1 TABLET(40 MG) BY MOUTH EVERY DAY     RESTASIS 0.05 % ophthalmic emulsion  Generic drug: cycloSPORINE  INSTILL ONE DROP IN OU BID     simvastatin 40 MG tablet  Commonly known as: ZOCOR  Take 40 mg by mouth every evening.     trospium 20 mg Tab tablet  Commonly known as: sanctura  Take 20 mg by mouth once daily.     venlafaxine 150 MG Cp24  Commonly known as: EFFEXOR-XR  Take 225 mg by mouth once daily.        STOP taking these medications    sulfamethoxazole-trimethoprim 800-160mg 800-160 mg Tab  Commonly known as: BACTRIM DS            Patrice Iglesias MD  Orthopedics  [facility]

## 2020-11-27 NOTE — PLAN OF CARE
Future Appointments   Date Time Provider Department Center   12/3/2020 10:00 AM [facility]   12/16/2020 10:00 AM [facility]     [facility] on 11/26/20.   11/27/20 1011   Final Note   Assessment Type Final Discharge Note   Anticipated Discharge Disposition Home-Health   What phone number can be called within the next 1-3 days to see how you are doing after discharge?   ([phone])   Hospital Follow Up  Appt(s) scheduled? Yes   Discharge plans and expectations educations in teach back method with documentation complete? Yes   Right Care Referral Info   Post Acute Recommendation Home-care   Referral Type Home Health/Home Infusion   Facility Name [facility]

## 2020-11-30 ENCOUNTER — TELEPHONE (OUTPATIENT)
Dept: ORTHOPEDICS | Facility: CLINIC | Age: 60
End: 2020-11-30

## 2020-11-30 ENCOUNTER — PATIENT MESSAGE (OUTPATIENT)
Dept: ORTHOPEDICS | Facility: CLINIC | Age: 60
End: 2020-11-30

## 2020-11-30 LAB
BACTERIA SPEC AEROBE CULT: ABNORMAL
BACTERIA SPEC ANAEROBE CULT: NORMAL

## 2020-12-01 PROCEDURE — G0180 PR HOME HEALTH MD CERTIFICATION: ICD-10-PCS | Mod: ,,, | Performed by: ORTHOPAEDIC SURGERY

## 2020-12-01 PROCEDURE — G0180 MD CERTIFICATION HHA PATIENT: HCPCS | Mod: ,,, | Performed by: ORTHOPAEDIC SURGERY

## 2020-12-03 ENCOUNTER — OFFICE VISIT (OUTPATIENT)
Dept: INFECTIOUS DISEASES | Facility: CLINIC | Age: 60
End: 2020-12-03
Payer: COMMERCIAL

## 2020-12-03 ENCOUNTER — OFFICE VISIT (OUTPATIENT)
Dept: ORTHOPEDICS | Facility: CLINIC | Age: 60
End: 2020-12-03
Payer: COMMERCIAL

## 2020-12-03 VITALS
HEART RATE: 88 BPM | DIASTOLIC BLOOD PRESSURE: 79 MMHG | TEMPERATURE: 98 F | HEIGHT: 65 IN | SYSTOLIC BLOOD PRESSURE: 163 MMHG | WEIGHT: 182.13 LBS | BODY MASS INDEX: 30.34 KG/M2

## 2020-12-03 VITALS
SYSTOLIC BLOOD PRESSURE: 177 MMHG | HEART RATE: 78 BPM | TEMPERATURE: 98 F | RESPIRATION RATE: 18 BRPM | DIASTOLIC BLOOD PRESSURE: 93 MMHG

## 2020-12-03 DIAGNOSIS — Z98.890 S/P ORIF (OPEN REDUCTION INTERNAL FIXATION) FRACTURE: Primary | ICD-10-CM

## 2020-12-03 DIAGNOSIS — T14.8XXA HEMATOMA: ICD-10-CM

## 2020-12-03 DIAGNOSIS — Z87.81 S/P ORIF (OPEN REDUCTION INTERNAL FIXATION) FRACTURE: Primary | ICD-10-CM

## 2020-12-03 DIAGNOSIS — Z51.89 VISIT FOR WOUND CHECK: ICD-10-CM

## 2020-12-03 DIAGNOSIS — S82.032D CLOSED DISPLACED TRANSVERSE FRACTURE OF LEFT PATELLA WITH ROUTINE HEALING, SUBSEQUENT ENCOUNTER: Primary | ICD-10-CM

## 2020-12-03 DIAGNOSIS — Z87.81 S/P ORIF (OPEN REDUCTION INTERNAL FIXATION) FRACTURE: ICD-10-CM

## 2020-12-03 DIAGNOSIS — Z98.890 S/P ORIF (OPEN REDUCTION INTERNAL FIXATION) FRACTURE: ICD-10-CM

## 2020-12-03 DIAGNOSIS — M86.28 SUBACUTE OSTEOMYELITIS, OTHER SITE: ICD-10-CM

## 2020-12-03 DIAGNOSIS — T81.31XD SURGICAL WOUND DEHISCENCE, SUBSEQUENT ENCOUNTER: ICD-10-CM

## 2020-12-03 PROCEDURE — 99999 PR PBB SHADOW E&M-EST. PATIENT-LVL V: ICD-10-PCS | Mod: PBBFAC,,, | Performed by: PHYSICIAN ASSISTANT

## 2020-12-03 PROCEDURE — 99999 PR PBB SHADOW E&M-EST. PATIENT-LVL V: CPT | Mod: PBBFAC,,, | Performed by: PHYSICIAN ASSISTANT

## 2020-12-03 PROCEDURE — 99999 PR PBB SHADOW E&M-EST. PATIENT-LVL V: ICD-10-PCS | Mod: PBBFAC,,, | Performed by: INTERNAL MEDICINE

## 2020-12-03 PROCEDURE — 99999 PR PBB SHADOW E&M-EST. PATIENT-LVL V: CPT | Mod: PBBFAC,,, | Performed by: INTERNAL MEDICINE

## 2020-12-03 PROCEDURE — 99214 OFFICE O/P EST MOD 30 MIN: CPT | Mod: S$GLB,,, | Performed by: INTERNAL MEDICINE

## 2020-12-03 PROCEDURE — 99024 PR POST-OP FOLLOW-UP VISIT: ICD-10-PCS | Mod: S$GLB,,, | Performed by: PHYSICIAN ASSISTANT

## 2020-12-03 PROCEDURE — 99214 PR OFFICE/OUTPT VISIT, EST, LEVL IV, 30-39 MIN: ICD-10-PCS | Mod: S$GLB,,, | Performed by: INTERNAL MEDICINE

## 2020-12-03 PROCEDURE — 99024 POSTOP FOLLOW-UP VISIT: CPT | Mod: S$GLB,,, | Performed by: PHYSICIAN ASSISTANT

## 2020-12-03 NOTE — PROGRESS NOTES
"Subjective:      Patient ID: Bhavna Hancock is a 60 y.o. female.    Chief Complaint:Hospital Follow Up      History of Present Illness    Patient presents for hospital follow-up.  Patient was admitted on November 24 for wound dehiscence after patella fracture that was repaired with ORIF on October 5th.    Operative note from November 24 states: "Poor wound healing anterior incision left knee with serous drainage" and that "Excisional debridement and irrigation of the anterior left knee incision," "Removal hardware, deep left patella," and "Complex closure left knee wound" with a wound VAC was performed.    There was no discussion of any necrotic eschar, and the patient had no fevers or chills. The patient does state that she had significant edema prior to this surgery.  Her  states that she does not elevate her leg and that she sleeps in a recliner with her leg horizontal at best.  She states that the edema is improved since being less ambulatory.    Cultures from surgery grew no bacteria in any of the cultures.  The aerobe ache bacterial culture grew rare mucor species.  The fungal culture did not show any fungal growth.  Pathology of the tissue was not performed.  There were no tissue biopsies performed.    The wound is healing, although her edema and serous drainage persist.  She has no other significant complaints.    She states that she has poor range of motion, partly because of the severe edema.                          Review of Systems   Constitution: Negative for chills, decreased appetite, fever, malaise/fatigue, night sweats, weight gain and weight loss.   HENT: Negative for congestion, ear pain, hearing loss, hoarse voice, sore throat and tinnitus.    Eyes: Negative for blurred vision, redness and visual disturbance.   Cardiovascular: Positive for leg swelling. Negative for chest pain and palpitations.   Respiratory: Negative for cough, hemoptysis, shortness of breath and sputum production.  "   Hematologic/Lymphatic: Negative for adenopathy. Does not bruise/bleed easily.   Skin: Positive for dry skin. Negative for itching, rash and suspicious lesions.   Musculoskeletal: Positive for back pain and joint pain. Negative for myalgias and neck pain.   Gastrointestinal: Positive for constipation. Negative for abdominal pain, diarrhea, heartburn, nausea and vomiting.   Genitourinary: Positive for urgency. Negative for dysuria, flank pain, frequency, hematuria and hesitancy.   Neurological: Negative for dizziness, headaches, numbness, paresthesias and weakness.   Psychiatric/Behavioral: Positive for depression. Negative for memory loss. The patient is nervous/anxious. The patient does not have insomnia.      Objective:   Physical Exam  Vitals signs and nursing note reviewed.   Constitutional:       Appearance: Normal appearance.   HENT:      Head: Normocephalic and atraumatic.   Cardiovascular:      Rate and Rhythm: Normal rate and regular rhythm.   Pulmonary:      Effort: Pulmonary effort is normal.      Breath sounds: Normal breath sounds.   Abdominal:      General: Abdomen is flat. Bowel sounds are normal.   Musculoskeletal:        Legs:       Comments: PICC on right arm, without erythema or tenderness.   Skin:     General: Skin is warm and dry.      Findings: No erythema.   Neurological:      General: No focal deficit present.      Mental Status: She is alert and oriented to person, place, and time.                   Assessment:       1. S/P ORIF (open reduction internal fixation) fracture    2. Surgical wound dehiscence, subsequent encounter    3. Subacute osteomyelitis, other site          Plan:       Discussed microbiological findings at length with patient and her .  I explained that mucormycosis typically causes progressive, increasing necrosis of the tissue in which it invades.  The fact that the organisms were isolated in a bacterial culture and not in the fungal culture suggest that the mucor  culture is a contaminant.  I also explained that the primary treatment for this infection is surgical and not medical.  Reduction of fungal burden with repeated debridements involving wide excisional margins is usually performed along with adjuvant anti fungal therapy with nephrotoxic amphotericin B.    I explained that she does not have risk factors for this infection, nor is her wound consistent with such an infection.  However, I felt that this rare results warrants observation.  She will receive close observation for her wound.  If mucoid or is involved, her wound healing will become progressively worse.  She will then require a tissue biopsy showing invasion of the tissue with mucormycosis.  This will then require excisional debridements and adjuvant anti fungal chemotherapy as above.    Her current wound however appears to have dehiscence related to her significant leg edema.  I explained how poor perfusion at the wound edges occurs with significant edema.  She is not elevating the leg nor controlling the edema in any way.  She sleeps in a recliner with her leg down, but she is capable of lying on the sofa with her leg elevated.  I explained that she would have significant acceleration of her wound healing if she were to elevate her leg and reduce the edema on the wound Services, thereby improving the perfusion for both antibiotics and wound healing mechanics.  She expressed understanding and agreement.    She will continue on vancomycin and cefepime for the empiric therapy of her wound dehiscence and possible osteomyelitis of her patella.  I will see her in [facility]opedic Clinic with the wound exposed at her next visit in 1 week.  If there is any sign of wound necrosis or involvement of a possible mucormycosis, she will have a tissue biopsy performed for tissue culture and pathology.  If her edema is controlled and her wound continues to heal, then clearly the mucor culture was a contaminant.  What I spent over  50% of a 45 min encounter counseling the patient regarding this life-threatening and limb threatening possibility.

## 2020-12-03 NOTE — PROGRESS NOTES
Principal Orthopedic Problem: Transverse left patella fracture     Relevant Medical History:  PMHX of HTN, DM( last A1C 8.3 )    HPI: Ms. Hancock is 58 yo F fell onto left knee from standing height on 9/22/2020.  She was seen initially in the [place] ED, diagnosed with a displaced left patella fracture and placed into to knee immobilizer. She followed up in clinic on 09/24/2020, but was found to have abrasions to her knee. She was treated with ORIF on 10/05/2020. She went on to have some trouble with range of motion and serious drainage from her incision. Her CRP (<0.50) and SED rate (35), no fluid collection and MRI. Patient treated with manipulation under anesthesia, irrigation and debridement, removal of hardware on 11/24/2020.     Ms. Hancock is here today for a post-operative visit after a   1.  Manipulation under anesthesia and fluoroscopy left knee contracture (able to bend to about 120 degrees)   2.  Excisional debridement and irrigation of the anterior left knee incision   3.  Removal hardware, deep left patella   4.  Complex closure left knee wound 7 cm      by Dr. Iglesias on 11/24/2020.    MICRO: MUCOR SPECIES: currently patient is on Vancomycin. And  Cefepime 2g q8h, projected end date of 01/5/21    Appointment made with ID today for evaluation of antibiotic regime.     Interval History:  she reports that she is doing ok.   She is taking antibiotics as prescribed.   she denies fever, chills, and sweats since the time of the surgery.     Physical exam:  Wound vac removed.  Incision is clean,and intact, no erythema, no increased warmth.          RADS: All pertinent images were reviewed by myself:   None done at this visit.     Assessment:  Post-op visit (9 days)      Plan:  Current care, treatment plan, precautions, activity level/ modifications, limitations, rehabilitation exercises and proposed future treatment were discussed with the patient. We discussed the need to monitor for changes in  symptoms and condition and report them to the physician.  Discussed importance of compliance with all appointments and follow up examinations.   - new wound vac placed.   - She will continue antibiotic regime as instructed by ID, will see them today  - continue knee brace locked in extension  - weight bearing as tolerated with knee brace.   - pain medication: no refill needed,    Pain medication refill policy provided to patient for review.    - Patient is to return to clinic in 1 weeks  At time x-ray of her knee is NOT needed  At time consider removal of wound vac and possible suture removal pending healing.      If there are any questions or concerns prior to scheduled follow up, the patient was instructed to contact the office

## 2020-12-08 ENCOUNTER — TELEPHONE (OUTPATIENT)
Dept: ORTHOPEDICS | Facility: CLINIC | Age: 60
End: 2020-12-08

## 2020-12-08 NOTE — TELEPHONE ENCOUNTER
----- Message from Kiya Chaidez PA-C sent at 12/8/2020  6:46 AM CST -----  Right now we will hold until she has her sutures removed then may begin again. They may want to decrease instead of discharge.   Kiya   ----- Message -----  From: Verito Mann MA  Sent: 12/7/2020   3:47 PM CST  To: CADEN Parker  Is this pt to hold therapy until further notice?  I need to advise Tianna at [facility].Juancho Mccracken  ----- Message -----  From: Ji Arteaga  Sent: 12/7/2020   3:25 PM CST  To: Harris ALEXANDRE Staff    [facility] Asking for order to hold pt physical therapy please contact           Contact info    Ext [phone]

## 2020-12-09 ENCOUNTER — PATIENT MESSAGE (OUTPATIENT)
Dept: ORTHOPEDICS | Facility: CLINIC | Age: 60
End: 2020-12-09

## 2020-12-09 ENCOUNTER — DOCUMENT SCAN (OUTPATIENT)
Dept: HOME HEALTH SERVICES | Facility: HOSPITAL | Age: 60
End: 2020-12-09
Payer: COMMERCIAL

## 2020-12-10 ENCOUNTER — PATIENT MESSAGE (OUTPATIENT)
Dept: ORTHOPEDICS | Facility: CLINIC | Age: 60
End: 2020-12-10

## 2020-12-10 ENCOUNTER — OFFICE VISIT (OUTPATIENT)
Dept: ORTHOPEDICS | Facility: CLINIC | Age: 60
End: 2020-12-10
Payer: COMMERCIAL

## 2020-12-10 ENCOUNTER — TELEPHONE (OUTPATIENT)
Dept: ORTHOPEDICS | Facility: CLINIC | Age: 60
End: 2020-12-10

## 2020-12-10 DIAGNOSIS — Z98.890 S/P ORIF (OPEN REDUCTION INTERNAL FIXATION) FRACTURE: ICD-10-CM

## 2020-12-10 DIAGNOSIS — Z51.89 VISIT FOR WOUND CHECK: ICD-10-CM

## 2020-12-10 DIAGNOSIS — S82.032D CLOSED DISPLACED TRANSVERSE FRACTURE OF LEFT PATELLA WITH ROUTINE HEALING, SUBSEQUENT ENCOUNTER: Primary | ICD-10-CM

## 2020-12-10 DIAGNOSIS — Z87.81 S/P ORIF (OPEN REDUCTION INTERNAL FIXATION) FRACTURE: ICD-10-CM

## 2020-12-10 PROCEDURE — 99499 UNLISTED E&M SERVICE: CPT | Mod: 95,,, | Performed by: PHYSICIAN ASSISTANT

## 2020-12-10 PROCEDURE — 99499 NO LOS: ICD-10-PCS | Mod: 95,,, | Performed by: PHYSICIAN ASSISTANT

## 2020-12-10 NOTE — PROGRESS NOTES
Called and spoke with the patient. She reports that  12/04/2020 she tested positive for COVID. She stated that she is doing well and is currently asymptomatic. Due to her recent positive test we will hold on her appointment today. 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 She was given instructions on the phone on how to turn off the machine and remove the vac. Patient will send a picture of her current incision. She will dress the wound with aquacel foam dressing that she has from a previous visit. She will continue to keep it dry and clean. She is still weight bearing as tolerated. Once we have seen the wound we will consider if to begin range of motion.   She stated that she was concerned about her cultures. She was informed by ID that mucor had grown, but is a common containment. She stated that ID wanted to culture the wound today. I will send a message to ID informing of the situation so they can proceed as needed.   New appointment made for Monday 12/21 for possible suture removal.

## 2020-12-10 NOTE — TELEPHONE ENCOUNTER
Called patient she will paint wound with betadine. Ok to work on genlte range of motion to prevent stiffness.

## 2020-12-18 ENCOUNTER — EXTERNAL HOME HEALTH (OUTPATIENT)
Dept: HOME HEALTH SERVICES | Facility: HOSPITAL | Age: 60
End: 2020-12-18
Payer: COMMERCIAL

## 2020-12-19 ENCOUNTER — LAB VISIT (OUTPATIENT)
Dept: LAB | Facility: HOSPITAL | Age: 60
End: 2020-12-19
Attending: INTERNAL MEDICINE
Payer: COMMERCIAL

## 2020-12-19 DIAGNOSIS — S82.032D CLOSED DISPLACED TRANSVERSE FRACTURE OF LEFT PATELLA WITH ROUTINE HEALING: ICD-10-CM

## 2020-12-19 DIAGNOSIS — Z79.4 ENCOUNTER FOR LONG-TERM (CURRENT) USE OF INSULIN: Primary | ICD-10-CM

## 2020-12-19 LAB
ALBUMIN SERPL BCP-MCNC: 3.2 G/DL (ref 3.5–5.2)
ALP SERPL-CCNC: 126 U/L (ref 55–135)
ALT SERPL W/O P-5'-P-CCNC: 15 U/L (ref 10–44)
ANION GAP SERPL CALC-SCNC: 10 MMOL/L (ref 8–16)
AST SERPL-CCNC: 20 U/L (ref 10–40)
BILIRUB SERPL-MCNC: 0.2 MG/DL (ref 0.1–1)
BUN SERPL-MCNC: 11 MG/DL (ref 6–20)
CALCIUM SERPL-MCNC: 9.2 MG/DL (ref 8.7–10.5)
CHLORIDE SERPL-SCNC: 106 MMOL/L (ref 95–110)
CO2 SERPL-SCNC: 27 MMOL/L (ref 23–29)
CREAT SERPL-MCNC: 0.7 MG/DL (ref 0.5–1.4)
EST. GFR  (AFRICAN AMERICAN): >60 ML/MIN/1.73 M^2
EST. GFR  (NON AFRICAN AMERICAN): >60 ML/MIN/1.73 M^2
GLUCOSE SERPL-MCNC: 87 MG/DL (ref 70–110)
POTASSIUM SERPL-SCNC: 4.1 MMOL/L (ref 3.5–5.1)
PROT SERPL-MCNC: 6.6 G/DL (ref 6–8.4)
SODIUM SERPL-SCNC: 143 MMOL/L (ref 136–145)
VANCOMYCIN TROUGH SERPL-MCNC: 18 UG/ML (ref 10–22)

## 2020-12-19 PROCEDURE — 80053 COMPREHEN METABOLIC PANEL: CPT

## 2020-12-19 PROCEDURE — 80202 ASSAY OF VANCOMYCIN: CPT

## 2020-12-21 ENCOUNTER — OFFICE VISIT (OUTPATIENT)
Dept: INFECTIOUS DISEASES | Facility: CLINIC | Age: 60
End: 2020-12-21
Payer: COMMERCIAL

## 2020-12-21 ENCOUNTER — OFFICE VISIT (OUTPATIENT)
Dept: ORTHOPEDICS | Facility: CLINIC | Age: 60
End: 2020-12-21
Payer: COMMERCIAL

## 2020-12-21 ENCOUNTER — DOCUMENT SCAN (OUTPATIENT)
Dept: HOME HEALTH SERVICES | Facility: HOSPITAL | Age: 60
End: 2020-12-21
Payer: COMMERCIAL

## 2020-12-21 DIAGNOSIS — Z87.81 S/P ORIF (OPEN REDUCTION INTERNAL FIXATION) FRACTURE: Primary | ICD-10-CM

## 2020-12-21 DIAGNOSIS — T81.31XD SURGICAL WOUND DEHISCENCE, SUBSEQUENT ENCOUNTER: Primary | ICD-10-CM

## 2020-12-21 DIAGNOSIS — Z98.890 S/P ORIF (OPEN REDUCTION INTERNAL FIXATION) FRACTURE: Primary | ICD-10-CM

## 2020-12-21 PROCEDURE — 99214 OFFICE O/P EST MOD 30 MIN: CPT | Mod: S$GLB,,, | Performed by: INTERNAL MEDICINE

## 2020-12-21 PROCEDURE — 99214 PR OFFICE/OUTPT VISIT, EST, LEVL IV, 30-39 MIN: ICD-10-PCS | Mod: S$GLB,,, | Performed by: INTERNAL MEDICINE

## 2020-12-21 PROCEDURE — 99024 PR POST-OP FOLLOW-UP VISIT: ICD-10-PCS | Mod: S$GLB,,, | Performed by: PHYSICIAN ASSISTANT

## 2020-12-21 PROCEDURE — 99024 POSTOP FOLLOW-UP VISIT: CPT | Mod: S$GLB,,, | Performed by: PHYSICIAN ASSISTANT

## 2020-12-21 PROCEDURE — 99999 PR PBB SHADOW E&M-EST. PATIENT-LVL III: ICD-10-PCS | Mod: PBBFAC,,, | Performed by: PHYSICIAN ASSISTANT

## 2020-12-21 PROCEDURE — 99999 PR PBB SHADOW E&M-EST. PATIENT-LVL III: CPT | Mod: PBBFAC,,, | Performed by: PHYSICIAN ASSISTANT

## 2020-12-21 NOTE — PROGRESS NOTES
Infectious Diseases Clinic Note    Subjective:       Patient ID: Bhavna Hancock is a 60 y.o. female.    Chief Complaint: No chief complaint on file.    HPI    Here for ortho f/u, ID requested to evaluate wound.  Per patient and her son they are concerned about serous drainage and increasing swelling.  No fevers, chills, or other systemic signs/symptoms of worsening infection    Tolerating IV abx without toxicities    Past Medical History:   Diagnosis Date    Abdominal pain     Diabetes     GERD (gastroesophageal reflux disease)     Hx of colonic polyps     Hyperlipidemia     Hypertension     Nausea and vomiting        Social History     Socioeconomic History    Marital status:      Spouse name: Not on file    Number of children: Not on file    Years of education: Not on file    Highest education level: Not on file   Occupational History    Not on file   Social Needs    Financial resource strain: Not on file    Food insecurity     Worry: Not on file     Inability: Not on file    Transportation needs     Medical: Not on file     Non-medical: Not on file   Tobacco Use    Smoking status: Former Smoker     Quit date: 10/5/2005     Years since quitting: 15.2    Smokeless tobacco: Never Used   Substance and Sexual Activity    Alcohol use: No    Drug use: No    Sexual activity: Not on file   Lifestyle    Physical activity     Days per week: Not on file     Minutes per session: Not on file    Stress: Not on file   Relationships    Social connections     Talks on phone: Not on file     Gets together: Not on file     Attends Taoism service: Not on file     Active member of club or organization: Not on file     Attends meetings of clubs or organizations: Not on file     Relationship status: Not on file   Other Topics Concern    Not on file   Social History Narrative    Not on file         Current Outpatient Medications:     acetaminophen (TYLENOL) 500 MG tablet, Take 2 tablets (1,000 mg  total) by mouth every 6 (six) hours., Disp: , Rfl:     aspirin (ECOTRIN) 81 MG EC tablet, Take 1 tablet (81 mg total) by mouth 2 (two) times daily., Disp: 60 tablet, Rfl: 0    blood sugar diagnostic (ONETOUCH ULTRA BLUE TEST STRIP) Strp, Use to test blood sugar 5 times a day, Disp: , Rfl:     DEXCOM G6 SENSOR Priscilla, TEST BS FID, Disp: , Rfl:     DEXCOM G6 TRANSMITTER Priscilla, USE TO CHECK FID, Disp: , Rfl:     estradioL (ESTRACE) 1 MG tablet, TK 1 T PO QD, Disp: , Rfl:     fish oil-omega-3 fatty acids 300-1,000 mg capsule, Take 2 g by mouth once daily., Disp: , Rfl:     ibuprofen (ADVIL,MOTRIN) 600 MG tablet, Take 1 tablet (600 mg total) by mouth every 6 (six) hours as needed for Pain., Disp: 20 tablet, Rfl: 0    insulin glargine (LANTUS) 100 unit/mL injection, Inject 35 Units into the skin once daily. , Disp: , Rfl:     insulin lispro (HUMALOG KWIKPEN INSULIN) 100 unit/mL pen, INJECT 14 UNITS UNDER THE SKIN WITH EVERY MEAL, Disp: , Rfl:     insulin syringe-needle U-100 1/2 mL 30 gauge Syrg, USE ONE SYRINGE PER DAY UTD, Disp: , Rfl:     iron bis-gly/FA/C/B12/Ca/succ (IRON-150 ORAL), , Disp: , Rfl:     levothyroxine (SYNTHROID) 75 MCG tablet, TAKE 1 TABLET BY MOUTH EVERY DAY, Disp: , Rfl:     lisinopril (PRINIVIL,ZESTRIL) 2.5 MG tablet, Take 2.5 mg by mouth once daily., Disp: , Rfl:     LOTEMAX SM 0.38 % DrpG, INT 1 GTT IN OU BID UTD, Disp: , Rfl:     medroxyPROGESTERone (PROVERA) 2.5 MG tablet, TK 1 T PO QD, Disp: , Rfl:     metformin (GLUCOPHAGE) 500 MG tablet, Take 500 mg by mouth daily with breakfast., Disp: , Rfl:     oxyCODONE (ROXICODONE) 5 MG immediate release tablet, Take 1 tablet (5 mg total) by mouth every 4 (four) hours as needed for Pain., Disp: 44 tablet, Rfl: 0    pantoprazole (PROTONIX) 40 MG tablet, TAKE 1 TABLET(40 MG) BY MOUTH EVERY DAY, Disp: 30 tablet, Rfl: 0    RESTASIS 0.05 % ophthalmic emulsion, INSTILL ONE DROP IN OU BID, Disp: , Rfl:     simvastatin (ZOCOR) 40 MG tablet, Take  40 mg by mouth every evening., Disp: , Rfl:     trospium (SANCTURA) 20 mg Tab tablet, Take 20 mg by mouth once daily. , Disp: , Rfl:     venlafaxine (EFFEXOR-XR) 150 MG Cp24, Take 225 mg by mouth once daily. , Disp: , Rfl:   No current facility-administered medications for this visit.     Facility-Administered Medications Ordered in Other Visits:     lidocaine HCL 10 mg/ml (1%) injection 5 mL, 5 mL, , , Maddie Meeks MD, 5 mL at 09/14/20 1100    triamcinolone acetonide injection 40 mg, 40 mg, Intra-articular, , Maddie Meeks MD, 40 mg at 09/14/20 1100    Review of Systems   Constitutional: Negative for activity change, chills and fever.   HENT: Negative for congestion, mouth sores, rhinorrhea, sinus pressure and sore throat.    Eyes: Negative for photophobia, pain and redness.   Respiratory: Negative for cough, chest tightness, shortness of breath and wheezing.    Cardiovascular: Negative for chest pain and leg swelling.   Gastrointestinal: Negative for abdominal distention, abdominal pain, diarrhea, nausea and vomiting.   Endocrine: Negative for polyuria.   Genitourinary: Negative for decreased urine volume, dysuria and flank pain.   Musculoskeletal: Negative for joint swelling and neck pain.   Skin: Positive for wound. Negative for color change.   Allergic/Immunologic: Negative for food allergies.   Neurological: Negative for dizziness, weakness and headaches.   Hematological: Negative for adenopathy.   Psychiatric/Behavioral: Negative for agitation and confusion. The patient is not nervous/anxious.            Objective:      There were no vitals filed for this visit.  Physical Exam  Constitutional:       General: She is not in acute distress.     Appearance: She is well-developed.   HENT:      Head: Normocephalic and atraumatic.   Eyes:      General: No scleral icterus.     Conjunctiva/sclera: Conjunctivae normal.   Neck:      Musculoskeletal: Normal range of motion and neck supple.    Cardiovascular:      Rate and Rhythm: Normal rate and regular rhythm.      Heart sounds: No murmur.   Pulmonary:      Effort: Pulmonary effort is normal. No respiratory distress.      Breath sounds: Normal breath sounds. No wheezing.   Abdominal:      General: Bowel sounds are normal. There is no distension.      Palpations: Abdomen is soft.   Musculoskeletal: Normal range of motion.         General: No tenderness.   Lymphadenopathy:      Cervical: No cervical adenopathy.   Skin:     General: Skin is warm and dry.      Findings: No erythema or rash.      Comments:  PIC in place , L knee edema, no purulent drainage   Neurological:      Mental Status: She is alert and oriented to person, place, and time.      Coordination: Coordination normal.   Psychiatric:         Behavior: Behavior normal.                 Assessment/Plan:       61 y/o F h/o HTN, DM, HLD, hypothyroidism L patellar fracture 9/22 after a fall and underwent ORIF of left patella 10/5.  She developed stiffness and knee as well as wound dehisence w/ drainage of serous fluid 11/24  underwent excisional debridement and irrigation of the anterior left knee incision.  Per notes no purulence were seen but cables were exposed so were removed.  She was sent home with 6 weeks of cefepime, vancomcycin.  After discharge OR cultures grew mucor.  Thought not to be pathogen at that time given host and improvement without mold agents.  She is here today for f/u and feels like knee swelling has progressed.  CRP is normal, ESR elevated  - still unlikely mucor is pathogen though given worsening edema on broad spectrum abx then would consider imaging, and if fluid sampling and sending for aerobic, anaerobic, fungal and AFB cultures to evaluate for other pathogens (or mucor) playing a role currently.  - continue cefepime, vancomycin as planned with lab monitoring as previously ordered

## 2020-12-21 NOTE — PROGRESS NOTES
Principal Orthopedic Problem: Transverse left patella fracture     Relevant Medical History:  PMHX of HTN, DM( last A1C 8.3 )    HPI: Ms. Hancock is 58 yo F fell onto left knee from standing height on 9/22/2020.  She was seen initially in the [place] ED, diagnosed with a displaced left patella fracture and placed into to knee immobilizer. She followed up in clinic on 09/24/2020, but was found to have abrasions to her knee. She was treated with ORIF on 10/05/2020. She went on to have some trouble with range of motion and serious drainage from her incision. Her CRP (<0.50) and SED rate (35), no fluid collection and MRI. Patient treated with manipulation under anesthesia, irrigation and debridement, removal of hardware on 11/24/2020.     Ms. Hancock is here today for a post-operative visit after a   1.  Manipulation under anesthesia and fluoroscopy left knee contracture (able to bend to about 120 degrees)   2.  Excisional debridement and irrigation of the anterior left knee incision   3.  Removal hardware, deep left patella   4.  Complex closure left knee wound 7 cm      by Dr. Iglesias on 11/24/2020.    MICRO: MUCOR SPECIES: currently patient is on Vancomycin. And  Cefepime 2g q8h, projected end date of 01/5/21    Appointment made with ID today for evaluation of antibiotic regime.     Interval History:  she reports that she is doing ok.   She is taking antibiotics as prescribed. She stated that she has continue clear serious drainage from her incision on her bandage.   Since was previous visit she was diagnosed with covid but is doing well and is currently a symptomatic.      Physical exam:  Wound vac removed.  Incision is clean,, no erythema, no increased warmth, no drainage, unable to express anything from her wound.continued swelling, stable.          RADS: All pertinent images were reviewed by myself:   None done at this visit.     Assessment:  Post-op visit (3 weeks)      Plan:  Current care, treatment  plan, precautions, activity level/ modifications, limitations, rehabilitation exercises and proposed future treatment were discussed with the patient. We discussed the need to monitor for changes in symptoms and condition and report them to the physician.  Discussed importance of compliance with all appointments and follow up examinations.   - The patient was advised to keep the incision clean and dry for the next 24 hours after which she may wash the area with antibacterial soap in the shower. Will not submerge until the incision is completely healed  -Patient was advised to monitor wound closely and multiple times daily for any problems. Call clinic immediately or report to ED for immediate medical attention for any complications including reopening of wound, drainage, purulence, redness, streaking, odor, pain out of proportion, fever, chills, etc.   - She will continue antibiotic regime as instructed by ID, will see them today  - continue knee brace locked ok gentle ROM  - weight bearing as tolerated with knee brace.  - Order placed for MRI to evaluate for fluid collection with plans for aspiration if seen.  - pain medication: no refill needed,    Pain medication refill policy provided to patient for review.    - Patient is to return to clinic in 2 weeks with  if MRI negaive  At time x-ray of her knee 2 view AP lat is needed     If there are any questions or concerns prior to scheduled follow up, the patient was instructed to contact the office

## 2020-12-22 ENCOUNTER — DOCUMENT SCAN (OUTPATIENT)
Dept: HOME HEALTH SERVICES | Facility: HOSPITAL | Age: 60
End: 2020-12-22
Payer: COMMERCIAL

## 2020-12-23 ENCOUNTER — TELEPHONE (OUTPATIENT)
Dept: ORTHOPEDICS | Facility: CLINIC | Age: 60
End: 2020-12-23

## 2020-12-23 NOTE — TELEPHONE ENCOUNTER
Called and spoke with the patient to review MRI which was reviewed by myself and . At this time there is no fluid collection for aspiration. We will send to wound care. She has an appointment tomorrow at 8 am for evaluation.  will call and discuss case with ID

## 2020-12-24 ENCOUNTER — OFFICE VISIT (OUTPATIENT)
Dept: WOUND CARE | Facility: CLINIC | Age: 60
End: 2020-12-24
Payer: COMMERCIAL

## 2020-12-24 VITALS
TEMPERATURE: 97 F | BODY MASS INDEX: 30.52 KG/M2 | HEIGHT: 65 IN | HEART RATE: 81 BPM | DIASTOLIC BLOOD PRESSURE: 79 MMHG | WEIGHT: 183.19 LBS | SYSTOLIC BLOOD PRESSURE: 143 MMHG

## 2020-12-24 DIAGNOSIS — T81.31XA SURGICAL WOUND DEHISCENCE, INITIAL ENCOUNTER: Primary | ICD-10-CM

## 2020-12-24 PROCEDURE — 87206 SMEAR FLUORESCENT/ACID STAI: CPT

## 2020-12-24 PROCEDURE — 99203 OFFICE O/P NEW LOW 30 MIN: CPT | Mod: 25,S$GLB,, | Performed by: NURSE PRACTITIONER

## 2020-12-24 PROCEDURE — 87015 SPECIMEN INFECT AGNT CONCNTJ: CPT

## 2020-12-24 PROCEDURE — 99203 PR OFFICE/OUTPT VISIT, NEW, LEVL III, 30-44 MIN: ICD-10-PCS | Mod: 25,S$GLB,, | Performed by: NURSE PRACTITIONER

## 2020-12-24 PROCEDURE — 87070 CULTURE OTHR SPECIMN AEROBIC: CPT

## 2020-12-24 PROCEDURE — 87116 MYCOBACTERIA CULTURE: CPT

## 2020-12-24 PROCEDURE — 99999 PR PBB SHADOW E&M-EST. PATIENT-LVL V: ICD-10-PCS | Mod: PBBFAC,,, | Performed by: NURSE PRACTITIONER

## 2020-12-24 PROCEDURE — 87102 FUNGUS ISOLATION CULTURE: CPT

## 2020-12-24 PROCEDURE — 11042 DBRDMT SUBQ TIS 1ST 20SQCM/<: CPT | Mod: S$GLB,,, | Performed by: NURSE PRACTITIONER

## 2020-12-24 PROCEDURE — 87075 CULTR BACTERIA EXCEPT BLOOD: CPT

## 2020-12-24 PROCEDURE — 99999 PR PBB SHADOW E&M-EST. PATIENT-LVL V: CPT | Mod: PBBFAC,,, | Performed by: NURSE PRACTITIONER

## 2020-12-24 PROCEDURE — 11042 DEBRIDEMENT: ICD-10-PCS | Mod: S$GLB,,, | Performed by: NURSE PRACTITIONER

## 2020-12-24 RX ORDER — VENLAFAXINE HYDROCHLORIDE 75 MG/1
75 CAPSULE, EXTENDED RELEASE ORAL DAILY
Status: ON HOLD | COMMUNITY
Start: 2020-12-15 | End: 2021-02-02 | Stop reason: HOSPADM

## 2020-12-24 RX ORDER — VENLAFAXINE HYDROCHLORIDE 150 MG/1
150 CAPSULE, EXTENDED RELEASE ORAL
COMMUNITY
Start: 2020-12-15 | End: 2021-02-24 | Stop reason: CLARIF

## 2020-12-24 NOTE — PROGRESS NOTES
Subjective:       Patient ID: Bhavna Hancock is a 60 y.o. female.    Chief Complaint: Wound Consult    HPI     60 y.o. female presents with left knee wound dehisence.  History of diabetes, hypertension.   S/p ORIF of left patella on 10/5/20 secondary to patellar fracture due to fall on 9/22/20. States she slipped on algae. She developed wound dehiscence with drainage and difficulty with range of motion.  On 11/24/20, she underwent manipulation, irrigation and debridement, and removal of hardware and wound vac placement. No longer has wound vac.  She is followed by ID and Ortho.  Wound culture + mucor, but thought not to be the pathogen. She is currently on cefepime and vancomycin per ID and has home health.  Current wound care is betadine to wound daily.  Has knee immobilizer, did notice animal hair to brace. CRP is normal range and ESR elevated.  MRI 12/21/20 reviewed.       Past Medical History:   Diagnosis Date    Abdominal pain     Diabetes     GERD (gastroesophageal reflux disease)     Hx of colonic polyps     Hyperlipidemia     Hypertension     Nausea and vomiting      Past Surgical History:   Procedure Laterality Date    APPLICATION OF WOUND VACUUM-ASSISTED CLOSURE DEVICE Left 11/24/2020    Procedure: APPLICATION, WOUND VAC;  Surgeon: Patrice Iglesias MD;  Location: [address];  Service: Orthopedics;  Laterality: Left;    CRANIOTOMY  2013    DEBRIDEMENT TENNIS ELBOW      hip ascites      IRRIGATION AND DEBRIDEMENT OF LOWER EXTREMITY Left 11/24/2020    Procedure: IRRIGATION AND DEBRIDEMENT, LOWER EXTREMITY;  Surgeon: Patrice Iglesias MD;  Location: [address];  Service: Orthopedics;  Laterality: Left;    KNEE JOINT MANIPULATION Left 11/24/2020    Procedure: MANIPULATION, KNEE;  Surgeon: Patrice Iglesias MD;  Location: [address];  Service: Orthopedics;  Laterality: Left;    OPEN REDUCTION AND INTERNAL FIXATION (ORIF) OF FRACTURE OF PATELLA Left 10/5/2020    Procedure: ORIF,  FRACTURE, PATELLA - Femoral catheter ok;  Surgeon: Patrice Iglesias MD;  Location: [facility] OR [address] [place] [address];  Service: Orthopedics;  Laterality: Left;    REMOVAL OF HARDWARE FROM LOWER EXTREMITY Left 11/24/2020    Procedure: REMOVAL, HARDWARE, LOWER EXTREMITY;  Surgeon: Patrice Iglesias MD;  Location: [facility] OR [address] [place] [address];  Service: Orthopedics;  Laterality: Left;    TRIGGER FINGER RELEASE             Review of Systems   Constitutional: Negative for diaphoresis and fever.   Respiratory: Negative for shortness of breath.    Cardiovascular: Positive for leg swelling. Negative for chest pain.   Musculoskeletal: Positive for gait problem.   Skin: Positive for wound.   All other systems reviewed and are negative.              Objective:      Physical Exam  Vitals signs reviewed.   Constitutional:       General: She is not in acute distress.     Appearance: Normal appearance. She is well-developed.   Cardiovascular:      Rate and Rhythm: Normal rate.   Pulmonary:      Effort: Pulmonary effort is normal. No respiratory distress.   Skin:     General: Skin is warm and dry.      Capillary Refill: Capillary refill takes less than 2 seconds.      Comments: Left knee with open wound, residual sutures noted.  No undermining or tunneling noted.    Neurological:      Mental Status: She is alert and oriented to person, place, and time.         Assessment:       1. Surgical wound dehiscence, initial encounter           Incision/Site 12/24/20 0855 Left Knee (Active)   12/24/20 0855   Present Prior to Hospital Arrival?: Yes   Side: Left   Location: Knee   Orientation:    Incision Type:    Closure Method:    Additional Comments:    Removal Indication and Assessment:    Wound Outcome:    Removal Indications:    Wound Image   12/24/20 0854   Dressing Appearance Intact;Dried drainage 12/24/20 0854   Drainage Amount Small 12/24/20 0854   Drainage Characteristics/Odor Clear;Serous 12/24/20 0854   Appearance Slough 12/24/20 0854   Yellow (%), Wound  Tissue Color 100 % 12/24/20 0854   Periwound Area Edematous 12/24/20 0854   Wound Edges Undefined;Irregular 12/24/20 0854   Wound Length (cm) 4.8 cm 12/24/20 0854   Wound Width (cm) 0.4 cm 12/24/20 0854   Wound Depth (cm) 0.3 cm 12/24/20 0854   Wound Volume (cm^3) 0.58 cm^3 12/24/20 0854   Wound Surface Area (cm^2) 1.92 cm^2 12/24/20 0854   Care Cleansed with:;Wound cleanser 12/24/20 0854   Dressing Applied;Honey;Island/border 12/24/20 0854           Plan:       Continue antibiotics as prescribed by ID  Wound culture of left knee for aerobic, anaerobic, AFB, and fungal   Medihoney to wound daily  Elevate leg as much as possible  Do not get wound dressings wet, if wet remove soiled dressings and apply new dressings  RTC 2 weeks         [facility]    Clean wound with wound cleanser  Apply medihoney, gauze, and mepore dressing to wound  Skilled nursing one time per week

## 2020-12-24 NOTE — LETTER
December 24, 2020      Patrice Iglesias MD  [address]  [facility] [address]           [facility]  [address]  [facility] [address]  Phone: [phone]          Patient: Bhavna Hancock   MR Number: [id number]   YOB: 1960   Date of Visit: 12/24/2020       Dear Dr. Patrice Iglesias:    Thank you for referring Bhavna Hancock to me for evaluation. Attached you will find relevant portions of my assessment and plan of care.    If you have questions, please do not hesitate to call me. I look forward to following Bhavna Hancock along with you.    Sincerely,    Marielena Doe NP    Enclosure  CC:  No Recipients    If you would like to receive this communication electronically, please contact [email or web address] or [phone] to request more information on [employer] access.    For providers and/or their staff who would like to refer a patient to [facility], please contact us through our one-stop-shop provider referral line, [facility] , at [phone].    If you feel you have received this communication in error or would no longer like to receive these types of communications, please e-mail [email or web address]

## 2020-12-24 NOTE — PATIENT INSTRUCTIONS
Clean wounds with mild soap and water and pat dry  Apply medihoney to wound and cover with dry dressing  Do not get wound dressings wet, if wet remove soiled dressings and apply new dressings  Elevate legs as much as possible

## 2020-12-24 NOTE — PROCEDURES
"Debridement    Date/Time: 12/24/2020 8:30 AM  Performed by: Marielena Doe NP  Authorized by: Marielena Doe NP     Time out: Immediately prior to procedure a "time out" was called to verify the correct patient, procedure, equipment, support staff and site/side marked as required.    Consent Done?:  Yes (Verbal)  Local anesthesia used?: No      Wound Details:    Location:  Left knee    Type of Debridement:  Excisional       Length (cm):  4.8       Area (sq cm):  1.92       Width (cm):  0.4       Percent Debrided (%):  100       Depth (cm):  0.3       Total Area Debrided (sq cm):  1.92    Depth of debridement:  Subcutaneous tissue    Tissue debrided:  Dermis, Epidermis and Subcutaneous    Devitalized tissue debrided:  Biofilm, Exudate, Necrotic/Eschar, Slough and Fibrin (residual suture)    Instruments:  Curette, Forceps and Scissors    Bleeding:  Minimal  Hemostasis Achieved: Yes    Method Used:  Pressure  Patient tolerance:  Patient tolerated the procedure well with no immediate complications      "

## 2020-12-28 LAB
BACTERIA SPEC AEROBE CULT: NO GROWTH
FUNGUS SPEC CULT: NORMAL

## 2020-12-29 ENCOUNTER — TELEPHONE (OUTPATIENT)
Dept: WOUND CARE | Facility: CLINIC | Age: 60
End: 2020-12-29

## 2020-12-29 ENCOUNTER — PATIENT MESSAGE (OUTPATIENT)
Dept: ORTHOPEDICS | Facility: CLINIC | Age: 60
End: 2020-12-29

## 2020-12-29 NOTE — TELEPHONE ENCOUNTER
----- Message from Travis Bey sent at 12/29/2020 11:36 AM CST -----  Crystal from [facility] called to speak w/ someone regarding the patient's wound tunneling, requesting callback to discuss matter [phone]

## 2020-12-29 NOTE — TELEPHONE ENCOUNTER
Provider Nader returned call and spoke with Annmraie, discussing tunneling and reviewed wound care orders.  Faxed wound care orders to [facility] per request.

## 2020-12-31 ENCOUNTER — HOSPITAL ENCOUNTER (OUTPATIENT)
Dept: RADIOLOGY | Facility: HOSPITAL | Age: 60
Discharge: HOME | End: 2020-12-31
Attending: ORTHOPAEDIC SURGERY
Payer: COMMERCIAL

## 2020-12-31 ENCOUNTER — DOCUMENT SCAN (OUTPATIENT)
Dept: HOME HEALTH SERVICES | Facility: HOSPITAL | Age: 60
End: 2020-12-31
Payer: COMMERCIAL

## 2020-12-31 ENCOUNTER — ANESTHESIA EVENT (OUTPATIENT)
Dept: SURGERY | Facility: HOSPITAL | Age: 60
End: 2020-12-31
Payer: COMMERCIAL

## 2020-12-31 ENCOUNTER — OFFICE VISIT (OUTPATIENT)
Dept: ORTHOPEDICS | Facility: CLINIC | Age: 60
End: 2020-12-31
Payer: COMMERCIAL

## 2020-12-31 VITALS — HEIGHT: 65 IN | BODY MASS INDEX: 28.32 KG/M2 | WEIGHT: 170 LBS

## 2020-12-31 DIAGNOSIS — Z98.890 S/P ORIF (OPEN REDUCTION INTERNAL FIXATION) FRACTURE: Primary | ICD-10-CM

## 2020-12-31 DIAGNOSIS — Z87.81 S/P ORIF (OPEN REDUCTION INTERNAL FIXATION) FRACTURE: ICD-10-CM

## 2020-12-31 DIAGNOSIS — Z87.81 S/P ORIF (OPEN REDUCTION INTERNAL FIXATION) FRACTURE: Primary | ICD-10-CM

## 2020-12-31 DIAGNOSIS — T81.31XD DEHISCENCE OF OPERATIVE WOUND, SUBSEQUENT ENCOUNTER: ICD-10-CM

## 2020-12-31 DIAGNOSIS — Z98.890 S/P ORIF (OPEN REDUCTION INTERNAL FIXATION) FRACTURE: ICD-10-CM

## 2020-12-31 DIAGNOSIS — Z01.818 PRE-OP TESTING: Primary | ICD-10-CM

## 2020-12-31 DIAGNOSIS — S82.032D CLOSED DISPLACED TRANSVERSE FRACTURE OF LEFT PATELLA WITH ROUTINE HEALING, SUBSEQUENT ENCOUNTER: ICD-10-CM

## 2020-12-31 PROBLEM — T81.31XA SURGICAL WOUND BREAKDOWN: Status: ACTIVE | Noted: 2020-12-03

## 2020-12-31 PROCEDURE — 73560 X-RAY EXAM OF KNEE 1 OR 2: CPT | Mod: TC,LT

## 2020-12-31 PROCEDURE — 99999 PR PBB SHADOW E&M-EST. PATIENT-LVL V: ICD-10-PCS | Mod: PBBFAC,,, | Performed by: ORTHOPAEDIC SURGERY

## 2020-12-31 PROCEDURE — 99024 PR POST-OP FOLLOW-UP VISIT: ICD-10-PCS | Mod: S$GLB,,, | Performed by: ORTHOPAEDIC SURGERY

## 2020-12-31 PROCEDURE — 99999 PR PBB SHADOW E&M-EST. PATIENT-LVL V: CPT | Mod: PBBFAC,,, | Performed by: ORTHOPAEDIC SURGERY

## 2020-12-31 PROCEDURE — 73560 X-RAY EXAM OF KNEE 1 OR 2: CPT | Mod: 26,LT,, | Performed by: RADIOLOGY

## 2020-12-31 PROCEDURE — 99024 POSTOP FOLLOW-UP VISIT: CPT | Mod: S$GLB,,, | Performed by: ORTHOPAEDIC SURGERY

## 2020-12-31 PROCEDURE — 73560 XR KNEE 1 OR 2 VIEW LEFT: ICD-10-PCS | Mod: 26,LT,, | Performed by: RADIOLOGY

## 2020-12-31 RX ORDER — SODIUM CHLORIDE 9 MG/ML
INJECTION, SOLUTION INTRAVENOUS CONTINUOUS
Status: CANCELLED | OUTPATIENT
Start: 2020-12-31

## 2020-12-31 RX ORDER — MUPIROCIN 20 MG/G
OINTMENT TOPICAL
Status: CANCELLED | OUTPATIENT
Start: 2020-12-31

## 2020-12-31 NOTE — PROGRESS NOTES
Subjective:     60-year-old female who underwent open reduction internal fixation of a transverse left patella fracture by me on 10/05/2020.  The patient had stiffness and difficulty in healing her anterior incision.  I took her back to the operating room on 11/24 4 manipulation under anesthesia and possible wound exploration.  At that point she appeared to have full-thickness breakdown the more distal part of her incision.  I excised good deal for incision and mobilized tissue.  At that point I irrigated her with Bactisure and used dilute Betadine and dilute peroxide as well.  I removed the cables and left the screws in place.  She did not grow anything from the cultures at that surgery but did have new Course BCs is a probable contaminant on the cultures.  She has been maintained on broad-spectrum antibiotics via PICC line.  Repeat cultures done in the ID Clinic on 12/24/2020 also have no growth.  At this point the patient has had continued breakdown of her anterior incision.    Patient Active Problem List    Diagnosis Date Noted    S/P ORIF (open reduction internal fixation) fracture 12/31/2020    Surgical wound breakdown 12/03/2020    Subacute osteomyelitis, other site 12/03/2020    Stiffness of left knee 11/24/2020    Type 2 diabetes mellitus 11/24/2020    Hypertension 11/24/2020    Hypothyroidism 11/24/2020    Closed displaced transverse fracture of left patella 09/27/2020    Chronic pain of both shoulders 08/18/2020    Bilateral rotator cuff dysfunction 08/18/2020    History of adenomatous polyp of colon 05/29/2015     Past Medical History:   Diagnosis Date    Abdominal pain     Diabetes     GERD (gastroesophageal reflux disease)     Hx of colonic polyps     Hyperlipidemia     Hypertension     Nausea and vomiting       Past Surgical History:   Procedure Laterality Date    APPLICATION OF WOUND VACUUM-ASSISTED CLOSURE DEVICE Left 11/24/2020    Procedure: APPLICATION, WOUND VAC;  Surgeon:  "Patrice Iglesias MD;  Location: [address];  Service: Orthopedics;  Laterality: Left;    CRANIOTOMY  2013    DEBRIDEMENT TENNIS ELBOW      hip ascites      IRRIGATION AND DEBRIDEMENT OF LOWER EXTREMITY Left 11/24/2020    Procedure: IRRIGATION AND DEBRIDEMENT, LOWER EXTREMITY;  Surgeon: Patrice Iglesias MD;  Location: [facility] OR [address] [place] [address];  Service: Orthopedics;  Laterality: Left;    KNEE JOINT MANIPULATION Left 11/24/2020    Procedure: MANIPULATION, KNEE;  Surgeon: Patrice Iglesias MD;  Location: [facility] OR [address] [place] [address];  Service: Orthopedics;  Laterality: Left;    OPEN REDUCTION AND INTERNAL FIXATION (ORIF) OF FRACTURE OF PATELLA Left 10/5/2020    Procedure: ORIF, FRACTURE, PATELLA - Femoral catheter ok;  Surgeon: Patrice Iglesias MD;  Location: [facility] OR [address] [place] [address];  Service: Orthopedics;  Laterality: Left;    REMOVAL OF HARDWARE FROM LOWER EXTREMITY Left 11/24/2020    Procedure: REMOVAL, HARDWARE, LOWER EXTREMITY;  Surgeon: Patrice Iglesias MD;  Location: [facility] OR [address] [place] [address];  Service: Orthopedics;  Laterality: Left;    TRIGGER FINGER RELEASE        (Not in a hospital admission)    Review of patient's allergies indicates:   Allergen Reactions    Codeine Nausea And Vomiting      Social History     Tobacco Use    Smoking status: Former Smoker     Quit date: 10/5/2005     Years since quitting: 15.2    Smokeless tobacco: Never Used   Substance Use Topics    Alcohol use: No      Family History   Problem Relation Age of Onset    Colon cancer Father 77        Rectal    Stomach cancer Neg Hx     Esophageal cancer Neg Hx       Review of Systems  ROS:  Patient denies constitutional symptoms, cardiac symptoms, respiratory symptoms, GI symptoms.  The remainder of the musculoskeletal ROS is included in the HPI.      Objective:     Ht 5' 5" (1.651 m)   Wt 77.1 kg (170 lb)   BMI 28.29 kg/m²     PE:    AA&O x 4.  NAD  HEENT:  NCAT, sclera nonicteric  Lungs:  Respirations are equal and unlabored.  CV:  2+ bilateral upper and " lower extremity pulses.  Skin:  Intact throughout.    MS:  Left knee with no joint effusion.  There is further wound breakdown for about 6-7 cm on the anterior part of the knee.  Dressing has some mild serous fluid on it.  No erythema or purulence.  Mild edema of the surrounding tissue.    Imaging Review  X-rays show a well-healing fracture of the patella.  MRI done recently showed no significant fluid collection.    Assessment:     1.  Postop ORIF of left patella with healing fracture  2.  Recurrent wound breakdown and dehiscence left anterior knee incision    Plan:     At this point she is having more breakdown of the anterior knee incision.  I think it would be prudent to go to the operating room at this point for a formal debridement and irrigation.  I have explained to her that if her tissue does not mobilize very easily for tension-free closure that I might have to involve the plastic surgeons in her care.  Her albumin is now 3.5 which is good.  To OR Monday for I&D.    The risks, benefits and alternatives to surgery were discussed with the patient at great length.  These include bleeding, infection, vessel/nerve damage, pain, numbness, tingling, complex regional pain syndrome, hardware/surgical failure, recurrence/repeat wound breakdown and dehiscence, need for further surgery, malunion, nonunion, DVT, PE, arthritis and death.  Patient states an understanding and wishes to proceed with surgery.   All questions were answered.  No guarantees were implied or stated.  Informed consent was obtained.

## 2020-12-31 NOTE — H&P
Subjective:     60-year-old female who underwent open reduction internal fixation of a transverse left patella fracture by me on 10/05/2020.  The patient had stiffness and difficulty in healing her anterior incision.  I took her back to the operating room on 11/24 4 manipulation under anesthesia and possible wound exploration.  At that point she appeared to have full-thickness breakdown the more distal part of her incision.  I excised good deal for incision and mobilized tissue.  At that point I irrigated her with Bactisure and used dilute Betadine and dilute peroxide as well.  I removed the cables and left the screws in place.  She did not grow anything from the cultures at that surgery but did have new Course BCs is a probable contaminant on the cultures.  She has been maintained on broad-spectrum antibiotics via PICC line.  Repeat cultures done in the ID Clinic on 12/24/2020 also have no growth.  At this point the patient has had continued breakdown of her anterior incision.    Patient Active Problem List    Diagnosis Date Noted    S/P ORIF (open reduction internal fixation) fracture 12/31/2020    Surgical wound breakdown 12/03/2020    Subacute osteomyelitis, other site 12/03/2020    Stiffness of left knee 11/24/2020    Type 2 diabetes mellitus 11/24/2020    Hypertension 11/24/2020    Hypothyroidism 11/24/2020    Closed displaced transverse fracture of left patella 09/27/2020    Chronic pain of both shoulders 08/18/2020    Bilateral rotator cuff dysfunction 08/18/2020    History of adenomatous polyp of colon 05/29/2015     Past Medical History:   Diagnosis Date    Abdominal pain     Diabetes     GERD (gastroesophageal reflux disease)     Hx of colonic polyps     Hyperlipidemia     Hypertension     Nausea and vomiting       Past Surgical History:   Procedure Laterality Date    APPLICATION OF WOUND VACUUM-ASSISTED CLOSURE DEVICE Left 11/24/2020    Procedure: APPLICATION, WOUND VAC;  Surgeon:  "Patrice Iglesias MD;  Location: [address];  Service: Orthopedics;  Laterality: Left;    CRANIOTOMY  2013    DEBRIDEMENT TENNIS ELBOW      hip ascites      IRRIGATION AND DEBRIDEMENT OF LOWER EXTREMITY Left 11/24/2020    Procedure: IRRIGATION AND DEBRIDEMENT, LOWER EXTREMITY;  Surgeon: Patrice Iglesias MD;  Location: [facility] OR [address] [place] [address];  Service: Orthopedics;  Laterality: Left;    KNEE JOINT MANIPULATION Left 11/24/2020    Procedure: MANIPULATION, KNEE;  Surgeon: Patrice Iglesias MD;  Location: [facility] OR [address] [place] [address];  Service: Orthopedics;  Laterality: Left;    OPEN REDUCTION AND INTERNAL FIXATION (ORIF) OF FRACTURE OF PATELLA Left 10/5/2020    Procedure: ORIF, FRACTURE, PATELLA - Femoral catheter ok;  Surgeon: Patrice Iglesias MD;  Location: [facility] OR [address] [place] [address];  Service: Orthopedics;  Laterality: Left;    REMOVAL OF HARDWARE FROM LOWER EXTREMITY Left 11/24/2020    Procedure: REMOVAL, HARDWARE, LOWER EXTREMITY;  Surgeon: Patrice Iglesias MD;  Location: [facility] OR [address] [place] [address];  Service: Orthopedics;  Laterality: Left;    TRIGGER FINGER RELEASE        (Not in a hospital admission)    Review of patient's allergies indicates:   Allergen Reactions    Codeine Nausea And Vomiting      Social History     Tobacco Use    Smoking status: Former Smoker     Quit date: 10/5/2005     Years since quitting: 15.2    Smokeless tobacco: Never Used   Substance Use Topics    Alcohol use: No      Family History   Problem Relation Age of Onset    Colon cancer Father 77        Rectal    Stomach cancer Neg Hx     Esophageal cancer Neg Hx       Review of Systems  ROS:  Patient denies constitutional symptoms, cardiac symptoms, respiratory symptoms, GI symptoms.  The remainder of the musculoskeletal ROS is included in the HPI.      Objective:     Ht 5' 5" (1.651 m)   Wt 77.1 kg (170 lb)   BMI 28.29 kg/m²     PE:    AA&O x 4.  NAD  HEENT:  NCAT, sclera nonicteric  Lungs:  Respirations are equal and unlabored.  CV:  2+ bilateral upper and " lower extremity pulses.  Skin:  Intact throughout.    MS:  Left knee with no joint effusion.  There is further wound breakdown for about 6-7 cm on the anterior part of the knee.  Dressing has some mild serous fluid on it.  No erythema or purulence.  Mild edema of the surrounding tissue.    Imaging Review  X-rays show a well-healing fracture of the patella.  MRI done recently showed no significant fluid collection.    Assessment:     1.  Postop ORIF of left patella with healing fracture  2.  Recurrent wound breakdown and dehiscence left anterior knee incision    Plan:     At this point she is having more breakdown of the anterior knee incision.  I think it would be prudent to go to the operating room at this point for a formal debridement and irrigation.  I have explained to her that if her tissue does not mobilize very easily for tension-free closure that I might have to involve the plastic surgeons in her care.  Her albumin is now 3.5 which is good.  To OR Monday for I&D.    The risks, benefits and alternatives to surgery were discussed with the patient at great length.  These include bleeding, infection, vessel/nerve damage, pain, numbness, tingling, complex regional pain syndrome, hardware/surgical failure, recurrence/repeat wound breakdown and dehiscence, need for further surgery, malunion, nonunion, DVT, PE, arthritis and death.  Patient states an understanding and wishes to proceed with surgery.   All questions were answered.  No guarantees were implied or stated.  Informed consent was obtained.

## 2021-01-01 ENCOUNTER — NURSE TRIAGE (OUTPATIENT)
Dept: ADMINISTRATIVE | Facility: CLINIC | Age: 61
End: 2021-01-01

## 2021-01-01 ENCOUNTER — LAB VISIT (OUTPATIENT)
Dept: INTERNAL MEDICINE | Facility: CLINIC | Age: 61
End: 2021-01-01
Payer: COMMERCIAL

## 2021-01-01 DIAGNOSIS — Z01.818 PRE-OP TESTING: ICD-10-CM

## 2021-01-01 DIAGNOSIS — U07.1 COVID-19 VIRUS DETECTED: ICD-10-CM

## 2021-01-01 LAB — SARS-COV-2 RNA RESP QL NAA+PROBE: DETECTED

## 2021-01-01 PROCEDURE — U0003 INFECTIOUS AGENT DETECTION BY NUCLEIC ACID (DNA OR RNA); SEVERE ACUTE RESPIRATORY SYNDROME CORONAVIRUS 2 (SARS-COV-2) (CORONAVIRUS DISEASE [COVID-19]), AMPLIFIED PROBE TECHNIQUE, MAKING USE OF HIGH THROUGHPUT TECHNOLOGIES AS DESCRIBED BY CMS-2020-01-R: HCPCS

## 2021-01-02 ENCOUNTER — PATIENT MESSAGE (OUTPATIENT)
Dept: ORTHOPEDICS | Facility: CLINIC | Age: 61
End: 2021-01-02

## 2021-01-04 ENCOUNTER — HOSPITAL ENCOUNTER (OUTPATIENT)
Facility: HOSPITAL | Age: 61
Discharge: HOME | End: 2021-01-04
Attending: ORTHOPAEDIC SURGERY | Admitting: ORTHOPAEDIC SURGERY
Payer: COMMERCIAL

## 2021-01-04 ENCOUNTER — ANESTHESIA (OUTPATIENT)
Dept: SURGERY | Facility: HOSPITAL | Age: 61
End: 2021-01-04
Payer: COMMERCIAL

## 2021-01-04 ENCOUNTER — TELEPHONE (OUTPATIENT)
Dept: ORTHOPEDICS | Facility: CLINIC | Age: 61
End: 2021-01-04

## 2021-01-04 VITALS
TEMPERATURE: 98 F | OXYGEN SATURATION: 97 % | RESPIRATION RATE: 20 BRPM | DIASTOLIC BLOOD PRESSURE: 69 MMHG | HEART RATE: 87 BPM | SYSTOLIC BLOOD PRESSURE: 147 MMHG

## 2021-01-04 DIAGNOSIS — T81.31XD DEHISCENCE OF OPERATIVE WOUND, SUBSEQUENT ENCOUNTER: ICD-10-CM

## 2021-01-04 DIAGNOSIS — Z98.890 S/P ORIF (OPEN REDUCTION INTERNAL FIXATION) FRACTURE: ICD-10-CM

## 2021-01-04 DIAGNOSIS — Z87.81 S/P ORIF (OPEN REDUCTION INTERNAL FIXATION) FRACTURE: ICD-10-CM

## 2021-01-04 LAB
GRAM STN SPEC: NORMAL
GRAM STN SPEC: NORMAL
POCT GLUCOSE: 124 MG/DL (ref 70–110)
POCT GLUCOSE: 189 MG/DL (ref 70–110)

## 2021-01-04 PROCEDURE — 87205 SMEAR GRAM STAIN: CPT

## 2021-01-04 PROCEDURE — 71000016 HC POSTOP RECOV ADDL HR: Performed by: ORTHOPAEDIC SURGERY

## 2021-01-04 PROCEDURE — 87206 SMEAR FLUORESCENT/ACID STAI: CPT

## 2021-01-04 PROCEDURE — D9220A PRA ANESTHESIA: ICD-10-PCS | Mod: ANES,,, | Performed by: ANESTHESIOLOGY

## 2021-01-04 PROCEDURE — 71000015 HC POSTOP RECOV 1ST HR: Performed by: ORTHOPAEDIC SURGERY

## 2021-01-04 PROCEDURE — 15002 PR WOUND PREP, PED, TRK/ARM/LG 1ST 100 CM: ICD-10-PCS | Mod: 78,LT,, | Performed by: ORTHOPAEDIC SURGERY

## 2021-01-04 PROCEDURE — 63600175 PHARM REV CODE 636 W HCPCS: Performed by: ANESTHESIOLOGY

## 2021-01-04 PROCEDURE — 76942 ECHO GUIDE FOR BIOPSY: CPT | Performed by: ANESTHESIOLOGY

## 2021-01-04 PROCEDURE — 25000003 PHARM REV CODE 250: Performed by: ANESTHESIOLOGY

## 2021-01-04 PROCEDURE — 27201423 OPTIME MED/SURG SUP & DEVICES STERILE SUPPLY: Performed by: ORTHOPAEDIC SURGERY

## 2021-01-04 PROCEDURE — D9220A PRA ANESTHESIA: Mod: ANES,,, | Performed by: ANESTHESIOLOGY

## 2021-01-04 PROCEDURE — 15002 WOUND PREP TRK/ARM/LEG: CPT | Mod: 78,LT,, | Performed by: ORTHOPAEDIC SURGERY

## 2021-01-04 PROCEDURE — 14020 PR ADJ TISS XFER SCALP,EXTREM <10 SQCM: ICD-10-PCS | Mod: 78,LT,, | Performed by: ORTHOPAEDIC SURGERY

## 2021-01-04 PROCEDURE — 63600175 PHARM REV CODE 636 W HCPCS: Performed by: NURSE ANESTHETIST, CERTIFIED REGISTERED

## 2021-01-04 PROCEDURE — 36000707: Performed by: ORTHOPAEDIC SURGERY

## 2021-01-04 PROCEDURE — 63600175 PHARM REV CODE 636 W HCPCS: Performed by: STUDENT IN AN ORGANIZED HEALTH CARE EDUCATION/TRAINING PROGRAM

## 2021-01-04 PROCEDURE — 87075 CULTR BACTERIA EXCEPT BLOOD: CPT

## 2021-01-04 PROCEDURE — D9220A PRA ANESTHESIA: Mod: CRNA,,, | Performed by: NURSE ANESTHETIST, CERTIFIED REGISTERED

## 2021-01-04 PROCEDURE — 63600175 PHARM REV CODE 636 W HCPCS: Performed by: ORTHOPAEDIC SURGERY

## 2021-01-04 PROCEDURE — 71000044 HC DOSC ROUTINE RECOVERY FIRST HOUR: Performed by: ORTHOPAEDIC SURGERY

## 2021-01-04 PROCEDURE — 36000706: Performed by: ORTHOPAEDIC SURGERY

## 2021-01-04 PROCEDURE — 25000003 PHARM REV CODE 250: Performed by: ORTHOPAEDIC SURGERY

## 2021-01-04 PROCEDURE — 64447 NJX AA&/STRD FEMORAL NRV IMG: CPT | Mod: 59,LT,, | Performed by: ANESTHESIOLOGY

## 2021-01-04 PROCEDURE — 82962 GLUCOSE BLOOD TEST: CPT | Performed by: ORTHOPAEDIC SURGERY

## 2021-01-04 PROCEDURE — 87102 FUNGUS ISOLATION CULTURE: CPT

## 2021-01-04 PROCEDURE — 87116 MYCOBACTERIA CULTURE: CPT

## 2021-01-04 PROCEDURE — 37000008 HC ANESTHESIA 1ST 15 MINUTES: Performed by: ORTHOPAEDIC SURGERY

## 2021-01-04 PROCEDURE — D9220A PRA ANESTHESIA: ICD-10-PCS | Mod: CRNA,,, | Performed by: NURSE ANESTHETIST, CERTIFIED REGISTERED

## 2021-01-04 PROCEDURE — 37000009 HC ANESTHESIA EA ADD 15 MINS: Performed by: ORTHOPAEDIC SURGERY

## 2021-01-04 PROCEDURE — 14020 TIS TRNFR S/A/L 10 SQ CM/<: CPT | Mod: 78,LT,, | Performed by: ORTHOPAEDIC SURGERY

## 2021-01-04 PROCEDURE — 25000003 PHARM REV CODE 250: Performed by: NURSE ANESTHETIST, CERTIFIED REGISTERED

## 2021-01-04 PROCEDURE — 64447 LEFT ADDUCTOR CANAL SS: ICD-10-PCS | Mod: 59,LT,, | Performed by: ANESTHESIOLOGY

## 2021-01-04 PROCEDURE — 87070 CULTURE OTHR SPECIMN AEROBIC: CPT

## 2021-01-04 PROCEDURE — 76942 LEFT ADDUCTOR CANAL SS: ICD-10-PCS | Mod: 26,,, | Performed by: ANESTHESIOLOGY

## 2021-01-04 RX ORDER — MUPIROCIN 20 MG/G
OINTMENT TOPICAL
Status: DISCONTINUED | OUTPATIENT
Start: 2021-01-04 | End: 2021-01-04 | Stop reason: HOSPADM

## 2021-01-04 RX ORDER — VANCOMYCIN HYDROCHLORIDE 1 G/20ML
INJECTION, POWDER, LYOPHILIZED, FOR SOLUTION INTRAVENOUS
Status: DISCONTINUED | OUTPATIENT
Start: 2021-01-04 | End: 2021-01-04 | Stop reason: HOSPADM

## 2021-01-04 RX ORDER — ASPIRIN 81 MG/1
81 TABLET ORAL 2 TIMES DAILY
Qty: 60 TABLET | Refills: 0 | Status: SHIPPED | OUTPATIENT
Start: 2021-01-04 | End: 2021-01-11

## 2021-01-04 RX ORDER — FENTANYL CITRATE 50 UG/ML
25 INJECTION, SOLUTION INTRAMUSCULAR; INTRAVENOUS EVERY 5 MIN PRN
Status: DISCONTINUED | OUTPATIENT
Start: 2021-01-04 | End: 2021-01-04 | Stop reason: HOSPADM

## 2021-01-04 RX ORDER — OXYCODONE HYDROCHLORIDE 5 MG/1
5 TABLET ORAL EVERY 4 HOURS PRN
Qty: 15 TABLET | Refills: 0 | Status: ON HOLD | OUTPATIENT
Start: 2021-01-04 | End: 2021-02-02 | Stop reason: SDUPTHER

## 2021-01-04 RX ORDER — CEFAZOLIN SODIUM 1 G/3ML
INJECTION, POWDER, FOR SOLUTION INTRAMUSCULAR; INTRAVENOUS
Status: DISCONTINUED | OUTPATIENT
Start: 2021-01-04 | End: 2021-01-04

## 2021-01-04 RX ORDER — BUPIVACAINE HYDROCHLORIDE 2.5 MG/ML
INJECTION, SOLUTION EPIDURAL; INFILTRATION; INTRACAUDAL
Status: COMPLETED | OUTPATIENT
Start: 2021-01-04 | End: 2021-01-04

## 2021-01-04 RX ORDER — SODIUM CHLORIDE 9 MG/ML
INJECTION, SOLUTION INTRAVENOUS CONTINUOUS
Status: DISCONTINUED | OUTPATIENT
Start: 2021-01-04 | End: 2021-01-04 | Stop reason: HOSPADM

## 2021-01-04 RX ORDER — SODIUM CHLORIDE 0.9 % (FLUSH) 0.9 %
10 SYRINGE (ML) INJECTION
Status: DISCONTINUED | OUTPATIENT
Start: 2021-01-04 | End: 2021-01-04 | Stop reason: HOSPADM

## 2021-01-04 RX ORDER — FENTANYL CITRATE 50 UG/ML
INJECTION, SOLUTION INTRAMUSCULAR; INTRAVENOUS
Status: DISCONTINUED | OUTPATIENT
Start: 2021-01-04 | End: 2021-01-04

## 2021-01-04 RX ORDER — PROPOFOL 10 MG/ML
VIAL (ML) INTRAVENOUS
Status: DISCONTINUED | OUTPATIENT
Start: 2021-01-04 | End: 2021-01-04

## 2021-01-04 RX ORDER — MIDAZOLAM HYDROCHLORIDE 1 MG/ML
0.5 INJECTION INTRAMUSCULAR; INTRAVENOUS
Status: DISCONTINUED | OUTPATIENT
Start: 2021-01-04 | End: 2021-01-04 | Stop reason: HOSPADM

## 2021-01-04 RX ORDER — DEXTROMETHORPHAN HYDROBROMIDE, GUAIFENESIN 5; 100 MG/5ML; MG/5ML
650 LIQUID ORAL EVERY 8 HOURS
Qty: 120 TABLET | Refills: 0 | Status: SHIPPED | OUTPATIENT
Start: 2021-01-04 | End: 2021-01-11

## 2021-01-04 RX ORDER — DEXAMETHASONE SODIUM PHOSPHATE 4 MG/ML
INJECTION, SOLUTION INTRA-ARTICULAR; INTRALESIONAL; INTRAMUSCULAR; INTRAVENOUS; SOFT TISSUE
Status: DISCONTINUED | OUTPATIENT
Start: 2021-01-04 | End: 2021-01-04

## 2021-01-04 RX ORDER — OXYCODONE HYDROCHLORIDE 5 MG/1
5 TABLET ORAL
Status: DISCONTINUED | OUTPATIENT
Start: 2021-01-04 | End: 2021-01-04 | Stop reason: HOSPADM

## 2021-01-04 RX ORDER — HYDROCODONE BITARTRATE AND ACETAMINOPHEN 5; 325 MG/1; MG/1
1 TABLET ORAL EVERY 4 HOURS PRN
Status: DISCONTINUED | OUTPATIENT
Start: 2021-01-04 | End: 2021-01-04 | Stop reason: HOSPADM

## 2021-01-04 RX ORDER — ONDANSETRON 2 MG/ML
INJECTION INTRAMUSCULAR; INTRAVENOUS
Status: DISCONTINUED | OUTPATIENT
Start: 2021-01-04 | End: 2021-01-04

## 2021-01-04 RX ORDER — FENTANYL CITRATE 50 UG/ML
25 INJECTION, SOLUTION INTRAMUSCULAR; INTRAVENOUS EVERY 5 MIN PRN
Status: COMPLETED | OUTPATIENT
Start: 2021-01-04 | End: 2021-01-04

## 2021-01-04 RX ORDER — LIDOCAINE HYDROCHLORIDE 20 MG/ML
INJECTION, SOLUTION EPIDURAL; INFILTRATION; INTRACAUDAL; PERINEURAL
Status: DISCONTINUED | OUTPATIENT
Start: 2021-01-04 | End: 2021-01-04

## 2021-01-04 RX ORDER — ONDANSETRON 2 MG/ML
4 INJECTION INTRAMUSCULAR; INTRAVENOUS DAILY PRN
Status: DISCONTINUED | OUTPATIENT
Start: 2021-01-04 | End: 2021-01-04 | Stop reason: HOSPADM

## 2021-01-04 RX ORDER — HYDROMORPHONE HYDROCHLORIDE 1 MG/ML
0.2 INJECTION, SOLUTION INTRAMUSCULAR; INTRAVENOUS; SUBCUTANEOUS EVERY 5 MIN PRN
Status: DISCONTINUED | OUTPATIENT
Start: 2021-01-04 | End: 2021-01-04 | Stop reason: HOSPADM

## 2021-01-04 RX ORDER — PHENYLEPHRINE HYDROCHLORIDE 10 MG/ML
INJECTION INTRAVENOUS
Status: DISCONTINUED | OUTPATIENT
Start: 2021-01-04 | End: 2021-01-04

## 2021-01-04 RX ORDER — LISINOPRIL 2.5 MG/1
2.5 TABLET ORAL ONCE
Status: DISCONTINUED | OUTPATIENT
Start: 2021-01-04 | End: 2021-01-04 | Stop reason: HOSPADM

## 2021-01-04 RX ADMIN — HYDROMORPHONE HYDROCHLORIDE 0.2 MG: 1 INJECTION, SOLUTION INTRAMUSCULAR; INTRAVENOUS; SUBCUTANEOUS at 10:01

## 2021-01-04 RX ADMIN — PROPOFOL 40 MG: 10 INJECTION, EMULSION INTRAVENOUS at 07:01

## 2021-01-04 RX ADMIN — BUPIVACAINE HYDROCHLORIDE 15 ML: 2.5 INJECTION, SOLUTION EPIDURAL; INFILTRATION; INTRACAUDAL; PERINEURAL at 06:01

## 2021-01-04 RX ADMIN — FENTANYL CITRATE 25 MCG: 50 INJECTION INTRAMUSCULAR; INTRAVENOUS at 10:01

## 2021-01-04 RX ADMIN — FENTANYL CITRATE 25 MCG: 50 INJECTION, SOLUTION INTRAMUSCULAR; INTRAVENOUS at 08:01

## 2021-01-04 RX ADMIN — MUPIROCIN: 20 OINTMENT TOPICAL at 06:01

## 2021-01-04 RX ADMIN — SODIUM CHLORIDE 75 ML/HR: 0.9 INJECTION, SOLUTION INTRAVENOUS at 06:01

## 2021-01-04 RX ADMIN — PHENYLEPHRINE HYDROCHLORIDE 200 MCG: 10 INJECTION INTRAVENOUS at 08:01

## 2021-01-04 RX ADMIN — FENTANYL CITRATE 50 MCG: 50 INJECTION INTRAMUSCULAR; INTRAVENOUS at 06:01

## 2021-01-04 RX ADMIN — PHENYLEPHRINE HYDROCHLORIDE 100 MCG: 10 INJECTION INTRAVENOUS at 08:01

## 2021-01-04 RX ADMIN — PHENYLEPHRINE HYDROCHLORIDE 100 MCG: 10 INJECTION INTRAVENOUS at 07:01

## 2021-01-04 RX ADMIN — FENTANYL CITRATE 25 MCG: 50 INJECTION, SOLUTION INTRAMUSCULAR; INTRAVENOUS at 07:01

## 2021-01-04 RX ADMIN — CEFAZOLIN 2 G: 330 INJECTION, POWDER, FOR SOLUTION INTRAMUSCULAR; INTRAVENOUS at 07:01

## 2021-01-04 RX ADMIN — SODIUM CHLORIDE: 0.9 INJECTION, SOLUTION INTRAVENOUS at 06:01

## 2021-01-04 RX ADMIN — MIDAZOLAM 2 MG: 1 INJECTION INTRAMUSCULAR; INTRAVENOUS at 06:01

## 2021-01-04 RX ADMIN — DEXAMETHASONE SODIUM PHOSPHATE 4 MG: 4 INJECTION, SOLUTION INTRAMUSCULAR; INTRAVENOUS at 07:01

## 2021-01-04 RX ADMIN — LIDOCAINE HYDROCHLORIDE 100 MG: 20 INJECTION, SOLUTION EPIDURAL; INFILTRATION; INTRACAUDAL at 07:01

## 2021-01-04 RX ADMIN — PROPOFOL 160 MG: 10 INJECTION, EMULSION INTRAVENOUS at 07:01

## 2021-01-04 RX ADMIN — OXYCODONE 5 MG: 5 TABLET ORAL at 10:01

## 2021-01-04 RX ADMIN — ONDANSETRON 4 MG: 2 INJECTION, SOLUTION INTRAMUSCULAR; INTRAVENOUS at 07:01

## 2021-01-07 LAB — BACTERIA SPEC AEROBE CULT: NO GROWTH

## 2021-01-08 LAB — BACTERIA SPEC ANAEROBE CULT: NORMAL

## 2021-01-11 ENCOUNTER — OFFICE VISIT (OUTPATIENT)
Dept: ORTHOPEDICS | Facility: CLINIC | Age: 61
End: 2021-01-11
Payer: COMMERCIAL

## 2021-01-11 VITALS — BODY MASS INDEX: 28.32 KG/M2 | WEIGHT: 170 LBS | HEIGHT: 65 IN

## 2021-01-11 DIAGNOSIS — T81.31XD DEHISCENCE OF OPERATIVE WOUND, SUBSEQUENT ENCOUNTER: ICD-10-CM

## 2021-01-11 DIAGNOSIS — S82.032D CLOSED DISPLACED TRANSVERSE FRACTURE OF LEFT PATELLA WITH ROUTINE HEALING, SUBSEQUENT ENCOUNTER: Primary | ICD-10-CM

## 2021-01-11 DIAGNOSIS — Z51.89 VISIT FOR WOUND CHECK: ICD-10-CM

## 2021-01-11 LAB — BACTERIA SPEC ANAEROBE CULT: NORMAL

## 2021-01-11 PROCEDURE — 99024 PR POST-OP FOLLOW-UP VISIT: ICD-10-PCS | Mod: S$GLB,,, | Performed by: PHYSICIAN ASSISTANT

## 2021-01-11 PROCEDURE — 99999 PR PBB SHADOW E&M-EST. PATIENT-LVL IV: CPT | Mod: PBBFAC,,, | Performed by: PHYSICIAN ASSISTANT

## 2021-01-11 PROCEDURE — 99024 POSTOP FOLLOW-UP VISIT: CPT | Mod: S$GLB,,, | Performed by: PHYSICIAN ASSISTANT

## 2021-01-11 PROCEDURE — 99999 PR PBB SHADOW E&M-EST. PATIENT-LVL IV: ICD-10-PCS | Mod: PBBFAC,,, | Performed by: PHYSICIAN ASSISTANT

## 2021-01-11 RX ORDER — NYSTATIN AND TRIAMCINOLONE ACETONIDE 100000; 1 [USP'U]/G; MG/G
CREAM TOPICAL
Status: ON HOLD | COMMUNITY
Start: 2020-12-30 | End: 2021-02-02 | Stop reason: HOSPADM

## 2021-01-12 ENCOUNTER — OFFICE VISIT (OUTPATIENT)
Dept: INFECTIOUS DISEASES | Facility: CLINIC | Age: 61
End: 2021-01-12
Payer: COMMERCIAL

## 2021-01-12 VITALS
HEART RATE: 99 BPM | DIASTOLIC BLOOD PRESSURE: 93 MMHG | BODY MASS INDEX: 30.04 KG/M2 | HEIGHT: 65 IN | WEIGHT: 180.31 LBS | SYSTOLIC BLOOD PRESSURE: 190 MMHG | TEMPERATURE: 99 F

## 2021-01-12 DIAGNOSIS — Z87.81 S/P ORIF (OPEN REDUCTION INTERNAL FIXATION) FRACTURE: ICD-10-CM

## 2021-01-12 DIAGNOSIS — Z98.890 S/P ORIF (OPEN REDUCTION INTERNAL FIXATION) FRACTURE: ICD-10-CM

## 2021-01-12 DIAGNOSIS — Z45.2 PICC (PERIPHERALLY INSERTED CENTRAL CATHETER) REMOVAL: ICD-10-CM

## 2021-01-12 DIAGNOSIS — T81.31XD DEHISCENCE OF OPERATIVE WOUND, SUBSEQUENT ENCOUNTER: Primary | ICD-10-CM

## 2021-01-12 PROCEDURE — 99999 PR PBB SHADOW E&M-EST. PATIENT-LVL V: CPT | Mod: PBBFAC,,, | Performed by: INTERNAL MEDICINE

## 2021-01-12 PROCEDURE — 99999 PR PBB SHADOW E&M-EST. PATIENT-LVL V: ICD-10-PCS | Mod: PBBFAC,,, | Performed by: INTERNAL MEDICINE

## 2021-01-12 PROCEDURE — 99214 OFFICE O/P EST MOD 30 MIN: CPT | Mod: S$GLB,,, | Performed by: INTERNAL MEDICINE

## 2021-01-12 PROCEDURE — 99214 PR OFFICE/OUTPT VISIT, EST, LEVL IV, 30-39 MIN: ICD-10-PCS | Mod: S$GLB,,, | Performed by: INTERNAL MEDICINE

## 2021-01-12 RX ORDER — DOXYCYCLINE HYCLATE 100 MG
100 TABLET ORAL 2 TIMES DAILY
Qty: 60 TABLET | Refills: 2 | Status: ON HOLD | OUTPATIENT
Start: 2021-01-12 | End: 2021-02-02 | Stop reason: HOSPADM

## 2021-01-20 ENCOUNTER — OFFICE VISIT (OUTPATIENT)
Dept: PLASTIC SURGERY | Facility: CLINIC | Age: 61
DRG: 908 | End: 2021-01-20
Payer: COMMERCIAL

## 2021-01-20 ENCOUNTER — HOSPITAL ENCOUNTER (INPATIENT)
Facility: HOSPITAL | Age: 61
LOS: 14 days | Discharge: HOME | DRG: 908 | End: 2021-02-03
Attending: EMERGENCY MEDICINE | Admitting: SURGERY
Payer: COMMERCIAL

## 2021-01-20 ENCOUNTER — DOCUMENT SCAN (OUTPATIENT)
Dept: HOME HEALTH SERVICES | Facility: HOSPITAL | Age: 61
End: 2021-01-20
Payer: COMMERCIAL

## 2021-01-20 ENCOUNTER — OFFICE VISIT (OUTPATIENT)
Dept: ORTHOPEDICS | Facility: CLINIC | Age: 61
DRG: 908 | End: 2021-01-20
Payer: COMMERCIAL

## 2021-01-20 ENCOUNTER — OFFICE VISIT (OUTPATIENT)
Dept: INFECTIOUS DISEASES | Facility: CLINIC | Age: 61
DRG: 908 | End: 2021-01-20
Payer: COMMERCIAL

## 2021-01-20 ENCOUNTER — TELEPHONE (OUTPATIENT)
Dept: PLASTIC SURGERY | Facility: CLINIC | Age: 61
End: 2021-01-20

## 2021-01-20 VITALS
BODY MASS INDEX: 29.38 KG/M2 | DIASTOLIC BLOOD PRESSURE: 89 MMHG | HEIGHT: 65 IN | TEMPERATURE: 98 F | WEIGHT: 176.38 LBS | HEART RATE: 94 BPM | SYSTOLIC BLOOD PRESSURE: 159 MMHG

## 2021-01-20 VITALS
DIASTOLIC BLOOD PRESSURE: 80 MMHG | SYSTOLIC BLOOD PRESSURE: 140 MMHG | HEART RATE: 72 BPM | BODY MASS INDEX: 29.29 KG/M2 | WEIGHT: 176 LBS

## 2021-01-20 VITALS — TEMPERATURE: 97 F

## 2021-01-20 DIAGNOSIS — T81.31XA SURGICAL WOUND BREAKDOWN: ICD-10-CM

## 2021-01-20 DIAGNOSIS — Z98.890 S/P ORIF (OPEN REDUCTION INTERNAL FIXATION) FRACTURE: ICD-10-CM

## 2021-01-20 DIAGNOSIS — M86.28 SUBACUTE OSTEOMYELITIS, OTHER SITE: Primary | ICD-10-CM

## 2021-01-20 DIAGNOSIS — Z98.890 S/P ORIF (OPEN REDUCTION INTERNAL FIXATION) FRACTURE: Primary | ICD-10-CM

## 2021-01-20 DIAGNOSIS — M86.20 SUBACUTE OSTEOMYELITIS, UNSPECIFIED SITE: ICD-10-CM

## 2021-01-20 DIAGNOSIS — E10.69 TYPE 1 DIABETES MELLITUS WITH OTHER SPECIFIED COMPLICATION: ICD-10-CM

## 2021-01-20 DIAGNOSIS — T81.31XD DEHISCENCE OF OPERATIVE WOUND, SUBSEQUENT ENCOUNTER: ICD-10-CM

## 2021-01-20 DIAGNOSIS — Z79.4 TYPE 2 DIABETES MELLITUS WITH DIABETIC NEPHROPATHY, WITH LONG-TERM CURRENT USE OF INSULIN: ICD-10-CM

## 2021-01-20 DIAGNOSIS — T81.31XD DEHISCENCE OF OPERATIVE WOUND, SUBSEQUENT ENCOUNTER: Primary | ICD-10-CM

## 2021-01-20 DIAGNOSIS — Z87.81 S/P ORIF (OPEN REDUCTION INTERNAL FIXATION) FRACTURE: ICD-10-CM

## 2021-01-20 DIAGNOSIS — S82.002S CLOSED NONDISPLACED FRACTURE OF LEFT PATELLA, UNSPECIFIED FRACTURE MORPHOLOGY, SEQUELA: ICD-10-CM

## 2021-01-20 DIAGNOSIS — E11.21 TYPE 2 DIABETES MELLITUS WITH DIABETIC NEPHROPATHY, WITH LONG-TERM CURRENT USE OF INSULIN: ICD-10-CM

## 2021-01-20 DIAGNOSIS — I10 HYPERTENSION, UNSPECIFIED TYPE: ICD-10-CM

## 2021-01-20 DIAGNOSIS — Z87.81 S/P ORIF (OPEN REDUCTION INTERNAL FIXATION) FRACTURE: Primary | ICD-10-CM

## 2021-01-20 DIAGNOSIS — E03.9 HYPOTHYROIDISM, UNSPECIFIED TYPE: ICD-10-CM

## 2021-01-20 DIAGNOSIS — T81.30XA WOUND DEHISCENCE: ICD-10-CM

## 2021-01-20 DIAGNOSIS — R79.89 ELEVATED LFTS: ICD-10-CM

## 2021-01-20 LAB
ALBUMIN SERPL BCP-MCNC: 4 G/DL (ref 3.5–5.2)
ALP SERPL-CCNC: 179 U/L (ref 55–135)
ALT SERPL W/O P-5'-P-CCNC: 61 U/L (ref 10–44)
ANION GAP SERPL CALC-SCNC: 11 MMOL/L (ref 8–16)
AST SERPL-CCNC: 48 U/L (ref 10–40)
BASOPHILS # BLD AUTO: 0.08 K/UL (ref 0–0.2)
BASOPHILS NFR BLD: 1.4 % (ref 0–1.9)
BILIRUB SERPL-MCNC: 0.3 MG/DL (ref 0.1–1)
BUN SERPL-MCNC: 21 MG/DL (ref 6–20)
CALCIUM SERPL-MCNC: 10.1 MG/DL (ref 8.7–10.5)
CHLORIDE SERPL-SCNC: 104 MMOL/L (ref 95–110)
CO2 SERPL-SCNC: 26 MMOL/L (ref 23–29)
CREAT SERPL-MCNC: 0.8 MG/DL (ref 0.5–1.4)
CRP SERPL-MCNC: 1.8 MG/L (ref 0–3.19)
CTP QC/QA: YES
DIFFERENTIAL METHOD: ABNORMAL
EOSINOPHIL # BLD AUTO: 0.4 K/UL (ref 0–0.5)
EOSINOPHIL NFR BLD: 6.4 % (ref 0–8)
ERYTHROCYTE [DISTWIDTH] IN BLOOD BY AUTOMATED COUNT: 13.2 % (ref 11.5–14.5)
ERYTHROCYTE [SEDIMENTATION RATE] IN BLOOD BY WESTERGREN METHOD: 41 MM/HR (ref 0–36)
EST. GFR  (AFRICAN AMERICAN): >60 ML/MIN/1.73 M^2
EST. GFR  (NON AFRICAN AMERICAN): >60 ML/MIN/1.73 M^2
ESTIMATED AVG GLUCOSE: 194 MG/DL (ref 68–131)
GLUCOSE SERPL-MCNC: 193 MG/DL (ref 70–110)
HBA1C MFR BLD HPLC: 8.4 % (ref 4–5.6)
HCT VFR BLD AUTO: 41.9 % (ref 37–48.5)
HGB BLD-MCNC: 13 G/DL (ref 12–16)
IMM GRANULOCYTES # BLD AUTO: 0.02 K/UL (ref 0–0.04)
IMM GRANULOCYTES NFR BLD AUTO: 0.3 % (ref 0–0.5)
LYMPHOCYTES # BLD AUTO: 1.7 K/UL (ref 1–4.8)
LYMPHOCYTES NFR BLD: 29.4 % (ref 18–48)
MCH RBC QN AUTO: 26.8 PG (ref 27–31)
MCHC RBC AUTO-ENTMCNC: 31 G/DL (ref 32–36)
MCV RBC AUTO: 86 FL (ref 82–98)
MONOCYTES # BLD AUTO: 0.5 K/UL (ref 0.3–1)
MONOCYTES NFR BLD: 9.1 % (ref 4–15)
NEUTROPHILS # BLD AUTO: 3.2 K/UL (ref 1.8–7.7)
NEUTROPHILS NFR BLD: 53.4 % (ref 38–73)
NRBC BLD-RTO: 0 /100 WBC
PLATELET # BLD AUTO: 368 K/UL (ref 150–350)
PMV BLD AUTO: 11 FL (ref 9.2–12.9)
POCT GLUCOSE: 140 MG/DL (ref 70–110)
POTASSIUM SERPL-SCNC: 4.2 MMOL/L (ref 3.5–5.1)
PROT SERPL-MCNC: 7.7 G/DL (ref 6–8.4)
RBC # BLD AUTO: 4.85 M/UL (ref 4–5.4)
SARS-COV-2 RDRP RESP QL NAA+PROBE: POSITIVE
SODIUM SERPL-SCNC: 141 MMOL/L (ref 136–145)
WBC # BLD AUTO: 5.92 K/UL (ref 3.9–12.7)

## 2021-01-20 PROCEDURE — 86141 C-REACTIVE PROTEIN HS: CPT

## 2021-01-20 PROCEDURE — U0002 COVID-19 LAB TEST NON-CDC: HCPCS | Performed by: EMERGENCY MEDICINE

## 2021-01-20 PROCEDURE — 99214 OFFICE O/P EST MOD 30 MIN: CPT | Mod: S$GLB,,, | Performed by: INTERNAL MEDICINE

## 2021-01-20 PROCEDURE — 99499 UNLISTED E&M SERVICE: CPT | Mod: ,,, | Performed by: EMERGENCY MEDICINE

## 2021-01-20 PROCEDURE — 99204 OFFICE O/P NEW MOD 45 MIN: CPT | Mod: S$GLB,,, | Performed by: SURGERY

## 2021-01-20 PROCEDURE — 99499 NO LOS: ICD-10-PCS | Mod: ,,, | Performed by: EMERGENCY MEDICINE

## 2021-01-20 PROCEDURE — 83036 HEMOGLOBIN GLYCOSYLATED A1C: CPT

## 2021-01-20 PROCEDURE — 85652 RBC SED RATE AUTOMATED: CPT

## 2021-01-20 PROCEDURE — 99214 PR OFFICE/OUTPT VISIT, EST, LEVL IV, 30-39 MIN: ICD-10-PCS | Mod: S$GLB,,, | Performed by: INTERNAL MEDICINE

## 2021-01-20 PROCEDURE — 99999 PR PBB SHADOW E&M-EST. PATIENT-LVL V: ICD-10-PCS | Mod: PBBFAC,,, | Performed by: INTERNAL MEDICINE

## 2021-01-20 PROCEDURE — 87107 FUNGI IDENTIFICATION MOLD: CPT

## 2021-01-20 PROCEDURE — 99285 EMERGENCY DEPT VISIT HI MDM: CPT | Mod: 25

## 2021-01-20 PROCEDURE — 99999 PR PBB SHADOW E&M-EST. PATIENT-LVL IV: ICD-10-PCS | Mod: PBBFAC,,, | Performed by: SURGERY

## 2021-01-20 PROCEDURE — 99204 PR OFFICE/OUTPT VISIT, NEW, LEVL IV, 45-59 MIN: ICD-10-PCS | Mod: S$GLB,,, | Performed by: SURGERY

## 2021-01-20 PROCEDURE — 12000002 HC ACUTE/MED SURGE SEMI-PRIVATE ROOM

## 2021-01-20 PROCEDURE — 99999 PR PBB SHADOW E&M-EST. PATIENT-LVL V: CPT | Mod: PBBFAC,,, | Performed by: INTERNAL MEDICINE

## 2021-01-20 PROCEDURE — 87070 CULTURE OTHR SPECIMN AEROBIC: CPT

## 2021-01-20 PROCEDURE — 85025 COMPLETE CBC W/AUTO DIFF WBC: CPT

## 2021-01-20 PROCEDURE — 99999 PR PBB SHADOW E&M-EST. PATIENT-LVL III: CPT | Mod: PBBFAC,,, | Performed by: PHYSICIAN ASSISTANT

## 2021-01-20 PROCEDURE — 63600175 PHARM REV CODE 636 W HCPCS: Performed by: STUDENT IN AN ORGANIZED HEALTH CARE EDUCATION/TRAINING PROGRAM

## 2021-01-20 PROCEDURE — 99024 PR POST-OP FOLLOW-UP VISIT: ICD-10-PCS | Mod: S$GLB,,, | Performed by: PHYSICIAN ASSISTANT

## 2021-01-20 PROCEDURE — 99024 POSTOP FOLLOW-UP VISIT: CPT | Mod: S$GLB,,, | Performed by: PHYSICIAN ASSISTANT

## 2021-01-20 PROCEDURE — 25500020 PHARM REV CODE 255: Performed by: EMERGENCY MEDICINE

## 2021-01-20 PROCEDURE — 99999 PR PBB SHADOW E&M-EST. PATIENT-LVL III: ICD-10-PCS | Mod: PBBFAC,,, | Performed by: PHYSICIAN ASSISTANT

## 2021-01-20 PROCEDURE — 80053 COMPREHEN METABOLIC PANEL: CPT

## 2021-01-20 PROCEDURE — 99999 PR PBB SHADOW E&M-EST. PATIENT-LVL IV: CPT | Mod: PBBFAC,,, | Performed by: SURGERY

## 2021-01-20 RX ORDER — OXYCODONE HYDROCHLORIDE 5 MG/1
5 TABLET ORAL EVERY 4 HOURS PRN
Status: DISCONTINUED | OUTPATIENT
Start: 2021-01-20 | End: 2021-01-23

## 2021-01-20 RX ORDER — TALC
6 POWDER (GRAM) TOPICAL NIGHTLY PRN
Status: DISCONTINUED | OUTPATIENT
Start: 2021-01-20 | End: 2021-02-03 | Stop reason: HOSPADM

## 2021-01-20 RX ORDER — LIDOCAINE HYDROCHLORIDE 10 MG/ML
1 INJECTION, SOLUTION EPIDURAL; INFILTRATION; INTRACAUDAL; PERINEURAL ONCE
Status: DISCONTINUED | OUTPATIENT
Start: 2021-01-20 | End: 2021-01-23

## 2021-01-20 RX ORDER — ENOXAPARIN SODIUM 100 MG/ML
40 INJECTION SUBCUTANEOUS EVERY 24 HOURS
Status: DISCONTINUED | OUTPATIENT
Start: 2021-01-20 | End: 2021-01-22

## 2021-01-20 RX ORDER — ACETAMINOPHEN 325 MG/1
650 TABLET ORAL EVERY 8 HOURS PRN
Status: DISCONTINUED | OUTPATIENT
Start: 2021-01-20 | End: 2021-02-03 | Stop reason: HOSPADM

## 2021-01-20 RX ORDER — ONDANSETRON 8 MG/1
8 TABLET, ORALLY DISINTEGRATING ORAL EVERY 8 HOURS PRN
Status: DISCONTINUED | OUTPATIENT
Start: 2021-01-20 | End: 2021-02-03 | Stop reason: HOSPADM

## 2021-01-20 RX ORDER — ACETAMINOPHEN 325 MG/1
650 TABLET ORAL EVERY 4 HOURS PRN
Status: DISCONTINUED | OUTPATIENT
Start: 2021-01-20 | End: 2021-02-03 | Stop reason: HOSPADM

## 2021-01-20 RX ORDER — SODIUM CHLORIDE 0.9 % (FLUSH) 0.9 %
10 SYRINGE (ML) INJECTION
Status: DISCONTINUED | OUTPATIENT
Start: 2021-01-20 | End: 2021-02-03 | Stop reason: HOSPADM

## 2021-01-20 RX ADMIN — IOHEXOL 125 ML: 350 INJECTION, SOLUTION INTRAVENOUS at 06:01

## 2021-01-20 RX ADMIN — ENOXAPARIN SODIUM 40 MG: 40 INJECTION SUBCUTANEOUS at 10:01

## 2021-01-21 ENCOUNTER — ANESTHESIA EVENT (OUTPATIENT)
Dept: SURGERY | Facility: HOSPITAL | Age: 61
DRG: 908 | End: 2021-01-21
Payer: COMMERCIAL

## 2021-01-21 LAB
ABO + RH BLD: NORMAL
ANION GAP SERPL CALC-SCNC: 5 MMOL/L (ref 8–16)
BLD GP AB SCN CELLS X3 SERPL QL: NORMAL
BUN SERPL-MCNC: 19 MG/DL (ref 6–20)
CALCIUM SERPL-MCNC: 9.6 MG/DL (ref 8.7–10.5)
CHLORIDE SERPL-SCNC: 103 MMOL/L (ref 95–110)
CO2 SERPL-SCNC: 28 MMOL/L (ref 23–29)
CREAT SERPL-MCNC: 0.9 MG/DL (ref 0.5–1.4)
EST. GFR  (AFRICAN AMERICAN): >60 ML/MIN/1.73 M^2
EST. GFR  (NON AFRICAN AMERICAN): >60 ML/MIN/1.73 M^2
GLUCOSE SERPL-MCNC: 262 MG/DL (ref 70–110)
GLUCOSE SERPL-MCNC: 501 MG/DL (ref 70–110)
POCT GLUCOSE: 138 MG/DL (ref 70–110)
POCT GLUCOSE: 157 MG/DL (ref 70–110)
POCT GLUCOSE: 245 MG/DL (ref 70–110)
POCT GLUCOSE: 424 MG/DL (ref 70–110)
POCT GLUCOSE: 450 MG/DL (ref 70–110)
POTASSIUM SERPL-SCNC: 4 MMOL/L (ref 3.5–5.1)
SODIUM SERPL-SCNC: 136 MMOL/L (ref 136–145)

## 2021-01-21 PROCEDURE — 63600175 PHARM REV CODE 636 W HCPCS: Performed by: HOSPITALIST

## 2021-01-21 PROCEDURE — 82947 ASSAY GLUCOSE BLOOD QUANT: CPT

## 2021-01-21 PROCEDURE — 80048 BASIC METABOLIC PNL TOTAL CA: CPT

## 2021-01-21 PROCEDURE — 36415 COLL VENOUS BLD VENIPUNCTURE: CPT

## 2021-01-21 PROCEDURE — 94761 N-INVAS EAR/PLS OXIMETRY MLT: CPT

## 2021-01-21 PROCEDURE — 86900 BLOOD TYPING SEROLOGIC ABO: CPT

## 2021-01-21 PROCEDURE — 63600175 PHARM REV CODE 636 W HCPCS: Performed by: STUDENT IN AN ORGANIZED HEALTH CARE EDUCATION/TRAINING PROGRAM

## 2021-01-21 PROCEDURE — 25000003 PHARM REV CODE 250: Performed by: STUDENT IN AN ORGANIZED HEALTH CARE EDUCATION/TRAINING PROGRAM

## 2021-01-21 PROCEDURE — 12000002 HC ACUTE/MED SURGE SEMI-PRIVATE ROOM

## 2021-01-21 PROCEDURE — 86920 COMPATIBILITY TEST SPIN: CPT

## 2021-01-21 PROCEDURE — 25000003 PHARM REV CODE 250: Performed by: HOSPITALIST

## 2021-01-21 RX ORDER — GLUCAGON 1 MG
1 KIT INJECTION
Status: DISCONTINUED | OUTPATIENT
Start: 2021-01-21 | End: 2021-01-21

## 2021-01-21 RX ORDER — LEVOTHYROXINE SODIUM 75 UG/1
75 TABLET ORAL DAILY
Status: DISCONTINUED | OUTPATIENT
Start: 2021-01-21 | End: 2021-02-03 | Stop reason: HOSPADM

## 2021-01-21 RX ORDER — INSULIN ASPART 100 [IU]/ML
0-5 INJECTION, SOLUTION INTRAVENOUS; SUBCUTANEOUS
Status: DISCONTINUED | OUTPATIENT
Start: 2021-01-21 | End: 2021-01-21

## 2021-01-21 RX ORDER — INSULIN ASPART 100 [IU]/ML
0-12 INJECTION, SOLUTION INTRAVENOUS; SUBCUTANEOUS
Status: DISCONTINUED | OUTPATIENT
Start: 2021-01-21 | End: 2021-01-22 | Stop reason: SDUPTHER

## 2021-01-21 RX ORDER — VENLAFAXINE HYDROCHLORIDE 75 MG/1
225 CAPSULE, EXTENDED RELEASE ORAL DAILY
Status: DISCONTINUED | OUTPATIENT
Start: 2021-01-21 | End: 2021-02-03 | Stop reason: HOSPADM

## 2021-01-21 RX ORDER — IBUPROFEN 200 MG
16 TABLET ORAL
Status: DISCONTINUED | OUTPATIENT
Start: 2021-01-21 | End: 2021-01-21

## 2021-01-21 RX ORDER — IBUPROFEN 200 MG
24 TABLET ORAL
Status: DISCONTINUED | OUTPATIENT
Start: 2021-01-21 | End: 2021-01-21

## 2021-01-21 RX ORDER — CEFAZOLIN SODIUM 1 G/3ML
2 INJECTION, POWDER, FOR SOLUTION INTRAMUSCULAR; INTRAVENOUS
Status: CANCELLED | OUTPATIENT
Start: 2021-01-21

## 2021-01-21 RX ORDER — MUPIROCIN 20 MG/G
OINTMENT TOPICAL
Status: CANCELLED | OUTPATIENT
Start: 2021-01-21

## 2021-01-21 RX ORDER — HYDROCODONE BITARTRATE AND ACETAMINOPHEN 500; 5 MG/1; MG/1
TABLET ORAL
Status: DISCONTINUED | OUTPATIENT
Start: 2021-01-21 | End: 2021-02-03 | Stop reason: HOSPADM

## 2021-01-21 RX ORDER — GLUCAGON 1 MG
1 KIT INJECTION
Status: DISCONTINUED | OUTPATIENT
Start: 2021-01-21 | End: 2021-01-23

## 2021-01-21 RX ADMIN — ENOXAPARIN SODIUM 40 MG: 40 INJECTION SUBCUTANEOUS at 11:01

## 2021-01-21 RX ADMIN — VENLAFAXINE HYDROCHLORIDE 225 MG: 150 CAPSULE, EXTENDED RELEASE ORAL at 11:01

## 2021-01-21 RX ADMIN — SODIUM CHLORIDE 3 UNITS/HR: 9 INJECTION, SOLUTION INTRAVENOUS at 11:01

## 2021-01-21 RX ADMIN — INSULIN ASPART 5 UNITS: 100 INJECTION, SOLUTION INTRAVENOUS; SUBCUTANEOUS at 05:01

## 2021-01-21 RX ADMIN — LEVOTHYROXINE SODIUM 75 MCG: 25 TABLET ORAL at 11:01

## 2021-01-22 ENCOUNTER — ANESTHESIA (OUTPATIENT)
Dept: SURGERY | Facility: HOSPITAL | Age: 61
DRG: 908 | End: 2021-01-22
Payer: COMMERCIAL

## 2021-01-22 PROBLEM — E11.9 TYPE 2 DIABETES MELLITUS: Chronic | Status: ACTIVE | Noted: 2020-11-24

## 2021-01-22 LAB
ANION GAP SERPL CALC-SCNC: 10 MMOL/L (ref 8–16)
ANION GAP SERPL CALC-SCNC: 11 MMOL/L (ref 8–16)
ANION GAP SERPL CALC-SCNC: 13 MMOL/L (ref 8–16)
BASOPHILS # BLD AUTO: 0.03 K/UL (ref 0–0.2)
BASOPHILS NFR BLD: 0.3 % (ref 0–1.9)
BUN SERPL-MCNC: 15 MG/DL (ref 6–20)
BUN SERPL-MCNC: 17 MG/DL (ref 6–20)
BUN SERPL-MCNC: 18 MG/DL (ref 6–20)
CALCIUM SERPL-MCNC: 8.5 MG/DL (ref 8.7–10.5)
CALCIUM SERPL-MCNC: 9.5 MG/DL (ref 8.7–10.5)
CALCIUM SERPL-MCNC: 9.6 MG/DL (ref 8.7–10.5)
CHLORIDE SERPL-SCNC: 105 MMOL/L (ref 95–110)
CHLORIDE SERPL-SCNC: 106 MMOL/L (ref 95–110)
CHLORIDE SERPL-SCNC: 108 MMOL/L (ref 95–110)
CO2 SERPL-SCNC: 23 MMOL/L (ref 23–29)
CO2 SERPL-SCNC: 23 MMOL/L (ref 23–29)
CO2 SERPL-SCNC: 24 MMOL/L (ref 23–29)
CREAT SERPL-MCNC: 0.7 MG/DL (ref 0.5–1.4)
CREAT SERPL-MCNC: 0.7 MG/DL (ref 0.5–1.4)
CREAT SERPL-MCNC: 0.8 MG/DL (ref 0.5–1.4)
DIFFERENTIAL METHOD: ABNORMAL
EOSINOPHIL # BLD AUTO: 0 K/UL (ref 0–0.5)
EOSINOPHIL NFR BLD: 0.1 % (ref 0–8)
ERYTHROCYTE [DISTWIDTH] IN BLOOD BY AUTOMATED COUNT: 13.2 % (ref 11.5–14.5)
EST. GFR  (AFRICAN AMERICAN): >60 ML/MIN/1.73 M^2
EST. GFR  (NON AFRICAN AMERICAN): >60 ML/MIN/1.73 M^2
GLUCOSE SERPL-MCNC: 188 MG/DL (ref 70–110)
GLUCOSE SERPL-MCNC: 238 MG/DL (ref 70–110)
GLUCOSE SERPL-MCNC: 86 MG/DL (ref 70–110)
HCT VFR BLD AUTO: 30.4 % (ref 37–48.5)
HGB BLD-MCNC: 9.4 G/DL (ref 12–16)
IMM GRANULOCYTES # BLD AUTO: 0.04 K/UL (ref 0–0.04)
IMM GRANULOCYTES NFR BLD AUTO: 0.4 % (ref 0–0.5)
LYMPHOCYTES # BLD AUTO: 1.2 K/UL (ref 1–4.8)
LYMPHOCYTES NFR BLD: 12.9 % (ref 18–48)
MAGNESIUM SERPL-MCNC: 2.1 MG/DL (ref 1.6–2.6)
MCH RBC QN AUTO: 27.2 PG (ref 27–31)
MCHC RBC AUTO-ENTMCNC: 30.9 G/DL (ref 32–36)
MCV RBC AUTO: 88 FL (ref 82–98)
MONOCYTES # BLD AUTO: 0.7 K/UL (ref 0.3–1)
MONOCYTES NFR BLD: 7.8 % (ref 4–15)
NEUTROPHILS # BLD AUTO: 7.1 K/UL (ref 1.8–7.7)
NEUTROPHILS NFR BLD: 78.5 % (ref 38–73)
NRBC BLD-RTO: 0 /100 WBC
PHOSPHATE SERPL-MCNC: 4.9 MG/DL (ref 2.7–4.5)
PLATELET # BLD AUTO: 292 K/UL (ref 150–350)
PMV BLD AUTO: 11 FL (ref 9.2–12.9)
POCT GLUCOSE: 123 MG/DL (ref 70–110)
POCT GLUCOSE: 162 MG/DL (ref 70–110)
POCT GLUCOSE: 163 MG/DL (ref 70–110)
POCT GLUCOSE: 179 MG/DL (ref 70–110)
POCT GLUCOSE: 180 MG/DL (ref 70–110)
POCT GLUCOSE: 180 MG/DL (ref 70–110)
POCT GLUCOSE: 53 MG/DL (ref 70–110)
POCT GLUCOSE: 67 MG/DL (ref 70–110)
POCT GLUCOSE: 73 MG/DL (ref 70–110)
POCT GLUCOSE: 88 MG/DL (ref 70–110)
POCT GLUCOSE: 91 MG/DL (ref 70–110)
POTASSIUM SERPL-SCNC: 4.2 MMOL/L (ref 3.5–5.1)
POTASSIUM SERPL-SCNC: 4.2 MMOL/L (ref 3.5–5.1)
POTASSIUM SERPL-SCNC: 4.7 MMOL/L (ref 3.5–5.1)
RBC # BLD AUTO: 3.45 M/UL (ref 4–5.4)
SODIUM SERPL-SCNC: 139 MMOL/L (ref 136–145)
SODIUM SERPL-SCNC: 141 MMOL/L (ref 136–145)
SODIUM SERPL-SCNC: 143 MMOL/L (ref 136–145)
WBC # BLD AUTO: 8.99 K/UL (ref 3.9–12.7)

## 2021-01-22 PROCEDURE — 27800903 OPTIME MED/SURG SUP & DEVICES OTHER IMPLANTS: Performed by: SURGERY

## 2021-01-22 PROCEDURE — 25000003 PHARM REV CODE 250: Performed by: STUDENT IN AN ORGANIZED HEALTH CARE EDUCATION/TRAINING PROGRAM

## 2021-01-22 PROCEDURE — 25000003 PHARM REV CODE 250: Performed by: SURGERY

## 2021-01-22 PROCEDURE — 63600175 PHARM REV CODE 636 W HCPCS: Performed by: STUDENT IN AN ORGANIZED HEALTH CARE EDUCATION/TRAINING PROGRAM

## 2021-01-22 PROCEDURE — 63600175 PHARM REV CODE 636 W HCPCS: Performed by: NURSE ANESTHETIST, CERTIFIED REGISTERED

## 2021-01-22 PROCEDURE — D9220A PRA ANESTHESIA: Mod: ,,, | Performed by: ANESTHESIOLOGY

## 2021-01-22 PROCEDURE — 37000009 HC ANESTHESIA EA ADD 15 MINS: Performed by: SURGERY

## 2021-01-22 PROCEDURE — 27570 FIXATION OF KNEE JOINT: CPT | Mod: 78,LT,, | Performed by: ORTHOPAEDIC SURGERY

## 2021-01-22 PROCEDURE — 15756 PR FREE MUSC-SKIN FLAP W/MICROVASC ANAST: ICD-10-PCS | Mod: ,,, | Performed by: SURGERY

## 2021-01-22 PROCEDURE — 27201423 OPTIME MED/SURG SUP & DEVICES STERILE SUPPLY: Performed by: SURGERY

## 2021-01-22 PROCEDURE — 27201037 HC PRESSURE MONITORING SET UP

## 2021-01-22 PROCEDURE — 76942 ECHO GUIDE FOR BIOPSY: CPT | Mod: 26,,, | Performed by: ANESTHESIOLOGY

## 2021-01-22 PROCEDURE — 63600175 PHARM REV CODE 636 W HCPCS: Performed by: SURGERY

## 2021-01-22 PROCEDURE — 63600175 PHARM REV CODE 636 W HCPCS: Performed by: ORTHOPAEDIC SURGERY

## 2021-01-22 PROCEDURE — 25000003 PHARM REV CODE 250: Performed by: ANESTHESIOLOGY

## 2021-01-22 PROCEDURE — 15756 FREE MYO/SKIN FLAP MICROVASC: CPT | Mod: ,,, | Performed by: SURGERY

## 2021-01-22 PROCEDURE — 84100 ASSAY OF PHOSPHORUS: CPT

## 2021-01-22 PROCEDURE — 83735 ASSAY OF MAGNESIUM: CPT

## 2021-01-22 PROCEDURE — 76942 PR U/S GUIDANCE FOR NEEDLE GUIDANCE: ICD-10-PCS | Mod: 26,,, | Performed by: ANESTHESIOLOGY

## 2021-01-22 PROCEDURE — P9045 ALBUMIN (HUMAN), 5%, 250 ML: HCPCS | Mod: JG | Performed by: NURSE ANESTHETIST, CERTIFIED REGISTERED

## 2021-01-22 PROCEDURE — 15860 IV NJX TST VASC FLO FLAP/GRF: CPT | Mod: 51,,, | Performed by: SURGERY

## 2021-01-22 PROCEDURE — 15756 FREE MYO/SKIN FLAP MICROVASC: CPT | Mod: 82,,, | Performed by: OTOLARYNGOLOGY

## 2021-01-22 PROCEDURE — 15002 WOUND PREP TRK/ARM/LEG: CPT | Mod: ,,, | Performed by: SURGERY

## 2021-01-22 PROCEDURE — 36000705 HC OR TIME LEV I EA ADD 15 MIN: Performed by: SURGERY

## 2021-01-22 PROCEDURE — 99900035 HC TECH TIME PER 15 MIN (STAT)

## 2021-01-22 PROCEDURE — 94761 N-INVAS EAR/PLS OXIMETRY MLT: CPT

## 2021-01-22 PROCEDURE — C1769 GUIDE WIRE: HCPCS | Performed by: SURGERY

## 2021-01-22 PROCEDURE — 20000000 HC ICU ROOM

## 2021-01-22 PROCEDURE — 36620 INSERTION CATHETER ARTERY: CPT | Mod: 59,,, | Performed by: ANESTHESIOLOGY

## 2021-01-22 PROCEDURE — 25000003 PHARM REV CODE 250: Performed by: NURSE ANESTHETIST, CERTIFIED REGISTERED

## 2021-01-22 PROCEDURE — C1729 CATH, DRAINAGE: HCPCS | Performed by: SURGERY

## 2021-01-22 PROCEDURE — 36620 PR INSERT CATH,ART,PERCUT,SHORTTERM: ICD-10-PCS | Mod: 59,,, | Performed by: ANESTHESIOLOGY

## 2021-01-22 PROCEDURE — 63600175 PHARM REV CODE 636 W HCPCS: Mod: JG | Performed by: NURSE ANESTHETIST, CERTIFIED REGISTERED

## 2021-01-22 PROCEDURE — 80048 BASIC METABOLIC PNL TOTAL CA: CPT | Mod: 91

## 2021-01-22 PROCEDURE — 64450 NJX AA&/STRD OTHER PN/BRANCH: CPT | Mod: 59,LT,, | Performed by: ANESTHESIOLOGY

## 2021-01-22 PROCEDURE — 36000704 HC OR TIME LEV I 1ST 15 MIN: Performed by: SURGERY

## 2021-01-22 PROCEDURE — 27000221 HC OXYGEN, UP TO 24 HOURS

## 2021-01-22 PROCEDURE — 36415 COLL VENOUS BLD VENIPUNCTURE: CPT

## 2021-01-22 PROCEDURE — 15002 PR WOUND PREP, PED, TRK/ARM/LG 1ST 100 CM: ICD-10-PCS | Mod: ,,, | Performed by: SURGERY

## 2021-01-22 PROCEDURE — 64447: ICD-10-PCS | Mod: 59,LT,, | Performed by: ANESTHESIOLOGY

## 2021-01-22 PROCEDURE — 64450: ICD-10-PCS | Mod: 59,LT,, | Performed by: ANESTHESIOLOGY

## 2021-01-22 PROCEDURE — 15860 PR IV INJ TO TEST BLOOD FLOW IN FLAP/GRAFT: ICD-10-PCS | Mod: 51,,, | Performed by: SURGERY

## 2021-01-22 PROCEDURE — 37000008 HC ANESTHESIA 1ST 15 MINUTES: Performed by: SURGERY

## 2021-01-22 PROCEDURE — 27570 PR MANIPULATN KNEE JT+ANESTHESIA: ICD-10-PCS | Mod: 78,LT,, | Performed by: ORTHOPAEDIC SURGERY

## 2021-01-22 PROCEDURE — 64447 NJX AA&/STRD FEMORAL NRV IMG: CPT | Mod: 59,LT,, | Performed by: ANESTHESIOLOGY

## 2021-01-22 PROCEDURE — 25000003 PHARM REV CODE 250: Performed by: HOSPITALIST

## 2021-01-22 PROCEDURE — D9220A PRA ANESTHESIA: ICD-10-PCS | Mod: ,,, | Performed by: ANESTHESIOLOGY

## 2021-01-22 PROCEDURE — 15756 PR FREE MUSC-SKIN FLAP W/MICROVASC ANAST: ICD-10-PCS | Mod: 82,,, | Performed by: OTOLARYNGOLOGY

## 2021-01-22 PROCEDURE — 85025 COMPLETE CBC W/AUTO DIFF WBC: CPT

## 2021-01-22 DEVICE — COUPLER 2.0MM: Type: IMPLANTABLE DEVICE | Site: LEG | Status: FUNCTIONAL

## 2021-01-22 RX ORDER — MAGNESIUM SULFATE HEPTAHYDRATE 40 MG/ML
2 INJECTION, SOLUTION INTRAVENOUS
Status: DISCONTINUED | OUTPATIENT
Start: 2021-01-22 | End: 2021-01-26

## 2021-01-22 RX ORDER — ONDANSETRON 2 MG/ML
INJECTION INTRAMUSCULAR; INTRAVENOUS
Status: DISCONTINUED | OUTPATIENT
Start: 2021-01-22 | End: 2021-01-22

## 2021-01-22 RX ORDER — HALOPERIDOL 5 MG/ML
0.5 INJECTION INTRAMUSCULAR EVERY 10 MIN PRN
Status: CANCELLED | OUTPATIENT
Start: 2021-01-22

## 2021-01-22 RX ORDER — BUPIVACAINE HYDROCHLORIDE 2.5 MG/ML
INJECTION, SOLUTION EPIDURAL; INFILTRATION; INTRACAUDAL
Status: COMPLETED | OUTPATIENT
Start: 2021-01-22 | End: 2021-01-22

## 2021-01-22 RX ORDER — HEPARIN SODIUM 5000 [USP'U]/ML
INJECTION, SOLUTION INTRAVENOUS; SUBCUTANEOUS
Status: DISCONTINUED | OUTPATIENT
Start: 2021-01-22 | End: 2021-01-22 | Stop reason: HOSPADM

## 2021-01-22 RX ORDER — ROCURONIUM BROMIDE 10 MG/ML
INJECTION, SOLUTION INTRAVENOUS
Status: DISCONTINUED | OUTPATIENT
Start: 2021-01-22 | End: 2021-01-22

## 2021-01-22 RX ORDER — SODIUM CHLORIDE 9 MG/ML
INJECTION, SOLUTION INTRAVENOUS CONTINUOUS PRN
Status: DISCONTINUED | OUTPATIENT
Start: 2021-01-22 | End: 2021-01-22

## 2021-01-22 RX ORDER — MAGNESIUM SULFATE HEPTAHYDRATE 40 MG/ML
4 INJECTION, SOLUTION INTRAVENOUS
Status: DISCONTINUED | OUTPATIENT
Start: 2021-01-22 | End: 2021-01-26

## 2021-01-22 RX ORDER — AMOXICILLIN 250 MG
1 CAPSULE ORAL 2 TIMES DAILY
Status: DISCONTINUED | OUTPATIENT
Start: 2021-01-22 | End: 2021-02-03 | Stop reason: HOSPADM

## 2021-01-22 RX ORDER — VANCOMYCIN HYDROCHLORIDE 1 G/20ML
INJECTION, POWDER, LYOPHILIZED, FOR SOLUTION INTRAVENOUS
Status: DISCONTINUED | OUTPATIENT
Start: 2021-01-22 | End: 2021-01-22 | Stop reason: HOSPADM

## 2021-01-22 RX ORDER — ASPIRIN 81 MG/1
81 TABLET ORAL 2 TIMES DAILY
Status: CANCELLED | OUTPATIENT
Start: 2021-01-22

## 2021-01-22 RX ORDER — HEPARIN SODIUM 1000 [USP'U]/ML
INJECTION, SOLUTION INTRAVENOUS; SUBCUTANEOUS
Status: DISCONTINUED | OUTPATIENT
Start: 2021-01-22 | End: 2021-01-22

## 2021-01-22 RX ORDER — POTASSIUM CHLORIDE 7.45 MG/ML
10 INJECTION INTRAVENOUS
Status: DISCONTINUED | OUTPATIENT
Start: 2021-01-22 | End: 2021-01-26

## 2021-01-22 RX ORDER — POTASSIUM CHLORIDE 7.45 MG/ML
40 INJECTION INTRAVENOUS
Status: DISCONTINUED | OUTPATIENT
Start: 2021-01-22 | End: 2021-01-26

## 2021-01-22 RX ORDER — ALBUMIN HUMAN 50 G/1000ML
SOLUTION INTRAVENOUS CONTINUOUS PRN
Status: DISCONTINUED | OUTPATIENT
Start: 2021-01-22 | End: 2021-01-22

## 2021-01-22 RX ORDER — GLUCAGON 1 MG
1 KIT INJECTION
Status: DISCONTINUED | OUTPATIENT
Start: 2021-01-22 | End: 2021-01-22 | Stop reason: SDUPTHER

## 2021-01-22 RX ORDER — HEPARIN SODIUM 5000 [USP'U]/ML
5000 INJECTION, SOLUTION INTRAVENOUS; SUBCUTANEOUS EVERY 8 HOURS
Status: CANCELLED | OUTPATIENT
Start: 2021-01-22

## 2021-01-22 RX ORDER — PHENYLEPHRINE HYDROCHLORIDE 10 MG/ML
INJECTION INTRAVENOUS
Status: DISCONTINUED | OUTPATIENT
Start: 2021-01-22 | End: 2021-01-22

## 2021-01-22 RX ORDER — POTASSIUM CHLORIDE 7.45 MG/ML
10 INJECTION INTRAVENOUS
Status: DISCONTINUED | OUTPATIENT
Start: 2021-01-22 | End: 2021-01-22

## 2021-01-22 RX ORDER — DOXYCYCLINE HYCLATE 100 MG
100 TABLET ORAL EVERY 12 HOURS
Status: DISCONTINUED | OUTPATIENT
Start: 2021-01-22 | End: 2021-01-22

## 2021-01-22 RX ORDER — SODIUM CHLORIDE 9 MG/ML
INJECTION, SOLUTION INTRAVENOUS CONTINUOUS
Status: CANCELLED | OUTPATIENT
Start: 2021-01-22

## 2021-01-22 RX ORDER — HYDROMORPHONE HYDROCHLORIDE 1 MG/ML
0.5 INJECTION, SOLUTION INTRAMUSCULAR; INTRAVENOUS; SUBCUTANEOUS
Status: CANCELLED | OUTPATIENT
Start: 2021-01-22

## 2021-01-22 RX ORDER — HYDROMORPHONE HYDROCHLORIDE 1 MG/ML
INJECTION, SOLUTION INTRAMUSCULAR; INTRAVENOUS; SUBCUTANEOUS
Status: DISCONTINUED | OUTPATIENT
Start: 2021-01-22 | End: 2021-01-22

## 2021-01-22 RX ORDER — OXYCODONE HYDROCHLORIDE 5 MG/1
5 TABLET ORAL EVERY 4 HOURS PRN
Status: CANCELLED | OUTPATIENT
Start: 2021-01-22

## 2021-01-22 RX ORDER — CLINDAMYCIN PHOSPHATE 900 MG/50ML
INJECTION, SOLUTION INTRAVENOUS
Status: DISCONTINUED | OUTPATIENT
Start: 2021-01-22 | End: 2021-01-22

## 2021-01-22 RX ORDER — HEPARIN SODIUM 5000 [USP'U]/ML
5000 INJECTION, SOLUTION INTRAVENOUS; SUBCUTANEOUS EVERY 8 HOURS
Status: DISCONTINUED | OUTPATIENT
Start: 2021-01-22 | End: 2021-01-23

## 2021-01-22 RX ORDER — OXYCODONE HYDROCHLORIDE 10 MG/1
10 TABLET ORAL EVERY 4 HOURS PRN
Status: CANCELLED | OUTPATIENT
Start: 2021-01-22

## 2021-01-22 RX ORDER — METOPROLOL TARTRATE 1 MG/ML
INJECTION, SOLUTION INTRAVENOUS
Status: DISCONTINUED | OUTPATIENT
Start: 2021-01-22 | End: 2021-01-22

## 2021-01-22 RX ORDER — DIPHENHYDRAMINE HCL 25 MG
25 CAPSULE ORAL EVERY 4 HOURS PRN
Status: CANCELLED | OUTPATIENT
Start: 2021-01-22

## 2021-01-22 RX ORDER — HYDROMORPHONE HYDROCHLORIDE 1 MG/ML
INJECTION, SOLUTION INTRAMUSCULAR; INTRAVENOUS; SUBCUTANEOUS
Status: COMPLETED
Start: 2021-01-22 | End: 2021-01-22

## 2021-01-22 RX ORDER — FENTANYL CITRATE 50 UG/ML
INJECTION, SOLUTION INTRAMUSCULAR; INTRAVENOUS
Status: DISCONTINUED | OUTPATIENT
Start: 2021-01-22 | End: 2021-01-22

## 2021-01-22 RX ORDER — INSULIN ASPART 100 [IU]/ML
1-10 INJECTION, SOLUTION INTRAVENOUS; SUBCUTANEOUS EVERY 6 HOURS PRN
Status: DISCONTINUED | OUTPATIENT
Start: 2021-01-22 | End: 2021-01-23

## 2021-01-22 RX ORDER — FENTANYL CITRATE 50 UG/ML
25 INJECTION, SOLUTION INTRAMUSCULAR; INTRAVENOUS EVERY 5 MIN PRN
Status: CANCELLED | OUTPATIENT
Start: 2021-01-22

## 2021-01-22 RX ORDER — PROPOFOL 10 MG/ML
VIAL (ML) INTRAVENOUS
Status: DISCONTINUED | OUTPATIENT
Start: 2021-01-22 | End: 2021-01-22

## 2021-01-22 RX ORDER — DEXAMETHASONE SODIUM PHOSPHATE 4 MG/ML
INJECTION, SOLUTION INTRA-ARTICULAR; INTRALESIONAL; INTRAMUSCULAR; INTRAVENOUS; SOFT TISSUE
Status: DISCONTINUED | OUTPATIENT
Start: 2021-01-22 | End: 2021-01-22

## 2021-01-22 RX ORDER — CEFEPIME HYDROCHLORIDE 1 G/50ML
INJECTION, SOLUTION INTRAVENOUS
Status: DISCONTINUED | OUTPATIENT
Start: 2021-01-22 | End: 2021-01-22 | Stop reason: HOSPADM

## 2021-01-22 RX ORDER — MIDAZOLAM HYDROCHLORIDE 1 MG/ML
INJECTION INTRAMUSCULAR; INTRAVENOUS
Status: DISCONTINUED | OUTPATIENT
Start: 2021-01-22 | End: 2021-01-22

## 2021-01-22 RX ORDER — SODIUM CHLORIDE 0.9 % (FLUSH) 0.9 %
10 SYRINGE (ML) INJECTION
Status: CANCELLED | OUTPATIENT
Start: 2021-01-22

## 2021-01-22 RX ORDER — MUPIROCIN 20 MG/G
OINTMENT TOPICAL 2 TIMES DAILY
Status: DISPENSED | OUTPATIENT
Start: 2021-01-22 | End: 2021-01-27

## 2021-01-22 RX ORDER — KETAMINE HCL IN 0.9 % NACL 50 MG/5 ML
SYRINGE (ML) INTRAVENOUS
Status: DISCONTINUED | OUTPATIENT
Start: 2021-01-22 | End: 2021-01-22

## 2021-01-22 RX ORDER — LIDOCAINE HYDROCHLORIDE 40 MG/ML
INJECTION, SOLUTION RETROBULBAR
Status: DISCONTINUED | OUTPATIENT
Start: 2021-01-22 | End: 2021-01-22 | Stop reason: HOSPADM

## 2021-01-22 RX ORDER — POTASSIUM CHLORIDE 7.45 MG/ML
40 INJECTION INTRAVENOUS
Status: DISCONTINUED | OUTPATIENT
Start: 2021-01-22 | End: 2021-01-22

## 2021-01-22 RX ORDER — ACETAMINOPHEN 500 MG
1000 TABLET ORAL EVERY 8 HOURS
Status: CANCELLED | OUTPATIENT
Start: 2021-01-22

## 2021-01-22 RX ADMIN — MUPIROCIN: 20 OINTMENT TOPICAL at 09:01

## 2021-01-22 RX ADMIN — HYDROMORPHONE HYDROCHLORIDE 0.4 MG: 1 INJECTION, SOLUTION INTRAMUSCULAR; INTRAVENOUS; SUBCUTANEOUS at 05:01

## 2021-01-22 RX ADMIN — PHENYLEPHRINE HYDROCHLORIDE 50 MCG: 10 INJECTION INTRAVENOUS at 09:01

## 2021-01-22 RX ADMIN — ROCURONIUM BROMIDE 20 MG: 10 INJECTION, SOLUTION INTRAVENOUS at 09:01

## 2021-01-22 RX ADMIN — PHENYLEPHRINE HYDROCHLORIDE 100 MCG: 10 INJECTION INTRAVENOUS at 10:01

## 2021-01-22 RX ADMIN — MIDAZOLAM HYDROCHLORIDE 2 MG: 1 INJECTION, SOLUTION INTRAMUSCULAR; INTRAVENOUS at 07:01

## 2021-01-22 RX ADMIN — HYDROMORPHONE HYDROCHLORIDE 0.6 MG: 1 INJECTION, SOLUTION INTRAMUSCULAR; INTRAVENOUS; SUBCUTANEOUS at 06:01

## 2021-01-22 RX ADMIN — ROCURONIUM BROMIDE 10 MG: 10 INJECTION, SOLUTION INTRAVENOUS at 02:01

## 2021-01-22 RX ADMIN — Medication 10 MG: at 10:01

## 2021-01-22 RX ADMIN — SODIUM CHLORIDE, SODIUM GLUCONATE, SODIUM ACETATE, POTASSIUM CHLORIDE, MAGNESIUM CHLORIDE, SODIUM PHOSPHATE, DIBASIC, AND POTASSIUM PHOSPHATE: .53; .5; .37; .037; .03; .012; .00082 INJECTION, SOLUTION INTRAVENOUS at 12:01

## 2021-01-22 RX ADMIN — HYDROMORPHONE HYDROCHLORIDE 0.25 MG: 1 INJECTION, SOLUTION INTRAMUSCULAR; INTRAVENOUS; SUBCUTANEOUS at 11:01

## 2021-01-22 RX ADMIN — SODIUM CHLORIDE: 9 INJECTION, SOLUTION INTRAVENOUS at 07:01

## 2021-01-22 RX ADMIN — HYDROMORPHONE HYDROCHLORIDE 0.4 MG: 1 INJECTION, SOLUTION INTRAMUSCULAR; INTRAVENOUS; SUBCUTANEOUS at 06:01

## 2021-01-22 RX ADMIN — PHENYLEPHRINE HYDROCHLORIDE 50 MCG: 10 INJECTION INTRAVENOUS at 08:01

## 2021-01-22 RX ADMIN — METOPROLOL TARTRATE 2.5 MG: 5 INJECTION, SOLUTION INTRAVENOUS at 09:01

## 2021-01-22 RX ADMIN — HYDROMORPHONE HYDROCHLORIDE 1 MG: 1 INJECTION, SOLUTION INTRAMUSCULAR; INTRAVENOUS; SUBCUTANEOUS at 09:01

## 2021-01-22 RX ADMIN — FENTANYL CITRATE 100 MCG: 50 INJECTION, SOLUTION INTRAMUSCULAR; INTRAVENOUS at 07:01

## 2021-01-22 RX ADMIN — ONDANSETRON 4 MG: 2 INJECTION, SOLUTION INTRAMUSCULAR; INTRAVENOUS at 06:01

## 2021-01-22 RX ADMIN — DOXYCYCLINE 100 MG: 100 INJECTION, POWDER, LYOPHILIZED, FOR SOLUTION INTRAVENOUS at 09:01

## 2021-01-22 RX ADMIN — DOCUSATE SODIUM 50MG AND SENNOSIDES 8.6MG 1 TABLET: 8.6; 5 TABLET, FILM COATED ORAL at 09:01

## 2021-01-22 RX ADMIN — DEXTROSE MONOHYDRATE 25 G: 25 INJECTION, SOLUTION INTRAVENOUS at 01:01

## 2021-01-22 RX ADMIN — DEXAMETHASONE SODIUM PHOSPHATE 4 MG: 4 INJECTION, SOLUTION INTRAMUSCULAR; INTRAVENOUS at 07:01

## 2021-01-22 RX ADMIN — PROPOFOL 100 MG: 10 INJECTION, EMULSION INTRAVENOUS at 07:01

## 2021-01-22 RX ADMIN — ROCURONIUM BROMIDE 50 MG: 10 INJECTION, SOLUTION INTRAVENOUS at 07:01

## 2021-01-22 RX ADMIN — ROCURONIUM BROMIDE 10 MG: 10 INJECTION, SOLUTION INTRAVENOUS at 11:01

## 2021-01-22 RX ADMIN — PROPOFOL 30 MG: 10 INJECTION, EMULSION INTRAVENOUS at 05:01

## 2021-01-22 RX ADMIN — CALCIUM CHLORIDE 250 MG: 100 INJECTION, SOLUTION INTRAVENOUS at 04:01

## 2021-01-22 RX ADMIN — CLINDAMYCIN PHOSPHATE 900 MG: 18 INJECTION, SOLUTION INTRAVENOUS at 02:01

## 2021-01-22 RX ADMIN — Medication 10 MG: at 11:01

## 2021-01-22 RX ADMIN — Medication 10 MG: at 12:01

## 2021-01-22 RX ADMIN — HEPARIN SODIUM 3000 UNITS: 1000 INJECTION, SOLUTION INTRAVENOUS; SUBCUTANEOUS at 02:01

## 2021-01-22 RX ADMIN — DEXTROSE MONOHYDRATE 12.5 G: 25 INJECTION, SOLUTION INTRAVENOUS at 05:01

## 2021-01-22 RX ADMIN — CLINDAMYCIN PHOSPHATE 900 MG: 18 INJECTION, SOLUTION INTRAVENOUS at 08:01

## 2021-01-22 RX ADMIN — PROPOFOL 20 MG: 10 INJECTION, EMULSION INTRAVENOUS at 06:01

## 2021-01-22 RX ADMIN — PROPOFOL 50 MG: 10 INJECTION, EMULSION INTRAVENOUS at 09:01

## 2021-01-22 RX ADMIN — ROCURONIUM BROMIDE 20 MG: 10 INJECTION, SOLUTION INTRAVENOUS at 11:01

## 2021-01-22 RX ADMIN — PHENYLEPHRINE HYDROCHLORIDE 100 MCG: 10 INJECTION INTRAVENOUS at 08:01

## 2021-01-22 RX ADMIN — SODIUM CHLORIDE 2 UNITS/HR: 9 INJECTION, SOLUTION INTRAVENOUS at 02:01

## 2021-01-22 RX ADMIN — Medication 10 MG: at 01:01

## 2021-01-22 RX ADMIN — SODIUM CHLORIDE, SODIUM GLUCONATE, SODIUM ACETATE, POTASSIUM CHLORIDE, MAGNESIUM CHLORIDE, SODIUM PHOSPHATE, DIBASIC, AND POTASSIUM PHOSPHATE: .53; .5; .37; .037; .03; .012; .00082 INJECTION, SOLUTION INTRAVENOUS at 10:01

## 2021-01-22 RX ADMIN — Medication 20 MG: at 08:01

## 2021-01-22 RX ADMIN — ROCURONIUM BROMIDE 10 MG: 10 INJECTION, SOLUTION INTRAVENOUS at 03:01

## 2021-01-22 RX ADMIN — HEPARIN SODIUM 5000 UNITS: 1000 INJECTION, SOLUTION INTRAVENOUS; SUBCUTANEOUS at 08:01

## 2021-01-22 RX ADMIN — BUPIVACAINE HYDROCHLORIDE 20 ML: 2.5 INJECTION, SOLUTION EPIDURAL; INFILTRATION; INTRACAUDAL; PERINEURAL at 08:01

## 2021-01-22 RX ADMIN — SODIUM CHLORIDE, SODIUM GLUCONATE, SODIUM ACETATE, POTASSIUM CHLORIDE, MAGNESIUM CHLORIDE, SODIUM PHOSPHATE, DIBASIC, AND POTASSIUM PHOSPHATE: .53; .5; .37; .037; .03; .012; .00082 INJECTION, SOLUTION INTRAVENOUS at 07:01

## 2021-01-22 RX ADMIN — METOPROLOL TARTRATE 2.5 MG: 5 INJECTION, SOLUTION INTRAVENOUS at 11:01

## 2021-01-22 RX ADMIN — Medication 30 MG: at 07:01

## 2021-01-22 RX ADMIN — Medication 10 MG: at 09:01

## 2021-01-22 RX ADMIN — ROCURONIUM BROMIDE 10 MG: 10 INJECTION, SOLUTION INTRAVENOUS at 10:01

## 2021-01-22 RX ADMIN — BUPIVACAINE HYDROCHLORIDE 20 ML: 2.5 INJECTION, SOLUTION EPIDURAL; INFILTRATION; INTRACAUDAL; PERINEURAL at 07:01

## 2021-01-22 RX ADMIN — HEPARIN SODIUM 5000 UNITS: 5000 INJECTION INTRAVENOUS; SUBCUTANEOUS at 09:01

## 2021-01-22 RX ADMIN — PROPOFOL 50 MG: 10 INJECTION, EMULSION INTRAVENOUS at 08:01

## 2021-01-22 RX ADMIN — INSULIN ASPART 2 UNITS: 100 INJECTION, SOLUTION INTRAVENOUS; SUBCUTANEOUS at 07:01

## 2021-01-22 RX ADMIN — BUPIVACAINE HYDROCHLORIDE 20 ML: 2.5 INJECTION, SOLUTION EPIDURAL; INFILTRATION; INTRACAUDAL; PERINEURAL at 09:01

## 2021-01-22 RX ADMIN — HYDROMORPHONE HYDROCHLORIDE 0.6 MG: 1 INJECTION, SOLUTION INTRAMUSCULAR; INTRAVENOUS; SUBCUTANEOUS at 05:01

## 2021-01-22 RX ADMIN — ROCURONIUM BROMIDE 10 MG: 10 INJECTION, SOLUTION INTRAVENOUS at 01:01

## 2021-01-22 RX ADMIN — ROCURONIUM BROMIDE 50 MG: 10 INJECTION, SOLUTION INTRAVENOUS at 08:01

## 2021-01-22 RX ADMIN — Medication 10 MG: at 02:01

## 2021-01-22 RX ADMIN — SODIUM CHLORIDE, SODIUM GLUCONATE, SODIUM ACETATE, POTASSIUM CHLORIDE, MAGNESIUM CHLORIDE, SODIUM PHOSPHATE, DIBASIC, AND POTASSIUM PHOSPHATE: .53; .5; .37; .037; .03; .012; .00082 INJECTION, SOLUTION INTRAVENOUS at 02:01

## 2021-01-22 RX ADMIN — ROCURONIUM BROMIDE 10 MG: 10 INJECTION, SOLUTION INTRAVENOUS at 12:01

## 2021-01-22 RX ADMIN — ALBUMIN (HUMAN): 12.5 SOLUTION INTRAVENOUS at 04:01

## 2021-01-22 RX ADMIN — CALCIUM CHLORIDE 250 MG: 100 INJECTION, SOLUTION INTRAVENOUS at 05:01

## 2021-01-23 LAB
ANION GAP SERPL CALC-SCNC: 9 MMOL/L (ref 8–16)
BASOPHILS # BLD AUTO: 0.05 K/UL (ref 0–0.2)
BASOPHILS NFR BLD: 0.7 % (ref 0–1.9)
BUN SERPL-MCNC: 14 MG/DL (ref 6–20)
CALCIUM SERPL-MCNC: 8.4 MG/DL (ref 8.7–10.5)
CHLORIDE SERPL-SCNC: 105 MMOL/L (ref 95–110)
CO2 SERPL-SCNC: 25 MMOL/L (ref 23–29)
CREAT SERPL-MCNC: 0.7 MG/DL (ref 0.5–1.4)
DIFFERENTIAL METHOD: ABNORMAL
EOSINOPHIL # BLD AUTO: 0.1 K/UL (ref 0–0.5)
EOSINOPHIL NFR BLD: 0.7 % (ref 0–8)
ERYTHROCYTE [DISTWIDTH] IN BLOOD BY AUTOMATED COUNT: 13.4 % (ref 11.5–14.5)
EST. GFR  (AFRICAN AMERICAN): >60 ML/MIN/1.73 M^2
EST. GFR  (NON AFRICAN AMERICAN): >60 ML/MIN/1.73 M^2
GLUCOSE SERPL-MCNC: 137 MG/DL (ref 70–110)
GLUCOSE SERPL-MCNC: 149 MG/DL (ref 70–110)
GLUCOSE SERPL-MCNC: 250 MG/DL (ref 70–110)
GLUCOSE SERPL-MCNC: 97 MG/DL (ref 70–110)
HCO3 UR-SCNC: 24.5 MMOL/L (ref 24–28)
HCO3 UR-SCNC: 24.7 MMOL/L (ref 24–28)
HCO3 UR-SCNC: 26.2 MMOL/L (ref 24–28)
HCT VFR BLD AUTO: 28.5 % (ref 37–48.5)
HCT VFR BLD CALC: 35 %PCV (ref 36–54)
HCT VFR BLD CALC: 36 %PCV (ref 36–54)
HCT VFR BLD CALC: 36 %PCV (ref 36–54)
HGB BLD-MCNC: 9 G/DL (ref 12–16)
IMM GRANULOCYTES # BLD AUTO: 0.01 K/UL (ref 0–0.04)
IMM GRANULOCYTES NFR BLD AUTO: 0.1 % (ref 0–0.5)
LYMPHOCYTES # BLD AUTO: 1.7 K/UL (ref 1–4.8)
LYMPHOCYTES NFR BLD: 22.2 % (ref 18–48)
MAGNESIUM SERPL-MCNC: 1.9 MG/DL (ref 1.6–2.6)
MCH RBC QN AUTO: 27.4 PG (ref 27–31)
MCHC RBC AUTO-ENTMCNC: 31.6 G/DL (ref 32–36)
MCV RBC AUTO: 87 FL (ref 82–98)
MONOCYTES # BLD AUTO: 0.8 K/UL (ref 0.3–1)
MONOCYTES NFR BLD: 10.2 % (ref 4–15)
NEUTROPHILS # BLD AUTO: 4.9 K/UL (ref 1.8–7.7)
NEUTROPHILS NFR BLD: 66.1 % (ref 38–73)
NRBC BLD-RTO: 0 /100 WBC
PCO2 BLDA: 38.9 MMHG (ref 35–45)
PCO2 BLDA: 39.2 MMHG (ref 35–45)
PCO2 BLDA: 45.1 MMHG (ref 35–45)
PH SMN: 7.37 [PH] (ref 7.35–7.45)
PH SMN: 7.41 [PH] (ref 7.35–7.45)
PH SMN: 7.41 [PH] (ref 7.35–7.45)
PHOSPHATE SERPL-MCNC: 2.8 MG/DL (ref 2.7–4.5)
PLATELET # BLD AUTO: 268 K/UL (ref 150–350)
PMV BLD AUTO: 11.4 FL (ref 9.2–12.9)
PO2 BLDA: 80 MMHG (ref 80–100)
PO2 BLDA: 88 MMHG (ref 80–100)
PO2 BLDA: 93 MMHG (ref 80–100)
POC BE: 0 MMOL/L
POC BE: 0 MMOL/L
POC BE: 1 MMOL/L
POC IONIZED CALCIUM: 1.2 MMOL/L (ref 1.06–1.42)
POC IONIZED CALCIUM: 1.2 MMOL/L (ref 1.06–1.42)
POC IONIZED CALCIUM: 1.24 MMOL/L (ref 1.06–1.42)
POC SATURATED O2: 95 % (ref 95–100)
POC SATURATED O2: 97 % (ref 95–100)
POC SATURATED O2: 97 % (ref 95–100)
POC TCO2: 26 MMOL/L (ref 23–27)
POC TCO2: 26 MMOL/L (ref 23–27)
POC TCO2: 28 MMOL/L (ref 23–27)
POCT GLUCOSE: 224 MG/DL (ref 70–110)
POCT GLUCOSE: 236 MG/DL (ref 70–110)
POCT GLUCOSE: 239 MG/DL (ref 70–110)
POCT GLUCOSE: 241 MG/DL (ref 70–110)
POCT GLUCOSE: 250 MG/DL (ref 70–110)
POCT GLUCOSE: 280 MG/DL (ref 70–110)
POCT GLUCOSE: 303 MG/DL (ref 70–110)
POCT GLUCOSE: 306 MG/DL (ref 70–110)
POTASSIUM BLD-SCNC: 4.1 MMOL/L (ref 3.5–5.1)
POTASSIUM BLD-SCNC: 4.5 MMOL/L (ref 3.5–5.1)
POTASSIUM BLD-SCNC: 4.6 MMOL/L (ref 3.5–5.1)
POTASSIUM SERPL-SCNC: 3.9 MMOL/L (ref 3.5–5.1)
RBC # BLD AUTO: 3.29 M/UL (ref 4–5.4)
SAMPLE: ABNORMAL
SODIUM BLD-SCNC: 139 MMOL/L (ref 136–145)
SODIUM BLD-SCNC: 139 MMOL/L (ref 136–145)
SODIUM BLD-SCNC: 140 MMOL/L (ref 136–145)
SODIUM SERPL-SCNC: 139 MMOL/L (ref 136–145)
WBC # BLD AUTO: 7.43 K/UL (ref 3.9–12.7)

## 2021-01-23 PROCEDURE — 99222 1ST HOSP IP/OBS MODERATE 55: CPT | Mod: ,,, | Performed by: NURSE PRACTITIONER

## 2021-01-23 PROCEDURE — 80048 BASIC METABOLIC PNL TOTAL CA: CPT

## 2021-01-23 PROCEDURE — 27000221 HC OXYGEN, UP TO 24 HOURS

## 2021-01-23 PROCEDURE — 99900035 HC TECH TIME PER 15 MIN (STAT)

## 2021-01-23 PROCEDURE — 99291 PR CRITICAL CARE, E/M 30-74 MINUTES: ICD-10-PCS | Mod: ,,, | Performed by: STUDENT IN AN ORGANIZED HEALTH CARE EDUCATION/TRAINING PROGRAM

## 2021-01-23 PROCEDURE — 20000000 HC ICU ROOM

## 2021-01-23 PROCEDURE — 85025 COMPLETE CBC W/AUTO DIFF WBC: CPT

## 2021-01-23 PROCEDURE — C9399 UNCLASSIFIED DRUGS OR BIOLOG: HCPCS | Performed by: SURGERY

## 2021-01-23 PROCEDURE — 25000003 PHARM REV CODE 250: Performed by: SURGERY

## 2021-01-23 PROCEDURE — 83735 ASSAY OF MAGNESIUM: CPT

## 2021-01-23 PROCEDURE — 94761 N-INVAS EAR/PLS OXIMETRY MLT: CPT

## 2021-01-23 PROCEDURE — 99291 CRITICAL CARE FIRST HOUR: CPT | Mod: ,,, | Performed by: STUDENT IN AN ORGANIZED HEALTH CARE EDUCATION/TRAINING PROGRAM

## 2021-01-23 PROCEDURE — 25000003 PHARM REV CODE 250: Performed by: STUDENT IN AN ORGANIZED HEALTH CARE EDUCATION/TRAINING PROGRAM

## 2021-01-23 PROCEDURE — 99222 PR INITIAL HOSPITAL CARE,LEVL II: ICD-10-PCS | Mod: ,,, | Performed by: NURSE PRACTITIONER

## 2021-01-23 PROCEDURE — 63600175 PHARM REV CODE 636 W HCPCS: Performed by: STUDENT IN AN ORGANIZED HEALTH CARE EDUCATION/TRAINING PROGRAM

## 2021-01-23 PROCEDURE — 84100 ASSAY OF PHOSPHORUS: CPT

## 2021-01-23 PROCEDURE — 63600175 PHARM REV CODE 636 W HCPCS: Performed by: NURSE PRACTITIONER

## 2021-01-23 PROCEDURE — 63600175 PHARM REV CODE 636 W HCPCS: Performed by: SURGERY

## 2021-01-23 RX ORDER — KETOROLAC TROMETHAMINE 10 MG/1
10 TABLET, FILM COATED ORAL EVERY 6 HOURS
Status: COMPLETED | OUTPATIENT
Start: 2021-01-24 | End: 2021-01-28

## 2021-01-23 RX ORDER — LISINOPRIL 2.5 MG/1
2.5 TABLET ORAL DAILY
Status: DISCONTINUED | OUTPATIENT
Start: 2021-01-23 | End: 2021-02-03 | Stop reason: HOSPADM

## 2021-01-23 RX ORDER — INSULIN ASPART 100 [IU]/ML
1-12 INJECTION, SOLUTION INTRAVENOUS; SUBCUTANEOUS
Status: DISCONTINUED | OUTPATIENT
Start: 2021-01-24 | End: 2021-01-24

## 2021-01-23 RX ORDER — LABETALOL HCL 20 MG/4 ML
5 SYRINGE (ML) INTRAVENOUS ONCE
Status: COMPLETED | OUTPATIENT
Start: 2021-01-23 | End: 2021-01-23

## 2021-01-23 RX ORDER — INSULIN ASPART 100 [IU]/ML
0-5 INJECTION, SOLUTION INTRAVENOUS; SUBCUTANEOUS
Status: DISCONTINUED | OUTPATIENT
Start: 2021-01-23 | End: 2021-01-24

## 2021-01-23 RX ORDER — ACETAMINOPHEN 325 MG/1
650 TABLET ORAL EVERY 6 HOURS
Status: DISCONTINUED | OUTPATIENT
Start: 2021-01-23 | End: 2021-01-27

## 2021-01-23 RX ORDER — IBUPROFEN 200 MG
24 TABLET ORAL
Status: DISCONTINUED | OUTPATIENT
Start: 2021-01-23 | End: 2021-01-24

## 2021-01-23 RX ORDER — PANTOPRAZOLE SODIUM 40 MG/1
40 TABLET, DELAYED RELEASE ORAL DAILY
Status: DISCONTINUED | OUTPATIENT
Start: 2021-01-23 | End: 2021-02-03 | Stop reason: HOSPADM

## 2021-01-23 RX ORDER — INSULIN ASPART 100 [IU]/ML
3 INJECTION, SOLUTION INTRAVENOUS; SUBCUTANEOUS
Status: DISCONTINUED | OUTPATIENT
Start: 2021-01-23 | End: 2021-01-23

## 2021-01-23 RX ORDER — ENOXAPARIN SODIUM 100 MG/ML
40 INJECTION SUBCUTANEOUS EVERY 24 HOURS
Status: DISCONTINUED | OUTPATIENT
Start: 2021-01-23 | End: 2021-02-03 | Stop reason: HOSPADM

## 2021-01-23 RX ORDER — INSULIN ASPART 100 [IU]/ML
2 INJECTION, SOLUTION INTRAVENOUS; SUBCUTANEOUS
Status: DISCONTINUED | OUTPATIENT
Start: 2021-01-23 | End: 2021-01-23

## 2021-01-23 RX ORDER — OXYCODONE HYDROCHLORIDE 5 MG/1
5 TABLET ORAL EVERY 4 HOURS PRN
Status: DISCONTINUED | OUTPATIENT
Start: 2021-01-23 | End: 2021-02-03 | Stop reason: HOSPADM

## 2021-01-23 RX ORDER — GLUCAGON 1 MG
1 KIT INJECTION
Status: DISCONTINUED | OUTPATIENT
Start: 2021-01-23 | End: 2021-01-24

## 2021-01-23 RX ORDER — INSULIN ASPART 100 [IU]/ML
12 INJECTION, SOLUTION INTRAVENOUS; SUBCUTANEOUS
Status: DISCONTINUED | OUTPATIENT
Start: 2021-01-23 | End: 2021-01-23

## 2021-01-23 RX ORDER — GABAPENTIN 300 MG/1
300 CAPSULE ORAL 3 TIMES DAILY
Status: DISCONTINUED | OUTPATIENT
Start: 2021-01-23 | End: 2021-02-03 | Stop reason: HOSPADM

## 2021-01-23 RX ORDER — NAPROXEN SODIUM 220 MG/1
81 TABLET, FILM COATED ORAL DAILY
Status: DISCONTINUED | OUTPATIENT
Start: 2021-01-23 | End: 2021-01-26

## 2021-01-23 RX ORDER — IBUPROFEN 200 MG
16 TABLET ORAL
Status: DISCONTINUED | OUTPATIENT
Start: 2021-01-23 | End: 2021-01-24

## 2021-01-23 RX ORDER — HYDROMORPHONE HYDROCHLORIDE 1 MG/ML
1 INJECTION, SOLUTION INTRAMUSCULAR; INTRAVENOUS; SUBCUTANEOUS
Status: DISCONTINUED | OUTPATIENT
Start: 2021-01-23 | End: 2021-02-03 | Stop reason: HOSPADM

## 2021-01-23 RX ORDER — INSULIN ASPART 100 [IU]/ML
1-10 INJECTION, SOLUTION INTRAVENOUS; SUBCUTANEOUS
Status: DISCONTINUED | OUTPATIENT
Start: 2021-01-23 | End: 2021-01-23

## 2021-01-23 RX ORDER — OXYCODONE HYDROCHLORIDE 10 MG/1
10 TABLET ORAL EVERY 4 HOURS PRN
Status: DISCONTINUED | OUTPATIENT
Start: 2021-01-23 | End: 2021-02-03 | Stop reason: HOSPADM

## 2021-01-23 RX ADMIN — DOXYCYCLINE 100 MG: 100 INJECTION, POWDER, LYOPHILIZED, FOR SOLUTION INTRAVENOUS at 08:01

## 2021-01-23 RX ADMIN — HYDROMORPHONE HYDROCHLORIDE 1 MG: 1 INJECTION, SOLUTION INTRAMUSCULAR; INTRAVENOUS; SUBCUTANEOUS at 08:01

## 2021-01-23 RX ADMIN — HYDROMORPHONE HYDROCHLORIDE 1 MG: 1 INJECTION, SOLUTION INTRAMUSCULAR; INTRAVENOUS; SUBCUTANEOUS at 05:01

## 2021-01-23 RX ADMIN — DOCUSATE SODIUM 50MG AND SENNOSIDES 8.6MG 1 TABLET: 8.6; 5 TABLET, FILM COATED ORAL at 08:01

## 2021-01-23 RX ADMIN — ACETAMINOPHEN 650 MG: 325 TABLET ORAL at 11:01

## 2021-01-23 RX ADMIN — INSULIN ASPART 4 UNITS: 100 INJECTION, SOLUTION INTRAVENOUS; SUBCUTANEOUS at 05:01

## 2021-01-23 RX ADMIN — OXYCODONE HYDROCHLORIDE 10 MG: 10 TABLET ORAL at 02:01

## 2021-01-23 RX ADMIN — ASPIRIN 81 MG: 81 TABLET, CHEWABLE ORAL at 12:01

## 2021-01-23 RX ADMIN — INSULIN DETEMIR 35 UNITS: 100 INJECTION, SOLUTION SUBCUTANEOUS at 10:01

## 2021-01-23 RX ADMIN — MELATONIN TAB 3 MG 6 MG: 3 TAB at 08:01

## 2021-01-23 RX ADMIN — OXYCODONE HYDROCHLORIDE 10 MG: 10 TABLET ORAL at 08:01

## 2021-01-23 RX ADMIN — INSULIN ASPART 2 UNITS: 100 INJECTION, SOLUTION INTRAVENOUS; SUBCUTANEOUS at 03:01

## 2021-01-23 RX ADMIN — HEPARIN SODIUM 5000 UNITS: 5000 INJECTION INTRAVENOUS; SUBCUTANEOUS at 05:01

## 2021-01-23 RX ADMIN — MUPIROCIN: 20 OINTMENT TOPICAL at 08:01

## 2021-01-23 RX ADMIN — PANTOPRAZOLE SODIUM 40 MG: 40 TABLET, DELAYED RELEASE ORAL at 10:01

## 2021-01-23 RX ADMIN — INSULIN ASPART 6 UNITS: 100 INJECTION, SOLUTION INTRAVENOUS; SUBCUTANEOUS at 10:01

## 2021-01-23 RX ADMIN — ACETAMINOPHEN 650 MG: 325 TABLET ORAL at 12:01

## 2021-01-23 RX ADMIN — OXYCODONE 5 MG: 5 TABLET ORAL at 01:01

## 2021-01-23 RX ADMIN — ACETAMINOPHEN 650 MG: 325 TABLET ORAL at 06:01

## 2021-01-23 RX ADMIN — GABAPENTIN 300 MG: 300 CAPSULE ORAL at 08:01

## 2021-01-23 RX ADMIN — INSULIN ASPART 1 UNITS: 100 INJECTION, SOLUTION INTRAVENOUS; SUBCUTANEOUS at 09:01

## 2021-01-23 RX ADMIN — KETOROLAC TROMETHAMINE 10 MG: 10 TABLET, FILM COATED ORAL at 11:01

## 2021-01-23 RX ADMIN — GABAPENTIN 300 MG: 300 CAPSULE ORAL at 02:01

## 2021-01-23 RX ADMIN — ACETAMINOPHEN 650 MG: 325 TABLET ORAL at 05:01

## 2021-01-23 RX ADMIN — LISINOPRIL 2.5 MG: 2.5 TABLET ORAL at 10:01

## 2021-01-23 RX ADMIN — HYDROMORPHONE HYDROCHLORIDE 1 MG: 1 INJECTION, SOLUTION INTRAMUSCULAR; INTRAVENOUS; SUBCUTANEOUS at 03:01

## 2021-01-23 RX ADMIN — INSULIN ASPART 10 UNITS: 100 INJECTION, SOLUTION INTRAVENOUS; SUBCUTANEOUS at 05:01

## 2021-01-23 RX ADMIN — INSULIN ASPART 2 UNITS: 100 INJECTION, SOLUTION INTRAVENOUS; SUBCUTANEOUS at 12:01

## 2021-01-23 RX ADMIN — HYDROMORPHONE HYDROCHLORIDE 1 MG: 1 INJECTION, SOLUTION INTRAMUSCULAR; INTRAVENOUS; SUBCUTANEOUS at 07:01

## 2021-01-23 RX ADMIN — OXYCODONE 5 MG: 5 TABLET ORAL at 12:01

## 2021-01-23 RX ADMIN — ENOXAPARIN SODIUM 40 MG: 40 INJECTION SUBCUTANEOUS at 05:01

## 2021-01-23 RX ADMIN — VENLAFAXINE HYDROCHLORIDE 225 MG: 150 CAPSULE, EXTENDED RELEASE ORAL at 08:01

## 2021-01-23 RX ADMIN — LEVOTHYROXINE SODIUM 75 MCG: 25 TABLET ORAL at 08:01

## 2021-01-23 RX ADMIN — LABETALOL HCL IV SOLN PREFILLED SYRINGE 20 MG/4ML (5 MG/ML) 5 MG: 20/4 SOLUTION PREFILLED SYRINGE at 04:01

## 2021-01-24 LAB
ALBUMIN SERPL BCP-MCNC: 2.9 G/DL (ref 3.5–5.2)
ALP SERPL-CCNC: 121 U/L (ref 55–135)
ALT SERPL W/O P-5'-P-CCNC: 85 U/L (ref 10–44)
ANION GAP SERPL CALC-SCNC: 12 MMOL/L (ref 8–16)
AST SERPL-CCNC: 206 U/L (ref 10–40)
BASOPHILS # BLD AUTO: 0.05 K/UL (ref 0–0.2)
BASOPHILS NFR BLD: 0.7 % (ref 0–1.9)
BILIRUB SERPL-MCNC: 0.7 MG/DL (ref 0.1–1)
BUN SERPL-MCNC: 13 MG/DL (ref 6–20)
C PEPTIDE SERPL-MCNC: 0.11 NG/ML (ref 0.78–5.19)
CALCIUM SERPL-MCNC: 8.2 MG/DL (ref 8.7–10.5)
CHLORIDE SERPL-SCNC: 103 MMOL/L (ref 95–110)
CO2 SERPL-SCNC: 18 MMOL/L (ref 23–29)
CREAT SERPL-MCNC: 0.8 MG/DL (ref 0.5–1.4)
DIFFERENTIAL METHOD: ABNORMAL
EOSINOPHIL # BLD AUTO: 0.2 K/UL (ref 0–0.5)
EOSINOPHIL NFR BLD: 3.2 % (ref 0–8)
ERYTHROCYTE [DISTWIDTH] IN BLOOD BY AUTOMATED COUNT: 13.6 % (ref 11.5–14.5)
EST. GFR  (AFRICAN AMERICAN): >60 ML/MIN/1.73 M^2
EST. GFR  (NON AFRICAN AMERICAN): >60 ML/MIN/1.73 M^2
GLUCOSE SERPL-MCNC: 371 MG/DL (ref 70–110)
GLUCOSE SERPL-MCNC: 373 MG/DL (ref 70–110)
HCT VFR BLD AUTO: 28.6 % (ref 37–48.5)
HGB BLD-MCNC: 8.8 G/DL (ref 12–16)
IMM GRANULOCYTES # BLD AUTO: 0.02 K/UL (ref 0–0.04)
IMM GRANULOCYTES NFR BLD AUTO: 0.3 % (ref 0–0.5)
LYMPHOCYTES # BLD AUTO: 1.2 K/UL (ref 1–4.8)
LYMPHOCYTES NFR BLD: 17.5 % (ref 18–48)
MAGNESIUM SERPL-MCNC: 1.7 MG/DL (ref 1.6–2.6)
MCH RBC QN AUTO: 27.2 PG (ref 27–31)
MCHC RBC AUTO-ENTMCNC: 30.8 G/DL (ref 32–36)
MCV RBC AUTO: 89 FL (ref 82–98)
MONOCYTES # BLD AUTO: 0.7 K/UL (ref 0.3–1)
MONOCYTES NFR BLD: 10.6 % (ref 4–15)
NEUTROPHILS # BLD AUTO: 4.7 K/UL (ref 1.8–7.7)
NEUTROPHILS NFR BLD: 67.7 % (ref 38–73)
NRBC BLD-RTO: 0 /100 WBC
PHOSPHATE SERPL-MCNC: 2.9 MG/DL (ref 2.7–4.5)
PLATELET # BLD AUTO: 156 K/UL (ref 150–350)
PMV BLD AUTO: 11.4 FL (ref 9.2–12.9)
POCT GLUCOSE: 128 MG/DL (ref 70–110)
POCT GLUCOSE: 209 MG/DL (ref 70–110)
POCT GLUCOSE: 229 MG/DL (ref 70–110)
POCT GLUCOSE: 242 MG/DL (ref 70–110)
POCT GLUCOSE: 368 MG/DL (ref 70–110)
POTASSIUM SERPL-SCNC: 5.3 MMOL/L (ref 3.5–5.1)
PROT SERPL-MCNC: 5.7 G/DL (ref 6–8.4)
RBC # BLD AUTO: 3.23 M/UL (ref 4–5.4)
SODIUM SERPL-SCNC: 133 MMOL/L (ref 136–145)
WBC # BLD AUTO: 6.96 K/UL (ref 3.9–12.7)

## 2021-01-24 PROCEDURE — 99223 PR INITIAL HOSPITAL CARE,LEVL III: ICD-10-PCS | Mod: ,,, | Performed by: INTERNAL MEDICINE

## 2021-01-24 PROCEDURE — 99223 1ST HOSP IP/OBS HIGH 75: CPT | Mod: ,,, | Performed by: INTERNAL MEDICINE

## 2021-01-24 PROCEDURE — 25000003 PHARM REV CODE 250: Performed by: STUDENT IN AN ORGANIZED HEALTH CARE EDUCATION/TRAINING PROGRAM

## 2021-01-24 PROCEDURE — 25000003 PHARM REV CODE 250: Performed by: SURGERY

## 2021-01-24 PROCEDURE — 25000003 PHARM REV CODE 250: Performed by: NURSE PRACTITIONER

## 2021-01-24 PROCEDURE — 63600175 PHARM REV CODE 636 W HCPCS: Performed by: STUDENT IN AN ORGANIZED HEALTH CARE EDUCATION/TRAINING PROGRAM

## 2021-01-24 PROCEDURE — 99291 PR CRITICAL CARE, E/M 30-74 MINUTES: ICD-10-PCS | Mod: ,,, | Performed by: STUDENT IN AN ORGANIZED HEALTH CARE EDUCATION/TRAINING PROGRAM

## 2021-01-24 PROCEDURE — 99232 SBSQ HOSP IP/OBS MODERATE 35: CPT | Mod: ,,, | Performed by: NURSE PRACTITIONER

## 2021-01-24 PROCEDURE — 63600175 PHARM REV CODE 636 W HCPCS: Performed by: SURGERY

## 2021-01-24 PROCEDURE — 99291 CRITICAL CARE FIRST HOUR: CPT | Mod: ,,, | Performed by: STUDENT IN AN ORGANIZED HEALTH CARE EDUCATION/TRAINING PROGRAM

## 2021-01-24 PROCEDURE — 82947 ASSAY GLUCOSE BLOOD QUANT: CPT

## 2021-01-24 PROCEDURE — 94761 N-INVAS EAR/PLS OXIMETRY MLT: CPT

## 2021-01-24 PROCEDURE — 85025 COMPLETE CBC W/AUTO DIFF WBC: CPT

## 2021-01-24 PROCEDURE — 99900035 HC TECH TIME PER 15 MIN (STAT)

## 2021-01-24 PROCEDURE — 84681 ASSAY OF C-PEPTIDE: CPT

## 2021-01-24 PROCEDURE — 27000221 HC OXYGEN, UP TO 24 HOURS

## 2021-01-24 PROCEDURE — 99232 PR SUBSEQUENT HOSPITAL CARE,LEVL II: ICD-10-PCS | Mod: ,,, | Performed by: NURSE PRACTITIONER

## 2021-01-24 PROCEDURE — 84100 ASSAY OF PHOSPHORUS: CPT

## 2021-01-24 PROCEDURE — 63600175 PHARM REV CODE 636 W HCPCS: Performed by: NURSE PRACTITIONER

## 2021-01-24 PROCEDURE — 83735 ASSAY OF MAGNESIUM: CPT

## 2021-01-24 PROCEDURE — 36415 COLL VENOUS BLD VENIPUNCTURE: CPT

## 2021-01-24 PROCEDURE — 80053 COMPREHEN METABOLIC PANEL: CPT

## 2021-01-24 PROCEDURE — 20000000 HC ICU ROOM

## 2021-01-24 RX ORDER — INSULIN ASPART 100 [IU]/ML
3-6 INJECTION, SOLUTION INTRAVENOUS; SUBCUTANEOUS
Status: DISCONTINUED | OUTPATIENT
Start: 2021-01-25 | End: 2021-01-25

## 2021-01-24 RX ORDER — IBUPROFEN 200 MG
16 TABLET ORAL
Status: DISCONTINUED | OUTPATIENT
Start: 2021-01-24 | End: 2021-01-25

## 2021-01-24 RX ORDER — INSULIN ASPART 100 [IU]/ML
6-12 INJECTION, SOLUTION INTRAVENOUS; SUBCUTANEOUS
Status: DISCONTINUED | OUTPATIENT
Start: 2021-01-24 | End: 2021-01-24

## 2021-01-24 RX ORDER — GLUCAGON 1 MG
1 KIT INJECTION
Status: DISCONTINUED | OUTPATIENT
Start: 2021-01-24 | End: 2021-01-25

## 2021-01-24 RX ORDER — DOXYCYCLINE HYCLATE 100 MG
100 TABLET ORAL EVERY 12 HOURS
Status: DISCONTINUED | OUTPATIENT
Start: 2021-01-24 | End: 2021-02-03 | Stop reason: HOSPADM

## 2021-01-24 RX ORDER — IBUPROFEN 200 MG
24 TABLET ORAL
Status: DISCONTINUED | OUTPATIENT
Start: 2021-01-24 | End: 2021-01-25

## 2021-01-24 RX ORDER — FUROSEMIDE 10 MG/ML
20 INJECTION INTRAMUSCULAR; INTRAVENOUS ONCE
Status: COMPLETED | OUTPATIENT
Start: 2021-01-24 | End: 2021-01-24

## 2021-01-24 RX ORDER — INSULIN ASPART 100 [IU]/ML
0-5 INJECTION, SOLUTION INTRAVENOUS; SUBCUTANEOUS
Status: DISCONTINUED | OUTPATIENT
Start: 2021-01-24 | End: 2021-01-25

## 2021-01-24 RX ADMIN — MELATONIN TAB 3 MG 6 MG: 3 TAB at 08:01

## 2021-01-24 RX ADMIN — GABAPENTIN 300 MG: 300 CAPSULE ORAL at 08:01

## 2021-01-24 RX ADMIN — INSULIN ASPART 10 UNITS: 100 INJECTION, SOLUTION INTRAVENOUS; SUBCUTANEOUS at 08:01

## 2021-01-24 RX ADMIN — LEVOTHYROXINE SODIUM 75 MCG: 25 TABLET ORAL at 09:01

## 2021-01-24 RX ADMIN — OXYCODONE HYDROCHLORIDE 10 MG: 10 TABLET ORAL at 09:01

## 2021-01-24 RX ADMIN — KETOROLAC TROMETHAMINE 10 MG: 10 TABLET, FILM COATED ORAL at 12:01

## 2021-01-24 RX ADMIN — OXYCODONE HYDROCHLORIDE 10 MG: 10 TABLET ORAL at 05:01

## 2021-01-24 RX ADMIN — HYDROMORPHONE HYDROCHLORIDE 1 MG: 1 INJECTION, SOLUTION INTRAMUSCULAR; INTRAVENOUS; SUBCUTANEOUS at 07:01

## 2021-01-24 RX ADMIN — MAGNESIUM SULFATE 2 G: 2 INJECTION INTRAVENOUS at 06:01

## 2021-01-24 RX ADMIN — ACETAMINOPHEN 650 MG: 325 TABLET ORAL at 12:01

## 2021-01-24 RX ADMIN — GABAPENTIN 300 MG: 300 CAPSULE ORAL at 09:01

## 2021-01-24 RX ADMIN — ACETAMINOPHEN 650 MG: 325 TABLET ORAL at 11:01

## 2021-01-24 RX ADMIN — DOCUSATE SODIUM 50MG AND SENNOSIDES 8.6MG 1 TABLET: 8.6; 5 TABLET, FILM COATED ORAL at 09:01

## 2021-01-24 RX ADMIN — DOXYCYCLINE HYCLATE 100 MG: 100 TABLET, COATED ORAL at 08:01

## 2021-01-24 RX ADMIN — MUPIROCIN: 20 OINTMENT TOPICAL at 09:01

## 2021-01-24 RX ADMIN — HYDROMORPHONE HYDROCHLORIDE 1 MG: 1 INJECTION, SOLUTION INTRAMUSCULAR; INTRAVENOUS; SUBCUTANEOUS at 09:01

## 2021-01-24 RX ADMIN — DOCUSATE SODIUM 50MG AND SENNOSIDES 8.6MG 1 TABLET: 8.6; 5 TABLET, FILM COATED ORAL at 08:01

## 2021-01-24 RX ADMIN — KETOROLAC TROMETHAMINE 10 MG: 10 TABLET, FILM COATED ORAL at 06:01

## 2021-01-24 RX ADMIN — SODIUM CHLORIDE 2 UNITS/HR: 9 INJECTION, SOLUTION INTRAVENOUS at 09:01

## 2021-01-24 RX ADMIN — PANTOPRAZOLE SODIUM 40 MG: 40 TABLET, DELAYED RELEASE ORAL at 09:01

## 2021-01-24 RX ADMIN — INSULIN ASPART 2 UNITS: 100 INJECTION, SOLUTION INTRAVENOUS; SUBCUTANEOUS at 01:01

## 2021-01-24 RX ADMIN — KETOROLAC TROMETHAMINE 10 MG: 10 TABLET, FILM COATED ORAL at 11:01

## 2021-01-24 RX ADMIN — INSULIN ASPART 12 UNITS: 100 INJECTION, SOLUTION INTRAVENOUS; SUBCUTANEOUS at 01:01

## 2021-01-24 RX ADMIN — INSULIN ASPART 2 UNITS: 100 INJECTION, SOLUTION INTRAVENOUS; SUBCUTANEOUS at 08:01

## 2021-01-24 RX ADMIN — ACETAMINOPHEN 650 MG: 325 TABLET ORAL at 06:01

## 2021-01-24 RX ADMIN — GABAPENTIN 300 MG: 300 CAPSULE ORAL at 03:01

## 2021-01-24 RX ADMIN — MUPIROCIN: 20 OINTMENT TOPICAL at 08:01

## 2021-01-24 RX ADMIN — FUROSEMIDE 20 MG: 10 INJECTION, SOLUTION INTRAMUSCULAR; INTRAVENOUS at 06:01

## 2021-01-24 RX ADMIN — OXYCODONE HYDROCHLORIDE 10 MG: 10 TABLET ORAL at 11:01

## 2021-01-24 RX ADMIN — VENLAFAXINE HYDROCHLORIDE 225 MG: 150 CAPSULE, EXTENDED RELEASE ORAL at 09:01

## 2021-01-24 RX ADMIN — INSULIN ASPART 5 UNITS: 100 INJECTION, SOLUTION INTRAVENOUS; SUBCUTANEOUS at 08:01

## 2021-01-24 RX ADMIN — DOXYCYCLINE 100 MG: 100 INJECTION, POWDER, LYOPHILIZED, FOR SOLUTION INTRAVENOUS at 09:01

## 2021-01-24 RX ADMIN — KETOROLAC TROMETHAMINE 10 MG: 10 TABLET, FILM COATED ORAL at 05:01

## 2021-01-24 RX ADMIN — ASPIRIN 81 MG: 81 TABLET, CHEWABLE ORAL at 09:01

## 2021-01-24 RX ADMIN — HYDROMORPHONE HYDROCHLORIDE 1 MG: 1 INJECTION, SOLUTION INTRAMUSCULAR; INTRAVENOUS; SUBCUTANEOUS at 03:01

## 2021-01-24 RX ADMIN — ENOXAPARIN SODIUM 40 MG: 40 INJECTION SUBCUTANEOUS at 04:01

## 2021-01-25 LAB
ABO + RH BLD: NORMAL
ALBUMIN SERPL BCP-MCNC: 2.7 G/DL (ref 3.5–5.2)
ALP SERPL-CCNC: 167 U/L (ref 55–135)
ALT SERPL W/O P-5'-P-CCNC: 104 U/L (ref 10–44)
ANION GAP SERPL CALC-SCNC: 11 MMOL/L (ref 8–16)
AST SERPL-CCNC: 150 U/L (ref 10–40)
BASOPHILS # BLD AUTO: 0.04 K/UL (ref 0–0.2)
BASOPHILS NFR BLD: 0.7 % (ref 0–1.9)
BILIRUB SERPL-MCNC: 0.3 MG/DL (ref 0.1–1)
BLD GP AB SCN CELLS X3 SERPL QL: NORMAL
BLD PROD TYP BPU: NORMAL
BLD PROD TYP BPU: NORMAL
BLOOD UNIT EXPIRATION DATE: NORMAL
BLOOD UNIT EXPIRATION DATE: NORMAL
BLOOD UNIT TYPE CODE: 600
BLOOD UNIT TYPE CODE: 600
BLOOD UNIT TYPE: NORMAL
BLOOD UNIT TYPE: NORMAL
BUN SERPL-MCNC: 16 MG/DL (ref 6–20)
CALCIUM SERPL-MCNC: 8.3 MG/DL (ref 8.7–10.5)
CHLORIDE SERPL-SCNC: 103 MMOL/L (ref 95–110)
CO2 SERPL-SCNC: 22 MMOL/L (ref 23–29)
CODING SYSTEM: NORMAL
CODING SYSTEM: NORMAL
CREAT SERPL-MCNC: 0.7 MG/DL (ref 0.5–1.4)
DIFFERENTIAL METHOD: ABNORMAL
DISPENSE STATUS: NORMAL
DISPENSE STATUS: NORMAL
EOSINOPHIL # BLD AUTO: 0.4 K/UL (ref 0–0.5)
EOSINOPHIL NFR BLD: 5.9 % (ref 0–8)
ERYTHROCYTE [DISTWIDTH] IN BLOOD BY AUTOMATED COUNT: 13.1 % (ref 11.5–14.5)
EST. GFR  (AFRICAN AMERICAN): >60 ML/MIN/1.73 M^2
EST. GFR  (NON AFRICAN AMERICAN): >60 ML/MIN/1.73 M^2
GLUCOSE SERPL-MCNC: 129 MG/DL (ref 70–110)
GLUCOSE SERPL-MCNC: 291 MG/DL (ref 70–110)
HCO3 UR-SCNC: 25.4 MMOL/L (ref 24–28)
HCT VFR BLD AUTO: 27.4 % (ref 37–48.5)
HCT VFR BLD CALC: 22 %PCV (ref 36–54)
HGB BLD-MCNC: 8.6 G/DL (ref 12–16)
IMM GRANULOCYTES # BLD AUTO: 0.02 K/UL (ref 0–0.04)
IMM GRANULOCYTES NFR BLD AUTO: 0.3 % (ref 0–0.5)
LYMPHOCYTES # BLD AUTO: 1.1 K/UL (ref 1–4.8)
LYMPHOCYTES NFR BLD: 18.4 % (ref 18–48)
MAGNESIUM SERPL-MCNC: 1.8 MG/DL (ref 1.6–2.6)
MCH RBC QN AUTO: 27.7 PG (ref 27–31)
MCHC RBC AUTO-ENTMCNC: 31.4 G/DL (ref 32–36)
MCV RBC AUTO: 88 FL (ref 82–98)
MONOCYTES # BLD AUTO: 0.6 K/UL (ref 0.3–1)
MONOCYTES NFR BLD: 9 % (ref 4–15)
NEUTROPHILS # BLD AUTO: 4 K/UL (ref 1.8–7.7)
NEUTROPHILS NFR BLD: 65.7 % (ref 38–73)
NRBC BLD-RTO: 0 /100 WBC
NUM UNITS TRANS PACKED RBC: NORMAL
NUM UNITS TRANS PACKED RBC: NORMAL
PCO2 BLDA: 40.4 MMHG (ref 35–45)
PH SMN: 7.41 [PH] (ref 7.35–7.45)
PHOSPHATE SERPL-MCNC: 3 MG/DL (ref 2.7–4.5)
PLATELET # BLD AUTO: 199 K/UL (ref 150–350)
PMV BLD AUTO: 11.7 FL (ref 9.2–12.9)
PO2 BLDA: 315 MMHG (ref 80–100)
POC BE: 1 MMOL/L
POC IONIZED CALCIUM: 1.01 MMOL/L (ref 1.06–1.42)
POC SATURATED O2: 100 % (ref 95–100)
POC TCO2: 27 MMOL/L (ref 23–27)
POCT GLUCOSE: 234 MG/DL (ref 70–110)
POCT GLUCOSE: 274 MG/DL (ref 70–110)
POCT GLUCOSE: 279 MG/DL (ref 70–110)
POCT GLUCOSE: 296 MG/DL (ref 70–110)
POTASSIUM BLD-SCNC: 4.1 MMOL/L (ref 3.5–5.1)
POTASSIUM SERPL-SCNC: 4.2 MMOL/L (ref 3.5–5.1)
PROT SERPL-MCNC: 5.6 G/DL (ref 6–8.4)
RBC # BLD AUTO: 3.11 M/UL (ref 4–5.4)
SAMPLE: ABNORMAL
SODIUM BLD-SCNC: 141 MMOL/L (ref 136–145)
SODIUM SERPL-SCNC: 136 MMOL/L (ref 136–145)
WBC # BLD AUTO: 6.1 K/UL (ref 3.9–12.7)

## 2021-01-25 PROCEDURE — 99233 PR SUBSEQUENT HOSPITAL CARE,LEVL III: ICD-10-PCS | Mod: ,,, | Performed by: SURGERY

## 2021-01-25 PROCEDURE — 25000003 PHARM REV CODE 250: Performed by: INTERNAL MEDICINE

## 2021-01-25 PROCEDURE — 80053 COMPREHEN METABOLIC PANEL: CPT

## 2021-01-25 PROCEDURE — 86900 BLOOD TYPING SEROLOGIC ABO: CPT

## 2021-01-25 PROCEDURE — 27000221 HC OXYGEN, UP TO 24 HOURS

## 2021-01-25 PROCEDURE — 63600175 PHARM REV CODE 636 W HCPCS: Performed by: SURGERY

## 2021-01-25 PROCEDURE — 63600175 PHARM REV CODE 636 W HCPCS: Performed by: NURSE PRACTITIONER

## 2021-01-25 PROCEDURE — 25000003 PHARM REV CODE 250: Performed by: STUDENT IN AN ORGANIZED HEALTH CARE EDUCATION/TRAINING PROGRAM

## 2021-01-25 PROCEDURE — 99232 SBSQ HOSP IP/OBS MODERATE 35: CPT | Mod: ,,, | Performed by: NURSE PRACTITIONER

## 2021-01-25 PROCEDURE — 25000003 PHARM REV CODE 250: Performed by: SURGERY

## 2021-01-25 PROCEDURE — 99233 SBSQ HOSP IP/OBS HIGH 50: CPT | Mod: ,,, | Performed by: SURGERY

## 2021-01-25 PROCEDURE — 99900035 HC TECH TIME PER 15 MIN (STAT)

## 2021-01-25 PROCEDURE — 99233 PR SUBSEQUENT HOSPITAL CARE,LEVL III: ICD-10-PCS | Mod: ,,, | Performed by: INTERNAL MEDICINE

## 2021-01-25 PROCEDURE — 99233 SBSQ HOSP IP/OBS HIGH 50: CPT | Mod: ,,, | Performed by: INTERNAL MEDICINE

## 2021-01-25 PROCEDURE — 63600175 PHARM REV CODE 636 W HCPCS: Performed by: STUDENT IN AN ORGANIZED HEALTH CARE EDUCATION/TRAINING PROGRAM

## 2021-01-25 PROCEDURE — C9399 UNCLASSIFIED DRUGS OR BIOLOG: HCPCS | Performed by: NURSE PRACTITIONER

## 2021-01-25 PROCEDURE — 84100 ASSAY OF PHOSPHORUS: CPT

## 2021-01-25 PROCEDURE — 99232 PR SUBSEQUENT HOSPITAL CARE,LEVL II: ICD-10-PCS | Mod: ,,, | Performed by: NURSE PRACTITIONER

## 2021-01-25 PROCEDURE — 85025 COMPLETE CBC W/AUTO DIFF WBC: CPT

## 2021-01-25 PROCEDURE — 83735 ASSAY OF MAGNESIUM: CPT

## 2021-01-25 PROCEDURE — 94761 N-INVAS EAR/PLS OXIMETRY MLT: CPT

## 2021-01-25 PROCEDURE — 20000000 HC ICU ROOM

## 2021-01-25 PROCEDURE — 25000003 PHARM REV CODE 250: Performed by: NURSE PRACTITIONER

## 2021-01-25 RX ORDER — METHOCARBAMOL 500 MG/1
500 TABLET, FILM COATED ORAL 4 TIMES DAILY
Status: DISCONTINUED | OUTPATIENT
Start: 2021-01-25 | End: 2021-02-03 | Stop reason: HOSPADM

## 2021-01-25 RX ORDER — INSULIN ASPART 100 [IU]/ML
1-20 INJECTION, SOLUTION INTRAVENOUS; SUBCUTANEOUS
Status: DISCONTINUED | OUTPATIENT
Start: 2021-01-25 | End: 2021-01-25

## 2021-01-25 RX ORDER — INSULIN ASPART 100 [IU]/ML
1 INJECTION, SOLUTION INTRAVENOUS; SUBCUTANEOUS
Status: DISCONTINUED | OUTPATIENT
Start: 2021-01-25 | End: 2021-01-25

## 2021-01-25 RX ORDER — IBUPROFEN 200 MG
16 TABLET ORAL
Status: DISCONTINUED | OUTPATIENT
Start: 2021-01-25 | End: 2021-02-03 | Stop reason: HOSPADM

## 2021-01-25 RX ORDER — GLUCAGON 1 MG
1 KIT INJECTION
Status: DISCONTINUED | OUTPATIENT
Start: 2021-01-25 | End: 2021-02-03 | Stop reason: HOSPADM

## 2021-01-25 RX ORDER — INSULIN ASPART 100 [IU]/ML
0-5 INJECTION, SOLUTION INTRAVENOUS; SUBCUTANEOUS
Status: DISCONTINUED | OUTPATIENT
Start: 2021-01-25 | End: 2021-01-27

## 2021-01-25 RX ORDER — INSULIN ASPART 100 [IU]/ML
1-20 INJECTION, SOLUTION INTRAVENOUS; SUBCUTANEOUS
Status: DISCONTINUED | OUTPATIENT
Start: 2021-01-25 | End: 2021-02-02

## 2021-01-25 RX ORDER — IBUPROFEN 200 MG
24 TABLET ORAL
Status: DISCONTINUED | OUTPATIENT
Start: 2021-01-25 | End: 2021-02-03 | Stop reason: HOSPADM

## 2021-01-25 RX ORDER — INSULIN ASPART 100 [IU]/ML
1-14 INJECTION, SOLUTION INTRAVENOUS; SUBCUTANEOUS
Status: DISCONTINUED | OUTPATIENT
Start: 2021-01-25 | End: 2021-01-25

## 2021-01-25 RX ADMIN — KETOROLAC TROMETHAMINE 10 MG: 10 TABLET, FILM COATED ORAL at 05:01

## 2021-01-25 RX ADMIN — MAGNESIUM SULFATE 2 G: 2 INJECTION INTRAVENOUS at 05:01

## 2021-01-25 RX ADMIN — METHOCARBAMOL 500 MG: 500 TABLET ORAL at 08:01

## 2021-01-25 RX ADMIN — ACETAMINOPHEN 650 MG: 325 TABLET ORAL at 01:01

## 2021-01-25 RX ADMIN — VENLAFAXINE HYDROCHLORIDE 225 MG: 150 CAPSULE, EXTENDED RELEASE ORAL at 09:01

## 2021-01-25 RX ADMIN — PANTOPRAZOLE SODIUM 40 MG: 40 TABLET, DELAYED RELEASE ORAL at 09:01

## 2021-01-25 RX ADMIN — METHOCARBAMOL 500 MG: 500 TABLET ORAL at 01:01

## 2021-01-25 RX ADMIN — ACETAMINOPHEN 650 MG: 325 TABLET ORAL at 05:01

## 2021-01-25 RX ADMIN — DOCUSATE SODIUM 50MG AND SENNOSIDES 8.6MG 1 TABLET: 8.6; 5 TABLET, FILM COATED ORAL at 08:01

## 2021-01-25 RX ADMIN — INSULIN ASPART 3 UNITS: 100 INJECTION, SOLUTION INTRAVENOUS; SUBCUTANEOUS at 03:01

## 2021-01-25 RX ADMIN — ASPIRIN 81 MG: 81 TABLET, CHEWABLE ORAL at 09:01

## 2021-01-25 RX ADMIN — INSULIN ASPART 3 UNITS: 100 INJECTION, SOLUTION INTRAVENOUS; SUBCUTANEOUS at 09:01

## 2021-01-25 RX ADMIN — INSULIN ASPART 20 UNITS: 100 INJECTION, SOLUTION INTRAVENOUS; SUBCUTANEOUS at 01:01

## 2021-01-25 RX ADMIN — OXYCODONE HYDROCHLORIDE 10 MG: 10 TABLET ORAL at 08:01

## 2021-01-25 RX ADMIN — OXYCODONE HYDROCHLORIDE 10 MG: 10 TABLET ORAL at 09:01

## 2021-01-25 RX ADMIN — METHOCARBAMOL 500 MG: 500 TABLET ORAL at 05:01

## 2021-01-25 RX ADMIN — ISAVUCONAZONIUM SULFATE 372 MG: 186 CAPSULE ORAL at 10:01

## 2021-01-25 RX ADMIN — INSULIN ASPART 2 UNITS: 100 INJECTION, SOLUTION INTRAVENOUS; SUBCUTANEOUS at 05:01

## 2021-01-25 RX ADMIN — ENOXAPARIN SODIUM 40 MG: 40 INJECTION SUBCUTANEOUS at 05:01

## 2021-01-25 RX ADMIN — GABAPENTIN 300 MG: 300 CAPSULE ORAL at 09:01

## 2021-01-25 RX ADMIN — KETOROLAC TROMETHAMINE 10 MG: 10 TABLET, FILM COATED ORAL at 01:01

## 2021-01-25 RX ADMIN — MELATONIN TAB 3 MG 6 MG: 3 TAB at 08:01

## 2021-01-25 RX ADMIN — HYDROMORPHONE HYDROCHLORIDE 1 MG: 1 INJECTION, SOLUTION INTRAMUSCULAR; INTRAVENOUS; SUBCUTANEOUS at 03:01

## 2021-01-25 RX ADMIN — MUPIROCIN: 20 OINTMENT TOPICAL at 08:01

## 2021-01-25 RX ADMIN — DOXYCYCLINE HYCLATE 100 MG: 100 TABLET, COATED ORAL at 09:01

## 2021-01-25 RX ADMIN — MUPIROCIN: 20 OINTMENT TOPICAL at 09:01

## 2021-01-25 RX ADMIN — OXYCODONE HYDROCHLORIDE 10 MG: 10 TABLET ORAL at 03:01

## 2021-01-25 RX ADMIN — INSULIN ASPART 3 UNITS: 100 INJECTION, SOLUTION INTRAVENOUS; SUBCUTANEOUS at 01:01

## 2021-01-25 RX ADMIN — DOXYCYCLINE HYCLATE 100 MG: 100 TABLET, COATED ORAL at 08:01

## 2021-01-25 RX ADMIN — INSULIN DETEMIR 35 UNITS: 100 INJECTION, SOLUTION SUBCUTANEOUS at 12:01

## 2021-01-25 RX ADMIN — DOCUSATE SODIUM 50MG AND SENNOSIDES 8.6MG 1 TABLET: 8.6; 5 TABLET, FILM COATED ORAL at 09:01

## 2021-01-25 RX ADMIN — INSULIN ASPART 3 UNITS: 100 INJECTION, SOLUTION INTRAVENOUS; SUBCUTANEOUS at 05:01

## 2021-01-25 RX ADMIN — METHOCARBAMOL 500 MG: 500 TABLET ORAL at 09:01

## 2021-01-25 RX ADMIN — LEVOTHYROXINE SODIUM 75 MCG: 25 TABLET ORAL at 09:01

## 2021-01-25 RX ADMIN — GABAPENTIN 300 MG: 300 CAPSULE ORAL at 03:01

## 2021-01-25 RX ADMIN — HYDROMORPHONE HYDROCHLORIDE 1 MG: 1 INJECTION, SOLUTION INTRAMUSCULAR; INTRAVENOUS; SUBCUTANEOUS at 12:01

## 2021-01-25 RX ADMIN — GABAPENTIN 300 MG: 300 CAPSULE ORAL at 08:01

## 2021-01-26 PROBLEM — T81.30XA WOUND DEHISCENCE: Status: ACTIVE | Noted: 2021-01-20

## 2021-01-26 LAB
ACID FAST MOD KINY STN SPEC: NORMAL
ALBUMIN SERPL BCP-MCNC: 2.7 G/DL (ref 3.5–5.2)
ALP SERPL-CCNC: 232 U/L (ref 55–135)
ALT SERPL W/O P-5'-P-CCNC: 113 U/L (ref 10–44)
ANION GAP SERPL CALC-SCNC: 8 MMOL/L (ref 8–16)
AST SERPL-CCNC: 174 U/L (ref 10–40)
BASOPHILS # BLD AUTO: 0.03 K/UL (ref 0–0.2)
BASOPHILS NFR BLD: 0.4 % (ref 0–1.9)
BILIRUB SERPL-MCNC: 0.4 MG/DL (ref 0.1–1)
BUN SERPL-MCNC: 16 MG/DL (ref 6–20)
CALCIUM SERPL-MCNC: 8.4 MG/DL (ref 8.7–10.5)
CHLORIDE SERPL-SCNC: 100 MMOL/L (ref 95–110)
CO2 SERPL-SCNC: 24 MMOL/L (ref 23–29)
CREAT SERPL-MCNC: 0.7 MG/DL (ref 0.5–1.4)
DIFFERENTIAL METHOD: ABNORMAL
EOSINOPHIL # BLD AUTO: 0.5 K/UL (ref 0–0.5)
EOSINOPHIL NFR BLD: 6.5 % (ref 0–8)
ERYTHROCYTE [DISTWIDTH] IN BLOOD BY AUTOMATED COUNT: 13.2 % (ref 11.5–14.5)
EST. GFR  (AFRICAN AMERICAN): >60 ML/MIN/1.73 M^2
EST. GFR  (NON AFRICAN AMERICAN): >60 ML/MIN/1.73 M^2
FUNGUS SPEC CULT: NORMAL
GLUCOSE SERPL-MCNC: 275 MG/DL (ref 70–110)
HCT VFR BLD AUTO: 27.6 % (ref 37–48.5)
HGB BLD-MCNC: 8.7 G/DL (ref 12–16)
IMM GRANULOCYTES # BLD AUTO: 0.03 K/UL (ref 0–0.04)
IMM GRANULOCYTES NFR BLD AUTO: 0.4 % (ref 0–0.5)
LYMPHOCYTES # BLD AUTO: 1 K/UL (ref 1–4.8)
LYMPHOCYTES NFR BLD: 14.2 % (ref 18–48)
MAGNESIUM SERPL-MCNC: 1.7 MG/DL (ref 1.6–2.6)
MCH RBC QN AUTO: 27.1 PG (ref 27–31)
MCHC RBC AUTO-ENTMCNC: 31.5 G/DL (ref 32–36)
MCV RBC AUTO: 86 FL (ref 82–98)
MONOCYTES # BLD AUTO: 0.6 K/UL (ref 0.3–1)
MONOCYTES NFR BLD: 9 % (ref 4–15)
MYCOBACTERIUM SPEC QL CULT: NORMAL
NEUTROPHILS # BLD AUTO: 4.8 K/UL (ref 1.8–7.7)
NEUTROPHILS NFR BLD: 69.5 % (ref 38–73)
NRBC BLD-RTO: 0 /100 WBC
PHOSPHATE SERPL-MCNC: 2.8 MG/DL (ref 2.7–4.5)
PLATELET # BLD AUTO: 224 K/UL (ref 150–350)
PMV BLD AUTO: 11.5 FL (ref 9.2–12.9)
POCT GLUCOSE: 168 MG/DL (ref 70–110)
POCT GLUCOSE: 206 MG/DL (ref 70–110)
POCT GLUCOSE: 252 MG/DL (ref 70–110)
POCT GLUCOSE: 255 MG/DL (ref 70–110)
POCT GLUCOSE: 283 MG/DL (ref 70–110)
POCT GLUCOSE: 286 MG/DL (ref 70–110)
POCT GLUCOSE: 358 MG/DL (ref 70–110)
POTASSIUM SERPL-SCNC: 4.4 MMOL/L (ref 3.5–5.1)
PROT SERPL-MCNC: 5.8 G/DL (ref 6–8.4)
RBC # BLD AUTO: 3.21 M/UL (ref 4–5.4)
SODIUM SERPL-SCNC: 132 MMOL/L (ref 136–145)
WBC # BLD AUTO: 6.97 K/UL (ref 3.9–12.7)

## 2021-01-26 PROCEDURE — 99233 SBSQ HOSP IP/OBS HIGH 50: CPT | Mod: ,,, | Performed by: INTERNAL MEDICINE

## 2021-01-26 PROCEDURE — 63600175 PHARM REV CODE 636 W HCPCS: Performed by: STUDENT IN AN ORGANIZED HEALTH CARE EDUCATION/TRAINING PROGRAM

## 2021-01-26 PROCEDURE — 25000003 PHARM REV CODE 250: Performed by: STUDENT IN AN ORGANIZED HEALTH CARE EDUCATION/TRAINING PROGRAM

## 2021-01-26 PROCEDURE — 97116 GAIT TRAINING THERAPY: CPT

## 2021-01-26 PROCEDURE — 83735 ASSAY OF MAGNESIUM: CPT

## 2021-01-26 PROCEDURE — 84100 ASSAY OF PHOSPHORUS: CPT

## 2021-01-26 PROCEDURE — 25000003 PHARM REV CODE 250: Performed by: INTERNAL MEDICINE

## 2021-01-26 PROCEDURE — 85025 COMPLETE CBC W/AUTO DIFF WBC: CPT

## 2021-01-26 PROCEDURE — 99233 SBSQ HOSP IP/OBS HIGH 50: CPT | Mod: ,,, | Performed by: SURGERY

## 2021-01-26 PROCEDURE — 97530 THERAPEUTIC ACTIVITIES: CPT

## 2021-01-26 PROCEDURE — 97162 PT EVAL MOD COMPLEX 30 MIN: CPT

## 2021-01-26 PROCEDURE — 99233 PR SUBSEQUENT HOSPITAL CARE,LEVL III: ICD-10-PCS | Mod: ,,, | Performed by: SURGERY

## 2021-01-26 PROCEDURE — 97165 OT EVAL LOW COMPLEX 30 MIN: CPT

## 2021-01-26 PROCEDURE — 94761 N-INVAS EAR/PLS OXIMETRY MLT: CPT

## 2021-01-26 PROCEDURE — 25000003 PHARM REV CODE 250: Performed by: SURGERY

## 2021-01-26 PROCEDURE — C9399 UNCLASSIFIED DRUGS OR BIOLOG: HCPCS | Performed by: NURSE PRACTITIONER

## 2021-01-26 PROCEDURE — 25000003 PHARM REV CODE 250: Performed by: NURSE PRACTITIONER

## 2021-01-26 PROCEDURE — 99900035 HC TECH TIME PER 15 MIN (STAT)

## 2021-01-26 PROCEDURE — 63600175 PHARM REV CODE 636 W HCPCS: Performed by: SURGERY

## 2021-01-26 PROCEDURE — 80053 COMPREHEN METABOLIC PANEL: CPT

## 2021-01-26 PROCEDURE — 20600001 HC STEP DOWN PRIVATE ROOM

## 2021-01-26 PROCEDURE — 99233 PR SUBSEQUENT HOSPITAL CARE,LEVL III: ICD-10-PCS | Mod: ,,, | Performed by: INTERNAL MEDICINE

## 2021-01-26 RX ORDER — NAPROXEN SODIUM 220 MG/1
81 TABLET, FILM COATED ORAL 2 TIMES DAILY
Status: DISCONTINUED | OUTPATIENT
Start: 2021-01-26 | End: 2021-02-03 | Stop reason: HOSPADM

## 2021-01-26 RX ADMIN — OXYCODONE HYDROCHLORIDE 10 MG: 10 TABLET ORAL at 03:01

## 2021-01-26 RX ADMIN — ACETAMINOPHEN 650 MG: 325 TABLET ORAL at 05:01

## 2021-01-26 RX ADMIN — ASPIRIN 81 MG: 81 TABLET, CHEWABLE ORAL at 09:01

## 2021-01-26 RX ADMIN — OXYCODONE HYDROCHLORIDE 10 MG: 10 TABLET ORAL at 09:01

## 2021-01-26 RX ADMIN — GABAPENTIN 300 MG: 300 CAPSULE ORAL at 09:01

## 2021-01-26 RX ADMIN — HYDROMORPHONE HYDROCHLORIDE 1 MG: 1 INJECTION, SOLUTION INTRAMUSCULAR; INTRAVENOUS; SUBCUTANEOUS at 09:01

## 2021-01-26 RX ADMIN — DOCUSATE SODIUM 50MG AND SENNOSIDES 8.6MG 1 TABLET: 8.6; 5 TABLET, FILM COATED ORAL at 09:01

## 2021-01-26 RX ADMIN — ACETAMINOPHEN 325 MG: 325 TABLET ORAL at 05:01

## 2021-01-26 RX ADMIN — ISAVUCONAZONIUM SULFATE 372 MG: 186 CAPSULE ORAL at 05:01

## 2021-01-26 RX ADMIN — LEVOTHYROXINE SODIUM 75 MCG: 25 TABLET ORAL at 09:01

## 2021-01-26 RX ADMIN — DOXYCYCLINE HYCLATE 100 MG: 100 TABLET, COATED ORAL at 09:01

## 2021-01-26 RX ADMIN — INSULIN DETEMIR 7 UNITS: 100 INJECTION, SOLUTION SUBCUTANEOUS at 10:01

## 2021-01-26 RX ADMIN — VENLAFAXINE HYDROCHLORIDE 225 MG: 150 CAPSULE, EXTENDED RELEASE ORAL at 09:01

## 2021-01-26 RX ADMIN — INSULIN ASPART 3 UNITS: 100 INJECTION, SOLUTION INTRAVENOUS; SUBCUTANEOUS at 07:01

## 2021-01-26 RX ADMIN — MUPIROCIN: 20 OINTMENT TOPICAL at 09:01

## 2021-01-26 RX ADMIN — METHOCARBAMOL 500 MG: 500 TABLET ORAL at 09:01

## 2021-01-26 RX ADMIN — ACETAMINOPHEN 650 MG: 325 TABLET ORAL at 12:01

## 2021-01-26 RX ADMIN — ACETAMINOPHEN 650 MG: 325 TABLET ORAL at 11:01

## 2021-01-26 RX ADMIN — KETOROLAC TROMETHAMINE 10 MG: 10 TABLET, FILM COATED ORAL at 12:01

## 2021-01-26 RX ADMIN — ENOXAPARIN SODIUM 40 MG: 40 INJECTION SUBCUTANEOUS at 05:01

## 2021-01-26 RX ADMIN — METHOCARBAMOL 500 MG: 500 TABLET ORAL at 02:01

## 2021-01-26 RX ADMIN — KETOROLAC TROMETHAMINE 10 MG: 10 TABLET, FILM COATED ORAL at 05:01

## 2021-01-26 RX ADMIN — INSULIN ASPART 2 UNITS: 100 INJECTION, SOLUTION INTRAVENOUS; SUBCUTANEOUS at 09:01

## 2021-01-26 RX ADMIN — INSULIN ASPART 3 UNITS: 100 INJECTION, SOLUTION INTRAVENOUS; SUBCUTANEOUS at 02:01

## 2021-01-26 RX ADMIN — METHOCARBAMOL 500 MG: 500 TABLET ORAL at 05:01

## 2021-01-26 RX ADMIN — MAGNESIUM SULFATE 2 G: 2 INJECTION INTRAVENOUS at 05:01

## 2021-01-26 RX ADMIN — ISAVUCONAZONIUM SULFATE 372 MG: 186 CAPSULE ORAL at 02:01

## 2021-01-26 RX ADMIN — KETOROLAC TROMETHAMINE 10 MG: 10 TABLET, FILM COATED ORAL at 11:01

## 2021-01-26 RX ADMIN — ISAVUCONAZONIUM SULFATE 372 MG: 186 CAPSULE ORAL at 11:01

## 2021-01-26 RX ADMIN — OXYCODONE HYDROCHLORIDE 10 MG: 10 TABLET ORAL at 06:01

## 2021-01-26 RX ADMIN — MELATONIN TAB 3 MG 6 MG: 3 TAB at 09:01

## 2021-01-26 RX ADMIN — INSULIN DETEMIR 35 UNITS: 100 INJECTION, SOLUTION SUBCUTANEOUS at 09:01

## 2021-01-26 RX ADMIN — INSULIN ASPART 9 UNITS: 100 INJECTION, SOLUTION INTRAVENOUS; SUBCUTANEOUS at 07:01

## 2021-01-26 RX ADMIN — OXYCODONE HYDROCHLORIDE 10 MG: 10 TABLET ORAL at 11:01

## 2021-01-26 RX ADMIN — GABAPENTIN 300 MG: 300 CAPSULE ORAL at 02:01

## 2021-01-26 RX ADMIN — INSULIN ASPART 5 UNITS: 100 INJECTION, SOLUTION INTRAVENOUS; SUBCUTANEOUS at 09:01

## 2021-01-26 RX ADMIN — INSULIN ASPART 4 UNITS: 100 INJECTION, SOLUTION INTRAVENOUS; SUBCUTANEOUS at 02:01

## 2021-01-26 RX ADMIN — PANTOPRAZOLE SODIUM 40 MG: 40 TABLET, DELAYED RELEASE ORAL at 09:01

## 2021-01-27 PROBLEM — E10.9 T1DM (TYPE 1 DIABETES MELLITUS): Status: ACTIVE | Noted: 2021-01-27

## 2021-01-27 PROBLEM — E11.9 TYPE 2 DIABETES MELLITUS: Status: RESOLVED | Noted: 2020-11-24 | Resolved: 2021-01-27

## 2021-01-27 PROBLEM — R79.89 ELEVATED LFTS: Status: ACTIVE | Noted: 2021-01-27

## 2021-01-27 LAB
ALBUMIN SERPL BCP-MCNC: 2.5 G/DL (ref 3.5–5.2)
ALP SERPL-CCNC: 303 U/L (ref 55–135)
ALT SERPL W/O P-5'-P-CCNC: 134 U/L (ref 10–44)
ANION GAP SERPL CALC-SCNC: 9 MMOL/L (ref 8–16)
AST SERPL-CCNC: 195 U/L (ref 10–40)
BACTERIA #/AREA URNS AUTO: NORMAL /HPF
BASOPHILS # BLD AUTO: 0.03 K/UL (ref 0–0.2)
BASOPHILS NFR BLD: 0.4 % (ref 0–1.9)
BILIRUB SERPL-MCNC: 0.3 MG/DL (ref 0.1–1)
BILIRUB UR QL STRIP: NEGATIVE
BUN SERPL-MCNC: 17 MG/DL (ref 6–20)
CALCIUM SERPL-MCNC: 8.9 MG/DL (ref 8.7–10.5)
CHLORIDE SERPL-SCNC: 101 MMOL/L (ref 95–110)
CK SERPL-CCNC: 670 U/L (ref 20–180)
CLARITY UR REFRACT.AUTO: CLEAR
CO2 SERPL-SCNC: 27 MMOL/L (ref 23–29)
COLOR UR AUTO: YELLOW
CREAT SERPL-MCNC: 0.8 MG/DL (ref 0.5–1.4)
DIFFERENTIAL METHOD: ABNORMAL
EOSINOPHIL # BLD AUTO: 0.5 K/UL (ref 0–0.5)
EOSINOPHIL NFR BLD: 7.7 % (ref 0–8)
ERYTHROCYTE [DISTWIDTH] IN BLOOD BY AUTOMATED COUNT: 13.6 % (ref 11.5–14.5)
EST. GFR  (AFRICAN AMERICAN): >60 ML/MIN/1.73 M^2
EST. GFR  (NON AFRICAN AMERICAN): >60 ML/MIN/1.73 M^2
GLUCOSE SERPL-MCNC: 186 MG/DL (ref 70–110)
GLUCOSE UR QL STRIP: ABNORMAL
HCT VFR BLD AUTO: 26.7 % (ref 37–48.5)
HGB BLD-MCNC: 8.4 G/DL (ref 12–16)
HGB UR QL STRIP: NEGATIVE
IMM GRANULOCYTES # BLD AUTO: 0.03 K/UL (ref 0–0.04)
IMM GRANULOCYTES NFR BLD AUTO: 0.4 % (ref 0–0.5)
KETONES UR QL STRIP: NEGATIVE
LEUKOCYTE ESTERASE UR QL STRIP: NEGATIVE
LYMPHOCYTES # BLD AUTO: 1.1 K/UL (ref 1–4.8)
LYMPHOCYTES NFR BLD: 16.9 % (ref 18–48)
MAGNESIUM SERPL-MCNC: 2 MG/DL (ref 1.6–2.6)
MCH RBC QN AUTO: 27 PG (ref 27–31)
MCHC RBC AUTO-ENTMCNC: 31.5 G/DL (ref 32–36)
MCV RBC AUTO: 86 FL (ref 82–98)
MICROSCOPIC COMMENT: NORMAL
MONOCYTES # BLD AUTO: 0.7 K/UL (ref 0.3–1)
MONOCYTES NFR BLD: 10.8 % (ref 4–15)
NEUTROPHILS # BLD AUTO: 4.3 K/UL (ref 1.8–7.7)
NEUTROPHILS NFR BLD: 63.8 % (ref 38–73)
NITRITE UR QL STRIP: NEGATIVE
NRBC BLD-RTO: 0 /100 WBC
PH UR STRIP: 6 [PH] (ref 5–8)
PHOSPHATE SERPL-MCNC: 4.6 MG/DL (ref 2.7–4.5)
PLATELET # BLD AUTO: 255 K/UL (ref 150–350)
PMV BLD AUTO: 11.4 FL (ref 9.2–12.9)
POCT GLUCOSE: 118 MG/DL (ref 70–110)
POCT GLUCOSE: 160 MG/DL (ref 70–110)
POCT GLUCOSE: 235 MG/DL (ref 70–110)
POCT GLUCOSE: 283 MG/DL (ref 70–110)
POCT GLUCOSE: 74 MG/DL (ref 70–110)
POTASSIUM SERPL-SCNC: 4.1 MMOL/L (ref 3.5–5.1)
PROT SERPL-MCNC: 5.8 G/DL (ref 6–8.4)
PROT UR QL STRIP: NEGATIVE
RBC # BLD AUTO: 3.11 M/UL (ref 4–5.4)
SODIUM SERPL-SCNC: 137 MMOL/L (ref 136–145)
SP GR UR STRIP: 1.02 (ref 1–1.03)
SQUAMOUS #/AREA URNS AUTO: 6 /HPF
URN SPEC COLLECT METH UR: ABNORMAL
WBC # BLD AUTO: 6.74 K/UL (ref 3.9–12.7)
WBC #/AREA URNS AUTO: 3 /HPF (ref 0–5)
YEAST UR QL AUTO: NORMAL

## 2021-01-27 PROCEDURE — 99232 SBSQ HOSP IP/OBS MODERATE 35: CPT | Mod: GT,,, | Performed by: NURSE PRACTITIONER

## 2021-01-27 PROCEDURE — 80053 COMPREHEN METABOLIC PANEL: CPT

## 2021-01-27 PROCEDURE — 25000003 PHARM REV CODE 250: Performed by: STUDENT IN AN ORGANIZED HEALTH CARE EDUCATION/TRAINING PROGRAM

## 2021-01-27 PROCEDURE — 51798 US URINE CAPACITY MEASURE: CPT

## 2021-01-27 PROCEDURE — 20600001 HC STEP DOWN PRIVATE ROOM

## 2021-01-27 PROCEDURE — 25000003 PHARM REV CODE 250: Performed by: SURGERY

## 2021-01-27 PROCEDURE — 82550 ASSAY OF CK (CPK): CPT

## 2021-01-27 PROCEDURE — 99232 PR SUBSEQUENT HOSPITAL CARE,LEVL II: ICD-10-PCS | Mod: GT,,, | Performed by: NURSE PRACTITIONER

## 2021-01-27 PROCEDURE — 99253 PR INITIAL INPATIENT CONSULT,LEVL III: ICD-10-PCS | Mod: ,,, | Performed by: INTERNAL MEDICINE

## 2021-01-27 PROCEDURE — 63600175 PHARM REV CODE 636 W HCPCS: Performed by: SURGERY

## 2021-01-27 PROCEDURE — 85025 COMPLETE CBC W/AUTO DIFF WBC: CPT

## 2021-01-27 PROCEDURE — 83735 ASSAY OF MAGNESIUM: CPT

## 2021-01-27 PROCEDURE — 84100 ASSAY OF PHOSPHORUS: CPT

## 2021-01-27 PROCEDURE — 83874 ASSAY OF MYOGLOBIN: CPT

## 2021-01-27 PROCEDURE — 99253 IP/OBS CNSLTJ NEW/EST LOW 45: CPT | Mod: ,,, | Performed by: INTERNAL MEDICINE

## 2021-01-27 PROCEDURE — 81001 URINALYSIS AUTO W/SCOPE: CPT

## 2021-01-27 PROCEDURE — 36415 COLL VENOUS BLD VENIPUNCTURE: CPT

## 2021-01-27 PROCEDURE — 25000003 PHARM REV CODE 250: Performed by: INTERNAL MEDICINE

## 2021-01-27 RX ORDER — POLYETHYLENE GLYCOL 3350 17 G/17G
17 POWDER, FOR SOLUTION ORAL DAILY
Status: DISCONTINUED | OUTPATIENT
Start: 2021-01-27 | End: 2021-02-03 | Stop reason: HOSPADM

## 2021-01-27 RX ORDER — INSULIN ASPART 100 [IU]/ML
1-10 INJECTION, SOLUTION INTRAVENOUS; SUBCUTANEOUS
Status: DISCONTINUED | OUTPATIENT
Start: 2021-01-27 | End: 2021-01-31

## 2021-01-27 RX ADMIN — METHOCARBAMOL 500 MG: 500 TABLET ORAL at 10:01

## 2021-01-27 RX ADMIN — ACETAMINOPHEN 650 MG: 325 TABLET ORAL at 11:01

## 2021-01-27 RX ADMIN — ASPIRIN 81 MG: 81 TABLET, CHEWABLE ORAL at 09:01

## 2021-01-27 RX ADMIN — METHOCARBAMOL 500 MG: 500 TABLET ORAL at 02:01

## 2021-01-27 RX ADMIN — VENLAFAXINE HYDROCHLORIDE 225 MG: 150 CAPSULE, EXTENDED RELEASE ORAL at 09:01

## 2021-01-27 RX ADMIN — METHOCARBAMOL 500 MG: 500 TABLET ORAL at 09:01

## 2021-01-27 RX ADMIN — DOCUSATE SODIUM 50MG AND SENNOSIDES 8.6MG 1 TABLET: 8.6; 5 TABLET, FILM COATED ORAL at 09:01

## 2021-01-27 RX ADMIN — ACETAMINOPHEN 650 MG: 325 TABLET ORAL at 06:01

## 2021-01-27 RX ADMIN — MELATONIN TAB 3 MG 6 MG: 3 TAB at 10:01

## 2021-01-27 RX ADMIN — ISAVUCONAZONIUM SULFATE 372 MG: 186 CAPSULE ORAL at 02:01

## 2021-01-27 RX ADMIN — OXYCODONE HYDROCHLORIDE 10 MG: 10 TABLET ORAL at 09:01

## 2021-01-27 RX ADMIN — METHOCARBAMOL 500 MG: 500 TABLET ORAL at 06:01

## 2021-01-27 RX ADMIN — ENOXAPARIN SODIUM 40 MG: 40 INJECTION SUBCUTANEOUS at 06:01

## 2021-01-27 RX ADMIN — SODIUM CHLORIDE, SODIUM LACTATE, POTASSIUM CHLORIDE, AND CALCIUM CHLORIDE 500 ML: .6; .31; .03; .02 INJECTION, SOLUTION INTRAVENOUS at 03:01

## 2021-01-27 RX ADMIN — KETOROLAC TROMETHAMINE 10 MG: 10 TABLET, FILM COATED ORAL at 06:01

## 2021-01-27 RX ADMIN — INSULIN ASPART 2 UNITS: 100 INJECTION, SOLUTION INTRAVENOUS; SUBCUTANEOUS at 11:01

## 2021-01-27 RX ADMIN — LEVOTHYROXINE SODIUM 75 MCG: 25 TABLET ORAL at 09:01

## 2021-01-27 RX ADMIN — DOXYCYCLINE HYCLATE 100 MG: 100 TABLET, COATED ORAL at 10:01

## 2021-01-27 RX ADMIN — GABAPENTIN 300 MG: 300 CAPSULE ORAL at 09:01

## 2021-01-27 RX ADMIN — ONDANSETRON 8 MG: 8 TABLET, ORALLY DISINTEGRATING ORAL at 06:01

## 2021-01-27 RX ADMIN — ISAVUCONAZONIUM SULFATE 372 MG: 186 CAPSULE ORAL at 06:01

## 2021-01-27 RX ADMIN — INSULIN ASPART 2 UNITS: 100 INJECTION, SOLUTION INTRAVENOUS; SUBCUTANEOUS at 09:01

## 2021-01-27 RX ADMIN — OXYCODONE HYDROCHLORIDE 5 MG: 5 TABLET ORAL at 11:01

## 2021-01-27 RX ADMIN — ASPIRIN 81 MG: 81 TABLET, CHEWABLE ORAL at 10:01

## 2021-01-27 RX ADMIN — PANTOPRAZOLE SODIUM 40 MG: 40 TABLET, DELAYED RELEASE ORAL at 09:01

## 2021-01-27 RX ADMIN — KETOROLAC TROMETHAMINE 10 MG: 10 TABLET, FILM COATED ORAL at 11:01

## 2021-01-27 RX ADMIN — DOXYCYCLINE HYCLATE 100 MG: 100 TABLET, COATED ORAL at 09:01

## 2021-01-27 RX ADMIN — DOCUSATE SODIUM 50MG AND SENNOSIDES 8.6MG 1 TABLET: 8.6; 5 TABLET, FILM COATED ORAL at 10:01

## 2021-01-27 RX ADMIN — GABAPENTIN 300 MG: 300 CAPSULE ORAL at 02:01

## 2021-01-27 RX ADMIN — OXYCODONE HYDROCHLORIDE 10 MG: 10 TABLET ORAL at 06:01

## 2021-01-27 RX ADMIN — INSULIN ASPART 6 UNITS: 100 INJECTION, SOLUTION INTRAVENOUS; SUBCUTANEOUS at 09:01

## 2021-01-27 RX ADMIN — POLYETHYLENE GLYCOL 3350 17 G: 17 POWDER, FOR SOLUTION ORAL at 11:01

## 2021-01-28 LAB
ALBUMIN SERPL BCP-MCNC: 2.4 G/DL (ref 3.5–5.2)
ALP SERPL-CCNC: 336 U/L (ref 55–135)
ALT SERPL W/O P-5'-P-CCNC: 113 U/L (ref 10–44)
ANION GAP SERPL CALC-SCNC: 9 MMOL/L (ref 8–16)
AST SERPL-CCNC: 125 U/L (ref 10–40)
BILIRUB SERPL-MCNC: 0.3 MG/DL (ref 0.1–1)
BUN SERPL-MCNC: 13 MG/DL (ref 6–20)
CALCIUM SERPL-MCNC: 9.1 MG/DL (ref 8.7–10.5)
CHLORIDE SERPL-SCNC: 100 MMOL/L (ref 95–110)
CK SERPL-CCNC: 341 U/L (ref 20–180)
CO2 SERPL-SCNC: 27 MMOL/L (ref 23–29)
CREAT SERPL-MCNC: 0.7 MG/DL (ref 0.5–1.4)
EST. GFR  (AFRICAN AMERICAN): >60 ML/MIN/1.73 M^2
EST. GFR  (NON AFRICAN AMERICAN): >60 ML/MIN/1.73 M^2
GLUCOSE SERPL-MCNC: 83 MG/DL (ref 70–110)
HAV IGM SERPL QL IA: NEGATIVE
HBV CORE IGM SERPL QL IA: NEGATIVE
HBV SURFACE AG SERPL QL IA: NEGATIVE
HCV AB SERPL QL IA: NEGATIVE
HIV 1+2 AB+HIV1 P24 AG SERPL QL IA: NEGATIVE
MAGNESIUM SERPL-MCNC: 1.8 MG/DL (ref 1.6–2.6)
PHOSPHATE SERPL-MCNC: 4.1 MG/DL (ref 2.7–4.5)
POCT GLUCOSE: 137 MG/DL (ref 70–110)
POCT GLUCOSE: 164 MG/DL (ref 70–110)
POCT GLUCOSE: 185 MG/DL (ref 70–110)
POCT GLUCOSE: 72 MG/DL (ref 70–110)
POCT GLUCOSE: 80 MG/DL (ref 70–110)
POTASSIUM SERPL-SCNC: 4.4 MMOL/L (ref 3.5–5.1)
PROT SERPL-MCNC: 5.9 G/DL (ref 6–8.4)
SODIUM SERPL-SCNC: 136 MMOL/L (ref 136–145)

## 2021-01-28 PROCEDURE — 63600175 PHARM REV CODE 636 W HCPCS: Performed by: STUDENT IN AN ORGANIZED HEALTH CARE EDUCATION/TRAINING PROGRAM

## 2021-01-28 PROCEDURE — 51798 US URINE CAPACITY MEASURE: CPT

## 2021-01-28 PROCEDURE — 36415 COLL VENOUS BLD VENIPUNCTURE: CPT

## 2021-01-28 PROCEDURE — 63600175 PHARM REV CODE 636 W HCPCS: Performed by: SURGERY

## 2021-01-28 PROCEDURE — 25000003 PHARM REV CODE 250: Performed by: STUDENT IN AN ORGANIZED HEALTH CARE EDUCATION/TRAINING PROGRAM

## 2021-01-28 PROCEDURE — 84100 ASSAY OF PHOSPHORUS: CPT

## 2021-01-28 PROCEDURE — 80074 ACUTE HEPATITIS PANEL: CPT

## 2021-01-28 PROCEDURE — 83735 ASSAY OF MAGNESIUM: CPT

## 2021-01-28 PROCEDURE — 82550 ASSAY OF CK (CPK): CPT

## 2021-01-28 PROCEDURE — 20600001 HC STEP DOWN PRIVATE ROOM

## 2021-01-28 PROCEDURE — 80053 COMPREHEN METABOLIC PANEL: CPT

## 2021-01-28 PROCEDURE — 97110 THERAPEUTIC EXERCISES: CPT

## 2021-01-28 PROCEDURE — 25000003 PHARM REV CODE 250: Performed by: SURGERY

## 2021-01-28 PROCEDURE — 86703 HIV-1/HIV-2 1 RESULT ANTBDY: CPT

## 2021-01-28 PROCEDURE — 99232 PR SUBSEQUENT HOSPITAL CARE,LEVL II: ICD-10-PCS | Mod: ,,, | Performed by: HOSPITALIST

## 2021-01-28 PROCEDURE — 63600175 PHARM REV CODE 636 W HCPCS: Performed by: NURSE PRACTITIONER

## 2021-01-28 PROCEDURE — 99232 SBSQ HOSP IP/OBS MODERATE 35: CPT | Mod: ,,, | Performed by: HOSPITALIST

## 2021-01-28 RX ORDER — LANOLIN ALCOHOL/MO/W.PET/CERES
2000 CREAM (GRAM) TOPICAL ONCE
Status: DISCONTINUED | OUTPATIENT
Start: 2021-01-28 | End: 2021-01-28

## 2021-01-28 RX ORDER — BISACODYL 10 MG
10 SUPPOSITORY, RECTAL RECTAL DAILY PRN
Status: DISCONTINUED | OUTPATIENT
Start: 2021-01-28 | End: 2021-02-03 | Stop reason: HOSPADM

## 2021-01-28 RX ORDER — BISACODYL 10 MG
10 SUPPOSITORY, RECTAL RECTAL DAILY PRN
Status: CANCELLED | OUTPATIENT
Start: 2021-01-28

## 2021-01-28 RX ORDER — LANOLIN ALCOHOL/MO/W.PET/CERES
800 CREAM (GRAM) TOPICAL EVERY 4 HOURS
Status: COMPLETED | OUTPATIENT
Start: 2021-01-28 | End: 2021-01-28

## 2021-01-28 RX ADMIN — KETOROLAC TROMETHAMINE 10 MG: 10 TABLET, FILM COATED ORAL at 05:01

## 2021-01-28 RX ADMIN — DOXYCYCLINE HYCLATE 100 MG: 100 TABLET, COATED ORAL at 08:01

## 2021-01-28 RX ADMIN — POLYETHYLENE GLYCOL 3350 17 G: 17 POWDER, FOR SOLUTION ORAL at 08:01

## 2021-01-28 RX ADMIN — ASPIRIN 81 MG: 81 TABLET, CHEWABLE ORAL at 09:01

## 2021-01-28 RX ADMIN — OXYCODONE HYDROCHLORIDE 10 MG: 10 TABLET ORAL at 05:01

## 2021-01-28 RX ADMIN — HYDROMORPHONE HYDROCHLORIDE 1 MG: 1 INJECTION, SOLUTION INTRAMUSCULAR; INTRAVENOUS; SUBCUTANEOUS at 02:01

## 2021-01-28 RX ADMIN — KETOROLAC TROMETHAMINE 10 MG: 10 TABLET, FILM COATED ORAL at 06:01

## 2021-01-28 RX ADMIN — METHOCARBAMOL 500 MG: 500 TABLET ORAL at 08:01

## 2021-01-28 RX ADMIN — MELATONIN TAB 3 MG 6 MG: 3 TAB at 08:01

## 2021-01-28 RX ADMIN — INSULIN ASPART 6 UNITS: 100 INJECTION, SOLUTION INTRAVENOUS; SUBCUTANEOUS at 08:01

## 2021-01-28 RX ADMIN — BISACODYL 10 MG: 10 SUPPOSITORY RECTAL at 11:01

## 2021-01-28 RX ADMIN — KETOROLAC TROMETHAMINE 10 MG: 10 TABLET, FILM COATED ORAL at 12:01

## 2021-01-28 RX ADMIN — PANTOPRAZOLE SODIUM 40 MG: 40 TABLET, DELAYED RELEASE ORAL at 08:01

## 2021-01-28 RX ADMIN — DOCUSATE SODIUM 50MG AND SENNOSIDES 8.6MG 1 TABLET: 8.6; 5 TABLET, FILM COATED ORAL at 08:01

## 2021-01-28 RX ADMIN — GABAPENTIN 300 MG: 300 CAPSULE ORAL at 03:01

## 2021-01-28 RX ADMIN — OXYCODONE HYDROCHLORIDE 5 MG: 5 TABLET ORAL at 03:01

## 2021-01-28 RX ADMIN — ENOXAPARIN SODIUM 40 MG: 40 INJECTION SUBCUTANEOUS at 05:01

## 2021-01-28 RX ADMIN — LISINOPRIL 2.5 MG: 2.5 TABLET ORAL at 08:01

## 2021-01-28 RX ADMIN — GABAPENTIN 300 MG: 300 CAPSULE ORAL at 08:01

## 2021-01-28 RX ADMIN — VENLAFAXINE HYDROCHLORIDE 225 MG: 150 CAPSULE, EXTENDED RELEASE ORAL at 08:01

## 2021-01-28 RX ADMIN — LEVOTHYROXINE SODIUM 75 MCG: 25 TABLET ORAL at 05:01

## 2021-01-28 RX ADMIN — ASPIRIN 81 MG: 81 TABLET, CHEWABLE ORAL at 08:01

## 2021-01-28 RX ADMIN — HYDROMORPHONE HYDROCHLORIDE 1 MG: 1 INJECTION, SOLUTION INTRAMUSCULAR; INTRAVENOUS; SUBCUTANEOUS at 10:01

## 2021-01-28 RX ADMIN — OXYCODONE HYDROCHLORIDE 10 MG: 10 TABLET ORAL at 08:01

## 2021-01-28 RX ADMIN — KETOROLAC TROMETHAMINE 10 MG: 10 TABLET, FILM COATED ORAL at 01:01

## 2021-01-28 RX ADMIN — METHOCARBAMOL 500 MG: 500 TABLET ORAL at 01:01

## 2021-01-28 RX ADMIN — INSULIN ASPART 11 UNITS: 100 INJECTION, SOLUTION INTRAVENOUS; SUBCUTANEOUS at 01:01

## 2021-01-28 RX ADMIN — HYDROMORPHONE HYDROCHLORIDE 1 MG: 1 INJECTION, SOLUTION INTRAMUSCULAR; INTRAVENOUS; SUBCUTANEOUS at 09:01

## 2021-01-28 RX ADMIN — Medication 800 MG: at 11:01

## 2021-01-28 RX ADMIN — Medication 800 MG: at 03:01

## 2021-01-28 RX ADMIN — ISAVUCONAZONIUM SULFATE 372 MG: 186 CAPSULE ORAL at 01:01

## 2021-01-28 RX ADMIN — METHOCARBAMOL 500 MG: 500 TABLET ORAL at 05:01

## 2021-01-28 RX ADMIN — INSULIN ASPART 1 UNITS: 100 INJECTION, SOLUTION INTRAVENOUS; SUBCUTANEOUS at 10:01

## 2021-01-29 LAB
A1AT SERPL-MCNC: 199 MG/DL (ref 100–190)
ALBUMIN SERPL BCP-MCNC: 2.4 G/DL (ref 3.5–5.2)
ALP SERPL-CCNC: 512 U/L (ref 55–135)
ALT SERPL W/O P-5'-P-CCNC: 145 U/L (ref 10–44)
ANION GAP SERPL CALC-SCNC: 6 MMOL/L (ref 8–16)
AST SERPL-CCNC: 219 U/L (ref 10–40)
BACTERIA SPEC AEROBE CULT: ABNORMAL
BASOPHILS # BLD AUTO: 0.05 K/UL (ref 0–0.2)
BASOPHILS NFR BLD: 0.7 % (ref 0–1.9)
BILIRUB SERPL-MCNC: 0.3 MG/DL (ref 0.1–1)
BUN SERPL-MCNC: 14 MG/DL (ref 6–20)
CALCIUM SERPL-MCNC: 8.7 MG/DL (ref 8.7–10.5)
CERULOPLASMIN SERPL-MCNC: 45 MG/DL (ref 15–45)
CHLORIDE SERPL-SCNC: 100 MMOL/L (ref 95–110)
CK SERPL-CCNC: 198 U/L (ref 20–180)
CO2 SERPL-SCNC: 28 MMOL/L (ref 23–29)
CREAT SERPL-MCNC: 0.8 MG/DL (ref 0.5–1.4)
DIFFERENTIAL METHOD: ABNORMAL
EOSINOPHIL # BLD AUTO: 0.6 K/UL (ref 0–0.5)
EOSINOPHIL NFR BLD: 8.1 % (ref 0–8)
ERYTHROCYTE [DISTWIDTH] IN BLOOD BY AUTOMATED COUNT: 13.7 % (ref 11.5–14.5)
EST. GFR  (AFRICAN AMERICAN): >60 ML/MIN/1.73 M^2
EST. GFR  (NON AFRICAN AMERICAN): >60 ML/MIN/1.73 M^2
FERRITIN SERPL-MCNC: 93 NG/ML (ref 20–300)
GGT SERPL-CCNC: 296 U/L (ref 8–55)
GLUCOSE SERPL-MCNC: 168 MG/DL (ref 70–110)
HCT VFR BLD AUTO: 26.4 % (ref 37–48.5)
HGB BLD-MCNC: 8 G/DL (ref 12–16)
IMM GRANULOCYTES # BLD AUTO: 0.11 K/UL (ref 0–0.04)
IMM GRANULOCYTES NFR BLD AUTO: 1.5 % (ref 0–0.5)
IRON SERPL-MCNC: 34 UG/DL (ref 30–160)
LYMPHOCYTES # BLD AUTO: 1 K/UL (ref 1–4.8)
LYMPHOCYTES NFR BLD: 13.5 % (ref 18–48)
MAGNESIUM SERPL-MCNC: 2 MG/DL (ref 1.6–2.6)
MCH RBC QN AUTO: 26.9 PG (ref 27–31)
MCHC RBC AUTO-ENTMCNC: 30.3 G/DL (ref 32–36)
MCV RBC AUTO: 89 FL (ref 82–98)
MONOCYTES # BLD AUTO: 0.9 K/UL (ref 0.3–1)
MONOCYTES NFR BLD: 12.6 % (ref 4–15)
MYOGLOBIN UR-MCNC: <15 MCG/L
NEUTROPHILS # BLD AUTO: 4.5 K/UL (ref 1.8–7.7)
NEUTROPHILS NFR BLD: 63.6 % (ref 38–73)
NRBC BLD-RTO: 0 /100 WBC
PHOSPHATE SERPL-MCNC: 3.7 MG/DL (ref 2.7–4.5)
PLATELET # BLD AUTO: 284 K/UL (ref 150–350)
PMV BLD AUTO: 10.9 FL (ref 9.2–12.9)
POCT GLUCOSE: 166 MG/DL (ref 70–110)
POCT GLUCOSE: 168 MG/DL (ref 70–110)
POCT GLUCOSE: 227 MG/DL (ref 70–110)
POTASSIUM SERPL-SCNC: 4.5 MMOL/L (ref 3.5–5.1)
PROT SERPL-MCNC: 6 G/DL (ref 6–8.4)
RBC # BLD AUTO: 2.97 M/UL (ref 4–5.4)
SATURATED IRON: 13 % (ref 20–50)
SODIUM SERPL-SCNC: 134 MMOL/L (ref 136–145)
T4 FREE SERPL-MCNC: 0.92 NG/DL (ref 0.71–1.51)
TOTAL IRON BINDING CAPACITY: 259 UG/DL (ref 250–450)
TRANSFERRIN SERPL-MCNC: 175 MG/DL (ref 200–375)
TRIGL SERPL-MCNC: 148 MG/DL (ref 30–150)
TSH SERPL DL<=0.005 MIU/L-ACNC: 5.23 UIU/ML (ref 0.4–4)
WBC # BLD AUTO: 7.12 K/UL (ref 3.9–12.7)

## 2021-01-29 PROCEDURE — 82728 ASSAY OF FERRITIN: CPT

## 2021-01-29 PROCEDURE — 20600001 HC STEP DOWN PRIVATE ROOM

## 2021-01-29 PROCEDURE — 63600175 PHARM REV CODE 636 W HCPCS: Performed by: SURGERY

## 2021-01-29 PROCEDURE — 25000003 PHARM REV CODE 250: Performed by: STUDENT IN AN ORGANIZED HEALTH CARE EDUCATION/TRAINING PROGRAM

## 2021-01-29 PROCEDURE — 99232 SBSQ HOSP IP/OBS MODERATE 35: CPT | Mod: ,,, | Performed by: HOSPITALIST

## 2021-01-29 PROCEDURE — 82977 ASSAY OF GGT: CPT

## 2021-01-29 PROCEDURE — 99232 PR SUBSEQUENT HOSPITAL CARE,LEVL II: ICD-10-PCS | Mod: ,,, | Performed by: HOSPITALIST

## 2021-01-29 PROCEDURE — 86038 ANTINUCLEAR ANTIBODIES: CPT

## 2021-01-29 PROCEDURE — 83735 ASSAY OF MAGNESIUM: CPT

## 2021-01-29 PROCEDURE — 84439 ASSAY OF FREE THYROXINE: CPT

## 2021-01-29 PROCEDURE — 86039 ANTINUCLEAR ANTIBODIES (ANA): CPT

## 2021-01-29 PROCEDURE — 83540 ASSAY OF IRON: CPT

## 2021-01-29 PROCEDURE — 25000003 PHARM REV CODE 250: Performed by: SURGERY

## 2021-01-29 PROCEDURE — 84478 ASSAY OF TRIGLYCERIDES: CPT

## 2021-01-29 PROCEDURE — 82103 ALPHA-1-ANTITRYPSIN TOTAL: CPT

## 2021-01-29 PROCEDURE — 86235 NUCLEAR ANTIGEN ANTIBODY: CPT | Mod: 91

## 2021-01-29 PROCEDURE — 36415 COLL VENOUS BLD VENIPUNCTURE: CPT

## 2021-01-29 PROCEDURE — 63600175 PHARM REV CODE 636 W HCPCS: Performed by: STUDENT IN AN ORGANIZED HEALTH CARE EDUCATION/TRAINING PROGRAM

## 2021-01-29 PROCEDURE — 82550 ASSAY OF CK (CPK): CPT

## 2021-01-29 PROCEDURE — 86255 FLUORESCENT ANTIBODY SCREEN: CPT

## 2021-01-29 PROCEDURE — 80053 COMPREHEN METABOLIC PANEL: CPT

## 2021-01-29 PROCEDURE — 25000003 PHARM REV CODE 250: Performed by: NURSE PRACTITIONER

## 2021-01-29 PROCEDURE — 84443 ASSAY THYROID STIM HORMONE: CPT

## 2021-01-29 PROCEDURE — 82390 ASSAY OF CERULOPLASMIN: CPT

## 2021-01-29 PROCEDURE — 86256 FLUORESCENT ANTIBODY TITER: CPT

## 2021-01-29 PROCEDURE — 86235 NUCLEAR ANTIGEN ANTIBODY: CPT | Mod: 59

## 2021-01-29 PROCEDURE — 85025 COMPLETE CBC W/AUTO DIFF WBC: CPT

## 2021-01-29 PROCEDURE — 84100 ASSAY OF PHOSPHORUS: CPT

## 2021-01-29 PROCEDURE — C9399 UNCLASSIFIED DRUGS OR BIOLOG: HCPCS | Performed by: NURSE PRACTITIONER

## 2021-01-29 RX ADMIN — INSULIN ASPART 2 UNITS: 100 INJECTION, SOLUTION INTRAVENOUS; SUBCUTANEOUS at 05:01

## 2021-01-29 RX ADMIN — GABAPENTIN 300 MG: 300 CAPSULE ORAL at 09:01

## 2021-01-29 RX ADMIN — METHOCARBAMOL 500 MG: 500 TABLET ORAL at 04:01

## 2021-01-29 RX ADMIN — INSULIN DETEMIR 42 UNITS: 100 INJECTION, SOLUTION SUBCUTANEOUS at 09:01

## 2021-01-29 RX ADMIN — METHOCARBAMOL 500 MG: 500 TABLET ORAL at 01:01

## 2021-01-29 RX ADMIN — ENOXAPARIN SODIUM 40 MG: 40 INJECTION SUBCUTANEOUS at 04:01

## 2021-01-29 RX ADMIN — METHOCARBAMOL 500 MG: 500 TABLET ORAL at 09:01

## 2021-01-29 RX ADMIN — INSULIN ASPART 10 UNITS: 100 INJECTION, SOLUTION INTRAVENOUS; SUBCUTANEOUS at 05:01

## 2021-01-29 RX ADMIN — INSULIN ASPART 2 UNITS: 100 INJECTION, SOLUTION INTRAVENOUS; SUBCUTANEOUS at 12:01

## 2021-01-29 RX ADMIN — INSULIN ASPART 4 UNITS: 100 INJECTION, SOLUTION INTRAVENOUS; SUBCUTANEOUS at 08:01

## 2021-01-29 RX ADMIN — GABAPENTIN 300 MG: 300 CAPSULE ORAL at 08:01

## 2021-01-29 RX ADMIN — ASPIRIN 81 MG: 81 TABLET, CHEWABLE ORAL at 09:01

## 2021-01-29 RX ADMIN — INSULIN ASPART 9 UNITS: 100 INJECTION, SOLUTION INTRAVENOUS; SUBCUTANEOUS at 08:01

## 2021-01-29 RX ADMIN — VENLAFAXINE HYDROCHLORIDE 225 MG: 150 CAPSULE, EXTENDED RELEASE ORAL at 09:01

## 2021-01-29 RX ADMIN — METHOCARBAMOL 500 MG: 500 TABLET ORAL at 08:01

## 2021-01-29 RX ADMIN — ASPIRIN 81 MG: 81 TABLET, CHEWABLE ORAL at 08:01

## 2021-01-29 RX ADMIN — ISAVUCONAZONIUM SULFATE 372 MG: 186 CAPSULE ORAL at 03:01

## 2021-01-29 RX ADMIN — OXYCODONE HYDROCHLORIDE 10 MG: 10 TABLET ORAL at 04:01

## 2021-01-29 RX ADMIN — OXYCODONE HYDROCHLORIDE 10 MG: 10 TABLET ORAL at 03:01

## 2021-01-29 RX ADMIN — GABAPENTIN 300 MG: 300 CAPSULE ORAL at 03:01

## 2021-01-29 RX ADMIN — LEVOTHYROXINE SODIUM 75 MCG: 25 TABLET ORAL at 06:01

## 2021-01-29 RX ADMIN — INSULIN ASPART 9 UNITS: 100 INJECTION, SOLUTION INTRAVENOUS; SUBCUTANEOUS at 12:01

## 2021-01-29 RX ADMIN — DOXYCYCLINE HYCLATE 100 MG: 100 TABLET, COATED ORAL at 08:01

## 2021-01-29 RX ADMIN — DOCUSATE SODIUM 50MG AND SENNOSIDES 8.6MG 1 TABLET: 8.6; 5 TABLET, FILM COATED ORAL at 09:01

## 2021-01-29 RX ADMIN — PANTOPRAZOLE SODIUM 40 MG: 40 TABLET, DELAYED RELEASE ORAL at 09:01

## 2021-01-29 RX ADMIN — LISINOPRIL 2.5 MG: 2.5 TABLET ORAL at 09:01

## 2021-01-29 RX ADMIN — HYDROMORPHONE HYDROCHLORIDE 1 MG: 1 INJECTION, SOLUTION INTRAMUSCULAR; INTRAVENOUS; SUBCUTANEOUS at 06:01

## 2021-01-29 RX ADMIN — OXYCODONE HYDROCHLORIDE 10 MG: 10 TABLET ORAL at 10:01

## 2021-01-29 RX ADMIN — DOXYCYCLINE HYCLATE 100 MG: 100 TABLET, COATED ORAL at 09:01

## 2021-01-29 RX ADMIN — OXYCODONE HYDROCHLORIDE 10 MG: 10 TABLET ORAL at 08:01

## 2021-01-30 LAB
ALBUMIN SERPL BCP-MCNC: 2.4 G/DL (ref 3.5–5.2)
ALBUMIN SERPL BCP-MCNC: 2.4 G/DL (ref 3.5–5.2)
ALP SERPL-CCNC: 504 U/L (ref 55–135)
ALP SERPL-CCNC: 552 U/L (ref 55–135)
ALT SERPL W/O P-5'-P-CCNC: 113 U/L (ref 10–44)
ALT SERPL W/O P-5'-P-CCNC: 124 U/L (ref 10–44)
ANION GAP SERPL CALC-SCNC: 7 MMOL/L (ref 8–16)
ANION GAP SERPL CALC-SCNC: 8 MMOL/L (ref 8–16)
AST SERPL-CCNC: 139 U/L (ref 10–40)
AST SERPL-CCNC: 148 U/L (ref 10–40)
BASOPHILS # BLD AUTO: 0.05 K/UL (ref 0–0.2)
BASOPHILS NFR BLD: 0.9 % (ref 0–1.9)
BILIRUB SERPL-MCNC: 0.2 MG/DL (ref 0.1–1)
BILIRUB SERPL-MCNC: 0.2 MG/DL (ref 0.1–1)
BUN SERPL-MCNC: 12 MG/DL (ref 6–20)
BUN SERPL-MCNC: 13 MG/DL (ref 6–20)
CALCIUM SERPL-MCNC: 8.8 MG/DL (ref 8.7–10.5)
CALCIUM SERPL-MCNC: 8.9 MG/DL (ref 8.7–10.5)
CHLORIDE SERPL-SCNC: 102 MMOL/L (ref 95–110)
CHLORIDE SERPL-SCNC: 102 MMOL/L (ref 95–110)
CK SERPL-CCNC: 114 U/L (ref 20–180)
CO2 SERPL-SCNC: 27 MMOL/L (ref 23–29)
CO2 SERPL-SCNC: 28 MMOL/L (ref 23–29)
CREAT SERPL-MCNC: 0.7 MG/DL (ref 0.5–1.4)
CREAT SERPL-MCNC: 0.8 MG/DL (ref 0.5–1.4)
CRP SERPL-MCNC: 84.1 MG/L (ref 0–8.2)
DIFFERENTIAL METHOD: ABNORMAL
EOSINOPHIL # BLD AUTO: 0.7 K/UL (ref 0–0.5)
EOSINOPHIL NFR BLD: 11.9 % (ref 0–8)
ERYTHROCYTE [DISTWIDTH] IN BLOOD BY AUTOMATED COUNT: 14 % (ref 11.5–14.5)
ERYTHROCYTE [SEDIMENTATION RATE] IN BLOOD BY WESTERGREN METHOD: 43 MM/HR (ref 0–36)
EST. GFR  (AFRICAN AMERICAN): >60 ML/MIN/1.73 M^2
EST. GFR  (AFRICAN AMERICAN): >60 ML/MIN/1.73 M^2
EST. GFR  (NON AFRICAN AMERICAN): >60 ML/MIN/1.73 M^2
EST. GFR  (NON AFRICAN AMERICAN): >60 ML/MIN/1.73 M^2
GLUCOSE SERPL-MCNC: 173 MG/DL (ref 70–110)
GLUCOSE SERPL-MCNC: 180 MG/DL (ref 70–110)
HCT VFR BLD AUTO: 28 % (ref 37–48.5)
HGB BLD-MCNC: 8.6 G/DL (ref 12–16)
IMM GRANULOCYTES # BLD AUTO: 0.2 K/UL (ref 0–0.04)
IMM GRANULOCYTES NFR BLD AUTO: 3.4 % (ref 0–0.5)
LYMPHOCYTES # BLD AUTO: 1 K/UL (ref 1–4.8)
LYMPHOCYTES NFR BLD: 16.7 % (ref 18–48)
MAGNESIUM SERPL-MCNC: 1.9 MG/DL (ref 1.6–2.6)
MCH RBC QN AUTO: 27 PG (ref 27–31)
MCHC RBC AUTO-ENTMCNC: 30.7 G/DL (ref 32–36)
MCV RBC AUTO: 88 FL (ref 82–98)
MONOCYTES # BLD AUTO: 0.8 K/UL (ref 0.3–1)
MONOCYTES NFR BLD: 14.3 % (ref 4–15)
NEUTROPHILS # BLD AUTO: 3.1 K/UL (ref 1.8–7.7)
NEUTROPHILS NFR BLD: 52.8 % (ref 38–73)
NRBC BLD-RTO: 0 /100 WBC
PHOSPHATE SERPL-MCNC: 3.8 MG/DL (ref 2.7–4.5)
PLATELET # BLD AUTO: 345 K/UL (ref 150–350)
PMV BLD AUTO: 10.4 FL (ref 9.2–12.9)
POCT GLUCOSE: 137 MG/DL (ref 70–110)
POCT GLUCOSE: 179 MG/DL (ref 70–110)
POCT GLUCOSE: 199 MG/DL (ref 70–110)
POCT GLUCOSE: 206 MG/DL (ref 70–110)
POCT GLUCOSE: 61 MG/DL (ref 70–110)
POCT GLUCOSE: 75 MG/DL (ref 70–110)
POCT GLUCOSE: 82 MG/DL (ref 70–110)
POCT GLUCOSE: 97 MG/DL (ref 70–110)
POTASSIUM SERPL-SCNC: 4.6 MMOL/L (ref 3.5–5.1)
POTASSIUM SERPL-SCNC: 4.7 MMOL/L (ref 3.5–5.1)
PROT SERPL-MCNC: 5.9 G/DL (ref 6–8.4)
PROT SERPL-MCNC: 6.1 G/DL (ref 6–8.4)
RBC # BLD AUTO: 3.18 M/UL (ref 4–5.4)
SODIUM SERPL-SCNC: 137 MMOL/L (ref 136–145)
SODIUM SERPL-SCNC: 137 MMOL/L (ref 136–145)
WBC # BLD AUTO: 5.82 K/UL (ref 3.9–12.7)

## 2021-01-30 PROCEDURE — 99222 1ST HOSP IP/OBS MODERATE 55: CPT | Mod: ,,, | Performed by: INTERNAL MEDICINE

## 2021-01-30 PROCEDURE — 25000003 PHARM REV CODE 250: Performed by: STUDENT IN AN ORGANIZED HEALTH CARE EDUCATION/TRAINING PROGRAM

## 2021-01-30 PROCEDURE — 80053 COMPREHEN METABOLIC PANEL: CPT | Mod: 91

## 2021-01-30 PROCEDURE — 86140 C-REACTIVE PROTEIN: CPT

## 2021-01-30 PROCEDURE — 85025 COMPLETE CBC W/AUTO DIFF WBC: CPT

## 2021-01-30 PROCEDURE — 25000003 PHARM REV CODE 250: Performed by: SURGERY

## 2021-01-30 PROCEDURE — 63600175 PHARM REV CODE 636 W HCPCS: Performed by: SURGERY

## 2021-01-30 PROCEDURE — 83735 ASSAY OF MAGNESIUM: CPT

## 2021-01-30 PROCEDURE — 25000003 PHARM REV CODE 250: Performed by: NURSE PRACTITIONER

## 2021-01-30 PROCEDURE — 85652 RBC SED RATE AUTOMATED: CPT

## 2021-01-30 PROCEDURE — 99222 PR INITIAL HOSPITAL CARE,LEVL II: ICD-10-PCS | Mod: ,,, | Performed by: INTERNAL MEDICINE

## 2021-01-30 PROCEDURE — 80053 COMPREHEN METABOLIC PANEL: CPT

## 2021-01-30 PROCEDURE — 84100 ASSAY OF PHOSPHORUS: CPT

## 2021-01-30 PROCEDURE — 36415 COLL VENOUS BLD VENIPUNCTURE: CPT

## 2021-01-30 PROCEDURE — 20600001 HC STEP DOWN PRIVATE ROOM

## 2021-01-30 PROCEDURE — 82550 ASSAY OF CK (CPK): CPT

## 2021-01-30 RX ADMIN — LISINOPRIL 2.5 MG: 2.5 TABLET ORAL at 09:01

## 2021-01-30 RX ADMIN — GABAPENTIN 300 MG: 300 CAPSULE ORAL at 10:01

## 2021-01-30 RX ADMIN — INSULIN ASPART 2 UNITS: 100 INJECTION, SOLUTION INTRAVENOUS; SUBCUTANEOUS at 09:01

## 2021-01-30 RX ADMIN — MELATONIN TAB 3 MG 6 MG: 3 TAB at 10:01

## 2021-01-30 RX ADMIN — METHOCARBAMOL 500 MG: 500 TABLET ORAL at 10:01

## 2021-01-30 RX ADMIN — INSULIN ASPART 10 UNITS: 100 INJECTION, SOLUTION INTRAVENOUS; SUBCUTANEOUS at 12:01

## 2021-01-30 RX ADMIN — METHOCARBAMOL 500 MG: 500 TABLET ORAL at 09:01

## 2021-01-30 RX ADMIN — DOXYCYCLINE HYCLATE 100 MG: 100 TABLET, COATED ORAL at 10:01

## 2021-01-30 RX ADMIN — ASPIRIN 81 MG: 81 TABLET, CHEWABLE ORAL at 10:01

## 2021-01-30 RX ADMIN — LEVOTHYROXINE SODIUM 75 MCG: 25 TABLET ORAL at 05:01

## 2021-01-30 RX ADMIN — GABAPENTIN 300 MG: 300 CAPSULE ORAL at 02:01

## 2021-01-30 RX ADMIN — PANTOPRAZOLE SODIUM 40 MG: 40 TABLET, DELAYED RELEASE ORAL at 09:01

## 2021-01-30 RX ADMIN — OXYCODONE HYDROCHLORIDE 10 MG: 10 TABLET ORAL at 07:01

## 2021-01-30 RX ADMIN — OXYCODONE HYDROCHLORIDE 10 MG: 10 TABLET ORAL at 12:01

## 2021-01-30 RX ADMIN — METHOCARBAMOL 500 MG: 500 TABLET ORAL at 01:01

## 2021-01-30 RX ADMIN — ENOXAPARIN SODIUM 40 MG: 40 INJECTION SUBCUTANEOUS at 04:01

## 2021-01-30 RX ADMIN — ASPIRIN 81 MG: 81 TABLET, CHEWABLE ORAL at 09:01

## 2021-01-30 RX ADMIN — INSULIN ASPART 15 UNITS: 100 INJECTION, SOLUTION INTRAVENOUS; SUBCUTANEOUS at 09:01

## 2021-01-30 RX ADMIN — GABAPENTIN 300 MG: 300 CAPSULE ORAL at 09:01

## 2021-01-30 RX ADMIN — VENLAFAXINE HYDROCHLORIDE 225 MG: 150 CAPSULE, EXTENDED RELEASE ORAL at 09:01

## 2021-01-30 RX ADMIN — INSULIN DETEMIR 42 UNITS: 100 INJECTION, SOLUTION SUBCUTANEOUS at 09:01

## 2021-01-30 RX ADMIN — INSULIN ASPART 2 UNITS: 100 INJECTION, SOLUTION INTRAVENOUS; SUBCUTANEOUS at 12:01

## 2021-01-30 RX ADMIN — DOCUSATE SODIUM 50MG AND SENNOSIDES 8.6MG 1 TABLET: 8.6; 5 TABLET, FILM COATED ORAL at 09:01

## 2021-01-30 RX ADMIN — MELATONIN TAB 3 MG 6 MG: 3 TAB at 12:01

## 2021-01-30 RX ADMIN — ISAVUCONAZONIUM SULFATE 372 MG: 186 CAPSULE ORAL at 01:01

## 2021-01-30 RX ADMIN — INSULIN ASPART 6 UNITS: 100 INJECTION, SOLUTION INTRAVENOUS; SUBCUTANEOUS at 05:01

## 2021-01-30 RX ADMIN — METHOCARBAMOL 500 MG: 500 TABLET ORAL at 04:01

## 2021-01-30 RX ADMIN — OXYCODONE HYDROCHLORIDE 10 MG: 10 TABLET ORAL at 11:01

## 2021-01-30 RX ADMIN — DEXTROSE MONOHYDRATE 12.5 G: 25 INJECTION, SOLUTION INTRAVENOUS at 04:01

## 2021-01-30 RX ADMIN — OXYCODONE HYDROCHLORIDE 10 MG: 10 TABLET ORAL at 02:01

## 2021-01-30 RX ADMIN — DOXYCYCLINE HYCLATE 100 MG: 100 TABLET, COATED ORAL at 09:01

## 2021-01-31 LAB
ALBUMIN SERPL BCP-MCNC: 2.4 G/DL (ref 3.5–5.2)
ALP SERPL-CCNC: 551 U/L (ref 55–135)
ALT SERPL W/O P-5'-P-CCNC: 103 U/L (ref 10–44)
ANION GAP SERPL CALC-SCNC: 9 MMOL/L (ref 8–16)
ANISOCYTOSIS BLD QL SMEAR: SLIGHT
AST SERPL-CCNC: 119 U/L (ref 10–40)
BASOPHILS # BLD AUTO: 0.05 K/UL (ref 0–0.2)
BASOPHILS NFR BLD: 0.7 % (ref 0–1.9)
BILIRUB SERPL-MCNC: 0.2 MG/DL (ref 0.1–1)
BUN SERPL-MCNC: 12 MG/DL (ref 6–20)
CALCIUM SERPL-MCNC: 8.7 MG/DL (ref 8.7–10.5)
CHLORIDE SERPL-SCNC: 100 MMOL/L (ref 95–110)
CO2 SERPL-SCNC: 26 MMOL/L (ref 23–29)
CREAT SERPL-MCNC: 0.8 MG/DL (ref 0.5–1.4)
DIFFERENTIAL METHOD: ABNORMAL
EOSINOPHIL # BLD AUTO: 0.8 K/UL (ref 0–0.5)
EOSINOPHIL NFR BLD: 10.3 % (ref 0–8)
ERYTHROCYTE [DISTWIDTH] IN BLOOD BY AUTOMATED COUNT: 14 % (ref 11.5–14.5)
EST. GFR  (AFRICAN AMERICAN): >60 ML/MIN/1.73 M^2
EST. GFR  (NON AFRICAN AMERICAN): >60 ML/MIN/1.73 M^2
GLUCOSE SERPL-MCNC: 194 MG/DL (ref 70–110)
HCT VFR BLD AUTO: 27.4 % (ref 37–48.5)
HGB BLD-MCNC: 8.4 G/DL (ref 12–16)
HYPOCHROMIA BLD QL SMEAR: ABNORMAL
IMM GRANULOCYTES # BLD AUTO: 0.3 K/UL (ref 0–0.04)
IMM GRANULOCYTES NFR BLD AUTO: 4.1 % (ref 0–0.5)
LYMPHOCYTES # BLD AUTO: 1.3 K/UL (ref 1–4.8)
LYMPHOCYTES NFR BLD: 17.8 % (ref 18–48)
MAGNESIUM SERPL-MCNC: 2 MG/DL (ref 1.6–2.6)
MCH RBC QN AUTO: 27.2 PG (ref 27–31)
MCHC RBC AUTO-ENTMCNC: 30.7 G/DL (ref 32–36)
MCV RBC AUTO: 89 FL (ref 82–98)
MONOCYTES # BLD AUTO: 1.1 K/UL (ref 0.3–1)
MONOCYTES NFR BLD: 15.2 % (ref 4–15)
NEUTROPHILS # BLD AUTO: 3.8 K/UL (ref 1.8–7.7)
NEUTROPHILS NFR BLD: 51.9 % (ref 38–73)
NRBC BLD-RTO: 0 /100 WBC
OVALOCYTES BLD QL SMEAR: ABNORMAL
PHOSPHATE SERPL-MCNC: 4.2 MG/DL (ref 2.7–4.5)
PLATELET # BLD AUTO: 386 K/UL (ref 150–350)
PLATELET BLD QL SMEAR: ABNORMAL
PMV BLD AUTO: 10.3 FL (ref 9.2–12.9)
POCT GLUCOSE: 115 MG/DL (ref 70–110)
POCT GLUCOSE: 117 MG/DL (ref 70–110)
POCT GLUCOSE: 142 MG/DL (ref 70–110)
POCT GLUCOSE: 278 MG/DL (ref 70–110)
POCT GLUCOSE: 68 MG/DL (ref 70–110)
POCT GLUCOSE: 69 MG/DL (ref 70–110)
POCT GLUCOSE: 76 MG/DL (ref 70–110)
POCT GLUCOSE: 81 MG/DL (ref 70–110)
POCT GLUCOSE: 93 MG/DL (ref 70–110)
POCT GLUCOSE: 99 MG/DL (ref 70–110)
POIKILOCYTOSIS BLD QL SMEAR: SLIGHT
POLYCHROMASIA BLD QL SMEAR: ABNORMAL
POTASSIUM SERPL-SCNC: 5.2 MMOL/L (ref 3.5–5.1)
PROT SERPL-MCNC: 5.9 G/DL (ref 6–8.4)
RBC # BLD AUTO: 3.09 M/UL (ref 4–5.4)
SODIUM SERPL-SCNC: 135 MMOL/L (ref 136–145)
WBC # BLD AUTO: 7.35 K/UL (ref 3.9–12.7)

## 2021-01-31 PROCEDURE — 80053 COMPREHEN METABOLIC PANEL: CPT

## 2021-01-31 PROCEDURE — 99232 PR SUBSEQUENT HOSPITAL CARE,LEVL II: ICD-10-PCS | Mod: GT,,, | Performed by: NURSE PRACTITIONER

## 2021-01-31 PROCEDURE — 25000003 PHARM REV CODE 250: Performed by: SURGERY

## 2021-01-31 PROCEDURE — 99232 SBSQ HOSP IP/OBS MODERATE 35: CPT | Mod: GT,,, | Performed by: NURSE PRACTITIONER

## 2021-01-31 PROCEDURE — 84100 ASSAY OF PHOSPHORUS: CPT

## 2021-01-31 PROCEDURE — 36415 COLL VENOUS BLD VENIPUNCTURE: CPT

## 2021-01-31 PROCEDURE — 25000003 PHARM REV CODE 250: Performed by: STUDENT IN AN ORGANIZED HEALTH CARE EDUCATION/TRAINING PROGRAM

## 2021-01-31 PROCEDURE — 85025 COMPLETE CBC W/AUTO DIFF WBC: CPT

## 2021-01-31 PROCEDURE — 25000003 PHARM REV CODE 250: Performed by: NURSE PRACTITIONER

## 2021-01-31 PROCEDURE — 63600175 PHARM REV CODE 636 W HCPCS: Performed by: NURSE PRACTITIONER

## 2021-01-31 PROCEDURE — 83735 ASSAY OF MAGNESIUM: CPT

## 2021-01-31 PROCEDURE — 20600001 HC STEP DOWN PRIVATE ROOM

## 2021-01-31 PROCEDURE — 63600175 PHARM REV CODE 636 W HCPCS: Performed by: SURGERY

## 2021-01-31 RX ORDER — INSULIN ASPART 100 [IU]/ML
0-5 INJECTION, SOLUTION INTRAVENOUS; SUBCUTANEOUS
Status: DISCONTINUED | OUTPATIENT
Start: 2021-01-31 | End: 2021-02-03 | Stop reason: HOSPADM

## 2021-01-31 RX ADMIN — GABAPENTIN 300 MG: 300 CAPSULE ORAL at 08:01

## 2021-01-31 RX ADMIN — DOXYCYCLINE HYCLATE 100 MG: 100 TABLET, COATED ORAL at 08:01

## 2021-01-31 RX ADMIN — OXYCODONE HYDROCHLORIDE 5 MG: 5 TABLET ORAL at 12:01

## 2021-01-31 RX ADMIN — METHOCARBAMOL 500 MG: 500 TABLET ORAL at 12:01

## 2021-01-31 RX ADMIN — METHOCARBAMOL 500 MG: 500 TABLET ORAL at 09:01

## 2021-01-31 RX ADMIN — PANTOPRAZOLE SODIUM 40 MG: 40 TABLET, DELAYED RELEASE ORAL at 09:01

## 2021-01-31 RX ADMIN — METHOCARBAMOL 500 MG: 500 TABLET ORAL at 08:01

## 2021-01-31 RX ADMIN — INSULIN ASPART 17 UNITS: 100 INJECTION, SOLUTION INTRAVENOUS; SUBCUTANEOUS at 12:01

## 2021-01-31 RX ADMIN — ENOXAPARIN SODIUM 40 MG: 40 INJECTION SUBCUTANEOUS at 04:01

## 2021-01-31 RX ADMIN — OXYCODONE HYDROCHLORIDE 5 MG: 5 TABLET ORAL at 07:01

## 2021-01-31 RX ADMIN — DEXTROSE MONOHYDRATE 12.5 G: 25 INJECTION, SOLUTION INTRAVENOUS at 06:01

## 2021-01-31 RX ADMIN — INSULIN ASPART 11 UNITS: 100 INJECTION, SOLUTION INTRAVENOUS; SUBCUTANEOUS at 08:01

## 2021-01-31 RX ADMIN — OXYCODONE HYDROCHLORIDE 10 MG: 10 TABLET ORAL at 06:01

## 2021-01-31 RX ADMIN — OXYCODONE HYDROCHLORIDE 5 MG: 5 TABLET ORAL at 11:01

## 2021-01-31 RX ADMIN — INSULIN ASPART 6 UNITS: 100 INJECTION, SOLUTION INTRAVENOUS; SUBCUTANEOUS at 08:01

## 2021-01-31 RX ADMIN — INSULIN DETEMIR 38 UNITS: 100 INJECTION, SOLUTION SUBCUTANEOUS at 09:01

## 2021-01-31 RX ADMIN — ASPIRIN 81 MG: 81 TABLET, CHEWABLE ORAL at 09:01

## 2021-01-31 RX ADMIN — MELATONIN TAB 3 MG 6 MG: 3 TAB at 08:01

## 2021-01-31 RX ADMIN — ASPIRIN 81 MG: 81 TABLET, CHEWABLE ORAL at 08:01

## 2021-01-31 RX ADMIN — DOXYCYCLINE HYCLATE 100 MG: 100 TABLET, COATED ORAL at 09:01

## 2021-01-31 RX ADMIN — GABAPENTIN 300 MG: 300 CAPSULE ORAL at 02:01

## 2021-01-31 RX ADMIN — INSULIN ASPART 9 UNITS: 100 INJECTION, SOLUTION INTRAVENOUS; SUBCUTANEOUS at 05:01

## 2021-01-31 RX ADMIN — ISAVUCONAZONIUM SULFATE 372 MG: 186 CAPSULE ORAL at 02:01

## 2021-01-31 RX ADMIN — LISINOPRIL 2.5 MG: 2.5 TABLET ORAL at 09:01

## 2021-01-31 RX ADMIN — DOCUSATE SODIUM 50MG AND SENNOSIDES 8.6MG 1 TABLET: 8.6; 5 TABLET, FILM COATED ORAL at 09:01

## 2021-01-31 RX ADMIN — VENLAFAXINE HYDROCHLORIDE 225 MG: 150 CAPSULE, EXTENDED RELEASE ORAL at 09:01

## 2021-01-31 RX ADMIN — LEVOTHYROXINE SODIUM 75 MCG: 25 TABLET ORAL at 05:01

## 2021-01-31 RX ADMIN — METHOCARBAMOL 500 MG: 500 TABLET ORAL at 04:01

## 2021-01-31 RX ADMIN — GABAPENTIN 300 MG: 300 CAPSULE ORAL at 09:01

## 2021-01-31 RX ADMIN — DOCUSATE SODIUM 50MG AND SENNOSIDES 8.6MG 1 TABLET: 8.6; 5 TABLET, FILM COATED ORAL at 08:01

## 2021-02-01 LAB
ALBUMIN SERPL BCP-MCNC: 2.5 G/DL (ref 3.5–5.2)
ALP SERPL-CCNC: 530 U/L (ref 55–135)
ALT SERPL W/O P-5'-P-CCNC: 76 U/L (ref 10–44)
ANA PATTERN 1: NORMAL
ANA SER QL IF: POSITIVE
ANA TITR SER IF: NORMAL {TITER}
ANCA AB TITR SER IF: NORMAL TITER
ANION GAP SERPL CALC-SCNC: 11 MMOL/L (ref 8–16)
ANISOCYTOSIS BLD QL SMEAR: SLIGHT
AST SERPL-CCNC: 64 U/L (ref 10–40)
BASOPHILS NFR BLD: 0 % (ref 0–1.9)
BILIRUB SERPL-MCNC: 0.3 MG/DL (ref 0.1–1)
BUN SERPL-MCNC: 11 MG/DL (ref 6–20)
CALCIUM SERPL-MCNC: 8.8 MG/DL (ref 8.7–10.5)
CHLORIDE SERPL-SCNC: 102 MMOL/L (ref 95–110)
CO2 SERPL-SCNC: 23 MMOL/L (ref 23–29)
CREAT SERPL-MCNC: 0.8 MG/DL (ref 0.5–1.4)
DIFFERENTIAL METHOD: ABNORMAL
EOSINOPHIL NFR BLD: 10 % (ref 0–8)
ERYTHROCYTE [DISTWIDTH] IN BLOOD BY AUTOMATED COUNT: 13.8 % (ref 11.5–14.5)
EST. GFR  (AFRICAN AMERICAN): >60 ML/MIN/1.73 M^2
EST. GFR  (NON AFRICAN AMERICAN): >60 ML/MIN/1.73 M^2
GLUCOSE SERPL-MCNC: 272 MG/DL (ref 70–110)
HCT VFR BLD AUTO: 28.3 % (ref 37–48.5)
HGB BLD-MCNC: 8.7 G/DL (ref 12–16)
IMM GRANULOCYTES # BLD AUTO: ABNORMAL K/UL (ref 0–0.04)
IMM GRANULOCYTES NFR BLD AUTO: ABNORMAL % (ref 0–0.5)
LYMPHOCYTES NFR BLD: 14 % (ref 18–48)
MAGNESIUM SERPL-MCNC: 2 MG/DL (ref 1.6–2.6)
MCH RBC QN AUTO: 27 PG (ref 27–31)
MCHC RBC AUTO-ENTMCNC: 30.7 G/DL (ref 32–36)
MCV RBC AUTO: 88 FL (ref 82–98)
METAMYELOCYTES NFR BLD MANUAL: 1 %
MONOCYTES NFR BLD: 10 % (ref 4–15)
MYELOCYTES NFR BLD MANUAL: 1 %
NEUTROPHILS NFR BLD: 64 % (ref 38–73)
NRBC BLD-RTO: 0 /100 WBC
P-ANCA TITR SER IF: NORMAL TITER
PHOSPHATE SERPL-MCNC: 4.2 MG/DL (ref 2.7–4.5)
PLATELET # BLD AUTO: 401 K/UL (ref 150–350)
PLATELET BLD QL SMEAR: ABNORMAL
PMV BLD AUTO: 10.4 FL (ref 9.2–12.9)
POCT GLUCOSE: 145 MG/DL (ref 70–110)
POCT GLUCOSE: 282 MG/DL (ref 70–110)
POCT GLUCOSE: 315 MG/DL (ref 70–110)
POCT GLUCOSE: 96 MG/DL (ref 70–110)
POIKILOCYTOSIS BLD QL SMEAR: SLIGHT
POLYCHROMASIA BLD QL SMEAR: ABNORMAL
POTASSIUM SERPL-SCNC: 4.7 MMOL/L (ref 3.5–5.1)
PROT SERPL-MCNC: 6.1 G/DL (ref 6–8.4)
RBC # BLD AUTO: 3.22 M/UL (ref 4–5.4)
SODIUM SERPL-SCNC: 136 MMOL/L (ref 136–145)
WBC # BLD AUTO: 6.48 K/UL (ref 3.9–12.7)

## 2021-02-01 PROCEDURE — 99232 SBSQ HOSP IP/OBS MODERATE 35: CPT | Mod: GT,,, | Performed by: NURSE PRACTITIONER

## 2021-02-01 PROCEDURE — 25000003 PHARM REV CODE 250: Performed by: STUDENT IN AN ORGANIZED HEALTH CARE EDUCATION/TRAINING PROGRAM

## 2021-02-01 PROCEDURE — 63600175 PHARM REV CODE 636 W HCPCS: Performed by: NURSE PRACTITIONER

## 2021-02-01 PROCEDURE — 36415 COLL VENOUS BLD VENIPUNCTURE: CPT

## 2021-02-01 PROCEDURE — 63600175 PHARM REV CODE 636 W HCPCS: Performed by: SURGERY

## 2021-02-01 PROCEDURE — 20600001 HC STEP DOWN PRIVATE ROOM

## 2021-02-01 PROCEDURE — 97530 THERAPEUTIC ACTIVITIES: CPT

## 2021-02-01 PROCEDURE — 84100 ASSAY OF PHOSPHORUS: CPT

## 2021-02-01 PROCEDURE — 80053 COMPREHEN METABOLIC PANEL: CPT

## 2021-02-01 PROCEDURE — 25000003 PHARM REV CODE 250: Performed by: NURSE PRACTITIONER

## 2021-02-01 PROCEDURE — 99232 PR SUBSEQUENT HOSPITAL CARE,LEVL II: ICD-10-PCS | Mod: GT,,, | Performed by: NURSE PRACTITIONER

## 2021-02-01 PROCEDURE — 85007 BL SMEAR W/DIFF WBC COUNT: CPT

## 2021-02-01 PROCEDURE — 25000003 PHARM REV CODE 250: Performed by: SURGERY

## 2021-02-01 PROCEDURE — 85027 COMPLETE CBC AUTOMATED: CPT

## 2021-02-01 PROCEDURE — 83735 ASSAY OF MAGNESIUM: CPT

## 2021-02-01 PROCEDURE — C9399 UNCLASSIFIED DRUGS OR BIOLOG: HCPCS | Performed by: NURSE PRACTITIONER

## 2021-02-01 PROCEDURE — 97802 MEDICAL NUTRITION INDIV IN: CPT

## 2021-02-01 RX ORDER — DOXYCYCLINE HYCLATE 100 MG
100 TABLET ORAL EVERY 12 HOURS
Status: CANCELLED | OUTPATIENT
Start: 2021-02-01 | End: 2021-02-05

## 2021-02-01 RX ADMIN — MELATONIN TAB 3 MG 6 MG: 3 TAB at 09:02

## 2021-02-01 RX ADMIN — INSULIN ASPART 4 UNITS: 100 INJECTION, SOLUTION INTRAVENOUS; SUBCUTANEOUS at 08:02

## 2021-02-01 RX ADMIN — DOXYCYCLINE HYCLATE 100 MG: 100 TABLET, COATED ORAL at 09:02

## 2021-02-01 RX ADMIN — ISAVUCONAZONIUM SULFATE 372 MG: 186 CAPSULE ORAL at 02:02

## 2021-02-01 RX ADMIN — OXYCODONE HYDROCHLORIDE 10 MG: 10 TABLET ORAL at 09:02

## 2021-02-01 RX ADMIN — METHOCARBAMOL 500 MG: 500 TABLET ORAL at 08:02

## 2021-02-01 RX ADMIN — INSULIN ASPART 7 UNITS: 100 INJECTION, SOLUTION INTRAVENOUS; SUBCUTANEOUS at 08:02

## 2021-02-01 RX ADMIN — OXYCODONE HYDROCHLORIDE 5 MG: 5 TABLET ORAL at 12:02

## 2021-02-01 RX ADMIN — GABAPENTIN 300 MG: 300 CAPSULE ORAL at 09:02

## 2021-02-01 RX ADMIN — INSULIN ASPART 6 UNITS: 100 INJECTION, SOLUTION INTRAVENOUS; SUBCUTANEOUS at 12:02

## 2021-02-01 RX ADMIN — METHOCARBAMOL 500 MG: 500 TABLET ORAL at 12:02

## 2021-02-01 RX ADMIN — INSULIN DETEMIR 38 UNITS: 100 INJECTION, SOLUTION SUBCUTANEOUS at 08:02

## 2021-02-01 RX ADMIN — DOCUSATE SODIUM 50MG AND SENNOSIDES 8.6MG 1 TABLET: 8.6; 5 TABLET, FILM COATED ORAL at 08:02

## 2021-02-01 RX ADMIN — DOCUSATE SODIUM 50MG AND SENNOSIDES 8.6MG 1 TABLET: 8.6; 5 TABLET, FILM COATED ORAL at 09:02

## 2021-02-01 RX ADMIN — METHOCARBAMOL 500 MG: 500 TABLET ORAL at 09:02

## 2021-02-01 RX ADMIN — LISINOPRIL 2.5 MG: 2.5 TABLET ORAL at 08:02

## 2021-02-01 RX ADMIN — DOXYCYCLINE HYCLATE 100 MG: 100 TABLET, COATED ORAL at 08:02

## 2021-02-01 RX ADMIN — INSULIN ASPART 5 UNITS: 100 INJECTION, SOLUTION INTRAVENOUS; SUBCUTANEOUS at 06:02

## 2021-02-01 RX ADMIN — OXYCODONE HYDROCHLORIDE 5 MG: 5 TABLET ORAL at 05:02

## 2021-02-01 RX ADMIN — LEVOTHYROXINE SODIUM 75 MCG: 25 TABLET ORAL at 05:02

## 2021-02-01 RX ADMIN — PANTOPRAZOLE SODIUM 40 MG: 40 TABLET, DELAYED RELEASE ORAL at 08:02

## 2021-02-01 RX ADMIN — GABAPENTIN 300 MG: 300 CAPSULE ORAL at 02:02

## 2021-02-01 RX ADMIN — METHOCARBAMOL 500 MG: 500 TABLET ORAL at 05:02

## 2021-02-01 RX ADMIN — INSULIN ASPART 3 UNITS: 100 INJECTION, SOLUTION INTRAVENOUS; SUBCUTANEOUS at 12:02

## 2021-02-01 RX ADMIN — ASPIRIN 81 MG: 81 TABLET, CHEWABLE ORAL at 08:02

## 2021-02-01 RX ADMIN — GABAPENTIN 300 MG: 300 CAPSULE ORAL at 08:02

## 2021-02-01 RX ADMIN — ASPIRIN 81 MG: 81 TABLET, CHEWABLE ORAL at 09:02

## 2021-02-01 RX ADMIN — VENLAFAXINE HYDROCHLORIDE 225 MG: 150 CAPSULE, EXTENDED RELEASE ORAL at 08:02

## 2021-02-01 RX ADMIN — ENOXAPARIN SODIUM 40 MG: 40 INJECTION SUBCUTANEOUS at 05:02

## 2021-02-02 LAB
ALBUMIN SERPL BCP-MCNC: 2.7 G/DL (ref 3.5–5.2)
ALP SERPL-CCNC: 535 U/L (ref 55–135)
ALT SERPL W/O P-5'-P-CCNC: 60 U/L (ref 10–44)
ANION GAP SERPL CALC-SCNC: 10 MMOL/L (ref 8–16)
ANTI SM ANTIBODY: 0.08 RATIO (ref 0–0.99)
ANTI SM/RNP ANTIBODY: 0.07 RATIO (ref 0–0.99)
ANTI-SM INTERPRETATION: NEGATIVE
ANTI-SM/RNP INTERPRETATION: NEGATIVE
ANTI-SSA ANTIBODY: 0.05 RATIO (ref 0–0.99)
ANTI-SSA INTERPRETATION: NEGATIVE
ANTI-SSB ANTIBODY: 0.15 RATIO (ref 0–0.99)
ANTI-SSB INTERPRETATION: NEGATIVE
AST SERPL-CCNC: 41 U/L (ref 10–40)
BASOPHILS # BLD AUTO: 0.08 K/UL (ref 0–0.2)
BASOPHILS NFR BLD: 1.2 % (ref 0–1.9)
BILIRUB SERPL-MCNC: 0.3 MG/DL (ref 0.1–1)
BUN SERPL-MCNC: 17 MG/DL (ref 6–20)
CALCIUM SERPL-MCNC: 9.1 MG/DL (ref 8.7–10.5)
CHLORIDE SERPL-SCNC: 100 MMOL/L (ref 95–110)
CO2 SERPL-SCNC: 24 MMOL/L (ref 23–29)
CREAT SERPL-MCNC: 0.9 MG/DL (ref 0.5–1.4)
DIFFERENTIAL METHOD: ABNORMAL
DSDNA AB SER-ACNC: NORMAL [IU]/ML
EOSINOPHIL # BLD AUTO: 0.9 K/UL (ref 0–0.5)
EOSINOPHIL NFR BLD: 13 % (ref 0–8)
ERYTHROCYTE [DISTWIDTH] IN BLOOD BY AUTOMATED COUNT: 14.1 % (ref 11.5–14.5)
EST. GFR  (AFRICAN AMERICAN): >60 ML/MIN/1.73 M^2
EST. GFR  (NON AFRICAN AMERICAN): >60 ML/MIN/1.73 M^2
FUNGUS SPEC CULT: NORMAL
GLUCOSE SERPL-MCNC: 417 MG/DL (ref 70–110)
HCT VFR BLD AUTO: 29 % (ref 37–48.5)
HGB BLD-MCNC: 9 G/DL (ref 12–16)
IGG SERPL-MCNC: 571 MG/DL (ref 650–1600)
IMM GRANULOCYTES # BLD AUTO: 0.29 K/UL (ref 0–0.04)
IMM GRANULOCYTES NFR BLD AUTO: 4.4 % (ref 0–0.5)
INR PPP: 0.9 (ref 0.8–1.2)
LYMPHOCYTES # BLD AUTO: 1.4 K/UL (ref 1–4.8)
LYMPHOCYTES NFR BLD: 21.5 % (ref 18–48)
MAGNESIUM SERPL-MCNC: 2 MG/DL (ref 1.6–2.6)
MCH RBC QN AUTO: 26.9 PG (ref 27–31)
MCHC RBC AUTO-ENTMCNC: 31 G/DL (ref 32–36)
MCV RBC AUTO: 87 FL (ref 82–98)
MITOCHONDRIA AB TITR SER IF: NORMAL {TITER}
MONOCYTES # BLD AUTO: 0.7 K/UL (ref 0.3–1)
MONOCYTES NFR BLD: 10.6 % (ref 4–15)
NEUTROPHILS # BLD AUTO: 3.3 K/UL (ref 1.8–7.7)
NEUTROPHILS NFR BLD: 49.3 % (ref 38–73)
NRBC BLD-RTO: 0 /100 WBC
PHOSPHATE SERPL-MCNC: 3.8 MG/DL (ref 2.7–4.5)
PLATELET # BLD AUTO: 484 K/UL (ref 150–350)
PMV BLD AUTO: 9.9 FL (ref 9.2–12.9)
POCT GLUCOSE: 301 MG/DL (ref 70–110)
POCT GLUCOSE: 440 MG/DL (ref 70–110)
POCT GLUCOSE: 498 MG/DL (ref 70–110)
POCT GLUCOSE: 77 MG/DL (ref 70–110)
POCT GLUCOSE: 93 MG/DL (ref 70–110)
POCT GLUCOSE: >500 MG/DL (ref 70–110)
POTASSIUM SERPL-SCNC: 5 MMOL/L (ref 3.5–5.1)
PROT SERPL-MCNC: 6.2 G/DL (ref 6–8.4)
PROTHROMBIN TIME: 10.2 SEC (ref 9–12.5)
RBC # BLD AUTO: 3.34 M/UL (ref 4–5.4)
SMOOTH MUSCLE AB TITR SER IF: NORMAL {TITER}
SODIUM SERPL-SCNC: 134 MMOL/L (ref 136–145)
WBC # BLD AUTO: 6.6 K/UL (ref 3.9–12.7)

## 2021-02-02 PROCEDURE — 80053 COMPREHEN METABOLIC PANEL: CPT

## 2021-02-02 PROCEDURE — 25000003 PHARM REV CODE 250: Performed by: STUDENT IN AN ORGANIZED HEALTH CARE EDUCATION/TRAINING PROGRAM

## 2021-02-02 PROCEDURE — 99232 SBSQ HOSP IP/OBS MODERATE 35: CPT | Mod: GT,,, | Performed by: NURSE PRACTITIONER

## 2021-02-02 PROCEDURE — 25000003 PHARM REV CODE 250: Performed by: SURGERY

## 2021-02-02 PROCEDURE — 85025 COMPLETE CBC W/AUTO DIFF WBC: CPT

## 2021-02-02 PROCEDURE — 82784 ASSAY IGA/IGD/IGG/IGM EACH: CPT

## 2021-02-02 PROCEDURE — 63600175 PHARM REV CODE 636 W HCPCS: Performed by: SURGERY

## 2021-02-02 PROCEDURE — 85610 PROTHROMBIN TIME: CPT

## 2021-02-02 PROCEDURE — 99232 PR SUBSEQUENT HOSPITAL CARE,LEVL II: ICD-10-PCS | Mod: GT,,, | Performed by: NURSE PRACTITIONER

## 2021-02-02 PROCEDURE — 20600001 HC STEP DOWN PRIVATE ROOM

## 2021-02-02 PROCEDURE — 83735 ASSAY OF MAGNESIUM: CPT

## 2021-02-02 PROCEDURE — 84100 ASSAY OF PHOSPHORUS: CPT

## 2021-02-02 PROCEDURE — 63600175 PHARM REV CODE 636 W HCPCS: Performed by: NURSE PRACTITIONER

## 2021-02-02 PROCEDURE — 36415 COLL VENOUS BLD VENIPUNCTURE: CPT

## 2021-02-02 RX ORDER — ENOXAPARIN SODIUM 100 MG/ML
40 INJECTION SUBCUTANEOUS DAILY
Qty: 11.2 ML | Refills: 0 | Status: SHIPPED | OUTPATIENT
Start: 2021-02-02 | End: 2021-03-03

## 2021-02-02 RX ORDER — NAPROXEN SODIUM 220 MG/1
81 TABLET, FILM COATED ORAL 2 TIMES DAILY
Qty: 60 TABLET | Refills: 0 | Status: SHIPPED | OUTPATIENT
Start: 2021-02-02 | End: 2022-02-02

## 2021-02-02 RX ORDER — ONDANSETRON 8 MG/1
8 TABLET, ORALLY DISINTEGRATING ORAL EVERY 6 HOURS PRN
Qty: 20 TABLET | Refills: 0 | Status: SHIPPED | OUTPATIENT
Start: 2021-02-02

## 2021-02-02 RX ORDER — METHOCARBAMOL 500 MG/1
500 TABLET, FILM COATED ORAL 4 TIMES DAILY
Qty: 40 TABLET | Refills: 0 | Status: SHIPPED | OUTPATIENT
Start: 2021-02-02 | End: 2021-02-13

## 2021-02-02 RX ORDER — AMOXICILLIN 250 MG
1 CAPSULE ORAL 2 TIMES DAILY
Qty: 30 TABLET | Refills: 0 | Status: SHIPPED | OUTPATIENT
Start: 2021-02-02

## 2021-02-02 RX ORDER — INSULIN ASPART 100 [IU]/ML
10 INJECTION, SOLUTION INTRAVENOUS; SUBCUTANEOUS ONCE
Status: COMPLETED | OUTPATIENT
Start: 2021-02-02 | End: 2021-02-02

## 2021-02-02 RX ORDER — OXYCODONE HYDROCHLORIDE 5 MG/1
5 TABLET ORAL EVERY 6 HOURS PRN
Qty: 50 TABLET | Refills: 0 | Status: SHIPPED | OUTPATIENT
Start: 2021-02-02

## 2021-02-02 RX ORDER — INSULIN ASPART 100 [IU]/ML
10 INJECTION, SOLUTION INTRAVENOUS; SUBCUTANEOUS ONCE
Status: DISCONTINUED | OUTPATIENT
Start: 2021-02-02 | End: 2021-02-02

## 2021-02-02 RX ORDER — GABAPENTIN 300 MG/1
300 CAPSULE ORAL 3 TIMES DAILY
Qty: 90 CAPSULE | Refills: 11 | Status: SHIPPED | OUTPATIENT
Start: 2021-02-02 | End: 2022-02-02

## 2021-02-02 RX ORDER — INSULIN ASPART 100 [IU]/ML
10 INJECTION, SOLUTION INTRAVENOUS; SUBCUTANEOUS
Status: COMPLETED | OUTPATIENT
Start: 2021-02-02 | End: 2021-02-02

## 2021-02-02 RX ORDER — INSULIN ASPART 100 [IU]/ML
1-20 INJECTION, SOLUTION INTRAVENOUS; SUBCUTANEOUS
Status: DISCONTINUED | OUTPATIENT
Start: 2021-02-02 | End: 2021-02-03 | Stop reason: HOSPADM

## 2021-02-02 RX ORDER — POLYETHYLENE GLYCOL 3350 17 G/17G
17 POWDER, FOR SOLUTION ORAL DAILY
Qty: 510 G | Refills: 0 | Status: SHIPPED | OUTPATIENT
Start: 2021-02-02

## 2021-02-02 RX ORDER — DOXYCYCLINE HYCLATE 100 MG
100 TABLET ORAL EVERY 12 HOURS
Qty: 14 TABLET | Refills: 0 | Status: SHIPPED | OUTPATIENT
Start: 2021-02-02 | End: 2021-02-09

## 2021-02-02 RX ADMIN — INSULIN ASPART 5 UNITS: 100 INJECTION, SOLUTION INTRAVENOUS; SUBCUTANEOUS at 12:02

## 2021-02-02 RX ADMIN — INSULIN ASPART 10 UNITS: 100 INJECTION, SOLUTION INTRAVENOUS; SUBCUTANEOUS at 09:02

## 2021-02-02 RX ADMIN — DOXYCYCLINE HYCLATE 100 MG: 100 TABLET, COATED ORAL at 09:02

## 2021-02-02 RX ADMIN — ASPIRIN 81 MG: 81 TABLET, CHEWABLE ORAL at 09:02

## 2021-02-02 RX ADMIN — MELATONIN TAB 3 MG 6 MG: 3 TAB at 09:02

## 2021-02-02 RX ADMIN — GABAPENTIN 300 MG: 300 CAPSULE ORAL at 09:02

## 2021-02-02 RX ADMIN — VENLAFAXINE HYDROCHLORIDE 225 MG: 150 CAPSULE, EXTENDED RELEASE ORAL at 10:02

## 2021-02-02 RX ADMIN — ENOXAPARIN SODIUM 40 MG: 40 INJECTION SUBCUTANEOUS at 04:02

## 2021-02-02 RX ADMIN — METHOCARBAMOL 500 MG: 500 TABLET ORAL at 04:02

## 2021-02-02 RX ADMIN — ASPIRIN 81 MG: 81 TABLET, CHEWABLE ORAL at 10:02

## 2021-02-02 RX ADMIN — PANTOPRAZOLE SODIUM 40 MG: 40 TABLET, DELAYED RELEASE ORAL at 10:02

## 2021-02-02 RX ADMIN — INSULIN ASPART 8 UNITS: 100 INJECTION, SOLUTION INTRAVENOUS; SUBCUTANEOUS at 09:02

## 2021-02-02 RX ADMIN — INSULIN DETEMIR 38 UNITS: 100 INJECTION, SOLUTION SUBCUTANEOUS at 09:02

## 2021-02-02 RX ADMIN — OXYCODONE HYDROCHLORIDE 10 MG: 10 TABLET ORAL at 09:02

## 2021-02-02 RX ADMIN — GABAPENTIN 300 MG: 300 CAPSULE ORAL at 10:02

## 2021-02-02 RX ADMIN — DOCUSATE SODIUM 50MG AND SENNOSIDES 8.6MG 1 TABLET: 8.6; 5 TABLET, FILM COATED ORAL at 09:02

## 2021-02-02 RX ADMIN — DOCUSATE SODIUM 50MG AND SENNOSIDES 8.6MG 1 TABLET: 8.6; 5 TABLET, FILM COATED ORAL at 10:02

## 2021-02-02 RX ADMIN — METHOCARBAMOL 500 MG: 500 TABLET ORAL at 02:02

## 2021-02-02 RX ADMIN — INSULIN ASPART 10 UNITS: 100 INJECTION, SOLUTION INTRAVENOUS; SUBCUTANEOUS at 12:02

## 2021-02-02 RX ADMIN — DOXYCYCLINE HYCLATE 100 MG: 100 TABLET, COATED ORAL at 10:02

## 2021-02-02 RX ADMIN — METHOCARBAMOL 500 MG: 500 TABLET ORAL at 10:02

## 2021-02-02 RX ADMIN — GABAPENTIN 300 MG: 300 CAPSULE ORAL at 02:02

## 2021-02-02 RX ADMIN — METHOCARBAMOL 500 MG: 500 TABLET ORAL at 09:02

## 2021-02-02 RX ADMIN — LISINOPRIL 2.5 MG: 2.5 TABLET ORAL at 10:02

## 2021-02-02 RX ADMIN — LEVOTHYROXINE SODIUM 75 MCG: 25 TABLET ORAL at 05:02

## 2021-02-02 RX ADMIN — OXYCODONE HYDROCHLORIDE 10 MG: 10 TABLET ORAL at 02:02

## 2021-02-02 RX ADMIN — ISAVUCONAZONIUM SULFATE 372 MG: 186 CAPSULE ORAL at 02:02

## 2021-02-02 RX ADMIN — INSULIN ASPART 10 UNITS: 100 INJECTION, SOLUTION INTRAVENOUS; SUBCUTANEOUS at 06:02

## 2021-02-03 ENCOUNTER — TELEPHONE (OUTPATIENT)
Dept: ORTHOPEDICS | Facility: CLINIC | Age: 61
End: 2021-02-03

## 2021-02-03 ENCOUNTER — PATIENT MESSAGE (OUTPATIENT)
Dept: ORTHOPEDICS | Facility: CLINIC | Age: 61
End: 2021-02-03

## 2021-02-03 VITALS
TEMPERATURE: 97 F | BODY MASS INDEX: 29.53 KG/M2 | DIASTOLIC BLOOD PRESSURE: 62 MMHG | WEIGHT: 177.25 LBS | HEIGHT: 65 IN | SYSTOLIC BLOOD PRESSURE: 127 MMHG | HEART RATE: 89 BPM | RESPIRATION RATE: 20 BRPM | OXYGEN SATURATION: 94 %

## 2021-02-03 LAB
ALBUMIN SERPL BCP-MCNC: 3 G/DL (ref 3.5–5.2)
ALP SERPL-CCNC: 487 U/L (ref 55–135)
ALT SERPL W/O P-5'-P-CCNC: 51 U/L (ref 10–44)
ANION GAP SERPL CALC-SCNC: 9 MMOL/L (ref 8–16)
AST SERPL-CCNC: 30 U/L (ref 10–40)
BASOPHILS # BLD AUTO: 0.1 K/UL (ref 0–0.2)
BASOPHILS NFR BLD: 1.1 % (ref 0–1.9)
BILIRUB SERPL-MCNC: 0.3 MG/DL (ref 0.1–1)
BUN SERPL-MCNC: 20 MG/DL (ref 6–20)
CALCIUM SERPL-MCNC: 9.4 MG/DL (ref 8.7–10.5)
CHLORIDE SERPL-SCNC: 102 MMOL/L (ref 95–110)
CO2 SERPL-SCNC: 25 MMOL/L (ref 23–29)
CREAT SERPL-MCNC: 0.8 MG/DL (ref 0.5–1.4)
DIFFERENTIAL METHOD: ABNORMAL
EOSINOPHIL # BLD AUTO: 1 K/UL (ref 0–0.5)
EOSINOPHIL NFR BLD: 11.8 % (ref 0–8)
ERYTHROCYTE [DISTWIDTH] IN BLOOD BY AUTOMATED COUNT: 14 % (ref 11.5–14.5)
EST. GFR  (AFRICAN AMERICAN): >60 ML/MIN/1.73 M^2
EST. GFR  (NON AFRICAN AMERICAN): >60 ML/MIN/1.73 M^2
GLUCOSE SERPL-MCNC: 132 MG/DL (ref 70–110)
HCT VFR BLD AUTO: 31.5 % (ref 37–48.5)
HGB BLD-MCNC: 9.6 G/DL (ref 12–16)
IMM GRANULOCYTES # BLD AUTO: 0.31 K/UL (ref 0–0.04)
IMM GRANULOCYTES NFR BLD AUTO: 3.5 % (ref 0–0.5)
LYMPHOCYTES # BLD AUTO: 1.9 K/UL (ref 1–4.8)
LYMPHOCYTES NFR BLD: 21.7 % (ref 18–48)
MAGNESIUM SERPL-MCNC: 2 MG/DL (ref 1.6–2.6)
MCH RBC QN AUTO: 26.5 PG (ref 27–31)
MCHC RBC AUTO-ENTMCNC: 30.5 G/DL (ref 32–36)
MCV RBC AUTO: 87 FL (ref 82–98)
MONOCYTES # BLD AUTO: 1 K/UL (ref 0.3–1)
MONOCYTES NFR BLD: 10.9 % (ref 4–15)
NEUTROPHILS # BLD AUTO: 4.5 K/UL (ref 1.8–7.7)
NEUTROPHILS NFR BLD: 51 % (ref 38–73)
NRBC BLD-RTO: 0 /100 WBC
PHOSPHATE SERPL-MCNC: 4.3 MG/DL (ref 2.7–4.5)
PLATELET # BLD AUTO: 594 K/UL (ref 150–350)
PMV BLD AUTO: 9.8 FL (ref 9.2–12.9)
POCT GLUCOSE: 211 MG/DL (ref 70–110)
POCT GLUCOSE: 304 MG/DL (ref 70–110)
POCT GLUCOSE: 74 MG/DL (ref 70–110)
POTASSIUM SERPL-SCNC: 4.6 MMOL/L (ref 3.5–5.1)
PROT SERPL-MCNC: 6.8 G/DL (ref 6–8.4)
RBC # BLD AUTO: 3.62 M/UL (ref 4–5.4)
SODIUM SERPL-SCNC: 136 MMOL/L (ref 136–145)
WBC # BLD AUTO: 8.83 K/UL (ref 3.9–12.7)

## 2021-02-03 PROCEDURE — 25000003 PHARM REV CODE 250: Performed by: STUDENT IN AN ORGANIZED HEALTH CARE EDUCATION/TRAINING PROGRAM

## 2021-02-03 PROCEDURE — 36415 COLL VENOUS BLD VENIPUNCTURE: CPT

## 2021-02-03 PROCEDURE — 99232 PR SUBSEQUENT HOSPITAL CARE,LEVL II: ICD-10-PCS | Mod: ,,, | Performed by: NURSE PRACTITIONER

## 2021-02-03 PROCEDURE — 85025 COMPLETE CBC W/AUTO DIFF WBC: CPT

## 2021-02-03 PROCEDURE — 84100 ASSAY OF PHOSPHORUS: CPT

## 2021-02-03 PROCEDURE — 99232 SBSQ HOSP IP/OBS MODERATE 35: CPT | Mod: ,,, | Performed by: NURSE PRACTITIONER

## 2021-02-03 PROCEDURE — 83735 ASSAY OF MAGNESIUM: CPT

## 2021-02-03 PROCEDURE — 25000003 PHARM REV CODE 250: Performed by: SURGERY

## 2021-02-03 PROCEDURE — 80053 COMPREHEN METABOLIC PANEL: CPT

## 2021-02-03 RX ADMIN — LEVOTHYROXINE SODIUM 75 MCG: 25 TABLET ORAL at 07:02

## 2021-02-03 RX ADMIN — INSULIN ASPART 12 UNITS: 100 INJECTION, SOLUTION INTRAVENOUS; SUBCUTANEOUS at 12:02

## 2021-02-03 RX ADMIN — GABAPENTIN 300 MG: 300 CAPSULE ORAL at 09:02

## 2021-02-03 RX ADMIN — METHOCARBAMOL 500 MG: 500 TABLET ORAL at 01:02

## 2021-02-03 RX ADMIN — GABAPENTIN 300 MG: 300 CAPSULE ORAL at 02:02

## 2021-02-03 RX ADMIN — INSULIN ASPART 9 UNITS: 100 INJECTION, SOLUTION INTRAVENOUS; SUBCUTANEOUS at 08:02

## 2021-02-03 RX ADMIN — LISINOPRIL 2.5 MG: 2.5 TABLET ORAL at 09:02

## 2021-02-03 RX ADMIN — ASPIRIN 81 MG: 81 TABLET, CHEWABLE ORAL at 09:02

## 2021-02-03 RX ADMIN — INSULIN ASPART 4 UNITS: 100 INJECTION, SOLUTION INTRAVENOUS; SUBCUTANEOUS at 08:02

## 2021-02-03 RX ADMIN — ISAVUCONAZONIUM SULFATE 372 MG: 186 CAPSULE ORAL at 01:02

## 2021-02-03 RX ADMIN — INSULIN ASPART 2 UNITS: 100 INJECTION, SOLUTION INTRAVENOUS; SUBCUTANEOUS at 12:02

## 2021-02-03 RX ADMIN — METHOCARBAMOL 500 MG: 500 TABLET ORAL at 09:02

## 2021-02-03 RX ADMIN — DOXYCYCLINE HYCLATE 100 MG: 100 TABLET, COATED ORAL at 09:02

## 2021-02-03 RX ADMIN — VENLAFAXINE HYDROCHLORIDE 225 MG: 150 CAPSULE, EXTENDED RELEASE ORAL at 09:02

## 2021-02-03 RX ADMIN — INSULIN DETEMIR 38 UNITS: 100 INJECTION, SOLUTION SUBCUTANEOUS at 08:02

## 2021-02-03 RX ADMIN — OXYCODONE HYDROCHLORIDE 10 MG: 10 TABLET ORAL at 02:02

## 2021-02-03 RX ADMIN — PANTOPRAZOLE SODIUM 40 MG: 40 TABLET, DELAYED RELEASE ORAL at 09:02

## 2021-02-04 ENCOUNTER — PATIENT MESSAGE (OUTPATIENT)
Dept: PLASTIC SURGERY | Facility: CLINIC | Age: 61
End: 2021-02-04

## 2021-02-04 PROCEDURE — G0180 PR HOME HEALTH MD CERTIFICATION: ICD-10-PCS | Mod: ,,, | Performed by: SURGERY

## 2021-02-04 PROCEDURE — G0180 MD CERTIFICATION HHA PATIENT: HCPCS | Mod: ,,, | Performed by: SURGERY

## 2021-02-05 ENCOUNTER — OFFICE VISIT (OUTPATIENT)
Dept: PLASTIC SURGERY | Facility: CLINIC | Age: 61
End: 2021-02-05
Payer: COMMERCIAL

## 2021-02-05 ENCOUNTER — PATIENT OUTREACH (OUTPATIENT)
Dept: ADMINISTRATIVE | Facility: CLINIC | Age: 61
End: 2021-02-05

## 2021-02-05 ENCOUNTER — PATIENT MESSAGE (OUTPATIENT)
Dept: PLASTIC SURGERY | Facility: CLINIC | Age: 61
End: 2021-02-05

## 2021-02-05 ENCOUNTER — TELEPHONE (OUTPATIENT)
Dept: PLASTIC SURGERY | Facility: CLINIC | Age: 61
End: 2021-02-05

## 2021-02-05 DIAGNOSIS — E10.69 TYPE 1 DIABETES MELLITUS WITH OTHER SPECIFIED COMPLICATION: Primary | ICD-10-CM

## 2021-02-05 PROCEDURE — 99024 POSTOP FOLLOW-UP VISIT: CPT | Mod: 95,,, | Performed by: SURGERY

## 2021-02-05 PROCEDURE — 99024 PR POST-OP FOLLOW-UP VISIT: ICD-10-PCS | Mod: 95,,, | Performed by: SURGERY

## 2021-02-08 ENCOUNTER — OFFICE VISIT (OUTPATIENT)
Dept: PLASTIC SURGERY | Facility: CLINIC | Age: 61
End: 2021-02-08
Payer: COMMERCIAL

## 2021-02-08 VITALS — WEIGHT: 177 LBS | BODY MASS INDEX: 29.45 KG/M2

## 2021-02-08 DIAGNOSIS — M86.20 SUBACUTE OSTEOMYELITIS, UNSPECIFIED SITE: ICD-10-CM

## 2021-02-08 DIAGNOSIS — Z87.81 S/P ORIF (OPEN REDUCTION INTERNAL FIXATION) FRACTURE: ICD-10-CM

## 2021-02-08 DIAGNOSIS — Z98.890 S/P ORIF (OPEN REDUCTION INTERNAL FIXATION) FRACTURE: ICD-10-CM

## 2021-02-08 DIAGNOSIS — R79.89 ELEVATED LFTS: Primary | ICD-10-CM

## 2021-02-08 DIAGNOSIS — T81.30XA WOUND DEHISCENCE: ICD-10-CM

## 2021-02-08 PROCEDURE — 99024 PR POST-OP FOLLOW-UP VISIT: ICD-10-PCS | Mod: S$GLB,,, | Performed by: SURGERY

## 2021-02-08 PROCEDURE — 99024 POSTOP FOLLOW-UP VISIT: CPT | Mod: S$GLB,,, | Performed by: SURGERY

## 2021-02-08 PROCEDURE — 99999 PR PBB SHADOW E&M-EST. PATIENT-LVL III: ICD-10-PCS | Mod: PBBFAC,,, | Performed by: SURGERY

## 2021-02-08 PROCEDURE — 99999 PR PBB SHADOW E&M-EST. PATIENT-LVL III: CPT | Mod: PBBFAC,,, | Performed by: SURGERY

## 2021-02-11 ENCOUNTER — EXTERNAL HOME HEALTH (OUTPATIENT)
Dept: HOME HEALTH SERVICES | Facility: HOSPITAL | Age: 61
End: 2021-02-11
Payer: COMMERCIAL

## 2021-02-11 ENCOUNTER — HOSPITAL ENCOUNTER (OUTPATIENT)
Dept: RADIOLOGY | Facility: HOSPITAL | Age: 61
Discharge: HOME | End: 2021-02-11
Attending: PHYSICIAN ASSISTANT
Payer: COMMERCIAL

## 2021-02-11 ENCOUNTER — OFFICE VISIT (OUTPATIENT)
Dept: ORTHOPEDICS | Facility: CLINIC | Age: 61
End: 2021-02-11
Payer: COMMERCIAL

## 2021-02-11 ENCOUNTER — TELEPHONE (OUTPATIENT)
Dept: ORTHOPEDICS | Facility: CLINIC | Age: 61
End: 2021-02-11

## 2021-02-11 VITALS — WEIGHT: 170 LBS | HEIGHT: 65 IN | RESPIRATION RATE: 18 BRPM | BODY MASS INDEX: 28.32 KG/M2

## 2021-02-11 DIAGNOSIS — S82.032D CLOSED DISPLACED TRANSVERSE FRACTURE OF LEFT PATELLA WITH ROUTINE HEALING, SUBSEQUENT ENCOUNTER: ICD-10-CM

## 2021-02-11 DIAGNOSIS — S82.032D CLOSED DISPLACED TRANSVERSE FRACTURE OF LEFT PATELLA WITH ROUTINE HEALING, SUBSEQUENT ENCOUNTER: Primary | ICD-10-CM

## 2021-02-11 PROCEDURE — 99024 PR POST-OP FOLLOW-UP VISIT: ICD-10-PCS | Mod: S$GLB,,, | Performed by: PHYSICIAN ASSISTANT

## 2021-02-11 PROCEDURE — 73560 XR KNEE 1 OR 2 VIEW LEFT: ICD-10-PCS | Mod: 26,LT,, | Performed by: RADIOLOGY

## 2021-02-11 PROCEDURE — 99024 POSTOP FOLLOW-UP VISIT: CPT | Mod: S$GLB,,, | Performed by: PHYSICIAN ASSISTANT

## 2021-02-11 PROCEDURE — 99999 PR PBB SHADOW E&M-EST. PATIENT-LVL IV: ICD-10-PCS | Mod: PBBFAC,,, | Performed by: PHYSICIAN ASSISTANT

## 2021-02-11 PROCEDURE — 99999 PR PBB SHADOW E&M-EST. PATIENT-LVL IV: CPT | Mod: PBBFAC,,, | Performed by: PHYSICIAN ASSISTANT

## 2021-02-11 PROCEDURE — 73560 X-RAY EXAM OF KNEE 1 OR 2: CPT | Mod: 26,LT,, | Performed by: RADIOLOGY

## 2021-02-11 PROCEDURE — 73560 X-RAY EXAM OF KNEE 1 OR 2: CPT | Mod: TC,LT

## 2021-02-15 ENCOUNTER — TELEPHONE (OUTPATIENT)
Dept: SURGERY | Facility: CLINIC | Age: 61
End: 2021-02-15

## 2021-02-15 ENCOUNTER — OFFICE VISIT (OUTPATIENT)
Dept: PLASTIC SURGERY | Facility: CLINIC | Age: 61
End: 2021-02-15
Payer: COMMERCIAL

## 2021-02-15 DIAGNOSIS — M86.20 SUBACUTE OSTEOMYELITIS, UNSPECIFIED SITE: Primary | ICD-10-CM

## 2021-02-15 PROCEDURE — 99024 POSTOP FOLLOW-UP VISIT: CPT | Mod: 95,,, | Performed by: SURGERY

## 2021-02-15 PROCEDURE — 99024 PR POST-OP FOLLOW-UP VISIT: ICD-10-PCS | Mod: 95,,, | Performed by: SURGERY

## 2021-02-17 ENCOUNTER — PATIENT MESSAGE (OUTPATIENT)
Dept: SURGERY | Facility: HOSPITAL | Age: 61
End: 2021-02-17

## 2021-02-17 ENCOUNTER — PATIENT MESSAGE (OUTPATIENT)
Dept: PLASTIC SURGERY | Facility: CLINIC | Age: 61
End: 2021-02-17

## 2021-02-22 ENCOUNTER — PATIENT MESSAGE (OUTPATIENT)
Dept: SURGERY | Facility: HOSPITAL | Age: 61
End: 2021-02-22

## 2021-02-24 ENCOUNTER — ANESTHESIA EVENT (OUTPATIENT)
Dept: SURGERY | Facility: HOSPITAL | Age: 61
End: 2021-02-24
Payer: COMMERCIAL

## 2021-02-25 ENCOUNTER — ANESTHESIA (OUTPATIENT)
Dept: SURGERY | Facility: HOSPITAL | Age: 61
End: 2021-02-25
Payer: COMMERCIAL

## 2021-02-25 ENCOUNTER — HOSPITAL ENCOUNTER (OUTPATIENT)
Facility: HOSPITAL | Age: 61
Discharge: HOME | End: 2021-02-25
Attending: SURGERY | Admitting: SURGERY
Payer: COMMERCIAL

## 2021-02-25 VITALS
RESPIRATION RATE: 16 BRPM | HEART RATE: 94 BPM | BODY MASS INDEX: 28.29 KG/M2 | WEIGHT: 170 LBS | OXYGEN SATURATION: 97 % | DIASTOLIC BLOOD PRESSURE: 60 MMHG | SYSTOLIC BLOOD PRESSURE: 131 MMHG | TEMPERATURE: 98 F

## 2021-02-25 DIAGNOSIS — Z01.818 PRE-OP TESTING: ICD-10-CM

## 2021-02-25 DIAGNOSIS — T81.31XA DEHISCENCE OF OPERATIVE WOUND, INITIAL ENCOUNTER: Primary | ICD-10-CM

## 2021-02-25 LAB
ACID FAST MOD KINY STN SPEC: NORMAL
GRAM STN SPEC: NORMAL
MYCOBACTERIUM SPEC QL CULT: NORMAL
POCT GLUCOSE: 139 MG/DL (ref 70–110)
POCT GLUCOSE: 279 MG/DL (ref 70–110)

## 2021-02-25 PROCEDURE — 88305 TISSUE EXAM BY PATHOLOGIST: CPT | Performed by: PATHOLOGY

## 2021-02-25 PROCEDURE — D9220A PRA ANESTHESIA: ICD-10-PCS | Mod: ANES,,, | Performed by: ANESTHESIOLOGY

## 2021-02-25 PROCEDURE — 88305 TISSUE EXAM BY PATHOLOGIST: ICD-10-PCS | Mod: 26,,, | Performed by: PATHOLOGY

## 2021-02-25 PROCEDURE — 36000704 HC OR TIME LEV I 1ST 15 MIN: Performed by: SURGERY

## 2021-02-25 PROCEDURE — 87070 CULTURE OTHR SPECIMN AEROBIC: CPT

## 2021-02-25 PROCEDURE — D9220A PRA ANESTHESIA: Mod: CRNA,,, | Performed by: NURSE ANESTHETIST, CERTIFIED REGISTERED

## 2021-02-25 PROCEDURE — 13121 CMPLX RPR S/A/L 2.6-7.5 CM: CPT | Mod: 59,78,, | Performed by: SURGERY

## 2021-02-25 PROCEDURE — 88305 TISSUE EXAM BY PATHOLOGIST: CPT | Mod: 26,,, | Performed by: PATHOLOGY

## 2021-02-25 PROCEDURE — 82962 GLUCOSE BLOOD TEST: CPT | Performed by: SURGERY

## 2021-02-25 PROCEDURE — 63600175 PHARM REV CODE 636 W HCPCS: Performed by: STUDENT IN AN ORGANIZED HEALTH CARE EDUCATION/TRAINING PROGRAM

## 2021-02-25 PROCEDURE — 13121 PR RECMPL WND SCALP,EXTR 2.6-7.5 CM: ICD-10-PCS | Mod: 59,78,, | Performed by: SURGERY

## 2021-02-25 PROCEDURE — 37000009 HC ANESTHESIA EA ADD 15 MINS: Performed by: SURGERY

## 2021-02-25 PROCEDURE — 87176 TISSUE HOMOGENIZATION CULTR: CPT

## 2021-02-25 PROCEDURE — 87075 CULTR BACTERIA EXCEPT BLOOD: CPT

## 2021-02-25 PROCEDURE — 71000015 HC POSTOP RECOV 1ST HR: Performed by: SURGERY

## 2021-02-25 PROCEDURE — 87205 SMEAR GRAM STAIN: CPT | Mod: 59

## 2021-02-25 PROCEDURE — 71000044 HC DOSC ROUTINE RECOVERY FIRST HOUR: Performed by: SURGERY

## 2021-02-25 PROCEDURE — 87077 CULTURE AEROBIC IDENTIFY: CPT | Mod: 59

## 2021-02-25 PROCEDURE — 71000016 HC POSTOP RECOV ADDL HR: Performed by: SURGERY

## 2021-02-25 PROCEDURE — 63600175 PHARM REV CODE 636 W HCPCS: Performed by: NURSE ANESTHETIST, CERTIFIED REGISTERED

## 2021-02-25 PROCEDURE — D9220A PRA ANESTHESIA: Mod: ANES,,, | Performed by: ANESTHESIOLOGY

## 2021-02-25 PROCEDURE — 27570 FIXATION OF KNEE JOINT: CPT | Mod: 78,LT,, | Performed by: SURGERY

## 2021-02-25 PROCEDURE — 25000003 PHARM REV CODE 250: Performed by: STUDENT IN AN ORGANIZED HEALTH CARE EDUCATION/TRAINING PROGRAM

## 2021-02-25 PROCEDURE — 25000003 PHARM REV CODE 250: Performed by: ANESTHESIOLOGY

## 2021-02-25 PROCEDURE — 87107 FUNGI IDENTIFICATION MOLD: CPT | Performed by: SURGERY

## 2021-02-25 PROCEDURE — 36000705 HC OR TIME LEV I EA ADD 15 MIN: Performed by: SURGERY

## 2021-02-25 PROCEDURE — 87186 SC STD MICRODIL/AGAR DIL: CPT | Mod: 59

## 2021-02-25 PROCEDURE — 25000003 PHARM REV CODE 250: Performed by: NURSE ANESTHETIST, CERTIFIED REGISTERED

## 2021-02-25 PROCEDURE — 13122 CMPLX RPR S/A/L ADDL 5 CM/>: CPT | Mod: 59,,, | Performed by: SURGERY

## 2021-02-25 PROCEDURE — 27570 PR MANIPULATN KNEE JT+ANESTHESIA: ICD-10-PCS | Mod: 78,LT,, | Performed by: SURGERY

## 2021-02-25 PROCEDURE — 87186 SC STD MICRODIL/AGAR DIL: CPT | Mod: 59 | Performed by: SURGERY

## 2021-02-25 PROCEDURE — 37000008 HC ANESTHESIA 1ST 15 MINUTES: Performed by: SURGERY

## 2021-02-25 PROCEDURE — D9220A PRA ANESTHESIA: ICD-10-PCS | Mod: CRNA,,, | Performed by: NURSE ANESTHETIST, CERTIFIED REGISTERED

## 2021-02-25 PROCEDURE — 13122 PR REP,SKIN,SCALP/EXTREM+5 CM/<: ICD-10-PCS | Mod: 59,,, | Performed by: SURGERY

## 2021-02-25 PROCEDURE — 63600175 PHARM REV CODE 636 W HCPCS: Performed by: ANESTHESIOLOGY

## 2021-02-25 PROCEDURE — 87102 FUNGUS ISOLATION CULTURE: CPT | Mod: 59

## 2021-02-25 RX ORDER — FENTANYL CITRATE 50 UG/ML
25 INJECTION, SOLUTION INTRAMUSCULAR; INTRAVENOUS EVERY 5 MIN PRN
Status: COMPLETED | OUTPATIENT
Start: 2021-02-25 | End: 2021-02-25

## 2021-02-25 RX ORDER — ONDANSETRON 2 MG/ML
4 INJECTION INTRAMUSCULAR; INTRAVENOUS EVERY 12 HOURS PRN
Status: DISCONTINUED | OUTPATIENT
Start: 2021-02-25 | End: 2021-02-25 | Stop reason: HOSPADM

## 2021-02-25 RX ORDER — LIDOCAINE HYDROCHLORIDE 20 MG/ML
INJECTION INTRAVENOUS
Status: DISCONTINUED | OUTPATIENT
Start: 2021-02-25 | End: 2021-02-25

## 2021-02-25 RX ORDER — PHENYLEPHRINE HYDROCHLORIDE 10 MG/ML
INJECTION INTRAVENOUS
Status: DISCONTINUED | OUTPATIENT
Start: 2021-02-25 | End: 2021-02-25

## 2021-02-25 RX ORDER — KETAMINE HCL IN 0.9 % NACL 50 MG/5 ML
SYRINGE (ML) INTRAVENOUS
Status: DISCONTINUED | OUTPATIENT
Start: 2021-02-25 | End: 2021-02-25

## 2021-02-25 RX ORDER — ONDANSETRON 2 MG/ML
4 INJECTION INTRAMUSCULAR; INTRAVENOUS DAILY PRN
Status: DISCONTINUED | OUTPATIENT
Start: 2021-02-25 | End: 2021-02-25 | Stop reason: HOSPADM

## 2021-02-25 RX ORDER — CEFAZOLIN SODIUM 1 G/3ML
2 INJECTION, POWDER, FOR SOLUTION INTRAMUSCULAR; INTRAVENOUS
Status: COMPLETED | OUTPATIENT
Start: 2021-02-25 | End: 2021-02-25

## 2021-02-25 RX ORDER — PROPOFOL 10 MG/ML
VIAL (ML) INTRAVENOUS
Status: DISCONTINUED | OUTPATIENT
Start: 2021-02-25 | End: 2021-02-25

## 2021-02-25 RX ORDER — SODIUM CHLORIDE 0.9 % (FLUSH) 0.9 %
3 SYRINGE (ML) INJECTION
Status: DISCONTINUED | OUTPATIENT
Start: 2021-02-25 | End: 2021-02-25 | Stop reason: HOSPADM

## 2021-02-25 RX ORDER — MIDAZOLAM HYDROCHLORIDE 1 MG/ML
INJECTION, SOLUTION INTRAMUSCULAR; INTRAVENOUS
Status: DISCONTINUED | OUTPATIENT
Start: 2021-02-25 | End: 2021-02-25

## 2021-02-25 RX ORDER — SODIUM CHLORIDE 9 MG/ML
INJECTION, SOLUTION INTRAVENOUS CONTINUOUS
Status: DISCONTINUED | OUTPATIENT
Start: 2021-02-25 | End: 2021-02-25 | Stop reason: HOSPADM

## 2021-02-25 RX ORDER — HYDROMORPHONE HYDROCHLORIDE 1 MG/ML
0.2 INJECTION, SOLUTION INTRAMUSCULAR; INTRAVENOUS; SUBCUTANEOUS EVERY 5 MIN PRN
Status: DISCONTINUED | OUTPATIENT
Start: 2021-02-25 | End: 2021-02-25 | Stop reason: HOSPADM

## 2021-02-25 RX ORDER — MEPERIDINE HYDROCHLORIDE 50 MG/ML
12.5 INJECTION INTRAMUSCULAR; INTRAVENOUS; SUBCUTANEOUS ONCE AS NEEDED
Status: DISCONTINUED | OUTPATIENT
Start: 2021-02-25 | End: 2021-02-25 | Stop reason: HOSPADM

## 2021-02-25 RX ORDER — FENTANYL CITRATE 50 UG/ML
INJECTION, SOLUTION INTRAMUSCULAR; INTRAVENOUS
Status: DISCONTINUED | OUTPATIENT
Start: 2021-02-25 | End: 2021-02-25

## 2021-02-25 RX ORDER — EPHEDRINE SULFATE 50 MG/ML
INJECTION, SOLUTION INTRAVENOUS
Status: DISCONTINUED | OUTPATIENT
Start: 2021-02-25 | End: 2021-02-25

## 2021-02-25 RX ORDER — OXYCODONE HYDROCHLORIDE 5 MG/1
10 TABLET ORAL EVERY 4 HOURS PRN
Status: DISCONTINUED | OUTPATIENT
Start: 2021-02-25 | End: 2021-02-25 | Stop reason: HOSPADM

## 2021-02-25 RX ADMIN — PROMETHAZINE HYDROCHLORIDE 6.25 MG: 25 INJECTION INTRAMUSCULAR; INTRAVENOUS at 11:02

## 2021-02-25 RX ADMIN — Medication 10 MG: at 08:02

## 2021-02-25 RX ADMIN — ONDANSETRON 4 MG: 2 INJECTION INTRAMUSCULAR; INTRAVENOUS at 10:02

## 2021-02-25 RX ADMIN — EPHEDRINE SULFATE 5 MG: 50 INJECTION INTRAVENOUS at 07:02

## 2021-02-25 RX ADMIN — PHENYLEPHRINE HYDROCHLORIDE 100 MCG: 10 INJECTION INTRAVENOUS at 07:02

## 2021-02-25 RX ADMIN — FENTANYL CITRATE 25 MCG: 50 INJECTION INTRAMUSCULAR; INTRAVENOUS at 10:02

## 2021-02-25 RX ADMIN — CEFAZOLIN 2 G: 330 INJECTION, POWDER, FOR SOLUTION INTRAMUSCULAR; INTRAVENOUS at 07:02

## 2021-02-25 RX ADMIN — PROPOFOL 170 MG: 10 INJECTION, EMULSION INTRAVENOUS at 07:02

## 2021-02-25 RX ADMIN — ONDANSETRON 4 MG: 2 INJECTION, SOLUTION INTRAMUSCULAR; INTRAVENOUS at 08:02

## 2021-02-25 RX ADMIN — HYDROMORPHONE HYDROCHLORIDE 0.2 MG: 1 INJECTION, SOLUTION INTRAMUSCULAR; INTRAVENOUS; SUBCUTANEOUS at 10:02

## 2021-02-25 RX ADMIN — OXYCODONE 10 MG: 5 TABLET ORAL at 12:02

## 2021-02-25 RX ADMIN — PROPOFOL 30 MG: 10 INJECTION, EMULSION INTRAVENOUS at 08:02

## 2021-02-25 RX ADMIN — FENTANYL CITRATE 50 MCG: 50 INJECTION, SOLUTION INTRAMUSCULAR; INTRAVENOUS at 08:02

## 2021-02-25 RX ADMIN — HYDROMORPHONE HYDROCHLORIDE 0.2 MG: 1 INJECTION, SOLUTION INTRAMUSCULAR; INTRAVENOUS; SUBCUTANEOUS at 11:02

## 2021-02-25 RX ADMIN — LIDOCAINE HYDROCHLORIDE 100 MG: 20 INJECTION, SOLUTION INTRAVENOUS at 07:02

## 2021-02-25 RX ADMIN — SODIUM CHLORIDE: 0.9 INJECTION, SOLUTION INTRAVENOUS at 07:02

## 2021-02-25 RX ADMIN — MIDAZOLAM HYDROCHLORIDE 2 MG: 1 INJECTION, SOLUTION INTRAMUSCULAR; INTRAVENOUS at 07:02

## 2021-02-25 RX ADMIN — FENTANYL CITRATE 50 MCG: 50 INJECTION, SOLUTION INTRAMUSCULAR; INTRAVENOUS at 07:02

## 2021-02-25 RX ADMIN — PHENYLEPHRINE HYDROCHLORIDE 100 MCG: 10 INJECTION INTRAVENOUS at 08:02

## 2021-02-25 RX ADMIN — Medication 20 MG: at 07:02

## 2021-02-25 RX ADMIN — HYDROMORPHONE HYDROCHLORIDE 0.2 MG: 1 INJECTION, SOLUTION INTRAMUSCULAR; INTRAVENOUS; SUBCUTANEOUS at 12:02

## 2021-02-27 ENCOUNTER — PATIENT MESSAGE (OUTPATIENT)
Dept: PLASTIC SURGERY | Facility: CLINIC | Age: 61
End: 2021-02-27

## 2021-02-27 LAB
BACTERIA SPEC AEROBE CULT: ABNORMAL
BACTERIA SPEC AEROBE CULT: ABNORMAL

## 2021-03-01 ENCOUNTER — OFFICE VISIT (OUTPATIENT)
Dept: PLASTIC SURGERY | Facility: CLINIC | Age: 61
End: 2021-03-01
Payer: COMMERCIAL

## 2021-03-01 ENCOUNTER — OFFICE VISIT (OUTPATIENT)
Dept: INFECTIOUS DISEASES | Facility: CLINIC | Age: 61
End: 2021-03-01
Payer: COMMERCIAL

## 2021-03-01 VITALS
HEIGHT: 65 IN | HEART RATE: 100 BPM | TEMPERATURE: 98 F | DIASTOLIC BLOOD PRESSURE: 72 MMHG | BODY MASS INDEX: 28.29 KG/M2 | SYSTOLIC BLOOD PRESSURE: 143 MMHG

## 2021-03-01 VITALS
DIASTOLIC BLOOD PRESSURE: 58 MMHG | HEIGHT: 65 IN | SYSTOLIC BLOOD PRESSURE: 115 MMHG | HEART RATE: 95 BPM | BODY MASS INDEX: 28.32 KG/M2 | WEIGHT: 170 LBS

## 2021-03-01 DIAGNOSIS — B46.5 INFECTION DUE TO MUCOR: ICD-10-CM

## 2021-03-01 DIAGNOSIS — T81.30XA WOUND DEHISCENCE: ICD-10-CM

## 2021-03-01 DIAGNOSIS — E10.69 TYPE 1 DIABETES MELLITUS WITH OTHER SPECIFIED COMPLICATION: ICD-10-CM

## 2021-03-01 DIAGNOSIS — T81.31XA DEHISCENCE OF OPERATIVE WOUND, INITIAL ENCOUNTER: ICD-10-CM

## 2021-03-01 DIAGNOSIS — T81.30XA WOUND DEHISCENCE: Primary | ICD-10-CM

## 2021-03-01 DIAGNOSIS — T81.31XA DEHISCENCE OF OPERATIVE WOUND, INITIAL ENCOUNTER: Primary | ICD-10-CM

## 2021-03-01 DIAGNOSIS — M86.28 SUBACUTE OSTEOMYELITIS, OTHER SITE: ICD-10-CM

## 2021-03-01 LAB
BACTERIA SPEC ANAEROBE CULT: NORMAL

## 2021-03-01 PROCEDURE — 99024 PR POST-OP FOLLOW-UP VISIT: ICD-10-PCS | Mod: S$GLB,,, | Performed by: SURGERY

## 2021-03-01 PROCEDURE — 99999 PR PBB SHADOW E&M-EST. PATIENT-LVL III: ICD-10-PCS | Mod: PBBFAC,,, | Performed by: SURGERY

## 2021-03-01 PROCEDURE — 99024 POSTOP FOLLOW-UP VISIT: CPT | Mod: S$GLB,,, | Performed by: SURGERY

## 2021-03-01 PROCEDURE — 99214 PR OFFICE/OUTPT VISIT, EST, LEVL IV, 30-39 MIN: ICD-10-PCS | Mod: S$GLB,,, | Performed by: INTERNAL MEDICINE

## 2021-03-01 PROCEDURE — 99214 OFFICE O/P EST MOD 30 MIN: CPT | Mod: S$GLB,,, | Performed by: INTERNAL MEDICINE

## 2021-03-01 PROCEDURE — 99999 PR PBB SHADOW E&M-EST. PATIENT-LVL IV: CPT | Mod: PBBFAC,,, | Performed by: INTERNAL MEDICINE

## 2021-03-01 PROCEDURE — 99999 PR PBB SHADOW E&M-EST. PATIENT-LVL IV: ICD-10-PCS | Mod: PBBFAC,,, | Performed by: INTERNAL MEDICINE

## 2021-03-01 PROCEDURE — 99999 PR PBB SHADOW E&M-EST. PATIENT-LVL III: CPT | Mod: PBBFAC,,, | Performed by: SURGERY

## 2021-03-05 LAB
BACTERIA SPEC AEROBE CULT: ABNORMAL
FINAL PATHOLOGIC DIAGNOSIS: NORMAL
GROSS: NORMAL
Lab: NORMAL
MICROSCOPIC EXAM: NORMAL

## 2021-03-08 LAB
ACID FAST MOD KINY STN SPEC: NORMAL
MYCOBACTERIUM SPEC QL CULT: NORMAL

## 2021-03-15 ENCOUNTER — OFFICE VISIT (OUTPATIENT)
Dept: PLASTIC SURGERY | Facility: CLINIC | Age: 61
End: 2021-03-15
Payer: COMMERCIAL

## 2021-03-15 VITALS — WEIGHT: 170 LBS | HEIGHT: 65 IN | BODY MASS INDEX: 28.32 KG/M2

## 2021-03-15 DIAGNOSIS — Z98.890 S/P ORIF (OPEN REDUCTION INTERNAL FIXATION) FRACTURE: ICD-10-CM

## 2021-03-15 DIAGNOSIS — M86.28 SUBACUTE OSTEOMYELITIS, OTHER SITE: ICD-10-CM

## 2021-03-15 DIAGNOSIS — T81.30XA WOUND DEHISCENCE: Primary | ICD-10-CM

## 2021-03-15 DIAGNOSIS — Z87.81 S/P ORIF (OPEN REDUCTION INTERNAL FIXATION) FRACTURE: ICD-10-CM

## 2021-03-15 DIAGNOSIS — Z45.2 PICC (PERIPHERALLY INSERTED CENTRAL CATHETER) REMOVAL: ICD-10-CM

## 2021-03-15 DIAGNOSIS — T81.31XA DEHISCENCE OF OPERATIVE WOUND, INITIAL ENCOUNTER: ICD-10-CM

## 2021-03-15 DIAGNOSIS — M86.20 SUBACUTE OSTEOMYELITIS, UNSPECIFIED SITE: ICD-10-CM

## 2021-03-15 DIAGNOSIS — M25.662 STIFFNESS OF LEFT KNEE: ICD-10-CM

## 2021-03-15 PROCEDURE — 99999 PR PBB SHADOW E&M-EST. PATIENT-LVL III: CPT | Mod: PBBFAC,,, | Performed by: SURGERY

## 2021-03-15 PROCEDURE — 99024 PR POST-OP FOLLOW-UP VISIT: ICD-10-PCS | Mod: S$GLB,,, | Performed by: SURGERY

## 2021-03-15 PROCEDURE — 99024 POSTOP FOLLOW-UP VISIT: CPT | Mod: S$GLB,,, | Performed by: SURGERY

## 2021-03-15 PROCEDURE — 99999 PR PBB SHADOW E&M-EST. PATIENT-LVL III: ICD-10-PCS | Mod: PBBFAC,,, | Performed by: SURGERY

## 2021-03-15 RX ORDER — TOBRAMYCIN 1.2 G/30ML
INJECTION, POWDER, LYOPHILIZED, FOR SOLUTION INTRAVENOUS
COMMUNITY
Start: 2021-03-04 | End: 2022-01-21

## 2021-03-15 RX ORDER — METFORMIN HYDROCHLORIDE 500 MG/1
500 TABLET ORAL DAILY
COMMUNITY
Start: 2021-03-04

## 2021-03-15 RX ORDER — NYSTATIN 100000 [USP'U]/G
POWDER TOPICAL
COMMUNITY
Start: 2021-03-04

## 2021-03-15 RX ORDER — INSULIN LISPRO 100 [IU]/ML
INJECTION, SOLUTION INTRAVENOUS; SUBCUTANEOUS
COMMUNITY
Start: 2021-02-26

## 2021-03-15 RX ORDER — VANCOMYCIN HYDROCHLORIDE 500 MG/10ML
INJECTION, POWDER, LYOPHILIZED, FOR SOLUTION INTRAVENOUS
COMMUNITY
Start: 2021-03-04 | End: 2022-01-21

## 2021-03-15 RX ORDER — LOTEPREDNOL ETABONATE 5 MG/ML
SUSPENSION/ DROPS OPHTHALMIC
COMMUNITY
Start: 2021-03-03

## 2021-03-17 ENCOUNTER — TELEPHONE (OUTPATIENT)
Dept: ADMINISTRATIVE | Facility: HOSPITAL | Age: 61
End: 2021-03-17

## 2021-03-29 ENCOUNTER — OFFICE VISIT (OUTPATIENT)
Dept: PLASTIC SURGERY | Facility: CLINIC | Age: 61
End: 2021-03-29
Payer: COMMERCIAL

## 2021-03-29 VITALS
HEIGHT: 65 IN | SYSTOLIC BLOOD PRESSURE: 127 MMHG | BODY MASS INDEX: 28.32 KG/M2 | HEART RATE: 99 BPM | WEIGHT: 170 LBS | DIASTOLIC BLOOD PRESSURE: 62 MMHG

## 2021-03-29 DIAGNOSIS — Z87.81 S/P ORIF (OPEN REDUCTION INTERNAL FIXATION) FRACTURE: Primary | ICD-10-CM

## 2021-03-29 DIAGNOSIS — Z98.890 S/P ORIF (OPEN REDUCTION INTERNAL FIXATION) FRACTURE: Primary | ICD-10-CM

## 2021-03-29 PROCEDURE — 99999 PR PBB SHADOW E&M-EST. PATIENT-LVL II: ICD-10-PCS | Mod: PBBFAC,,, | Performed by: SURGERY

## 2021-03-29 PROCEDURE — 99024 PR POST-OP FOLLOW-UP VISIT: ICD-10-PCS | Mod: S$GLB,,, | Performed by: SURGERY

## 2021-03-29 PROCEDURE — 99999 PR PBB SHADOW E&M-EST. PATIENT-LVL II: CPT | Mod: PBBFAC,,, | Performed by: SURGERY

## 2021-03-29 PROCEDURE — 99024 POSTOP FOLLOW-UP VISIT: CPT | Mod: S$GLB,,, | Performed by: SURGERY

## 2021-03-29 RX ORDER — VENLAFAXINE HYDROCHLORIDE 150 MG/1
150 CAPSULE, EXTENDED RELEASE ORAL DAILY
COMMUNITY
Start: 2021-03-16

## 2021-03-30 ENCOUNTER — OFFICE VISIT (OUTPATIENT)
Dept: INFECTIOUS DISEASES | Facility: CLINIC | Age: 61
End: 2021-03-30
Payer: COMMERCIAL

## 2021-03-30 VITALS
WEIGHT: 172.38 LBS | BODY MASS INDEX: 28.72 KG/M2 | SYSTOLIC BLOOD PRESSURE: 155 MMHG | DIASTOLIC BLOOD PRESSURE: 78 MMHG | TEMPERATURE: 98 F | HEIGHT: 65 IN | HEART RATE: 98 BPM

## 2021-03-30 DIAGNOSIS — M86.28 SUBACUTE OSTEOMYELITIS, OTHER SITE: Primary | ICD-10-CM

## 2021-03-30 DIAGNOSIS — T81.30XA WOUND DEHISCENCE: ICD-10-CM

## 2021-03-30 DIAGNOSIS — B46.5 INFECTION DUE TO MUCOR: ICD-10-CM

## 2021-03-30 DIAGNOSIS — E10.69 TYPE 1 DIABETES MELLITUS WITH OTHER SPECIFIED COMPLICATION: ICD-10-CM

## 2021-03-30 PROBLEM — Z45.2 PICC (PERIPHERALLY INSERTED CENTRAL CATHETER) REMOVAL: Status: RESOLVED | Noted: 2021-01-12 | Resolved: 2021-03-30

## 2021-03-30 LAB
FUNGUS SPEC CULT: ABNORMAL
FUNGUS SPEC CULT: NORMAL
FUNGUS SPEC CULT: NORMAL

## 2021-03-30 PROCEDURE — 99214 PR OFFICE/OUTPT VISIT, EST, LEVL IV, 30-39 MIN: ICD-10-PCS | Mod: S$GLB,,, | Performed by: INTERNAL MEDICINE

## 2021-03-30 PROCEDURE — 87102 FUNGUS ISOLATION CULTURE: CPT | Performed by: INTERNAL MEDICINE

## 2021-03-30 PROCEDURE — 99999 PR PBB SHADOW E&M-EST. PATIENT-LVL III: CPT | Mod: PBBFAC,,, | Performed by: INTERNAL MEDICINE

## 2021-03-30 PROCEDURE — 99999 PR PBB SHADOW E&M-EST. PATIENT-LVL III: ICD-10-PCS | Mod: PBBFAC,,, | Performed by: INTERNAL MEDICINE

## 2021-03-30 PROCEDURE — 87070 CULTURE OTHR SPECIMN AEROBIC: CPT | Performed by: INTERNAL MEDICINE

## 2021-03-30 PROCEDURE — 99214 OFFICE O/P EST MOD 30 MIN: CPT | Mod: S$GLB,,, | Performed by: INTERNAL MEDICINE

## 2021-03-30 RX ORDER — POSACONAZOLE 100 MG/1
300 TABLET, DELAYED RELEASE ORAL DAILY
Qty: 90 TABLET | Refills: 0 | Status: SHIPPED | OUTPATIENT
Start: 2021-03-30 | End: 2021-04-14 | Stop reason: SDUPTHER

## 2021-04-01 ENCOUNTER — PATIENT MESSAGE (OUTPATIENT)
Dept: INFECTIOUS DISEASES | Facility: CLINIC | Age: 61
End: 2021-04-01

## 2021-04-03 LAB — BACTERIA SPEC AEROBE CULT: NO GROWTH

## 2021-04-05 ENCOUNTER — PATIENT MESSAGE (OUTPATIENT)
Dept: INFECTIOUS DISEASES | Facility: CLINIC | Age: 61
End: 2021-04-05

## 2021-04-05 PROCEDURE — G0179 PR HOME HEALTH MD RECERTIFICATION: ICD-10-PCS | Mod: ,,, | Performed by: SURGERY

## 2021-04-05 PROCEDURE — G0179 MD RECERTIFICATION HHA PT: HCPCS | Mod: ,,, | Performed by: SURGERY

## 2021-04-09 ENCOUNTER — OFFICE VISIT (OUTPATIENT)
Dept: INFECTIOUS DISEASES | Facility: CLINIC | Age: 61
End: 2021-04-09
Payer: COMMERCIAL

## 2021-04-09 VITALS
WEIGHT: 178.81 LBS | BODY MASS INDEX: 29.79 KG/M2 | SYSTOLIC BLOOD PRESSURE: 163 MMHG | TEMPERATURE: 99 F | DIASTOLIC BLOOD PRESSURE: 77 MMHG | HEART RATE: 89 BPM | HEIGHT: 65 IN

## 2021-04-09 DIAGNOSIS — E10.69 TYPE 1 DIABETES MELLITUS WITH OTHER SPECIFIED COMPLICATION: ICD-10-CM

## 2021-04-09 DIAGNOSIS — B46.5 INFECTION DUE TO MUCOR: Primary | ICD-10-CM

## 2021-04-09 DIAGNOSIS — T81.31XD DEHISCENCE OF OPERATIVE WOUND, SUBSEQUENT ENCOUNTER: ICD-10-CM

## 2021-04-09 DIAGNOSIS — T81.30XA WOUND DEHISCENCE: ICD-10-CM

## 2021-04-09 PROBLEM — M86.28 SUBACUTE OSTEOMYELITIS, OTHER SITE: Status: RESOLVED | Noted: 2020-12-03 | Resolved: 2021-04-09

## 2021-04-09 PROBLEM — M86.20 SUBACUTE OSTEOMYELITIS: Status: RESOLVED | Noted: 2021-01-20 | Resolved: 2021-04-09

## 2021-04-09 PROCEDURE — 87186 SC STD MICRODIL/AGAR DIL: CPT | Performed by: INTERNAL MEDICINE

## 2021-04-09 PROCEDURE — 87102 FUNGUS ISOLATION CULTURE: CPT | Performed by: INTERNAL MEDICINE

## 2021-04-09 PROCEDURE — 99214 OFFICE O/P EST MOD 30 MIN: CPT | Mod: S$GLB,,, | Performed by: INTERNAL MEDICINE

## 2021-04-09 PROCEDURE — 99999 PR PBB SHADOW E&M-EST. PATIENT-LVL V: CPT | Mod: PBBFAC,,, | Performed by: INTERNAL MEDICINE

## 2021-04-09 PROCEDURE — 87077 CULTURE AEROBIC IDENTIFY: CPT | Performed by: INTERNAL MEDICINE

## 2021-04-09 PROCEDURE — 87070 CULTURE OTHR SPECIMN AEROBIC: CPT | Performed by: INTERNAL MEDICINE

## 2021-04-09 PROCEDURE — 99999 PR PBB SHADOW E&M-EST. PATIENT-LVL V: ICD-10-PCS | Mod: PBBFAC,,, | Performed by: INTERNAL MEDICINE

## 2021-04-09 PROCEDURE — 99214 PR OFFICE/OUTPT VISIT, EST, LEVL IV, 30-39 MIN: ICD-10-PCS | Mod: S$GLB,,, | Performed by: INTERNAL MEDICINE

## 2021-04-09 RX ORDER — CIPROFLOXACIN 500 MG/1
500 TABLET ORAL 2 TIMES DAILY
Qty: 30 TABLET | Refills: 1 | Status: SHIPPED | OUTPATIENT
Start: 2021-04-09 | End: 2022-01-21

## 2021-04-12 ENCOUNTER — EXTERNAL HOME HEALTH (OUTPATIENT)
Dept: HOME HEALTH SERVICES | Facility: HOSPITAL | Age: 61
End: 2021-04-12
Payer: COMMERCIAL

## 2021-04-12 LAB — BACTERIA SPEC AEROBE CULT: ABNORMAL

## 2021-04-13 ENCOUNTER — OFFICE VISIT (OUTPATIENT)
Dept: PLASTIC SURGERY | Facility: CLINIC | Age: 61
End: 2021-04-13
Payer: COMMERCIAL

## 2021-04-13 VITALS — BODY MASS INDEX: 29.66 KG/M2 | HEIGHT: 65 IN | WEIGHT: 178 LBS

## 2021-04-13 DIAGNOSIS — M25.662 STIFFNESS OF LEFT KNEE: Primary | ICD-10-CM

## 2021-04-13 DIAGNOSIS — R79.89 ELEVATED LFTS: ICD-10-CM

## 2021-04-13 PROCEDURE — 99024 PR POST-OP FOLLOW-UP VISIT: ICD-10-PCS | Mod: S$GLB,,, | Performed by: SURGERY

## 2021-04-13 PROCEDURE — 99024 POSTOP FOLLOW-UP VISIT: CPT | Mod: S$GLB,,, | Performed by: SURGERY

## 2021-04-14 ENCOUNTER — OFFICE VISIT (OUTPATIENT)
Dept: INFECTIOUS DISEASES | Facility: CLINIC | Age: 61
End: 2021-04-14
Payer: COMMERCIAL

## 2021-04-14 VITALS
BODY MASS INDEX: 29.27 KG/M2 | SYSTOLIC BLOOD PRESSURE: 166 MMHG | HEIGHT: 65 IN | TEMPERATURE: 99 F | WEIGHT: 175.69 LBS | HEART RATE: 85 BPM | DIASTOLIC BLOOD PRESSURE: 84 MMHG

## 2021-04-14 DIAGNOSIS — T81.30XA WOUND DEHISCENCE: Primary | ICD-10-CM

## 2021-04-14 DIAGNOSIS — B46.5 INFECTION DUE TO MUCOR: ICD-10-CM

## 2021-04-14 DIAGNOSIS — T81.31XD DEHISCENCE OF OPERATIVE WOUND, SUBSEQUENT ENCOUNTER: ICD-10-CM

## 2021-04-14 DIAGNOSIS — A49.8 PSEUDOMONAS AERUGINOSA INFECTION: ICD-10-CM

## 2021-04-14 PROCEDURE — 99999 PR PBB SHADOW E&M-EST. PATIENT-LVL V: CPT | Mod: PBBFAC,,, | Performed by: INTERNAL MEDICINE

## 2021-04-14 PROCEDURE — 99214 OFFICE O/P EST MOD 30 MIN: CPT | Mod: S$GLB,,, | Performed by: INTERNAL MEDICINE

## 2021-04-14 PROCEDURE — 99999 PR PBB SHADOW E&M-EST. PATIENT-LVL V: ICD-10-PCS | Mod: PBBFAC,,, | Performed by: INTERNAL MEDICINE

## 2021-04-14 PROCEDURE — 99214 PR OFFICE/OUTPT VISIT, EST, LEVL IV, 30-39 MIN: ICD-10-PCS | Mod: S$GLB,,, | Performed by: INTERNAL MEDICINE

## 2021-04-14 RX ORDER — POSACONAZOLE 100 MG/1
300 TABLET, DELAYED RELEASE ORAL DAILY
Qty: 90 TABLET | Refills: 0 | Status: SHIPPED | OUTPATIENT
Start: 2021-04-14 | End: 2021-05-26 | Stop reason: SDUPTHER

## 2021-04-19 ENCOUNTER — OFFICE VISIT (OUTPATIENT)
Dept: PLASTIC SURGERY | Facility: CLINIC | Age: 61
End: 2021-04-19
Payer: COMMERCIAL

## 2021-04-19 VITALS — HEIGHT: 65 IN | WEIGHT: 175 LBS | BODY MASS INDEX: 29.16 KG/M2

## 2021-04-19 DIAGNOSIS — B46.5 INFECTION DUE TO MUCOR: Primary | ICD-10-CM

## 2021-04-19 PROCEDURE — 99999 PR PBB SHADOW E&M-EST. PATIENT-LVL III: CPT | Mod: PBBFAC,,, | Performed by: SURGERY

## 2021-04-19 PROCEDURE — 99999 PR PBB SHADOW E&M-EST. PATIENT-LVL III: ICD-10-PCS | Mod: PBBFAC,,, | Performed by: SURGERY

## 2021-04-19 PROCEDURE — 99024 POSTOP FOLLOW-UP VISIT: CPT | Mod: S$GLB,,, | Performed by: SURGERY

## 2021-04-19 PROCEDURE — 99024 PR POST-OP FOLLOW-UP VISIT: ICD-10-PCS | Mod: S$GLB,,, | Performed by: SURGERY

## 2021-04-26 LAB — FUNGUS SPEC CULT: NORMAL

## 2021-05-03 DIAGNOSIS — M21.372 FOOT DROP, LEFT: Primary | ICD-10-CM

## 2021-05-10 ENCOUNTER — OFFICE VISIT (OUTPATIENT)
Dept: PLASTIC SURGERY | Facility: CLINIC | Age: 61
End: 2021-05-10
Payer: COMMERCIAL

## 2021-05-10 VITALS — DIASTOLIC BLOOD PRESSURE: 80 MMHG | SYSTOLIC BLOOD PRESSURE: 155 MMHG | HEART RATE: 93 BPM

## 2021-05-10 DIAGNOSIS — M21.372 FOOT DROP, LEFT: Primary | ICD-10-CM

## 2021-05-10 DIAGNOSIS — Z09 SURGERY FOLLOW-UP EXAMINATION: ICD-10-CM

## 2021-05-10 PROCEDURE — 99999 PR PBB SHADOW E&M-EST. PATIENT-LVL IV: CPT | Mod: PBBFAC,,, | Performed by: PHYSICIAN ASSISTANT

## 2021-05-10 PROCEDURE — 99212 OFFICE O/P EST SF 10 MIN: CPT | Mod: S$GLB,,, | Performed by: PHYSICIAN ASSISTANT

## 2021-05-10 PROCEDURE — 99212 PR OFFICE/OUTPT VISIT, EST, LEVL II, 10-19 MIN: ICD-10-PCS | Mod: S$GLB,,, | Performed by: PHYSICIAN ASSISTANT

## 2021-05-10 PROCEDURE — 99999 PR PBB SHADOW E&M-EST. PATIENT-LVL IV: ICD-10-PCS | Mod: PBBFAC,,, | Performed by: PHYSICIAN ASSISTANT

## 2021-05-11 LAB — FUNGUS SPEC CULT: NORMAL

## 2021-06-02 ENCOUNTER — OFFICE VISIT (OUTPATIENT)
Dept: PLASTIC SURGERY | Facility: CLINIC | Age: 61
End: 2021-06-02
Payer: COMMERCIAL

## 2021-06-02 VITALS — HEIGHT: 65 IN | WEIGHT: 175.06 LBS | BODY MASS INDEX: 29.17 KG/M2

## 2021-06-02 DIAGNOSIS — Z98.890 S/P ORIF (OPEN REDUCTION INTERNAL FIXATION) FRACTURE: ICD-10-CM

## 2021-06-02 DIAGNOSIS — M21.372 FOOT DROP, LEFT: Primary | ICD-10-CM

## 2021-06-02 DIAGNOSIS — Z87.81 S/P ORIF (OPEN REDUCTION INTERNAL FIXATION) FRACTURE: ICD-10-CM

## 2021-06-02 PROCEDURE — 99999 PR PBB SHADOW E&M-EST. PATIENT-LVL IV: CPT | Mod: PBBFAC,,, | Performed by: PHYSICIAN ASSISTANT

## 2021-06-02 PROCEDURE — 99999 PR PBB SHADOW E&M-EST. PATIENT-LVL IV: ICD-10-PCS | Mod: PBBFAC,,, | Performed by: PHYSICIAN ASSISTANT

## 2021-06-02 PROCEDURE — 99212 PR OFFICE/OUTPT VISIT, EST, LEVL II, 10-19 MIN: ICD-10-PCS | Mod: S$GLB,,, | Performed by: PHYSICIAN ASSISTANT

## 2021-06-02 PROCEDURE — 99212 OFFICE O/P EST SF 10 MIN: CPT | Mod: S$GLB,,, | Performed by: PHYSICIAN ASSISTANT

## 2021-06-03 ENCOUNTER — PATIENT MESSAGE (OUTPATIENT)
Dept: ORTHOPEDICS | Facility: CLINIC | Age: 61
End: 2021-06-03

## 2021-06-03 ENCOUNTER — TELEPHONE (OUTPATIENT)
Dept: ORTHOPEDICS | Facility: CLINIC | Age: 61
End: 2021-06-03

## 2021-06-03 ENCOUNTER — PROCEDURE VISIT (OUTPATIENT)
Dept: NEUROLOGY | Facility: CLINIC | Age: 61
End: 2021-06-03
Payer: COMMERCIAL

## 2021-06-03 DIAGNOSIS — M21.372 FOOT DROP, LEFT: ICD-10-CM

## 2021-06-03 DIAGNOSIS — R52 PAIN: Primary | ICD-10-CM

## 2021-06-03 PROCEDURE — 95912 NRV CNDJ TEST 11-12 STUDIES: CPT | Mod: S$GLB,,, | Performed by: PSYCHIATRY & NEUROLOGY

## 2021-06-03 PROCEDURE — 95886 PR EMG COMPLETE, W/ NERVE CONDUCTION STUDIES, 5+ MUSCLES: ICD-10-PCS | Mod: S$GLB,,, | Performed by: PSYCHIATRY & NEUROLOGY

## 2021-06-03 PROCEDURE — 95886 MUSC TEST DONE W/N TEST COMP: CPT | Mod: S$GLB,,, | Performed by: PSYCHIATRY & NEUROLOGY

## 2021-06-03 PROCEDURE — 95912 PR NERVE CONDUCTION STUDY; 11 -12 STUDIES: ICD-10-PCS | Mod: S$GLB,,, | Performed by: PSYCHIATRY & NEUROLOGY

## 2021-06-07 ENCOUNTER — TELEPHONE (OUTPATIENT)
Dept: ORTHOPEDICS | Facility: CLINIC | Age: 61
End: 2021-06-07

## 2021-06-18 ENCOUNTER — PATIENT MESSAGE (OUTPATIENT)
Dept: INFECTIOUS DISEASES | Facility: CLINIC | Age: 61
End: 2021-06-18

## 2021-06-18 ENCOUNTER — OFFICE VISIT (OUTPATIENT)
Dept: INFECTIOUS DISEASES | Facility: CLINIC | Age: 61
End: 2021-06-18
Payer: COMMERCIAL

## 2021-06-18 VITALS
HEIGHT: 65 IN | WEIGHT: 175.06 LBS | SYSTOLIC BLOOD PRESSURE: 172 MMHG | DIASTOLIC BLOOD PRESSURE: 87 MMHG | HEART RATE: 92 BPM | BODY MASS INDEX: 29.17 KG/M2 | TEMPERATURE: 98 F

## 2021-06-18 DIAGNOSIS — T81.31XD DEHISCENCE OF OPERATIVE WOUND, SUBSEQUENT ENCOUNTER: ICD-10-CM

## 2021-06-18 DIAGNOSIS — B46.5 INFECTION DUE TO MUCOR: Primary | ICD-10-CM

## 2021-06-18 DIAGNOSIS — M25.662 STIFFNESS OF LEFT KNEE: ICD-10-CM

## 2021-06-18 DIAGNOSIS — E10.69 TYPE 1 DIABETES MELLITUS WITH OTHER SPECIFIED COMPLICATION: ICD-10-CM

## 2021-06-18 DIAGNOSIS — T81.30XA WOUND DEHISCENCE: ICD-10-CM

## 2021-06-18 DIAGNOSIS — M21.372 ACQUIRED LEFT FOOT DROP: ICD-10-CM

## 2021-06-18 PROCEDURE — 99999 PR PBB SHADOW E&M-EST. PATIENT-LVL V: CPT | Mod: PBBFAC,,, | Performed by: INTERNAL MEDICINE

## 2021-06-18 PROCEDURE — 99999 PR PBB SHADOW E&M-EST. PATIENT-LVL V: ICD-10-PCS | Mod: PBBFAC,,, | Performed by: INTERNAL MEDICINE

## 2021-06-18 PROCEDURE — 99214 PR OFFICE/OUTPT VISIT, EST, LEVL IV, 30-39 MIN: ICD-10-PCS | Mod: S$GLB,,, | Performed by: INTERNAL MEDICINE

## 2021-06-18 PROCEDURE — 99214 OFFICE O/P EST MOD 30 MIN: CPT | Mod: S$GLB,,, | Performed by: INTERNAL MEDICINE

## 2021-06-18 RX ORDER — POSACONAZOLE 100 MG/1
TABLET, DELAYED RELEASE ORAL
Qty: 90 TABLET | Refills: 0 | Status: SHIPPED | OUTPATIENT
Start: 2021-09-14 | End: 2021-09-14 | Stop reason: CLARIF

## 2021-06-19 PROBLEM — A49.8 PSEUDOMONAS AERUGINOSA INFECTION: Status: RESOLVED | Noted: 2021-04-14 | Resolved: 2021-06-19

## 2021-06-29 ENCOUNTER — HOSPITAL ENCOUNTER (OUTPATIENT)
Dept: RADIOLOGY | Facility: HOSPITAL | Age: 61
Discharge: HOME | End: 2021-06-29
Attending: ORTHOPAEDIC SURGERY
Payer: COMMERCIAL

## 2021-06-29 ENCOUNTER — OFFICE VISIT (OUTPATIENT)
Dept: ORTHOPEDICS | Facility: CLINIC | Age: 61
End: 2021-06-29
Payer: COMMERCIAL

## 2021-06-29 DIAGNOSIS — M21.372 ACQUIRED LEFT FOOT DROP: ICD-10-CM

## 2021-06-29 DIAGNOSIS — M21.6X2 ACQUIRED PRONATION DEFORMITY OF LEFT FOOT: Primary | ICD-10-CM

## 2021-06-29 DIAGNOSIS — R52 PAIN: ICD-10-CM

## 2021-06-29 DIAGNOSIS — E10.42 DIABETIC POLYNEUROPATHY ASSOCIATED WITH TYPE 1 DIABETES MELLITUS: ICD-10-CM

## 2021-06-29 PROCEDURE — 99999 PR PBB SHADOW E&M-EST. PATIENT-LVL II: ICD-10-PCS | Mod: PBBFAC,,, | Performed by: ORTHOPAEDIC SURGERY

## 2021-06-29 PROCEDURE — 73610 XR ANKLE COMPLETE 3 VIEW LEFT: ICD-10-PCS | Mod: 26,LT,, | Performed by: RADIOLOGY

## 2021-06-29 PROCEDURE — 73610 X-RAY EXAM OF ANKLE: CPT | Mod: 26,LT,, | Performed by: RADIOLOGY

## 2021-06-29 PROCEDURE — 73610 X-RAY EXAM OF ANKLE: CPT | Mod: TC,PO,LT

## 2021-06-29 PROCEDURE — 99999 PR PBB SHADOW E&M-EST. PATIENT-LVL II: CPT | Mod: PBBFAC,,, | Performed by: ORTHOPAEDIC SURGERY

## 2021-06-29 PROCEDURE — 73630 X-RAY EXAM OF FOOT: CPT | Mod: TC,PO,LT

## 2021-06-29 PROCEDURE — 99244 OFF/OP CNSLTJ NEW/EST MOD 40: CPT | Mod: S$GLB,,, | Performed by: ORTHOPAEDIC SURGERY

## 2021-06-29 PROCEDURE — 99244 PR OFFICE CONSULTATION,LEVEL IV: ICD-10-PCS | Mod: S$GLB,,, | Performed by: ORTHOPAEDIC SURGERY

## 2021-06-29 PROCEDURE — 73630 X-RAY EXAM OF FOOT: CPT | Mod: 26,LT,, | Performed by: RADIOLOGY

## 2021-06-29 PROCEDURE — 73630 XR FOOT COMPLETE 3 VIEW LEFT: ICD-10-PCS | Mod: 26,LT,, | Performed by: RADIOLOGY

## 2021-07-06 NOTE — TELEPHONE ENCOUNTER
Spoke with patient.  Explained that some drainage int the container is ok.  She will monitor how much drainage is in the container and call of gets full. Will see Thursday.    details…

## 2021-07-29 ENCOUNTER — DOCUMENT SCAN (OUTPATIENT)
Dept: HOME HEALTH SERVICES | Facility: HOSPITAL | Age: 61
End: 2021-07-29
Payer: COMMERCIAL

## 2021-07-30 ENCOUNTER — OFFICE VISIT (OUTPATIENT)
Dept: INFECTIOUS DISEASES | Facility: CLINIC | Age: 61
End: 2021-07-30
Payer: COMMERCIAL

## 2021-07-30 VITALS
BODY MASS INDEX: 28.25 KG/M2 | HEIGHT: 65 IN | SYSTOLIC BLOOD PRESSURE: 149 MMHG | WEIGHT: 169.56 LBS | TEMPERATURE: 99 F | DIASTOLIC BLOOD PRESSURE: 87 MMHG | HEART RATE: 113 BPM

## 2021-07-30 DIAGNOSIS — M79.662 PAIN OF LEFT LOWER LEG: ICD-10-CM

## 2021-07-30 DIAGNOSIS — B46.5 INFECTION DUE TO MUCOR: Primary | ICD-10-CM

## 2021-07-30 DIAGNOSIS — E10.69 TYPE 1 DIABETES MELLITUS WITH OTHER SPECIFIED COMPLICATION: ICD-10-CM

## 2021-07-30 DIAGNOSIS — S81.802A LEG WOUND, LEFT, INITIAL ENCOUNTER: ICD-10-CM

## 2021-07-30 DIAGNOSIS — M86.28 SUBACUTE OSTEOMYELITIS, OTHER SITE: ICD-10-CM

## 2021-07-30 PROCEDURE — 99215 PR OFFICE/OUTPT VISIT, EST, LEVL V, 40-54 MIN: ICD-10-PCS | Mod: S$GLB,,, | Performed by: INTERNAL MEDICINE

## 2021-07-30 PROCEDURE — 99215 OFFICE O/P EST HI 40 MIN: CPT | Mod: S$GLB,,, | Performed by: INTERNAL MEDICINE

## 2021-07-30 PROCEDURE — 99999 PR PBB SHADOW E&M-EST. PATIENT-LVL V: CPT | Mod: PBBFAC,,, | Performed by: INTERNAL MEDICINE

## 2021-07-30 PROCEDURE — 99999 PR PBB SHADOW E&M-EST. PATIENT-LVL V: ICD-10-PCS | Mod: PBBFAC,,, | Performed by: INTERNAL MEDICINE

## 2021-08-03 ENCOUNTER — PATIENT MESSAGE (OUTPATIENT)
Dept: PLASTIC SURGERY | Facility: CLINIC | Age: 61
End: 2021-08-03

## 2021-08-18 ENCOUNTER — OFFICE VISIT (OUTPATIENT)
Dept: PLASTIC SURGERY | Facility: CLINIC | Age: 61
End: 2021-08-18
Payer: COMMERCIAL

## 2021-08-18 VITALS
SYSTOLIC BLOOD PRESSURE: 156 MMHG | HEART RATE: 107 BPM | BODY MASS INDEX: 28.25 KG/M2 | HEIGHT: 65 IN | DIASTOLIC BLOOD PRESSURE: 75 MMHG | WEIGHT: 169.56 LBS

## 2021-08-18 DIAGNOSIS — Z09 SURGERY FOLLOW-UP EXAMINATION: Primary | ICD-10-CM

## 2021-08-18 PROCEDURE — 99999 PR PBB SHADOW E&M-EST. PATIENT-LVL IV: ICD-10-PCS | Mod: PBBFAC,,, | Performed by: SURGERY

## 2021-08-18 PROCEDURE — 99211 OFF/OP EST MAY X REQ PHY/QHP: CPT | Mod: S$GLB,,, | Performed by: SURGERY

## 2021-08-18 PROCEDURE — 99211 PR OFFICE/OUTPT VISIT, EST, LEVL I: ICD-10-PCS | Mod: S$GLB,,, | Performed by: SURGERY

## 2021-08-18 PROCEDURE — 99999 PR PBB SHADOW E&M-EST. PATIENT-LVL IV: CPT | Mod: PBBFAC,,, | Performed by: SURGERY

## 2021-08-24 NOTE — H&P
Subjective:      Patient ID: Bhavna Hancock is a 60 y.o. female.    Chief Complaint: Follow-up (LEFT KNEE)    Principal Orthopedic Problem: Transverse left patella fracture     Relevant Medical History:  PMHX of HTN, DM( last A1C 8.3 )    HPI: Ms. Hancock is 60 yo F fell onto left knee from standing height on 9/22/2020.  She was seen initially in the [facility] ED, diagnosed with a displaced left patella fracture and placed into to knee immobilizer. She followed up in clinic on 09/24/2020, but was found to have abrasions to her knee. She was treated with ORIF on 10/05/2020. She has had continued issues with range of motion. She also developed some serious drainage from her incision.       Past Medical History:   Diagnosis Date    Abdominal pain     Diabetes     GERD (gastroesophageal reflux disease)     Hx of colonic polyps     Hyperlipidemia     Hypertension     Nausea and vomiting      Past Surgical History:   Procedure Laterality Date    CRANIOTOMY  2013    DEBRIDEMENT TENNIS ELBOW      hip ascites      OPEN REDUCTION AND INTERNAL FIXATION (ORIF) OF FRACTURE OF PATELLA Left 10/5/2020    Procedure: ORIF, FRACTURE, PATELLA - Femoral catheter ok;  Surgeon: Patrice Iglesias MD;  Location: [facility] OR [address] [place] [address];  Service: Orthopedics;  Laterality: Left;    TRIGGER FINGER RELEASE       Social History     Occupational History    Not on file   Tobacco Use    Smoking status: Former Smoker     Quit date: 10/5/2005     Years since quitting: 15.1    Smokeless tobacco: Never Used   Substance and Sexual Activity    Alcohol use: No    Drug use: No    Sexual activity: Not on file      ROS  Current Outpatient Medications on File Prior to Visit   Medication Sig Dispense Refill    acetaminophen (TYLENOL) 500 MG tablet Take 2 tablets (1,000 mg total) by mouth every 6 (six) hours.      blood sugar diagnostic (ONETOUCH ULTRA BLUE TEST STRIP) Strp Use to test blood sugar 5 times a day      DEXCOM G6 SENSOR Priscilla  TEST BS FID      DEXCOM G6 TRANSMITTER Priscilla USE TO CHECK FID      estradioL (ESTRACE) 1 MG tablet TK 1 T PO QD      fish oil-omega-3 fatty acids 300-1,000 mg capsule Take 2 g by mouth once daily.      ibuprofen (ADVIL,MOTRIN) 600 MG tablet Take 1 tablet (600 mg total) by mouth every 6 (six) hours as needed for Pain. 20 tablet 0    insulin glargine (LANTUS) 100 unit/mL injection Inject 35 Units into the skin once daily. 30 units nightly       insulin lispro (HUMALOG KWIKPEN INSULIN) 100 unit/mL pen INJECT 14 UNITS UNDER THE SKIN WITH EVERY MEAL      insulin regular 100 unit/mL Inj injection Inject into the skin 3 (three) times daily before meals. 8 units per meal      insulin syringe-needle U-100 1/2 mL 30 gauge Syrg USE ONE SYRINGE PER DAY UTD      levothyroxine (SYNTHROID) 75 MCG tablet TAKE 1 TABLET BY MOUTH EVERY DAY      lisinopril (PRINIVIL,ZESTRIL) 2.5 MG tablet Take 2.5 mg by mouth once daily.      LOTEMAX SM 0.38 % DrpG INT 1 GTT IN OU BID UTD      medroxyPROGESTERone (PROVERA) 2.5 MG tablet TK 1 T PO QD      meloxicam (MOBIC) 15 MG tablet Take 1 tablet (15 mg total) by mouth once daily. 30 tablet 0    metformin (GLUCOPHAGE) 500 MG tablet Take 500 mg by mouth daily with breakfast.      oxyCODONE (ROXICODONE) 5 MG immediate release tablet Take 1 tablet (5 mg total) by mouth every 6 (six) hours as needed for Pain. May take 1-2 tablets every 6 hours as needed for pain 20 tablet 0    pantoprazole (PROTONIX) 40 MG tablet TAKE 1 TABLET(40 MG) BY MOUTH EVERY DAY 30 tablet 0    RESTASIS 0.05 % ophthalmic emulsion INSTILL ONE DROP IN OU BID      simvastatin (ZOCOR) 40 MG tablet Take 40 mg by mouth every evening.      sulfamethoxazole-trimethoprim 800-160mg (BACTRIM DS) 800-160 mg Tab Take 1 tablet by mouth 2 (two) times daily. for 10 days 20 tablet 0    trospium (SANCTURA) 20 mg Tab tablet TK 1 T PO BID 1 HOUR B MEALS      venlafaxine (EFFEXOR-XR) 150 MG Cp24 Take 225 mg by mouth once daily.     "   aspirin (ECOTRIN) 81 MG EC tablet Take 1 tablet (81 mg total) by mouth 2 (two) times a day. 84 tablet 0     Current Facility-Administered Medications on File Prior to Visit   Medication Dose Route Frequency Provider Last Rate Last Dose    lidocaine HCL 10 mg/ml (1%) injection 5 mL  5 mL   Maddie Meeks MD   5 mL at 09/14/20 1100    triamcinolone acetonide injection 40 mg  40 mg Intra-articular  Maddie Meeks MD   40 mg at 09/14/20 1100     Review of patient's allergies indicates:   Allergen Reactions    Codeine Nausea And Vomiting         Objective:      Vitals:    11/19/20 1001   BP: 137/84   Pulse: 97   Resp: 18   Temp: 98.2 °F (36.8 °C)   TempSrc: Oral   Weight: 77.1 kg (169 lb 15.6 oz)   Height: 5' 5" (1.651 m)     Ortho Exam     Gen: WD, WN in NAD   HEENT: NC/AT   Heart: RR   Resp: unlabored breathing   Skin: intact, no lesions pertinent to the surgery site    Assessment:       1. Closed displaced transverse fracture of left patella    2. S/P ORIF (open reduction internal fixation) fracture    3. Visit for wound check          Plan:       Surgical intervention per       " Anesthesia Type: 1% lidocaine with epinephrine

## 2021-09-17 ENCOUNTER — TELEPHONE (OUTPATIENT)
Dept: INFECTIOUS DISEASES | Facility: CLINIC | Age: 61
End: 2021-09-17

## 2021-09-22 ENCOUNTER — HOSPITAL ENCOUNTER (OUTPATIENT)
Dept: RADIOLOGY | Facility: HOSPITAL | Age: 61
Discharge: HOME | End: 2021-09-22
Attending: INTERNAL MEDICINE
Payer: COMMERCIAL

## 2021-09-22 DIAGNOSIS — M86.28 SUBACUTE OSTEOMYELITIS, OTHER SITE: ICD-10-CM

## 2021-09-22 DIAGNOSIS — B46.5 INFECTION DUE TO MUCOR: ICD-10-CM

## 2021-09-22 DIAGNOSIS — M79.662 PAIN OF LEFT LOWER LEG: ICD-10-CM

## 2021-09-22 PROCEDURE — 25500020 PHARM REV CODE 255: Performed by: INTERNAL MEDICINE

## 2021-09-22 PROCEDURE — 73723 MRI KNEE W WO CONTRAST LEFT: ICD-10-PCS | Mod: 26,LT,, | Performed by: RADIOLOGY

## 2021-09-22 PROCEDURE — 73723 MRI JOINT LWR EXTR W/O&W/DYE: CPT | Mod: 26,LT,, | Performed by: RADIOLOGY

## 2021-09-22 PROCEDURE — A9585 GADOBUTROL INJECTION: HCPCS | Performed by: INTERNAL MEDICINE

## 2021-09-22 PROCEDURE — 73723 MRI JOINT LWR EXTR W/O&W/DYE: CPT | Mod: TC,LT

## 2021-09-22 RX ORDER — GADOBUTROL 604.72 MG/ML
8 INJECTION INTRAVENOUS
Status: COMPLETED | OUTPATIENT
Start: 2021-09-22 | End: 2021-09-22

## 2021-09-22 RX ADMIN — GADOBUTROL 8 ML: 604.72 INJECTION INTRAVENOUS at 07:09

## 2021-09-30 ENCOUNTER — HOSPITAL ENCOUNTER (OUTPATIENT)
Dept: RADIOLOGY | Facility: HOSPITAL | Age: 61
Discharge: HOME | End: 2021-09-30
Attending: INTERNAL MEDICINE
Payer: COMMERCIAL

## 2021-09-30 DIAGNOSIS — M79.662 PAIN OF LEFT LOWER LEG: ICD-10-CM

## 2021-09-30 PROCEDURE — 78315 BONE IMAGING 3 PHASE: CPT | Mod: 26,ME,, | Performed by: RADIOLOGY

## 2021-09-30 PROCEDURE — 78315 NM BONE SCAN 3 PHASE LEG: ICD-10-PCS | Mod: 26,ME,, | Performed by: RADIOLOGY

## 2021-09-30 PROCEDURE — 78315 BONE IMAGING 3 PHASE: CPT | Mod: TC

## 2021-09-30 PROCEDURE — G1004 NM BONE SCAN 3 PHASE LEG: ICD-10-PCS | Mod: ,,, | Performed by: RADIOLOGY

## 2021-09-30 PROCEDURE — G1004 CDSM NDSC: HCPCS | Mod: ,,, | Performed by: RADIOLOGY

## 2021-10-01 ENCOUNTER — PATIENT MESSAGE (OUTPATIENT)
Dept: INFECTIOUS DISEASES | Facility: CLINIC | Age: 61
End: 2021-10-01

## 2021-10-05 ENCOUNTER — PATIENT MESSAGE (OUTPATIENT)
Dept: INFECTIOUS DISEASES | Facility: CLINIC | Age: 61
End: 2021-10-05

## 2021-10-26 ENCOUNTER — PATIENT MESSAGE (OUTPATIENT)
Dept: INFECTIOUS DISEASES | Facility: CLINIC | Age: 61
End: 2021-10-26
Payer: COMMERCIAL

## 2022-01-13 ENCOUNTER — TELEPHONE (OUTPATIENT)
Dept: ORTHOPEDICS | Facility: CLINIC | Age: 62
End: 2022-01-13
Payer: COMMERCIAL

## 2022-01-13 NOTE — TELEPHONE ENCOUNTER
Informed them we did not receive anything and gave them our fax number.----- Message from Adrienne Hunter sent at 1/13/2022 11:41 AM CST -----  Regarding: Medical Records Request  Regarding: [facility] called to f/u on medical records fax they sent to office 01/06.       Call back number:[phone] office

## 2022-01-18 ENCOUNTER — PATIENT MESSAGE (OUTPATIENT)
Dept: INFECTIOUS DISEASES | Facility: CLINIC | Age: 62
End: 2022-01-18
Payer: COMMERCIAL

## 2022-01-19 ENCOUNTER — TELEPHONE (OUTPATIENT)
Dept: INFECTIOUS DISEASES | Facility: CLINIC | Age: 62
End: 2022-01-19
Payer: COMMERCIAL

## 2022-01-19 NOTE — TELEPHONE ENCOUNTER
Friday, Jan 21, around 9:30, as she requested. I will come from [facility] just to see her.

## 2022-01-19 NOTE — TELEPHONE ENCOUNTER
----- Message from Travis Bey sent at 1/19/2022 12:43 PM CST -----  Patient called to speak w/ someone in regards to scheduling an f/u appt. States she was instructed to schedule an date this week. Next available appt in Epic is 2/2. Requesting callback [phone]

## 2022-01-21 ENCOUNTER — OFFICE VISIT (OUTPATIENT)
Dept: INFECTIOUS DISEASES | Facility: CLINIC | Age: 62
End: 2022-01-21
Payer: COMMERCIAL

## 2022-01-21 VITALS
HEART RATE: 91 BPM | WEIGHT: 174.38 LBS | DIASTOLIC BLOOD PRESSURE: 74 MMHG | TEMPERATURE: 99 F | HEIGHT: 65 IN | SYSTOLIC BLOOD PRESSURE: 162 MMHG | BODY MASS INDEX: 29.05 KG/M2

## 2022-01-21 DIAGNOSIS — T81.31XD DEHISCENCE OF OPERATIVE WOUND, SUBSEQUENT ENCOUNTER: ICD-10-CM

## 2022-01-21 DIAGNOSIS — S82.032D CLOSED DISPLACED TRANSVERSE FRACTURE OF LEFT PATELLA WITH ROUTINE HEALING, SUBSEQUENT ENCOUNTER: ICD-10-CM

## 2022-01-21 DIAGNOSIS — B46.5 INFECTION DUE TO MUCOR: ICD-10-CM

## 2022-01-21 DIAGNOSIS — M86.28 SUBACUTE OSTEOMYELITIS, OTHER SITE: Primary | ICD-10-CM

## 2022-01-21 DIAGNOSIS — T81.30XA WOUND DEHISCENCE: ICD-10-CM

## 2022-01-21 PROBLEM — T81.31XA SURGICAL WOUND BREAKDOWN: Status: RESOLVED | Noted: 2020-12-03 | Resolved: 2022-01-21

## 2022-01-21 PROCEDURE — 99999 PR PBB SHADOW E&M-EST. PATIENT-LVL V: ICD-10-PCS | Mod: PBBFAC,,, | Performed by: INTERNAL MEDICINE

## 2022-01-21 PROCEDURE — 99214 PR OFFICE/OUTPT VISIT, EST, LEVL IV, 30-39 MIN: ICD-10-PCS | Mod: S$GLB,,, | Performed by: INTERNAL MEDICINE

## 2022-01-21 PROCEDURE — 99214 OFFICE O/P EST MOD 30 MIN: CPT | Mod: S$GLB,,, | Performed by: INTERNAL MEDICINE

## 2022-01-21 PROCEDURE — 99999 PR PBB SHADOW E&M-EST. PATIENT-LVL V: CPT | Mod: PBBFAC,,, | Performed by: INTERNAL MEDICINE

## 2022-01-21 RX ORDER — POSACONAZOLE 100 MG/1
300 TABLET, DELAYED RELEASE ORAL DAILY
Qty: 90 TABLET | Refills: 3 | Status: SHIPPED | OUTPATIENT
Start: 2022-01-21 | End: 2022-01-21 | Stop reason: SDUPTHER

## 2022-01-21 RX ORDER — POSACONAZOLE 100 MG/1
300 TABLET, DELAYED RELEASE ORAL DAILY
Qty: 90 TABLET | Refills: 3 | Status: SHIPPED | OUTPATIENT
Start: 2022-01-21

## 2022-01-21 NOTE — PROGRESS NOTES
Patient seen in follow up - has been out of posaconazole for 4 days, since now she is being asked to pay deductible of $1600. No fever or chills - went to [facility] in [place]. Notes reviewed. Recommendation for 1 year of therapy, which started on March 30, 2021. Notes hair loss continues.     Exam - left knee wound is healed. Still has left foot drop and uses AFO. Some decrease in range of motion.   No edema in legs.                    1. Subacute osteomyelitis, other site    2. Infection due to mucor    3. Closed displaced transverse fracture of left patella with routine healing, subsequent encounter    4. Wound dehiscence    5. Dehiscence of operative wound, subsequent encounter        Long discussion of 2nd opinion, MRI from 3/31/21, and possible treatment options. Will send Rx for posaconazole to specialty pharmacy and try to get lower cost. MRI in 2021 suggested that there was no abscess and no osteomyelitis. Reviewed culture from 2/25/21 - there is no other agent effective - she must have posaconazole.     Check ESR, CRP next week. Continue posaconazole until March 31. Re-evaluate with MRI and inflammatory parameters. Will discuss improving leg function after infection has been resolved. Discussed possible plans - she is not interested in amputation at this time. I spent over 40 minutes on this encounter today

## 2022-02-08 ENCOUNTER — TELEPHONE (OUTPATIENT)
Dept: INFECTIOUS DISEASES | Facility: CLINIC | Age: 62
End: 2022-02-08
Payer: COMMERCIAL

## 2022-02-08 DIAGNOSIS — M86.28 SUBACUTE OSTEOMYELITIS, OTHER SITE: Primary | ICD-10-CM

## 2022-02-18 ENCOUNTER — TELEPHONE (OUTPATIENT)
Dept: ORTHOPEDICS | Facility: CLINIC | Age: 62
End: 2022-02-18
Payer: COMMERCIAL

## 2022-02-18 NOTE — TELEPHONE ENCOUNTER
Spoke to nadja and confirmed fax number and will send to [phone].----- Message from Sosa Nath sent at 2/18/2022  9:51 AM CST -----  Regarding: Medical Records  Contact: Nadja WEINSTEIN @ [phone]  Nadja WEINSTEIN [facility] @ [phone], need all medical records for patient.

## 2022-02-21 ENCOUNTER — OFFICE VISIT (OUTPATIENT)
Dept: INFECTIOUS DISEASES | Facility: CLINIC | Age: 62
End: 2022-02-21
Payer: COMMERCIAL

## 2022-02-21 VITALS
WEIGHT: 179.44 LBS | HEIGHT: 65 IN | DIASTOLIC BLOOD PRESSURE: 77 MMHG | TEMPERATURE: 98 F | BODY MASS INDEX: 29.9 KG/M2 | HEART RATE: 79 BPM | SYSTOLIC BLOOD PRESSURE: 151 MMHG

## 2022-02-21 DIAGNOSIS — B46.5 INFECTION DUE TO MUCOR: Primary | ICD-10-CM

## 2022-02-21 DIAGNOSIS — M25.562 CHRONIC PAIN OF LEFT KNEE: ICD-10-CM

## 2022-02-21 DIAGNOSIS — G89.29 CHRONIC PAIN OF LEFT KNEE: ICD-10-CM

## 2022-02-21 PROCEDURE — 99213 PR OFFICE/OUTPT VISIT, EST, LEVL III, 20-29 MIN: ICD-10-PCS | Mod: S$GLB,,, | Performed by: INTERNAL MEDICINE

## 2022-02-21 PROCEDURE — 99213 OFFICE O/P EST LOW 20 MIN: CPT | Mod: S$GLB,,, | Performed by: INTERNAL MEDICINE

## 2022-02-21 PROCEDURE — 99999 PR PBB SHADOW E&M-EST. PATIENT-LVL III: ICD-10-PCS | Mod: PBBFAC,,, | Performed by: INTERNAL MEDICINE

## 2022-02-21 PROCEDURE — 99999 PR PBB SHADOW E&M-EST. PATIENT-LVL III: CPT | Mod: PBBFAC,,, | Performed by: INTERNAL MEDICINE

## 2022-02-21 RX ORDER — FERROUS SULFATE 325(65) MG
325 TABLET ORAL
COMMUNITY

## 2022-02-21 RX ORDER — ROSUVASTATIN CALCIUM 10 MG/1
1 TABLET, COATED ORAL DAILY
COMMUNITY
Start: 2021-07-24 | End: 2022-12-07

## 2022-02-21 RX ORDER — BUPROPION HYDROCHLORIDE 300 MG/1
300 TABLET ORAL
COMMUNITY
Start: 2022-01-27 | End: 2023-01-27

## 2022-02-21 NOTE — PROGRESS NOTES
Subjective:      Patient ID: Bhavna Hancock is a 61 y.o. female.    Chief Complaint:No chief complaint on file.      History of Present Illness    No complaints. Has not been able to get any co-pay assistance for posaconazole and has not taken it. No pain, no wound dehiscence.     Review of Systems   Constitutional: Negative for chills, decreased appetite, fever, malaise/fatigue, night sweats, weight gain and weight loss.   HENT: Positive for congestion and hoarse voice. Negative for ear pain, hearing loss, sore throat and tinnitus.    Eyes: Negative for blurred vision, redness and visual disturbance.   Cardiovascular: Negative for chest pain, leg swelling and palpitations.   Respiratory: Negative for cough, hemoptysis, shortness of breath, sputum production and wheezing.    Hematologic/Lymphatic: Negative for adenopathy. Does not bruise/bleed easily.   Skin: Negative for dry skin, itching, rash and suspicious lesions.   Musculoskeletal: Positive for back pain and myalgias. Negative for joint pain and neck pain.   Gastrointestinal: Negative for abdominal pain, constipation, diarrhea, heartburn, nausea and vomiting.   Genitourinary: Negative for dysuria, flank pain, frequency, hematuria, hesitancy and urgency.   Neurological: Negative for dizziness, headaches, numbness, paresthesias and weakness.   Psychiatric/Behavioral: Positive for depression. Negative for memory loss. The patient is nervous/anxious. The patient does not have insomnia.      Objective:   Physical Exam  Vitals and nursing note reviewed.   Musculoskeletal:      Right lower leg: No edema.      Left lower leg: No edema.      Comments: Left patellar wound healed. No erythema or edema. AFO brace on lateral left leg.    Skin:     General: Skin is warm and dry.      Findings: No erythema.   Neurological:      Mental Status: She is alert.       Assessment:       1. Infection due to mucor    2. Chronic pain of left knee          Plan:       Reviewed and  discussed inflammatory parameters and imaging. She is normal after taking posazonazole from April 2021 to January 2022. Last MRI was September 2021 - will order another MRI to compare. If no evidence of infection, will stay off anti-fungals. If there is evidence, will arrange biopsy.

## 2022-04-14 ENCOUNTER — PATIENT MESSAGE (OUTPATIENT)
Dept: INFECTIOUS DISEASES | Facility: CLINIC | Age: 62
End: 2022-04-14
Payer: COMMERCIAL

## 2022-04-27 ENCOUNTER — PATIENT MESSAGE (OUTPATIENT)
Dept: INFECTIOUS DISEASES | Facility: CLINIC | Age: 62
End: 2022-04-27
Payer: COMMERCIAL

## 2022-04-28 ENCOUNTER — PATIENT MESSAGE (OUTPATIENT)
Dept: INFECTIOUS DISEASES | Facility: CLINIC | Age: 62
End: 2022-04-28
Payer: COMMERCIAL

## 2022-06-03 ENCOUNTER — PATIENT MESSAGE (OUTPATIENT)
Dept: INFECTIOUS DISEASES | Facility: CLINIC | Age: 62
End: 2022-06-03
Payer: COMMERCIAL

## 2022-07-22 DIAGNOSIS — S82.032D CLOSED DISPLACED TRANSVERSE FRACTURE OF LEFT PATELLA WITH ROUTINE HEALING, SUBSEQUENT ENCOUNTER: Primary | ICD-10-CM

## 2022-07-29 ENCOUNTER — TELEPHONE (OUTPATIENT)
Dept: ORTHOPEDICS | Facility: CLINIC | Age: 62
End: 2022-07-29
Payer: COMMERCIAL

## 2022-07-29 NOTE — TELEPHONE ENCOUNTER
Left a v/m for pt to return my call regarding rescheduling her appointment that she missed on 7/26   Pt needs to be seen by Dr Iglesias and have updated x-rays.

## 2022-08-10 ENCOUNTER — TELEPHONE (OUTPATIENT)
Dept: ORTHOPEDICS | Facility: CLINIC | Age: 62
End: 2022-08-10
Payer: COMMERCIAL

## 2022-08-10 NOTE — TELEPHONE ENCOUNTER
----- Message from Tremontana Chevalier sent at 8/10/2022  4:17 PM CDT -----  Regarding: appt  Contact: Pt @ [phone]   Pt calling waiting to s/w Mary in Dr. Chaidez's office to schedule appt. No appts avail in [facility]. Pls call pt @ [phone].

## 2022-08-17 ENCOUNTER — OFFICE VISIT (OUTPATIENT)
Dept: ORTHOPEDICS | Facility: CLINIC | Age: 62
End: 2022-08-17
Payer: COMMERCIAL

## 2022-08-17 ENCOUNTER — HOSPITAL ENCOUNTER (OUTPATIENT)
Dept: RADIOLOGY | Facility: HOSPITAL | Age: 62
Discharge: HOME | End: 2022-08-17
Attending: ORTHOPAEDIC SURGERY
Payer: COMMERCIAL

## 2022-08-17 VITALS — WEIGHT: 179.44 LBS | HEIGHT: 65 IN | BODY MASS INDEX: 29.9 KG/M2

## 2022-08-17 DIAGNOSIS — M70.52 PES ANSERINUS BURSITIS OF LEFT KNEE: ICD-10-CM

## 2022-08-17 DIAGNOSIS — M25.662 STIFFNESS OF LEFT KNEE: ICD-10-CM

## 2022-08-17 DIAGNOSIS — S82.032D CLOSED DISPLACED TRANSVERSE FRACTURE OF LEFT PATELLA WITH ROUTINE HEALING, SUBSEQUENT ENCOUNTER: Primary | ICD-10-CM

## 2022-08-17 DIAGNOSIS — S82.032D CLOSED DISPLACED TRANSVERSE FRACTURE OF LEFT PATELLA WITH ROUTINE HEALING, SUBSEQUENT ENCOUNTER: ICD-10-CM

## 2022-08-17 DIAGNOSIS — M62.559 ATROPHY OF QUADRICEPS FEMORIS MUSCLE: ICD-10-CM

## 2022-08-17 DIAGNOSIS — S76.312S HAMSTRING STRAIN, LEFT, SEQUELA: ICD-10-CM

## 2022-08-17 PROCEDURE — 99999 PR PBB SHADOW E&M-EST. PATIENT-LVL III: CPT | Mod: PBBFAC,,, | Performed by: ORTHOPAEDIC SURGERY

## 2022-08-17 PROCEDURE — 73560 XR KNEE 1 OR 2 VIEW LEFT: ICD-10-PCS | Mod: 26,LT,, | Performed by: RADIOLOGY

## 2022-08-17 PROCEDURE — 73560 X-RAY EXAM OF KNEE 1 OR 2: CPT | Mod: TC,LT

## 2022-08-17 PROCEDURE — 99214 OFFICE O/P EST MOD 30 MIN: CPT | Mod: S$GLB,,, | Performed by: ORTHOPAEDIC SURGERY

## 2022-08-17 PROCEDURE — 99214 PR OFFICE/OUTPT VISIT, EST, LEVL IV, 30-39 MIN: ICD-10-PCS | Mod: S$GLB,,, | Performed by: ORTHOPAEDIC SURGERY

## 2022-08-17 PROCEDURE — 73560 X-RAY EXAM OF KNEE 1 OR 2: CPT | Mod: 26,LT,, | Performed by: RADIOLOGY

## 2022-08-17 PROCEDURE — 99999 PR PBB SHADOW E&M-EST. PATIENT-LVL III: ICD-10-PCS | Mod: PBBFAC,,, | Performed by: ORTHOPAEDIC SURGERY

## 2022-08-17 NOTE — PROGRESS NOTES
HPI: 61-year-old female who underwent open reduction internal fixation of left patella fracture on 10/05/2020.  Patient had stiffness in the knee and wound breakdown in the anterior portion of the incision around 6 weeks postoperatively and I took her back to the room for a manipulation under anesthesia and exploration with washout of her wound.  At that point I removed the cables.  She had repeat wound breakdown and her cultures grew mucor species.  I took her back to the operating room for another washout on 01/04/2021.  She later underwent another washout and ALT free flap by Dr. Farias on 01/22/2021.  She will back for another washout around that free flap on 02/25/2021.  She was followed in [facility] and treated with posaconazole from April 2021 to January 2022.  Repeat MRI obtained by ID clinic showed no evidence of osteomyelitis or soft tissue infection at the end of that course.  She has had a left foot drop since her flap surgery.    08/17/2022:  The patient has had no overt signs or symptoms of infection in the left lower extremity.  She does state that clinically she still has pain in the area.  She has numbness in her superficial and deep peroneal nerve distribution.  She has had an AFO for footdrop that area but has regained a good bit of strength.  She has not done any physical therapy since early on in her healing.  She had no evidence of infection in the flap.  She does state that it is bulky that she does not feel that looks good in shorts or address.  She still has difficulty riding horses.    Past Medical History:   Diagnosis Date    Abdominal pain     Diabetes     GERD (gastroesophageal reflux disease)     Hx of colonic polyps     Hyperlipidemia     Hypertension     Nausea and vomiting        Current Outpatient Medications on File Prior to Visit   Medication Sig Dispense Refill    aspirin 81 MG Chew Chew and swallow 1 tablet (81 mg total) by mouth 2 (two) times daily. 60  tablet 0    blood sugar diagnostic (ONETOUCH ULTRA BLUE TEST STRIP) Strp Use to test blood sugar 5 times a day      buPROPion (WELLBUTRIN XL) 300 MG 24 hr tablet Take 300 mg by mouth.      DEXCOM G6 SENSOR Priscilla TEST BS FID      DEXCOM G6 TRANSMITTER Priscilla USE TO CHECK FID      estradioL (ESTRACE) 1 MG tablet TK 1 T PO QD      ferrous sulfate (FEOSOL) 325 mg (65 mg iron) Tab tablet Take 325 mg by mouth.      fish oil-omega-3 fatty acids 300-1,000 mg capsule Take 2 g by mouth once daily.      gabapentin (NEURONTIN) 300 MG capsule Take 1 capsule (300 mg total) by mouth 3 (three) times daily. 90 capsule 11    ibuprofen (ADVIL,MOTRIN) 600 MG tablet Take 1 tablet (600 mg total) by mouth every 6 (six) hours as needed for Pain. 20 tablet 0    insulin glargine (LANTUS) 100 unit/mL injection Inject 40 Units into the skin once daily.       insulin lispro 100 unit/mL pen ADMINISTER 14 UNITS UNDER THE SKIN WITH EACH MEAL      insulin syringe-needle U-100 1/2 mL 30 gauge Syrg USE ONE SYRINGE PER DAY UTD      levothyroxine (SYNTHROID) 75 MCG tablet TAKE 1 TABLET BY MOUTH EVERY DAY      lisinopril (PRINIVIL,ZESTRIL) 2.5 MG tablet Take 2.5 mg by mouth once daily.      loteprednol (LOTEMAX) 0.5 % ophthalmic suspension INSTILL 1 DROP IN BOTH EYES ONCE A DAY      medroxyPROGESTERone (PROVERA) 2.5 MG tablet TK 1 T PO QD      metFORMIN (GLUCOPHAGE) 500 MG tablet Take 500 mg by mouth once daily.      NYSTOP powder SMARTSI Application Topical 2-3 Times Daily      ondansetron (ZOFRAN-ODT) 8 MG TbDL Dissolve 1 tablet (8 mg total) by mouth every 6 (six) hours as needed (severe nausea, vomiting). 20 tablet 0    oxyCODONE (ROXICODONE) 5 MG immediate release tablet Take 1 tablet (5 mg total) by mouth every 6 (six) hours as needed for Pain (severe pain). 50 tablet 0    pantoprazole (PROTONIX) 40 MG tablet TAKE 1 TABLET(40 MG) BY MOUTH EVERY DAY 30 tablet 0    polyethylene glycol (GLYCOLAX) 17 gram/dose powder Mix 1 capful  (17 g) with liquid and take by mouth once daily. 510 g 0    posaconazole (NOXAFIL) 100 mg TbEC tablet Take 3 tablets (300 mg total) by mouth once daily. 90 tablet 3    RESTASIS 0.05 % ophthalmic emulsion INSTILL ONE DROP IN OU BID      rosuvastatin (CRESTOR) 10 MG tablet Take 1 tablet by mouth once daily.      senna-docusate 8.6-50 mg (PERICOLACE) 8.6-50 mg per tablet Take 1 tablet by mouth 2 (two) times daily. 30 tablet 0    trospium (SANCTURA) 20 mg Tab tablet Take 20 mg by mouth once daily.       venlafaxine (EFFEXOR-XR) 150 MG Cp24 Take 150 mg by mouth once daily.       Current Facility-Administered Medications on File Prior to Visit   Medication Dose Route Frequency Provider Last Rate Last Admin    lidocaine HCL 10 mg/ml (1%) injection 5 mL  5 mL   Maddie Meeks MD   5 mL at 09/14/20 1100    triamcinolone acetonide injection 40 mg  40 mg Intra-articular  Maddie Meeks MD   40 mg at 09/14/20 1100         ROS:  Patient denies constitutional symptoms, cardiac symptoms, respiratory symptoms, GI symptoms, Urinary symptoms, skin issues, allergic issues  The remainder of the musculoskeletal ROS is included in the HPI.    PE:    AA&O x 4.  NAD.  Normal mood and affect.  HEENT:  NCAT, sclera nonicteric  Lungs:  Respirations are equal and unlabored.  Abd: Non distended  :  No evidence of incontinence  CV:  2+ bilateral upper and lower extremity pulses.  Skin:  Intact throughout.    MS:  Left knee ROM 0/0/120°.  No laxity.  Normal patellar tracking and tilt.  Flap edges well healed.  Some tenderness to palpation along the medial hamstrings down into the pes with some tenderness along the medial corner of the free flap.  Flap appears completely viable.  No light touch sensation in the superficial or deep peroneal nerve distribution on the lateral calf.  Some in the dorsum of the foot.  Nearly 5/5 motor in that distribution however.    Rads:  Reviewed and interpreted today by me.  Patella is completely  healed with 2 screws remaining.  A little bit of heterotopic ossification of the inferior pole patella    A/P:  She is nearly 2 years status post original ORIF of the patella.  She has 1.5 years status post free flap.  Functionally she does not feel like she is back to her baseline.  I talked to her about the a.m. fell into see she really needs this that she has regained a significant amount of her dorsiflexion of her foot.  I also explained to her that she has a lot of scar around the medial aspect of the flap which is causing her pain in the hamstrings stretching down into her pes bursa.  I would like at this point to send her back to physical therapy to work on some scar massage, range of motion and strengthening exercises.  I think that she may benefit from this.  She did discuss that the plastic surgeon id mentioned possible liposuction in the flap in the past.  I explained to her that while this may make it look more peeling it would have to be weighed against the risks given her prior fungal infection which is unusual.  While I know it is good for her mental well being I think that the focus right now should be on functional improvement and I think physical therapy is the best way to move forward.  I wrote her a new prescription for the physical therapist in [place] that she used last time and liked very much.

## 2022-09-13 ENCOUNTER — PATIENT MESSAGE (OUTPATIENT)
Dept: ORTHOPEDICS | Facility: CLINIC | Age: 62
End: 2022-09-13
Payer: COMMERCIAL

## 2023-01-10 ENCOUNTER — TELEPHONE (OUTPATIENT)
Dept: ORTHOPEDICS | Facility: CLINIC | Age: 63
End: 2023-01-10
Payer: COMMERCIAL

## 2023-01-10 ENCOUNTER — PATIENT MESSAGE (OUTPATIENT)
Dept: ORTHOPEDICS | Facility: CLINIC | Age: 63
End: 2023-01-10
Payer: COMMERCIAL

## 2023-01-10 NOTE — TELEPHONE ENCOUNTER
Left voice mail for pt to return my call regarding scheduling a follow up appoitment with [facility].

## 2023-01-13 ENCOUNTER — TELEPHONE (OUTPATIENT)
Dept: ORTHOPEDICS | Facility: CLINIC | Age: 63
End: 2023-01-13
Payer: COMMERCIAL

## 2023-01-16 ENCOUNTER — PATIENT MESSAGE (OUTPATIENT)
Dept: ORTHOPEDICS | Facility: CLINIC | Age: 63
End: 2023-01-16
Payer: COMMERCIAL

## 2023-01-19 ENCOUNTER — TELEPHONE (OUTPATIENT)
Dept: ORTHOPEDICS | Facility: CLINIC | Age: 63
End: 2023-01-19
Payer: COMMERCIAL

## 2023-01-19 DIAGNOSIS — S82.032D CLOSED DISPLACED TRANSVERSE FRACTURE OF LEFT PATELLA WITH ROUTINE HEALING, SUBSEQUENT ENCOUNTER: Primary | ICD-10-CM

## 2023-01-19 NOTE — TELEPHONE ENCOUNTER
Spoke with pt.   Scheduled her left knee xrays at [facility] prior to her appointment with Dr Iglesias on 1/26/23.   Pt verbalized understanding

## 2023-01-26 ENCOUNTER — OFFICE VISIT (OUTPATIENT)
Dept: ORTHOPEDICS | Facility: CLINIC | Age: 63
End: 2023-01-26
Payer: COMMERCIAL

## 2023-01-26 ENCOUNTER — HOSPITAL ENCOUNTER (OUTPATIENT)
Dept: RADIOLOGY | Facility: HOSPITAL | Age: 63
Discharge: HOME | End: 2023-01-26
Attending: ORTHOPAEDIC SURGERY
Payer: COMMERCIAL

## 2023-01-26 VITALS — WEIGHT: 179 LBS | BODY MASS INDEX: 29.82 KG/M2 | HEIGHT: 65 IN

## 2023-01-26 DIAGNOSIS — S82.032D CLOSED DISPLACED TRANSVERSE FRACTURE OF LEFT PATELLA WITH ROUTINE HEALING, SUBSEQUENT ENCOUNTER: Primary | ICD-10-CM

## 2023-01-26 DIAGNOSIS — M70.52 PES ANSERINUS BURSITIS OF LEFT KNEE: ICD-10-CM

## 2023-01-26 DIAGNOSIS — S82.032D CLOSED DISPLACED TRANSVERSE FRACTURE OF LEFT PATELLA WITH ROUTINE HEALING, SUBSEQUENT ENCOUNTER: ICD-10-CM

## 2023-01-26 PROCEDURE — 73560 XR KNEE 1 OR 2 VIEW LEFT: ICD-10-PCS | Mod: 26,LT,, | Performed by: RADIOLOGY

## 2023-01-26 PROCEDURE — 99999 PR PBB SHADOW E&M-EST. PATIENT-LVL II: CPT | Mod: PBBFAC,,, | Performed by: ORTHOPAEDIC SURGERY

## 2023-01-26 PROCEDURE — 99999 PR PBB SHADOW E&M-EST. PATIENT-LVL II: ICD-10-PCS | Mod: PBBFAC,,, | Performed by: ORTHOPAEDIC SURGERY

## 2023-01-26 PROCEDURE — 99214 PR OFFICE/OUTPT VISIT, EST, LEVL IV, 30-39 MIN: ICD-10-PCS | Mod: 25,S$GLB,, | Performed by: ORTHOPAEDIC SURGERY

## 2023-01-26 PROCEDURE — 20610 PR DRAIN/INJECT LARGE JOINT/BURSA: ICD-10-PCS | Mod: LT,S$GLB,, | Performed by: ORTHOPAEDIC SURGERY

## 2023-01-26 PROCEDURE — 20610 DRAIN/INJ JOINT/BURSA W/O US: CPT | Mod: LT,S$GLB,, | Performed by: ORTHOPAEDIC SURGERY

## 2023-01-26 PROCEDURE — 73560 X-RAY EXAM OF KNEE 1 OR 2: CPT | Mod: 26,LT,, | Performed by: RADIOLOGY

## 2023-01-26 PROCEDURE — 73560 X-RAY EXAM OF KNEE 1 OR 2: CPT | Mod: TC,LT

## 2023-01-26 PROCEDURE — 99214 OFFICE O/P EST MOD 30 MIN: CPT | Mod: 25,S$GLB,, | Performed by: ORTHOPAEDIC SURGERY

## 2023-01-26 RX ORDER — TRIAMCINOLONE ACETONIDE 40 MG/ML
40 INJECTION, SUSPENSION INTRA-ARTICULAR; INTRAMUSCULAR
Status: COMPLETED | OUTPATIENT
Start: 2023-01-26 | End: 2023-01-26

## 2023-01-26 RX ADMIN — TRIAMCINOLONE ACETONIDE 40 MG: 40 INJECTION, SUSPENSION INTRA-ARTICULAR; INTRAMUSCULAR at 10:01

## 2023-03-07 ENCOUNTER — PATIENT MESSAGE (OUTPATIENT)
Dept: INFECTIOUS DISEASES | Facility: CLINIC | Age: 63
End: 2023-03-07
Payer: COMMERCIAL

## 2023-03-07 DIAGNOSIS — M25.562 LEFT KNEE PAIN, UNSPECIFIED CHRONICITY: Primary | ICD-10-CM

## 2023-03-08 ENCOUNTER — PATIENT MESSAGE (OUTPATIENT)
Dept: INFECTIOUS DISEASES | Facility: CLINIC | Age: 63
End: 2023-03-08
Payer: COMMERCIAL

## 2023-04-27 ENCOUNTER — OFFICE VISIT (OUTPATIENT)
Dept: INFECTIOUS DISEASES | Facility: CLINIC | Age: 63
End: 2023-04-27
Payer: COMMERCIAL

## 2023-04-27 DIAGNOSIS — B46.5 INFECTION DUE TO MUCOR: Primary | ICD-10-CM

## 2023-04-27 DIAGNOSIS — S82.032D CLOSED DISPLACED TRANSVERSE FRACTURE OF LEFT PATELLA WITH ROUTINE HEALING, SUBSEQUENT ENCOUNTER: ICD-10-CM

## 2023-04-27 DIAGNOSIS — E10.69 TYPE 1 DIABETES MELLITUS WITH OTHER SPECIFIED COMPLICATION: ICD-10-CM

## 2023-04-27 DIAGNOSIS — M25.662 STIFFNESS OF LEFT KNEE: ICD-10-CM

## 2023-04-27 PROBLEM — S81.802A LEG WOUND, LEFT, INITIAL ENCOUNTER: Status: RESOLVED | Noted: 2021-07-30 | Resolved: 2023-04-27

## 2023-04-27 PROCEDURE — 99212 OFFICE O/P EST SF 10 MIN: CPT | Mod: 95,,, | Performed by: INTERNAL MEDICINE

## 2023-04-27 PROCEDURE — 99212 PR OFFICE/OUTPT VISIT, EST, LEVL II, 10-19 MIN: ICD-10-PCS | Mod: 95,,, | Performed by: INTERNAL MEDICINE

## 2023-04-27 NOTE — PROGRESS NOTES
The patient location is: home  The chief complaint leading to consultation is: knee pain and dysfunction    Visit type: audiovisual    Face to Face time with patient: 15 minutes  20 minutes of total time spent on the encounter, which includes face to face time and non-face to face time preparing to see the patient (eg, review of tests), Obtaining and/or reviewing separately obtained history, Documenting clinical information in the electronic or other health record, Independently interpreting results (not separately reported) and communicating results to the patient/family/caregiver, or Care coordination (not separately reported).         Each patient to whom he or she provides medical services by telemedicine is:  (1) informed of the relationship between the physician and patient and the respective role of any other health care provider with respect to management of the patient; and (2) notified that he or she may decline to receive medical services by telemedicine and may withdraw from such care at any time.    Notes:   Patient still having problems with leg.  Daughter on call - both want recap of events  Won her court case, and looking for disability.    No fevers, no swelling. No acute complaints.     MRI on 4/5  1. Status post patellar ORIF.  No MR imaging findings to suggest osteomyelitis.  2. Stable nondisplaced horizontal tear body segment/posterior horn medial meniscus.  3. 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  4. Mild medial gastrocnemius tendinosis.     1. Infection due to mucor    2. Type 1 diabetes mellitus with other specified complication    3. Stiffness of left knee    4. Closed displaced transverse fracture of left patella with routine healing, subsequent encounter      Discussed case with patient. No evidence of infection over a year off antifungal medication. Ok to proceed with any surgery for knee function or appearance.

## 2023-07-05 NOTE — TELEPHONE ENCOUNTER
Hi, when would you like to see this patient?      Pt A&Ox4, respirations even and unlabored. No apparent distress noted.     Discharge paperwork reviewed with pt with verbalized understanding. Pt denies any other needs at this time. Pt MCPHERSON well and is ambulatory at discharge.

## 2024-06-08 NOTE — PROGRESS NOTES
HPI: 61-year-old female who underwent open reduction internal fixation of left patella fracture on 10/05/2020.  Patient had stiffness in the knee and wound breakdown in the anterior portion of the incision around 6 weeks postoperatively and I took her back to the room for a manipulation under anesthesia and exploration with washout of her wound.  At that point I removed the cables.  She had repeat wound breakdown and her cultures grew mucor species.  I took her back to the operating room for another washout on 01/04/2021.  She later underwent another washout and ALT free flap by Dr. Farias on 01/22/2021.  She will back for another washout around that free flap on 02/25/2021.  She was followed in [facility] and treated with posaconazole from April 2021 to January 2022.  Repeat MRI obtained by ID clinic showed no evidence of osteomyelitis or soft tissue infection at the end of that course.  She has had a left foot drop since her flap surgery.    08/17/2022:  The patient has had no overt signs or symptoms of infection in the left lower extremity.  She does state that clinically she still has pain in the area.  She has numbness in her superficial and deep peroneal nerve distribution.  She has had an AFO for footdrop that area but has regained a good bit of strength.  She has not done any physical therapy since early on in her healing.  She had no evidence of infection in the flap.  She does state that it is bulky that she does not feel that looks good in shorts or a dress.  She still has difficulty riding horses.    01/26/2023:  Patient is ambulating still with the AFO.  She does feel a little bit stronger in the ankle but not much.  She discussed peroneal nerve decompression with Dr. Lemus at 1 point.  She states that she is also discussed this at the [place].  She is pain in the anteromedial aspect of the knee.  She is not riding her horses.    Past Medical History:    Diagnosis Date    Abdominal pain     Diabetes     GERD (gastroesophageal reflux disease)     Hx of colonic polyps     Hyperlipidemia     Hypertension     Nausea and vomiting        Current Outpatient Medications on File Prior to Visit   Medication Sig Dispense Refill    aspirin 81 MG Chew Chew and swallow 1 tablet (81 mg total) by mouth 2 (two) times daily. 60 tablet 0    blood sugar diagnostic (ONETOUCH ULTRA BLUE TEST STRIP) Strp Use to test blood sugar 5 times a day      buPROPion (WELLBUTRIN XL) 300 MG 24 hr tablet Take 300 mg by mouth.      DEXCOM G6 SENSOR Priscilla TEST BS FID      DEXCOM G6 TRANSMITTER Priscilla USE TO CHECK FID      estradioL (ESTRACE) 1 MG tablet TK 1 T PO QD      ferrous sulfate (FEOSOL) 325 mg (65 mg iron) Tab tablet Take 325 mg by mouth.      fish oil-omega-3 fatty acids 300-1,000 mg capsule Take 2 g by mouth once daily.      gabapentin (NEURONTIN) 300 MG capsule Take 1 capsule (300 mg total) by mouth 3 (three) times daily. 90 capsule 11    ibuprofen (ADVIL,MOTRIN) 600 MG tablet Take 1 tablet (600 mg total) by mouth every 6 (six) hours as needed for Pain. 20 tablet 0    insulin glargine (LANTUS) 100 unit/mL injection Inject 40 Units into the skin once daily.       insulin lispro 100 unit/mL pen ADMINISTER 14 UNITS UNDER THE SKIN WITH EACH MEAL      insulin syringe-needle U-100 1/2 mL 30 gauge Syrg USE ONE SYRINGE PER DAY UTD      levothyroxine (SYNTHROID) 75 MCG tablet TAKE 1 TABLET BY MOUTH EVERY DAY      lisinopril (PRINIVIL,ZESTRIL) 2.5 MG tablet Take 2.5 mg by mouth once daily.      loteprednol (LOTEMAX) 0.5 % ophthalmic suspension INSTILL 1 DROP IN BOTH EYES ONCE A DAY      medroxyPROGESTERone (PROVERA) 2.5 MG tablet TK 1 T PO QD      metFORMIN (GLUCOPHAGE) 500 MG tablet Take 500 mg by mouth once daily.      NYSTOP powder SMARTSI Application Topical 2-3 Times Daily      ondansetron (ZOFRAN-ODT) 8 MG TbDL Dissolve 1 tablet (8 mg total) by mouth every 6 (six) hours as needed (severe  nausea, vomiting). 20 tablet 0    oxyCODONE (ROXICODONE) 5 MG immediate release tablet Take 1 tablet (5 mg total) by mouth every 6 (six) hours as needed for Pain (severe pain). 50 tablet 0    pantoprazole (PROTONIX) 40 MG tablet TAKE 1 TABLET(40 MG) BY MOUTH EVERY DAY 30 tablet 0    polyethylene glycol (GLYCOLAX) 17 gram/dose powder Mix 1 capful (17 g) with liquid and take by mouth once daily. 510 g 0    posaconazole (NOXAFIL) 100 mg TbEC tablet Take 3 tablets (300 mg total) by mouth once daily. 90 tablet 3    RESTASIS 0.05 % ophthalmic emulsion INSTILL ONE DROP IN OU BID      rosuvastatin (CRESTOR) 10 MG tablet Take 1 tablet by mouth once daily.      senna-docusate 8.6-50 mg (PERICOLACE) 8.6-50 mg per tablet Take 1 tablet by mouth 2 (two) times daily. 30 tablet 0    trospium (SANCTURA) 20 mg Tab tablet Take 20 mg by mouth once daily.       venlafaxine (EFFEXOR-XR) 150 MG Cp24 Take 150 mg by mouth once daily.       Current Facility-Administered Medications on File Prior to Visit   Medication Dose Route Frequency Provider Last Rate Last Admin    lidocaine HCL 10 mg/ml (1%) injection 5 mL  5 mL   Maddie Meeks MD   5 mL at 09/14/20 1100    triamcinolone acetonide injection 40 mg  40 mg Intra-articular  Maddie Meeks MD   40 mg at 09/14/20 1100         ROS:  Patient denies constitutional symptoms, cardiac symptoms, respiratory symptoms, GI symptoms, Urinary symptoms, skin issues, allergic issues  The remainder of the musculoskeletal ROS is included in the HPI.    PE:    AA&O x 4.  NAD.  Normal mood and affect.  HEENT:  NCAT, sclera nonicteric  Lungs:  Respirations are equal and unlabored.  Abd: Non distended  :  No evidence of incontinence  CV:  2+ bilateral upper and lower extremity pulses.  Skin:  Intact throughout.    MS:  Left knee ROM 0/0/120°.  No laxity.  Normal patellar tracking and tilt.  Flap edges well healed.  Some tenderness to palpation along the medial hamstrings down into the pes with some  tenderness along the medial corner of the free flap.  Flap appears completely viable.  No light touch sensation in the superficial or deep peroneal nerve distribution on the lateral calf.  Some in the dorsum of the foot.  Nearly 5/5 motor in that distribution however.    Rads:  Reviewed and interpreted today by me.  Patella is completely healed with 2 screws remaining.  A little bit of heterotopic ossification of the inferior pole patella.  No change since last visit.    A/P:  She is over 2 years status post original ORIF of the left patella.  She has 18 months status post free flap.  She continued to work on her exercises for ankle range of motion and strength.  She does have some light touch sensation and motor in the area.  Her main problem today seems to be pes bursitis.  We talked about options and opted for a steroid injection into the pes bursa.  She tolerated this very well.      Procedure note: After the site was verified and verbal consent was obtained the area over the left pes bursa was prepped sterile.  5 cc lidocaine 1% without epinephrine and 40 mg Kenalog were injected into the area without difficulty.  Band-Aid was placed.  Patient tolerated the procedure well.       None

## 2024-09-09 ENCOUNTER — CLINICAL SUPPORT (OUTPATIENT)
Dept: DIABETES | Facility: CLINIC | Age: 64
End: 2024-09-09
Payer: COMMERCIAL

## 2024-09-09 DIAGNOSIS — E10.65 TYPE 1 DIABETES MELLITUS WITH HYPERGLYCEMIA: Primary | ICD-10-CM

## 2024-09-09 PROCEDURE — G0108 DIAB MANAGE TRN  PER INDIV: HCPCS | Mod: S$GLB,,, | Performed by: DIETITIAN, REGISTERED

## 2024-09-10 RX ORDER — LEVOTHYROXINE SODIUM 88 UG/1
88 CAPSULE ORAL DAILY
COMMUNITY
Start: 2024-08-28

## 2024-09-10 RX ORDER — SYRING-NEEDL,DISP,INSUL,0.3 ML 31 GX5/16"
1 SYRINGE, EMPTY DISPOSABLE MISCELLANEOUS
COMMUNITY

## 2024-09-10 NOTE — PROGRESS NOTES
Diabetes Care Specialist Progress Note  Author: Shea Keller RD, CDE  Date: 9/10/2024    Intake    Program Intake  Reason for Diabetes Program Visit:: Initial Diabetes Assessment  Current diabetes risk level:: moderate (HgbA1c 8.4%)  In the last 12 months, have you:: none  Permission to speak with others about care:: no    Current Diabetes Treatment: Oral Medications, Insulin  Oral Medication Type/Dose: metformin 500mg a day  Method of insulin delivery?: Injections  Injection Type: Vial/Sryringe  Vial/Syringe Type/Dose: lantus 40 units a day and humalog 14 units with meals (takes 2 units for every 15 g of carbohydrates)    Continuous Glucose Monitoring  Patient has CGM: Yes  Personal CGM type:: Dexcom G6    Lab Results   Component Value Date    HGBA1C 8.4 (H) 01/20/2021               There is no height or weight on file to calculate BMI.    Lifestyle Coping Support & Clinical    Lifestyle/Coping/Support  Compared to other people your age, how would you rate your health?: Good  Does anyone in your family have diabetes or does anyone in your family support you in your diabetes care?: FH: children, sibling; Support: spouse, siblings, and children  List anything about Diabetes that causes you stress?: elevated numbers  How do you deal with stress/distress?: takes medication  Learning Barriers:: None  Culture or Cheondoism beliefs that may impact ability to access healthcare: No  Psychosocial/Coping Skills Assessment Completed: : Yes  Assessment indicates:: Adequate understanding  Area of need?: No    Problem Review  Active Comorbidities: Hypertension, Other (comment), Gastrointestinal Disorder (hypothyroidism)    Diabetes Self-Management Skills Assessment    Medication Skills Assessment  Patient is able to identify current diabetes medications, dosages, and appropriate timing of medications.: yes  Patient reports problems or concerns with current medication regimen.: no  Patient is  aware that some diabetes medications  can cause low blood sugar?: Yes  Medication Skills Assessment Completed:: Yes  Assessment indicates:: Adequate understanding  Area of need?: No    Diabetes Disease Process/Treatment Options  Diabetes Type?: Type I  If previous diabetes education, when/where:: after diagnosis  What are your goals for this education session?: establish care  Is patient aware of what causes diabetes?: Yes  Does patient understand the pathophysiology of diabetes?: Yes  Diabetes Disease Process/Treatment Options: Skills Assessment Completed: Yes  Assessment indicates:: Adequate understanding  Area of need?: No    Nutrition/Healthy Eating  Patient can identify foods that impact blood sugar.: yes  Nutrition/Healthy Eating Skills Assessment Completed:: Yes  Assessment indicates:: Adequate understanding  Area of need?: No    Physical Activity/Exercise  Patient's daily activity level:: moderately active  Patient formally exercises outside of work.: no  Reasons for not exercising:: time constraints  Patient can identify forms of physical activity.: yes  Physical Activity/Exercise Skills Assessment Completed: : Yes  Assessment indicates:: Adequate understanding  Area of need?: No    Home Blood Glucose Monitoring  Patient states that blood sugar is checked at home daily.: yes  Monitoring Method:: personal continuous glucose monitor  Personal CGM type:: Dexcom G6  What is your A1c Target?: 7.0  Home Blood Glucose Monitoring Skills Assessment Completed: : Yes  Assessment indicates:: Adequate understanding  Area of need?: No    Acute Complications  Have you ever had hypoglycemia (low BG 70 or less)?: yes  How often and what are your symptoms?: recently felt terrible shaky, feeling faint, heart racing, panicy, terrible feeling  How do you treat hypoglycemia?: drank juice, ate sweets, used syrup  Have you ever had hyperglycemia (high  or more)?: no   Do you know the symptoms of high blood sugar and how to treat: tired, take insulin  Have you  ever had DKA?: no  Do you ever test for ketones?: no  Do you have a sick day plan?: no  Acute Complications Skills Assessment Completed: : Yes  Assessment indicates:: Adequate understanding  Area of need?: No    Chronic Complications  Reviewed health maintenance: yes  Have you completed your annual diabetes maintenance labwork? : yes  Do you examine your feet daily?: yes  Has your doctor examined your feet?: no  Do you see a Dentist?: yes  Do you see an eye doctor?: yes  Chronic Complications Skills Assessment Completed: : Yes  Assessment indicates:: Adequate understanding  Area of need?: No      Assessment Summary and Plan    Based on today's diabetes care assessment, the following areas of need were identified:          9/9/2024    12:01 AM   Areas of Need   Medications/Current Diabetes Treatment No, Insulin Pump Education  Discussed [employer] 780G series Pump and the Guardian Sensor; Tandem T-Slim and control IQ; Tandem Mobi; Beta Bionics; and OmniPod Dash /OmniPod 5 Automated mode.    Patient educated on insulin pump therapy, the purpose of insulin pump therapy, advantages and disadvantages of insulin pump therapy and/vs multiple daily injections, what a basal rate and bolus dose are, when to change infusion site and tubing, demonstrated how to prepare the syringe/cartridge and tubing (except for OmniPod) for use in the pump  Discussed ease of usage, infusion sets, communication with the sensor, basal and bolus, carbohydrate to insulin ration, correction factors, approved areas to insert set, carbohydrate counting, error messages, how to disconnect and reconnect the cartridge and tubing   Patient instructed that based on current insulin regimen she would be changing infusion set daily.  Scheduled patient an appt with Nadja Valadez   Lifestyle Coping Support No   Diabetes Disease Process/Treatment Options No   Nutrition/Healthy Eating No   Physical Activity/Exercise No   Home Blood Glucose Monitoring No, need to  add access code to the dexcom for patient   Acute Complications No   Chronic Complications No       Today's interventions were provided through individual discussion, instruction, and written materials were provided.      Patient verbalized understanding of instruction and written materials.  Pt was able to return back demonstration of instructions today. Patient understood key points, needs reinforcement and further instruction.     Diabetes Self-Management Care Plan:    Today's Diabetes Self-Management Care Plan was developed with Bhavna's input. Bhavna has agreed to work toward the following goal(s) to improve his/her overall diabetes control.      Care Plan: Diabetes Management   Updates made since 8/11/2024 12:00 AM        Problem: Healthy Eating         Goal: Eat 2-3 meals daily with 30-45g/2-3 servings of Carbohydrate per meal for the next 6 months    Start Date: 9/10/2024   Expected End Date: 3/10/2025   This Visit's Progress: Deferred   Priority: High   Barriers: No Barriers Identified        Task: Reviewed the sources and role of Carbohydrate, Protein, and Fat and how each nutrient impacts blood sugar. Completed 9/10/2024        Task: Provided visual examples using dry measuring cups, food models, and other familiar objects such as computer mouse, deck or cards, tennis ball etc. to help with visualization of portions. Completed 9/10/2024        Task: Explained how to count carbohydrates using the food label and the use of dry measuring cups for accurate carb counting. Completed 9/10/2024        Task: Discussed strategies for choosing healthier menu options when dining out. Completed 9/10/2024        Task: Recommended replacing beverages containing high sugar content with noncaloric/sugar free options and/or water. Completed 9/10/2024        Task: Review the importance of balancing carbohydrates with each meal using portion control techniques to count servings of carbohydrate and label reading to identify  serving size and amount of total carbs per serving. Completed 9/10/2024        Task: Provided Sample plate method and reviewed the use of the plate to estimate amounts of carbohydrate per meal. Completed 9/10/2024          Follow Up Plan     Follow up in about 4 weeks (around 10/7/2024) for Personal CGM Upload, General Follow-up.    Today's care plan and follow up schedule was discussed with patient.  Bhavna verbalized understanding of the care plan, goals, and agrees to follow up plan.        The patient was encouraged to communicate with his/her health care provider/physician and care team regarding his/her condition(s) and treatment.  I provided the patient with my contact information today and encouraged to contact me via phone or [facility] Patient Portal as needed.     Length of Visit   Total Time: 60 Minutes

## 2024-10-08 ENCOUNTER — OFFICE VISIT (OUTPATIENT)
Dept: DIABETES | Facility: CLINIC | Age: 64
End: 2024-10-08
Payer: COMMERCIAL

## 2024-10-08 VITALS
BODY MASS INDEX: 28.39 KG/M2 | WEIGHT: 170.38 LBS | OXYGEN SATURATION: 99 % | HEIGHT: 65 IN | SYSTOLIC BLOOD PRESSURE: 124 MMHG | HEART RATE: 85 BPM | DIASTOLIC BLOOD PRESSURE: 68 MMHG

## 2024-10-08 DIAGNOSIS — E10.649 TYPE 1 DIABETES MELLITUS WITH HYPOGLYCEMIA AND WITHOUT COMA: ICD-10-CM

## 2024-10-08 DIAGNOSIS — F32.A DEPRESSION, UNSPECIFIED DEPRESSION TYPE: ICD-10-CM

## 2024-10-08 DIAGNOSIS — E10.65 TYPE 1 DIABETES MELLITUS WITH HYPERGLYCEMIA: Primary | ICD-10-CM

## 2024-10-08 DIAGNOSIS — E10.42 TYPE 1 DIABETES MELLITUS WITH DIABETIC POLYNEUROPATHY: ICD-10-CM

## 2024-10-08 DIAGNOSIS — E10.649 HYPOGLYCEMIA UNAWARENESS ASSOCIATED WITH TYPE 1 DIABETES MELLITUS: ICD-10-CM

## 2024-10-08 DIAGNOSIS — I10 PRIMARY HYPERTENSION: ICD-10-CM

## 2024-10-08 DIAGNOSIS — E03.9 HYPOTHYROIDISM, UNSPECIFIED TYPE: ICD-10-CM

## 2024-10-08 DIAGNOSIS — E10.3513 TYPE 1 DIABETES MELLITUS WITH PROLIFERATIVE RETINOPATHY OF BOTH EYES AND MACULAR EDEMA: ICD-10-CM

## 2024-10-08 DIAGNOSIS — E78.5 DYSLIPIDEMIA, GOAL LDL BELOW 100: ICD-10-CM

## 2024-10-08 DIAGNOSIS — Z71.9 HEALTH EDUCATION/COUNSELING: ICD-10-CM

## 2024-10-08 PROCEDURE — 99205 OFFICE O/P NEW HI 60 MIN: CPT | Mod: S$GLB,,, | Performed by: NURSE PRACTITIONER

## 2024-10-08 PROCEDURE — 95251 CONT GLUC MNTR ANALYSIS I&R: CPT | Mod: S$GLB,,, | Performed by: NURSE PRACTITIONER

## 2024-10-08 PROCEDURE — 99999 PR PBB SHADOW E&M-EST. PATIENT-LVL V: CPT | Mod: PBBFAC,,, | Performed by: NURSE PRACTITIONER

## 2024-10-08 RX ORDER — FLUOXETINE HYDROCHLORIDE 60 MG/1
1 TABLET, FILM COATED ORAL; ORAL DAILY
COMMUNITY
Start: 2024-09-24

## 2024-10-08 RX ORDER — PEN NEEDLE, DIABETIC 32GX 5/32"
NEEDLE, DISPOSABLE MISCELLANEOUS
Qty: 150 EACH | Refills: 11 | Status: SHIPPED | OUTPATIENT
Start: 2024-10-08

## 2024-10-08 RX ORDER — BLOOD-GLUCOSE SENSOR
1 EACH MISCELLANEOUS
Qty: 3 EACH | Refills: 11 | Status: SHIPPED | OUTPATIENT
Start: 2024-10-08

## 2024-10-08 RX ORDER — INSULIN GLARGINE 300 [IU]/ML
28 INJECTION, SOLUTION SUBCUTANEOUS DAILY
Qty: 4.5 ML | Refills: 6 | Status: SHIPPED | OUTPATIENT
Start: 2024-10-08 | End: 2025-10-08

## 2024-10-08 RX ORDER — GLUCAGON 3 MG/1
POWDER NASAL
Qty: 2 EACH | Refills: 1 | Status: SHIPPED | OUTPATIENT
Start: 2024-10-08

## 2024-10-08 RX ORDER — BLOOD-GLUCOSE TRANSMITTER
1 EACH MISCELLANEOUS
Qty: 1 EACH | Refills: 4 | Status: SHIPPED | OUTPATIENT
Start: 2024-10-08

## 2024-10-08 RX ORDER — INSULIN LISPRO 100 [IU]/ML
INJECTION, SOLUTION INTRAVENOUS; SUBCUTANEOUS
Qty: 15 ML | Refills: 6 | Status: SHIPPED | OUTPATIENT
Start: 2024-10-08

## 2024-10-08 NOTE — PATIENT INSTRUCTIONS
Stop the Lantus   Change to Toujeo-- comes in a tan pen with green (lime green)   Start with 28 units daily -- we can increase as needed.   Want your fastings under 130       Take your blood sugar before breakfast for THREE consecutive days before increasing the dose- self titration:   If your fasting blood sugar in the morning is between 131-180 for THREE consecutive days, increase the dose by 1 unit.  If your fasting blood sugar in the morning is > 180 for three consecutive days, increase the dose by 2 units.   If your fasting blood sugar in the morning is between 80 and 130, continue your current dose.  If you have ANY blood sugar less than 80, decrease the dose by 2 units.  If you have ANY blood sugar less than 70, decrease the dose by 4 units and call your physician for assistance with further dose adjustments.        On your Humalog-- adjust your ratio to 1:8   1 unit for every 8 grams of carbohydrates     With using correction scale:  150-200: +1 unit  201-250: +2 units  251-300: +3 units  301-350: +4 units  >350: +5 units    Continue the dexcom G6     Meet with adelina for pump evaluation

## 2024-10-08 NOTE — ASSESSMENT & PLAN NOTE
Uncontrolled  + prandial excursions  Interested in insulin pump  Would greatly benefit from an insulin pump  Already on the Dexcom G6 and using her phone      -- Medication Changes:  Insulin doses weight based at 0.7 units/kg per day- TDD       Stop the Lantus   Change to Toujeo-- comes in a tan pen with green (lime green)   Start with 28 units daily -- we can increase as needed.   Want your fastings under 130       Take your blood sugar before breakfast for THREE consecutive days before increasing the dose- self titration:   If your fasting blood sugar in the morning is between 131-180 for THREE consecutive days, increase the dose by 1 unit.  If your fasting blood sugar in the morning is > 180 for three consecutive days, increase the dose by 2 units.   If your fasting blood sugar in the morning is between 80 and 130, continue your current dose.  If you have ANY blood sugar less than 80, decrease the dose by 2 units.  If you have ANY blood sugar less than 70, decrease the dose by 4 units and call your physician for assistance with further dose adjustments.        On your Humalog-- adjust your ratio to 1:8   1 unit for every 8 grams of carbohydrates     With using correction scale:  150-200: +1 unit  201-250: +2 units  251-300: +3 units  301-350: +4 units  >350: +5 units    Continue the dexcom G6     Meet with adelina for pump evaluation       -- Reviewed goals of therapy are to get the best control we can without hypoglycemia.  -- Reviewed patient's current insulin regimen. Clarified proper insulin dose and timing in relation to meals, etc. Insulin injection sites and proper rotation instructed.    -- Advised frequent self blood glucose monitoring.  Patient encouraged to document glucose results and bring them to every clinic visit. Rx for dexcom G6 to pharmacy -- continue  -- Hypoglycemia precautions discussed. Instructed on precautions before driving.    -- Call for Bg repeatedly < 70 or > 180.   -- Close adherence  to lifestyle changes recommended.   -- Periodic follow ups for eye evaluations, foot care and dental care suggested.  -- Refer to diabetes education-- insulin pump evaluation      Patient has diabetes mellitus and manages diabetes with intensive insulin regimen and uses prandial and basal insulin daily  Patient requires a therapeutic CGM and is willing to use therapeutic CGM for the necessary frequent adjustments of insulin therapy.  I have completed an in-person visit during the previous 6 months and will continue to have in-person visits every 6 months to assess adherence to their CGM regimen and diabetes treatment plan.  Due to COVID pandemic and need for remote monitoring this tool is medically necessary

## 2024-10-08 NOTE — ASSESSMENT & PLAN NOTE
BP goal is < 140/90.   Tolerating  ACEi  Controlled   Blood pressure goals discussed with patient     No

## 2024-10-08 NOTE — ASSESSMENT & PLAN NOTE
Optimize BG readings.   See above.   Referral to podiatry    Educated patient to check feet daily for any foreign objects and/or wounds. Discussed with patient the importance of wearing appropriate footwear at all times, not to walk barefoot ever, and to check shoes before putting them on feet. Instructed patient to keep feet dry by regularly changing shoes and socks and drying feet after baths and exercises. Also, instructed patient to report any new lesions, discolorations, or swelling to a healthcare professional.

## 2024-10-08 NOTE — ASSESSMENT & PLAN NOTE
Her levothyroxine was recently adjusted to 88 mcg due to TSH being above goal   reinforced take by itself on empty stomach, first thing in the morning and wait at least 30-60 minutes to eat, drink, or take other medications.    Check TSH with RTC

## 2024-10-08 NOTE — ASSESSMENT & PLAN NOTE
RX for baqismi   Continue Dexcom G6   Reviewed hypoglycemia management: Treat with 1/2 glass of juice, 1/2 can regular coke, or 4 glucose tablets.   Monitor and repeat treatment every 15 minutes until BG is >70   Then have a snack, which includes a complex carbohydrate and protein.

## 2024-10-08 NOTE — ASSESSMENT & PLAN NOTE
Reports that she is taking Prozac and Wellbutrin  Offered referral to Psychiatry and she deferred  She will let me know with RTC

## 2024-10-08 NOTE — PROGRESS NOTES
"  CC:   Chief Complaint   Patient presents with    Diabetes Mellitus       HPI: Bhavna Hancock is a 63 y.o. female presents for an initial visit today for the management of T1DM   She has been seen before by hospital endocrinology during that admission in 2021--for a left knee wound after ORIF --endocrinology manage her diabetes during that admission--no discharge follow-up      She  was diagnosed with Type 1 diabetes in 1990's after she had an 11lb baby in 1987. Started on       Family hx of diabetes: son ( dx with T1DM at 5 years old), daughter diagnosed at age 7 with T1DM, granddaughter was 11 years old   Hospitalized for diabetes: denies   Insulin therapy: since diagnosis       C-peptide level 1/2021--0.11      No personal or FH of thyroid cancer or personal of pancreatic cancer or pancreatitis.       TSH above goal == + hair loss-- LT4 increased to 88 mcg daily       She is interested in insulin pump              DIABETES COMPLICATIONS: retinopathy and peripheral neuropathy      Diabetes Management Status    ASA:  Yes - 81 mg daily     Statin: Taking--Crestor 10 mg  ACE/ARB: Taking--lisinopril 2.5 mg     Screening or Prevention Patient's value Goal Complete/Controlled?   HgA1C Testing and Control   Lab Results   Component Value Date    HGBA1C 8.4 (H) 01/20/2021      Annually/Less than 8% No   Lipid profile : 08/26/2024 Annually No   LDL control No results found for: "LDLCALC" Annually/Less than 100 mg/dl  No   Nephropathy screening No results found for: "LABMICR"  Lab Results   Component Value Date    PROTEINUA Negative 01/27/2021    Annually No   Blood pressure BP Readings from Last 1 Encounters:   10/08/24 124/68    Less than 140/90 Yes   Dilated retinal exam Most Recent Eye Exam Date: Not Found Annually Yes   Foot exam   Most Recent Foot Exam Date: Not Found Annually Yes       CURRENT A1C:    Hemoglobin A1C   Date Value Ref Range Status   01/20/2021 8.4 (H) 4.0 - 5.6 % Final     Comment:     ADA " Screening Guidelines:  5.7-6.4%  Consistent with prediabetes  >or=6.5%  Consistent with diabetes  High levels of fetal hemoglobin interfere with the HbA1C  assay. Heterozygous hemoglobin variants (HbS, HgC, etc)do  not significantly interfere with this assay.   However, presence of multiple variants may affect accuracy.         GOAL A1C:  6.5% without hypoglycemia    DM MEDICATIONS USED IN THE PAST:  Lantus, Humalog, lispro   Metformin  Dexcom G7         CURRENT DIABETES MEDICATIONS:  metformin 500 mg daily    Lantus 40 units daily in the AM   Lispro 2 units for every 15 grams of carbohydrates  + correction scale      Insulin: Vial/ syringe - lantus or pens for humalog/lispro.    Missed doses: rarely       BLOOD GLUCOSE MONITORING:    Sensor type: Dexcom G7   Average BG readin  Time in range: 39*%   26% high   34% very high   1% low   <1% very low   Site change:  q10 days      Supplies from pharmacy       Sensor was downloaded in clinic today and reviewed with patient.   Please see attached document for download.         HYPOGLYCEMIA:  Yes  1% low   <1% low   Reports a bad low a couple of weeks ago- down to the 30's   Glucagon kit: denies   Medic alert bracelet: yes         MEALS: eating 2 meals per day   BF: skips  Lunch: Noon- sandwich   Dinner: 1130 PM   Snack:5 PM -- nuts or pecans   Drinks: coffee   Diet coke   Water - a little        CURRENT EXERCISE:  No formal -- has horses       Review of Systems  Review of Systems   Constitutional:  Negative for appetite change, fatigue and unexpected weight change.   HENT:  Negative for trouble swallowing.    Eyes:  Positive for visual disturbance.   Respiratory:  Negative for shortness of breath.    Cardiovascular:  Negative for chest pain.   Gastrointestinal:  Negative for nausea.   Endocrine: Negative for polydipsia, polyphagia and polyuria.   Genitourinary:         No Nocturia    Skin:  Negative for wound.   Neurological:  Positive for numbness.    Psychiatric/Behavioral:  Positive for dysphoric mood.        Physical Exam   Physical Exam  Vitals and nursing note reviewed.   Constitutional:       General: She is not in acute distress.     Appearance: She is well-developed. She is not ill-appearing.      Comments: Overweight female   HENT:      Head: Normocephalic and atraumatic.      Right Ear: External ear normal.      Left Ear: External ear normal.      Nose: Nose normal.   Neck:      Thyroid: No thyromegaly.      Trachea: No tracheal deviation.   Cardiovascular:      Rate and Rhythm: Normal rate and regular rhythm.      Heart sounds: No murmur heard.  Pulmonary:      Effort: Pulmonary effort is normal. No respiratory distress.      Breath sounds: Normal breath sounds.   Abdominal:      Palpations: Abdomen is soft.      Tenderness: There is no abdominal tenderness.      Hernia: No hernia is present.   Musculoskeletal:      Cervical back: Normal range of motion and neck supple.   Skin:     General: Skin is warm and dry.      Capillary Refill: Capillary refill takes less than 2 seconds.      Findings: No rash.      Comments: Injection sites and dexcom sites are normal appearing. No lipo hypertropthy or atrophy     Neurological:      Mental Status: She is alert and oriented to person, place, and time.      Cranial Nerves: No cranial nerve deficit.   Psychiatric:         Mood and Affect: Mood is depressed. Affect is tearful.         Behavior: Behavior normal.         Judgment: Judgment normal.         FOOT EXAMINATION: Appropriate footwear     Protective Sensation (w/ 10 gram monofilament):  Right: Absent  Left: Decreased    Visual Inspection:  Callus -  Bilateral, Dry Skin -  Bilateral, and Onychomycosis -  Bilateral    Pedal Pulses:   Right: Present  Left: Present    Posterior Tibialis Pulses:   Right:Present  Left: Present        Lab Results   Component Value Date    TSH 5.230 (H) 01/29/2021             Type 1 diabetes mellitus with  hyperglycemia  Uncontrolled  + prandial excursions  Interested in insulin pump  Would greatly benefit from an insulin pump  Already on the Dexcom G6 and using her phone      -- Medication Changes:  Insulin doses weight based at 0.7 units/kg per day- TDD       Stop the Lantus   Change to Toujeo-- comes in a tan pen with green (lime green)   Start with 28 units daily -- we can increase as needed.   Want your fastings under 130       Take your blood sugar before breakfast for THREE consecutive days before increasing the dose- self titration:   If your fasting blood sugar in the morning is between 131-180 for THREE consecutive days, increase the dose by 1 unit.  If your fasting blood sugar in the morning is > 180 for three consecutive days, increase the dose by 2 units.   If your fasting blood sugar in the morning is between 80 and 130, continue your current dose.  If you have ANY blood sugar less than 80, decrease the dose by 2 units.  If you have ANY blood sugar less than 70, decrease the dose by 4 units and call your physician for assistance with further dose adjustments.        On your Humalog-- adjust your ratio to 1:8   1 unit for every 8 grams of carbohydrates     With using correction scale:  150-200: +1 unit  201-250: +2 units  251-300: +3 units  301-350: +4 units  >350: +5 units    Continue the dexcom G6     Meet with adelina for pump evaluation       -- Reviewed goals of therapy are to get the best control we can without hypoglycemia.  -- Reviewed patient's current insulin regimen. Clarified proper insulin dose and timing in relation to meals, etc. Insulin injection sites and proper rotation instructed.    -- Advised frequent self blood glucose monitoring.  Patient encouraged to document glucose results and bring them to every clinic visit. Rx for dexcom G6 to pharmacy -- continue  -- Hypoglycemia precautions discussed. Instructed on precautions before driving.    -- Call for Bg repeatedly < 70 or > 180.   --  Close adherence to lifestyle changes recommended.   -- Periodic follow ups for eye evaluations, foot care and dental care suggested.  -- Refer to diabetes education-- insulin pump evaluation      Patient has diabetes mellitus and manages diabetes with intensive insulin regimen and uses prandial and basal insulin daily  Patient requires a therapeutic CGM and is willing to use therapeutic CGM for the necessary frequent adjustments of insulin therapy.  I have completed an in-person visit during the previous 6 months and will continue to have in-person visits every 6 months to assess adherence to their CGM regimen and diabetes treatment plan.  Due to COVID pandemic and need for remote monitoring this tool is medically necessary          Type 1 diabetes mellitus with hypoglycemia  RX for baqismi   Continue Dexcom G6   Reviewed hypoglycemia management: Treat with 1/2 glass of juice, 1/2 can regular coke, or 4 glucose tablets.   Monitor and repeat treatment every 15 minutes until BG is >70   Then have a snack, which includes a complex carbohydrate and protein.      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   See above.   Follow-up with optometry    Type 1 diabetes mellitus with diabetic polyneuropathy  Optimize BG readings.   See above.   Referral to podiatry    Educated patient to check feet daily for any foreign objects and/or wounds. Discussed with patient the importance of wearing appropriate footwear at all times, not to walk barefoot ever, and to check shoes before putting them on feet. Instructed patient to keep feet dry by regularly changing shoes and socks and drying feet after baths and exercises. Also, instructed patient to report any new lesions, discolorations, or swelling to a healthcare professional.      Hypertension  BP goal is < 140/90.    Tolerating  ACEi  Controlled   Blood pressure goals discussed with patient      Dyslipidemia, goal LDL below 100  On statin per ADA recommendations  LDL goal < 100. LDL at goal. LFTs WNL. Continue statin.       Hypothyroidism  Her levothyroxine was recently adjusted to 88 mcg due to TSH being above goal   reinforced take by itself on empty stomach, first thing in the morning and wait at least 30-60 minutes to eat, drink, or take other medications.    Check TSH with RTC    Depression  Reports that she is taking Prozac and Wellbutrin  Offered referral to Psychiatry and she deferred  She will let me know with RTC        I spent a total of 60 minutes on the day of the visit.This includes face to face time and non-face to face time preparing to see the patient (eg, review of tests), obtaining and/or reviewing separately obtained history, documenting clinical information in the electronic or other health record, independently interpreting results and communicating results to the patient/family/caregiver, or care coordinator.      Follow up in about 8 weeks (around 12/3/2024).  1. See adelina for pump evaluation   2. Follow up with me in 8-10 weeks   3. [facility] at [facility]   4. Schedule with podiatry - nail care/foot         Orders Placed This Encounter   Procedures    Hemoglobin A1C     Standing Status:   Future     Number of Occurrences:   1     Standing Expiration Date:   4/8/2026    Comprehensive Metabolic Panel     Standing Status:   Future     Number of Occurrences:   1     Standing Expiration Date:   4/8/2026    TSH     Standing Status:   Future     Number of Occurrences:   1     Standing Expiration Date:   4/8/2026    CBC Auto Differential     Standing Status:   Future     Number of Occurrences:   1     Standing Expiration Date:   4/8/2026    Ambulatory referral/consult to Podiatry     Standing Status:   Future     Standing Expiration Date:   4/8/2026     Referral Priority:   Routine     Referral Type:    Consultation     Referral Reason:   Specialty Services Required     Referred to Provider:   Tata Durham DPM     Requested Specialty:   Podiatry     Number of Visits Requested:   1    Ambulatory referral/consult to Diabetes Education     Standing Status:   Future     Standing Expiration Date:   4/8/2026     Referral Priority:   Routine     Referral Type:   Consultation     Referral Reason:   Specialty Services Required     Referred to Provider:   Shea Keller RD, CDE     Requested Specialty:   Diabetes     Number of Visits Requested:   1       Recommendations were discussed with the patient in detail  The patient verbalized understanding and agrees with the plan outlined as above.     This note was partly generated with [employer] voice recognition software. I apologize for any possible typographical errors.

## 2024-10-15 ENCOUNTER — PATIENT MESSAGE (OUTPATIENT)
Dept: DIABETES | Facility: CLINIC | Age: 64
End: 2024-10-15
Payer: COMMERCIAL

## 2024-10-28 ENCOUNTER — PATIENT MESSAGE (OUTPATIENT)
Dept: PRIMARY CARE CLINIC | Facility: CLINIC | Age: 64
End: 2024-10-28
Payer: COMMERCIAL

## 2024-11-27 ENCOUNTER — NUTRITION (OUTPATIENT)
Dept: DIABETES | Facility: CLINIC | Age: 64
End: 2024-11-27
Payer: COMMERCIAL

## 2024-11-27 DIAGNOSIS — E10.65 TYPE 1 DIABETES MELLITUS WITH HYPERGLYCEMIA: Primary | ICD-10-CM

## 2024-11-27 DIAGNOSIS — E10.42 TYPE 1 DIABETES MELLITUS WITH DIABETIC POLYNEUROPATHY: ICD-10-CM

## 2024-11-27 DIAGNOSIS — E10.649 TYPE 1 DIABETES MELLITUS WITH HYPOGLYCEMIA AND WITHOUT COMA: ICD-10-CM

## 2024-11-27 DIAGNOSIS — E10.3513 TYPE 1 DIABETES MELLITUS WITH PROLIFERATIVE RETINOPATHY OF BOTH EYES AND MACULAR EDEMA: ICD-10-CM

## 2024-11-27 PROCEDURE — G0108 DIAB MANAGE TRN  PER INDIV: HCPCS | Mod: S$GLB,,, | Performed by: DIETITIAN, REGISTERED

## 2024-11-27 PROCEDURE — 99999 PR PBB SHADOW E&M-EST. PATIENT-LVL I: CPT | Mod: PBBFAC,,, | Performed by: DIETITIAN, REGISTERED

## 2024-11-27 NOTE — LETTER
December 6, 2024    Nadja Valadez, AZIZA  [address]  [address]  [place] [address]             [facility]  [address]  [facility] [address]  Phone: [phone]  Fax: [phone]   Patient: Bhavna Hancock   MR Number: [id number]   YOB: 1960   Date of Visit: 11/27/2024       Dear Dr. Valadez:    Thank you for referring Bhavna Hancock to me for evaluation. Below are the relevant portions of my assessment and plan of care.     Patient is interested in the omnipod 5     If you have questions, please do not hesitate to call me. I look forward to following Bhavna along with you.    Sincerely,      Shea Keller, RD, CDE           CC  No Recipients

## 2024-12-06 ENCOUNTER — TELEPHONE (OUTPATIENT)
Dept: DIABETES | Facility: CLINIC | Age: 64
End: 2024-12-06
Payer: COMMERCIAL

## 2024-12-06 DIAGNOSIS — E10.65 TYPE 1 DIABETES MELLITUS WITH HYPERGLYCEMIA: Primary | ICD-10-CM

## 2024-12-06 RX ORDER — INSULIN PMP CART,AUT,G6/7,CNTR
1 EACH SUBCUTANEOUS
Qty: 1 EACH | Refills: 0 | Status: SHIPPED | OUTPATIENT
Start: 2024-12-06

## 2024-12-06 RX ORDER — INSULIN PMP CART,AUT,G6/7,CNTR
1 EACH SUBCUTANEOUS
Qty: 3 EACH | Refills: 11 | Status: SHIPPED | OUTPATIENT
Start: 2024-12-06

## 2024-12-06 RX ORDER — INSULIN LISPRO 100 [IU]/ML
INJECTION, SOLUTION INTRAVENOUS; SUBCUTANEOUS
Qty: 30 ML | Refills: 11 | Status: SHIPPED | OUTPATIENT
Start: 2024-12-06

## 2024-12-06 NOTE — PROGRESS NOTES
Diabetes Care Specialist Follow-up Note  Author: Shea Keller RD, CDE  Date: 12/6/2024    Intake    Program Intake  Reason for Diabetes Program Visit:: Intervention  Type of Intervention:: Individual  Individual: Education  Education: Insulin Pump Evaluation  Current diabetes risk level:: moderate (HgbA1c 8.4%)  In the last month, have you used the ER or been admitted to the hospital: No  Permission to speak with others about care:: no    Current Diabetes Treatment: Oral Medications, Insulin  Oral Medication Type/Dose: metformin 500mg a day  Method of insulin delivery?: Injections  Injection Type: Vial/Sryringe  Vial/Syringe Type/Dose: lantus 40 units a day and humalog 14 units with meals (takes 2 units for every 15 g of carbohydrates)    Continuous Glucose Monitoring  Patient has CGM: Yes  Personal CGM type:: Dexcom G6  GMI Date: 12/06/24  GMI Value: 9.4 %    Lab Results   Component Value Date    HGBA1C 8.4 (H) 01/20/2021     A1c Pre Diabetes Care Specialist Intervention:  8.4%    Physical activity/Exercise:   No change    SMBG: using the dexcom       Lifestyle Coping Support & Clinical    Lifestyle/Coping/Support  Compared to other people your age, how would you rate your health?: Good  Psychosocial/Coping Skills Assessment Completed: : No  Assessment indicates:: Adequate understanding  Deffered due to:: Other (comment)  Area of need?: No    Problem Review  Active Comorbidities: Hypertension, Other (comment), Gastrointestinal Disorder (hypothyroidism)    Diabetes Self-Management Skills Assessment    Medication Skills Assessment  Patient is able to identify current diabetes medications, dosages, and appropriate timing of medications.: yes  Patient reports problems or concerns with current medication regimen.: no  Patient is  aware that some diabetes medications can cause low blood sugar?: Yes  Medication Skills Assessment Completed:: Yes  Assessment indicates:: Adequate understanding, Instruction Needed  Area of  need?: Yes    Diabetes Disease Process/Treatment Options  Diabetes Type?: Type I  Diabetes Disease Process/Treatment Options: Skills Assessment Completed: No  Assessment indicates:: Adequate understanding  Deferred due to:: Other (comment) (previously completed, there has been no change and patient has adequate understanding)  Area of need?: No    Nutrition/Healthy Eating  Patient can identify foods that impact blood sugar.: yes  Nutrition/Healthy Eating Skills Assessment Completed:: No  Assessment indicates:: Adequate understanding  Deffered due to:: Other (comment) (previously completed, there has been no change and patient has adequate understanding)  Area of need?: No    Physical Activity/Exercise  Patient's daily activity level:: moderately active  Patient formally exercises outside of work.: no  Reasons for not exercising:: time constraints  Patient can identify forms of physical activity.: yes  Physical Activity/Exercise Skills Assessment Completed: : No  Assessment indicates:: Adequate understanding  Deffered due to:: Other (comment) (previously completed, there has been no change and patient has adequate understanding)  Area of need?: No    Home Blood Glucose Monitoring  Patient states that blood sugar is checked at home daily.: yes  Monitoring Method:: personal continuous glucose monitor  Personal CGM type:: Dexcom G6   What is your current Time in Range?: 22%  What is your A1c Target?: 7.0  Home Blood Glucose Monitoring Skills Assessment Completed: : Yes  Assessment indicates:: Adequate understanding  Area of need?: No    Acute Complications  Have you ever had hypoglycemia (low BG 70 or less)?: yes  How often and what are your symptoms?: recently felt terrible shaky, feeling faint, heart racing, panicy, terrible feeling  How do you treat hypoglycemia?: drank juice, ate sweets, used syrup  Have you ever had hyperglycemia (high  or more)?: no   Do you know the symptoms of high blood sugar and how to  treat: tired, take insulin  Have you ever had DKA?: no  Do you ever test for ketones?: no  Do you have a sick day plan?: no  Acute Complications Skills Assessment Completed: : No  Assessment indicates:: Adequate understanding  Deffered due to:: Other (comment) (previously completed, there has been no change and patient has adequate understanding)  Area of need?: No    Chronic Complications  Reviewed health maintenance: yes  Has your doctor examined your feet?: no  Chronic Complications Skills Assessment Completed: : No  Assessment indicates:: Adequate understanding  Deferred due to:: Other (comment) (previously completed, there has been no change and patient has adequate understanding)  Area of need?: No      During today's follow-up visit,  the following areas required further assessment and content was provided/reviewed.    Based on today's diabetes care assessment, the following areas of need were identified:      Identified Areas of Need      Medication/Current Diabetes Treatment: Yes Insulin Pump Education  Discussed Vizerra 780G series Pump and the Guardian Sensor; Tandem T-Slim and control IQ; Tandem Mobi; Beta Bionics; and OmniPod Dash /OmniPod 5 Automated mode.    Patient educated on insulin pump therapy, the purpose of insulin pump therapy, advantages and disadvantages of insulin pump therapy and/vs multiple daily injections, what a basal rate and bolus dose are, when to change infusion site and tubing, demonstrated how to prepare the syringe/cartridge and tubing (except for OmniPod) for use in the pump  Discussed ease of usage, infusion sets, communication with the sensor, basal and bolus, carbohydrate to insulin ration, correction factors, approved areas to insert set, carbohydrate counting, error messages, how to disconnect and reconnect the cartridge and tubing   Patient instructed that based on current insulin regimen she would be changing infusion set daily.  Patient is interested in the omnipod 5    Lifestyle Coping/Support: No   Diabetes Disease Process/Treatment Options: No   Nutrition/Healthy Eating: No    Physical Activity/Exercise: No    Home Blood Glucose Monitoring: No    Acute Complications: No    Chronic Complications: No       Today's interventions were provided through individual discussion, instruction, and written materials were provided.    Patient verbalized understanding of instruction and written materials.  Pt was able to return back demonstration of instructions today. Patient understood key points, needs reinforcement and further instruction.     Diabetes Self-Management Care Plan Review and Evaluation of Progress:    During today's follow-up Bhavna's Diabetes Self-Management Care Plan progress was reviewed and progress was evaluated including his/her input. Bhavna has agreed to continue his/her journey to improve/maintain overall diabetes control by continuing to set health goals. See care plan progress below.      Care Plan: Diabetes Management   Updates made since 12/7/2023 12:00 AM        Problem: Healthy Eating         Goal: Eat 2-3 meals daily with 30-45g/2-3 servings of Carbohydrate per meal for the next 6 months    Start Date: 9/10/2024   Expected End Date: 3/10/2025   This Visit's Progress: Not met   Recent Progress: Deferred   Priority: High   Barriers: No Barriers Identified        Task: Reviewed the sources and role of Carbohydrate, Protein, and Fat and how each nutrient impacts blood sugar. Completed 9/10/2024        Task: Provided visual examples using dry measuring cups, food models, and other familiar objects such as computer mouse, deck or cards, tennis ball etc. to help with visualization of portions. Completed 9/10/2024        Task: Explained how to count carbohydrates using the food label and the use of dry measuring cups for accurate carb counting. Completed 9/10/2024        Task: Discussed strategies for choosing healthier menu options when dining out. Completed  9/10/2024        Task: Recommended replacing beverages containing high sugar content with noncaloric/sugar free options and/or water. Completed 9/10/2024        Task: Review the importance of balancing carbohydrates with each meal using portion control techniques to count servings of carbohydrate and label reading to identify serving size and amount of total carbs per serving. Completed 9/10/2024        Task: Provided Sample plate method and reviewed the use of the plate to estimate amounts of carbohydrate per meal. Completed 9/10/2024          Follow Up Plan     Follow up in about 4 weeks (around 12/25/2024) for Insulin Pump Start, Personal CGM Upload.    Today's care plan and follow up schedule was discussed with patient.  Bhavna verbalized understanding of the care plan, goals, and agrees to follow up plan.        The patient was encouraged to communicate with his/her health care provider/physician and care team regarding his/her condition(s) and treatment.  I provided the patient with my contact information today and encouraged to contact me via phone or [facility]'s Patient Portal as needed.     Length of Visit   Total Time: 60 Minutes

## 2024-12-06 NOTE — TELEPHONE ENCOUNTER
Patient would like to start the Omnipod 5  Rx sent to pharmacy for Omnipod 5  Please complete prior authorization  I also sent in the Humalog vials to the pharmacy  Please schedule her with Shea for an Omnipod 5 insulin pump start  Please schedule her to see Shea 1 week later for a download and a review  Please remind patient to bring all of the supplies to the visit--including a vial of insulin

## 2024-12-09 ENCOUNTER — TELEPHONE (OUTPATIENT)
Dept: DIABETES | Facility: CLINIC | Age: 64
End: 2024-12-09
Payer: COMMERCIAL

## 2024-12-17 ENCOUNTER — OFFICE VISIT (OUTPATIENT)
Dept: DIABETES | Facility: CLINIC | Age: 64
End: 2024-12-17
Payer: COMMERCIAL

## 2024-12-17 ENCOUNTER — TELEPHONE (OUTPATIENT)
Dept: DIABETES | Facility: CLINIC | Age: 64
End: 2024-12-17

## 2024-12-17 VITALS
SYSTOLIC BLOOD PRESSURE: 126 MMHG | OXYGEN SATURATION: 99 % | BODY MASS INDEX: 28.46 KG/M2 | DIASTOLIC BLOOD PRESSURE: 68 MMHG | HEART RATE: 88 BPM | WEIGHT: 171 LBS

## 2024-12-17 DIAGNOSIS — E10.649 HYPOGLYCEMIA UNAWARENESS ASSOCIATED WITH TYPE 1 DIABETES MELLITUS: ICD-10-CM

## 2024-12-17 DIAGNOSIS — E10.65 TYPE 1 DIABETES MELLITUS WITH HYPERGLYCEMIA: Primary | ICD-10-CM

## 2024-12-17 DIAGNOSIS — F32.A DEPRESSION, UNSPECIFIED DEPRESSION TYPE: ICD-10-CM

## 2024-12-17 DIAGNOSIS — E03.9 HYPOTHYROIDISM, UNSPECIFIED TYPE: ICD-10-CM

## 2024-12-17 DIAGNOSIS — E78.5 DYSLIPIDEMIA, GOAL LDL BELOW 100: ICD-10-CM

## 2024-12-17 DIAGNOSIS — E10.3513 TYPE 1 DIABETES MELLITUS WITH PROLIFERATIVE RETINOPATHY OF BOTH EYES AND MACULAR EDEMA: ICD-10-CM

## 2024-12-17 DIAGNOSIS — I10 PRIMARY HYPERTENSION: ICD-10-CM

## 2024-12-17 DIAGNOSIS — Z71.9 HEALTH EDUCATION/COUNSELING: ICD-10-CM

## 2024-12-17 DIAGNOSIS — E10.42 TYPE 1 DIABETES MELLITUS WITH DIABETIC POLYNEUROPATHY: ICD-10-CM

## 2024-12-17 DIAGNOSIS — E10.649 TYPE 1 DIABETES MELLITUS WITH HYPOGLYCEMIA AND WITHOUT COMA: ICD-10-CM

## 2024-12-17 PROCEDURE — 95251 CONT GLUC MNTR ANALYSIS I&R: CPT | Mod: S$GLB,,, | Performed by: NURSE PRACTITIONER

## 2024-12-17 PROCEDURE — 99999 PR PBB SHADOW E&M-EST. PATIENT-LVL V: CPT | Mod: PBBFAC,,, | Performed by: NURSE PRACTITIONER

## 2024-12-17 PROCEDURE — 99214 OFFICE O/P EST MOD 30 MIN: CPT | Mod: S$GLB,,, | Performed by: NURSE PRACTITIONER

## 2024-12-17 RX ORDER — PEN NEEDLE, DIABETIC 32GX 5/32"
NEEDLE, DISPOSABLE MISCELLANEOUS
Qty: 150 EACH | Refills: 11 | Status: SHIPPED | OUTPATIENT
Start: 2024-12-17

## 2024-12-17 RX ORDER — INSULIN LISPRO 100 [IU]/ML
INJECTION, SOLUTION INTRAVENOUS; SUBCUTANEOUS
Qty: 15 ML | Refills: 6 | Status: SHIPPED | OUTPATIENT
Start: 2024-12-17

## 2024-12-17 RX ORDER — INSULIN GLARGINE 300 [IU]/ML
40 INJECTION, SOLUTION SUBCUTANEOUS DAILY
Qty: 4.5 ML | Refills: 6 | Status: SHIPPED | OUTPATIENT
Start: 2024-12-17 | End: 2025-12-17

## 2024-12-17 NOTE — ASSESSMENT & PLAN NOTE
Uncontrolled  + prandial excursions  Plans to start the omnipod 5 with education in 2 weeks         -- Medication Changes:   Adjust  your Toujeo to 40 units daily     On your Humalog-- adjust your ratio to 1:6  1 unit for every 6 grams of carbohydrates     With using correction scale:  150-200: +1 unit  201-250: +2 units  251-300: +3 units  301-350: +4 units  >350: +5 units    Continue the dexcom G6     Meet with adelina for pump start      Patient wants to move forward with the Omnipod 5 after meeting with diabetes education for an insulin pump evaluation  Pump settings as below    Basal: 1.15 units/hr   ICR 1:8  ISF: 1:35  Target: 120  IOB: 3 hours       -- Reviewed goals of therapy are to get the best control we can without hypoglycemia.  -- Reviewed patient's current insulin regimen. Clarified proper insulin dose and timing in relation to meals, etc. Insulin injection sites and proper rotation instructed.    -- Advised frequent self blood glucose monitoring.  Patient encouraged to document glucose results and bring them to every clinic visit. Rx for dexcom G6 to pharmacy -- continue  -- Hypoglycemia precautions discussed. Instructed on precautions before driving.    -- Call for Bg repeatedly < 70 or > 180.   -- Close adherence to lifestyle changes recommended.   -- Periodic follow ups for eye evaluations, foot care and dental care suggested.  -- Refer to diabetes education-- insulin pump start and pump start follow up       Patient has diabetes mellitus and manages diabetes with intensive insulin regimen and uses prandial and basal insulin daily  Patient requires a therapeutic CGM and is willing to use therapeutic CGM for the necessary frequent adjustments of insulin therapy.  I have completed an in-person visit during the previous 6 months and will continue to have in-person visits every 6 months to assess adherence to their CGM regimen and diabetes treatment plan.  Due to COVID pandemic and need for remote  monitoring this tool is medically necessary

## 2024-12-17 NOTE — PROGRESS NOTES
CC:   Chief Complaint   Patient presents with    Diabetes Mellitus       HPI: Bhavna Hancock is a 64 y.o. female presents for a follow up visit today for the management of T1DM   She has been seen before by hospital endocrinology during that admission in 2021--for a left knee wound after ORIF --endocrinology manage her diabetes during that admission--no discharge follow-up      She  was diagnosed with Type 1 diabetes in 1990's after she had an 11lb baby in 1987. Started on       Family hx of diabetes: son ( dx with T1DM at 5 years old), daughter diagnosed at age 7 with T1DM, granddaughter was 11 years old   Hospitalized for diabetes: denies   Insulin therapy: since diagnosis       C-peptide level 1/2021--0.11      No personal or FH of thyroid cancer or personal of pancreatic cancer or pancreatitis.       TSH above goal == + hair loss-- LT4 increased to 88 mcg daily       Our 1st and last visit was in October of 2024  At that visit we stop the Lantus and changed her to Toujeo  Weight based her insulin at 0.7 units/kg per day total daily dose  On her Humalog adjusted her ratio to 1:8 with meals  Scheduled her to meet with diabetes education for an insulin pump evaluation  She was recently seen in the last couple of weeks with diabetes education and wants to move forward with the Omnipod 5  She has not started the pump yet  She is scheduled to meet with Shea on December 31st to start the insulin pump  See attached Dexcom download which shows that her blood sugar readings are running above goal--very high  She did not complete her blood work prior to this follow-up visit---she wants to get her blood work done at [facility]  She is adamant that she is not missing doses of her insulin   She reports feeling confident with CHO counting                 DIABETES COMPLICATIONS: retinopathy and peripheral neuropathy      Diabetes Management Status    ASA:  Yes - 81 mg daily     Statin: Taking--Crestor 10 mg  ACE/ARB:  "Taking--lisinopril 2.5 mg     The ASCVD Risk score (Greg CALLE, et al., 2019) failed to calculate for the following reasons:    Cannot find a previous HDL lab    Cannot find a previous total cholesterol lab      Screening or Prevention Patient's value Goal Complete/Controlled?   HgA1C Testing and Control   Lab Results   Component Value Date    HGBA1C 8.4 (H) 2021      Annually/Less than 8% No   Lipid profile : 2024 Annually No   LDL control No results found for: "LDLCALC" Annually/Less than 100 mg/dl  No   Nephropathy screening No results found for: "LABMICR"  Lab Results   Component Value Date    PROTEINUA Negative 2021    Annually No   Blood pressure BP Readings from Last 1 Encounters:   24 126/68    Less than 140/90 Yes   Dilated retinal exam Most Recent Eye Exam Date: Not Found Annually Yes   Foot exam   : 10/08/2024 Annually Yes       CURRENT A1C:    Hemoglobin A1C   Date Value Ref Range Status   2021 8.4 (H) 4.0 - 5.6 % Final     Comment:     ADA Screening Guidelines:  5.7-6.4%  Consistent with prediabetes  >or=6.5%  Consistent with diabetes  High levels of fetal hemoglobin interfere with the HbA1C  assay. Heterozygous hemoglobin variants (HbS, HgC, etc)do  not significantly interfere with this assay.   However, presence of multiple variants may affect accuracy.         GOAL A1C:  6.5% without hypoglycemia    DM MEDICATIONS USED IN THE PAST:  Lantus, Humalog, lispro   Metformin  Dexcom G7   Toujeo       CURRENT DIABETES MEDICATIONS:  metformin 500 mg daily    Toujeo 38 units daily in the AM   Lispro 1 units for every 8 grams of carbohydrates     With using correction scale:  150-200: +1 unit  201-250: +2 units  251-300: +3 units  301-350: +4 units  >350: +5 units    Insulin: pens     Missed doses: rarely       BLOOD GLUCOSE MONITORING:    Sensor type: Dexcom G6  Average BG readin  Time in range: 16%   21% high   62% very high   1% low   <1% very low   Site change:  q10 " days  Estimated A1c 10%    2 weeks prior average blood sugar 262 in range 19% of the time estimated A1c 9.6%        Supplies from pharmacy       Sensor was downloaded in clinic today and reviewed with patient.   Please see attached document for download.         HYPOGLYCEMIA:  Yes  1% low   <1% low   2 weeks prior --0% low and 0% very low  Has gone as low as 30  Glucagon kit: denies   Medic alert bracelet: yes         MEALS: eating 2 meals per day   BF: skips  Lunch: Noon- sandwich   Dinner: 1130 PM   Snack:5 PM -- nuts or pecans   Drinks: coffee   Diet coke   Water - a little        CURRENT EXERCISE:  No formal -- has horses       Review of Systems  Review of Systems   Constitutional:  Negative for appetite change, fatigue and unexpected weight change.   HENT:  Negative for trouble swallowing.    Eyes:  Positive for visual disturbance.   Respiratory:  Negative for shortness of breath.    Cardiovascular:  Negative for chest pain.   Gastrointestinal:  Negative for nausea.   Endocrine: Negative for polydipsia, polyphagia and polyuria.   Genitourinary:         No Nocturia    Skin:  Negative for wound.   Neurological:  Positive for numbness.   Psychiatric/Behavioral:  Positive for dysphoric mood.        Physical Exam   Physical Exam  Vitals and nursing note reviewed.   Constitutional:       General: She is not in acute distress.     Appearance: She is well-developed. She is not ill-appearing.      Comments: Overweight female   HENT:      Head: Normocephalic and atraumatic.      Right Ear: External ear normal.      Left Ear: External ear normal.      Nose: Nose normal.   Neck:      Thyroid: No thyromegaly.      Trachea: No tracheal deviation.   Cardiovascular:      Rate and Rhythm: Normal rate and regular rhythm.      Heart sounds: No murmur heard.  Pulmonary:      Effort: Pulmonary effort is normal. No respiratory distress.      Breath sounds: Normal breath sounds.   Abdominal:      Palpations: Abdomen is soft.       Tenderness: There is no abdominal tenderness.      Hernia: No hernia is present.   Musculoskeletal:      Cervical back: Normal range of motion and neck supple.   Skin:     General: Skin is warm and dry.      Capillary Refill: Capillary refill takes less than 2 seconds.      Findings: No rash.      Comments: Injection sites and dexcom sites are normal appearing. No lipo hypertropthy or atrophy     Neurological:      Mental Status: She is alert and oriented to person, place, and time.      Cranial Nerves: No cranial nerve deficit.   Psychiatric:         Mood and Affect: Mood is depressed. Affect is tearful.         Behavior: Behavior normal.         Judgment: Judgment normal.         FOOT EXAMINATION: Appropriate footwear         Lab Results   Component Value Date    TSH 5.230 (H) 01/29/2021           Type 1 diabetes mellitus with hyperglycemia  Uncontrolled  + prandial excursions  Plans to start the omnipod 5 with education in 2 weeks         -- Medication Changes:   Adjust  your Toujeo to 40 units daily     On your Humalog-- adjust your ratio to 1:6  1 unit for every 6 grams of carbohydrates     With using correction scale:  150-200: +1 unit  201-250: +2 units  251-300: +3 units  301-350: +4 units  >350: +5 units    Continue the dexcom G6     Meet with adelina for pump evaluation         Patient wants to move forward with the Omnipod 5 after meeting with diabetes education for an insulin pump evaluation  Pump settings as below    Basal: 1.15 units/hr   ICR 1:8  ISF: 1:35  Target: 120  IOB: 3 hours       -- Reviewed goals of therapy are to get the best control we can without hypoglycemia.  -- Reviewed patient's current insulin regimen. Clarified proper insulin dose and timing in relation to meals, etc. Insulin injection sites and proper rotation instructed.    -- Advised frequent self blood glucose monitoring.  Patient encouraged to document glucose results and bring them to every clinic visit. Rx for dexcom G6 to  pharmacy -- continue  -- Hypoglycemia precautions discussed. Instructed on precautions before driving.    -- Call for Bg repeatedly < 70 or > 180.   -- Close adherence to lifestyle changes recommended.   -- Periodic follow ups for eye evaluations, foot care and dental care suggested.  -- Refer to diabetes education-- insulin pump start and pump start follow up       Patient has diabetes mellitus and manages diabetes with intensive insulin regimen and uses prandial and basal insulin daily  Patient requires a therapeutic CGM and is willing to use therapeutic CGM for the necessary frequent adjustments of insulin therapy.  I have completed an in-person visit during the previous 6 months and will continue to have in-person visits every 6 months to assess adherence to their CGM regimen and diabetes treatment plan.  Due to COVID pandemic and need for remote monitoring this tool is medically necessary          Type 1 diabetes mellitus with hypoglycemia  Has stephensmi   Continue Dexcom G6   Reviewed hypoglycemia management: Treat with 1/2 glass of juice, 1/2 can regular coke, or 4 glucose tablets.   Monitor and repeat treatment every 15 minutes until BG is >70   Then have a snack, which includes a complex carbohydrate and protein.      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   See above.   Follow-up with optometry    Type 1 diabetes mellitus with diabetic polyneuropathy  Optimize BG readings.   See above.   Referral to podiatry    Educated patient to check feet daily for any foreign objects and/or wounds. Discussed with patient the importance of wearing appropriate footwear at all times, not to walk barefoot ever, and to check shoes before putting them on feet. Instructed patient to keep feet dry by regularly changing shoes and socks and drying feet after  baths and exercises. Also, instructed patient to report any new lesions, discolorations, or swelling to a healthcare professional.      Hypertension  BP goal is < 140/90.   Tolerating  ACEi  Controlled   Blood pressure goals discussed with patient      Dyslipidemia, goal LDL below 100  On statin per ADA recommendations  LDL goal < 100. LDL at goal. LFTs WNL. Continue statin.       Hypothyroidism  Check TSH level today   On LT4 88 mcg- 1 tablet daily    reinforced take by itself on empty stomach, first thing in the morning and wait at least 30-60 minutes to eat, drink, or take other medications.        Depression  Reports that she is taking Prozac and Wellbutrin  Offered referral to Psychiatry and she deferred  She will let me know with RTC          I spent a total of 30 minutes on the day of the visit.This includes face to face time and non-face to face time preparing to see the patient (eg, review of tests), obtaining and/or reviewing separately obtained history, documenting clinical information in the electronic or other health record, independently interpreting results and communicating results to the patient/family/caregiver, or care coordinator.        Follow up in about 8 weeks (around 2/11/2025).  1. Labs today   2. Follow up with me in 8 weeks with labs prior   3. Schedule with podiatry please         Orders Placed This Encounter   Procedures    Hemoglobin A1C     Standing Status:   Future     Standing Expiration Date:   6/17/2026    Comprehensive Metabolic Panel     Standing Status:   Future     Standing Expiration Date:   6/17/2026    CBC Auto Differential     Standing Status:   Future     Standing Expiration Date:   6/17/2026    TSH     Standing Status:   Future     Standing Expiration Date:   6/17/2026    Microalbumin/Creatinine Ratio, Urine     Standing Status:   Future     Standing Expiration Date:   6/17/2026     Order Specific Question:   Specimen Source     Answer:   Urine       Recommendations were  discussed with the patient in detail  The patient verbalized understanding and agrees with the plan outlined as above.     This note was partly generated with [employer] voice recognition software. I apologize for any possible typographical errors.

## 2024-12-17 NOTE — ASSESSMENT & PLAN NOTE
Check TSH level today   On LT4 88 mcg- 1 tablet daily    reinforced take by itself on empty stomach, first thing in the morning and wait at least 30-60 minutes to eat, drink, or take other medications.

## 2024-12-17 NOTE — ASSESSMENT & PLAN NOTE
Has jcaques   Continue Dexcom G6   Reviewed hypoglycemia management: Treat with 1/2 glass of juice, 1/2 can regular coke, or 4 glucose tablets.   Monitor and repeat treatment every 15 minutes until BG is >70   Then have a snack, which includes a complex carbohydrate and protein.

## 2024-12-17 NOTE — ASSESSMENT & PLAN NOTE
BP goal is < 140/90.   Tolerating  ACEi  Controlled   Blood pressure goals discussed with patient

## 2024-12-17 NOTE — TELEPHONE ENCOUNTER
----- Message from Nurse Lin sent at 12/17/2024  1:37 PM CST -----  Please schedule pt for 2/19 at 1:30

## 2024-12-18 ENCOUNTER — TELEPHONE (OUTPATIENT)
Dept: DIABETES | Facility: CLINIC | Age: 64
End: 2024-12-18
Payer: COMMERCIAL

## 2024-12-31 ENCOUNTER — NUTRITION (OUTPATIENT)
Dept: DIABETES | Facility: CLINIC | Age: 64
End: 2024-12-31
Payer: COMMERCIAL

## 2024-12-31 DIAGNOSIS — E10.3513 TYPE 1 DIABETES MELLITUS WITH PROLIFERATIVE RETINOPATHY OF BOTH EYES AND MACULAR EDEMA: ICD-10-CM

## 2024-12-31 DIAGNOSIS — E10.65 TYPE 1 DIABETES MELLITUS WITH HYPERGLYCEMIA: ICD-10-CM

## 2024-12-31 DIAGNOSIS — E10.649 TYPE 1 DIABETES MELLITUS WITH HYPOGLYCEMIA AND WITHOUT COMA: Primary | ICD-10-CM

## 2024-12-31 PROCEDURE — G0108 DIAB MANAGE TRN  PER INDIV: HCPCS | Mod: S$GLB,,, | Performed by: DIETITIAN, REGISTERED

## 2024-12-31 PROCEDURE — 99999 PR PBB SHADOW E&M-EST. PATIENT-LVL II: CPT | Mod: PBBFAC,,, | Performed by: DIETITIAN, REGISTERED

## 2024-12-31 NOTE — PROGRESS NOTES
Diabetes Care Specialist Follow-up Note  Author: Shea Keller RD, CDE  Date: 12/31/2024    Intake    Program Intake  Reason for Diabetes Program Visit:: Intervention  Type of Intervention:: Individual  Individual: Device Training  Device Training: Insulin Pump Start  Current diabetes risk level:: moderate (HgbA1c 8.4%)  In the last month, have you used the ER or been admitted to the hospital: No  Permission to speak with others about care:: no    Current Diabetes Treatment: Oral Medications, Insulin  Oral Medication Type/Dose: metformin 500mg a day  Method of insulin delivery?: Injections  Injection Type: Vial/Sryringe  Vial/Syringe Type/Dose: lantus 40 units a day and humalog 14 units with meals (takes 2 units for every 15 g of carbohydrates)    Continuous Glucose Monitoring  Patient has CGM: Yes  Personal CGM type:: Dexcom G6  GMI Date: 12/31/24  GMI Value: 8.8 %    Lab Results   Component Value Date    HGBA1C 9.4 (H) 12/17/2024     A1c Pre Diabetes Care Specialist Intervention:  8.4%    Physical activity/Exercise:   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, still caring for horses twice a day    SMBG: using the dexcom G6      Lifestyle Coping Support & Clinical    Lifestyle/Coping/Support  Compared to other people your age, how would you rate your health?: Good  Psychosocial/Coping Skills Assessment Completed: : No  Assessment indicates:: Adequate understanding  Deffered due to:: Other (comment)  Area of need?: No    Problem Review  Active Comorbidities: Hypertension, Other (comment), Gastrointestinal Disorder (hypothyroidism)    Diabetes Self-Management Skills Assessment    Medication Skills Assessment  Patient is able to identify current diabetes medications, dosages, and appropriate timing of medications.: yes  Patient reports problems or concerns with current medication regimen.: no  Patient is  aware that some diabetes medications can cause low blood sugar?: Yes  Medication Skills Assessment Completed:: Yes  Assessment indicates::  Adequate understanding, Instruction Needed  Area of need?: Yes    Diabetes Disease Process/Treatment Options  Diabetes Type?: Type I  Diabetes Disease Process/Treatment Options: Skills Assessment Completed: No  Assessment indicates:: Adequate understanding  Deferred due to:: Other (comment) (previously completed, there has been no change and patient has adequate understanding)  Area of need?: No    Nutrition/Healthy Eating  Meal Plan 24 Hour Recall - Breakfast: skipped  Meal Plan 24 Hour Recall - Lunch: sandwich  Meal Plan 24 Hour Recall - Dinner: dinner is about 11:30pm could be anything  Meal Plan 24 Hour Recall - Snack: nuts  Meal Plan 24 Hour Recall - Beverage: coffee, deit coke, occassionally water  Who shops/cooks?: self or   Patient can identify foods that impact blood sugar.: yes  Challenges to healthy eating:: other (see comments) (eating late at night and skipping meals)  Nutrition/Healthy Eating Skills Assessment Completed:: Yes  Assessment indicates:: Adequate understanding  Deffered due to:: Other (comment) (previously completed, there has been no change and patient has adequate understanding)  Area of need?: No    Physical Activity/Exercise  Patient's daily activity level:: moderately active  Patient formally exercises outside of work.: yes  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)  Patient can identify forms of physical activity.: yes  Physical Activity/Exercise Skills Assessment Completed: : Yes  Assessment indicates:: Adequate understanding  Deffered due to:: Other (comment) (previously completed, there has been no change and patient has adequate understanding)  Area of need?: No    Home Blood Glucose Monitoring  Patient states that blood sugar is checked at home daily.: yes  Monitoring Method:: personal continuous glucose monitor  Personal CGM type:: Dexcom G6   What is your current Time in Range?: 34%  What is your A1c Target?: 7.0 without hypoglycemia  Home  Blood Glucose Monitoring Skills Assessment Completed: : No  Assessment indicates:: Adequate understanding  Deferred due to:: Other (comment) (previously completed, there has been no change and patient has adequate understanding)  Area of need?: No    Acute Complications  Have you ever had hypoglycemia (low BG 70 or less)?: yes  How often and what are your symptoms?: recently felt terrible shaky, feeling faint, heart racing, panicy, terrible feeling  How do you treat hypoglycemia?: drank juice, ate sweets, used syrup  Have you ever had hyperglycemia (high  or more)?: no   Do you know the symptoms of high blood sugar and how to treat: tired, take insulin  Have you ever had DKA?: no  Do you ever test for ketones?: no  Do you have a sick day plan?: no  Acute Complications Skills Assessment Completed: : No  Assessment indicates:: Adequate understanding  Deffered due to:: Other (comment) (previously completed, there has been no change and patient has adequate understanding)  Area of need?: No    Chronic Complications  Reviewed health maintenance: yes  Has your doctor examined your feet?: no  Chronic Complications Skills Assessment Completed: : No  Assessment indicates:: Adequate understanding  Deferred due to:: Other (comment) (previously completed, there has been no change and patient has adequate understanding)  Area of need?: No      During today's follow-up visit,  the following areas required further assessment and content was provided/reviewed.    Based on today's diabetes care assessment, the following areas of need were identified:      Identified Areas of Need      Medication/Current Diabetes Treatment: Yes, Insulin Pump Education  OMNI POD 5 INSULIN PUMP START  Explained Smart[employer] Direct Technology - Every 5 minutes, the system automatically increases, decreases, or pauses insulin delivery based on your customized target glucose--helping to protect against highs and lows, day and night.  Automated Mode vs  Manual Mode vs Limited Automated Mode  Downloading the Narciso and ProConnect ([facility])  Pod and Dexcom need to be in line of site of each other  Inputting Dexcom Transmitter Code into Narciso or Handheld Device  Dexcom communicates with the pod directly and the Dexcom G6 narciso  Activity Feature  Changing the target and the length of time insulin is on board will adjust automated mode  Changing ICR/Basal Rates/ISF will only change setting in manual mode  SmartBolus Calculator - incorporates CGM data and trend data  Setup podder central account and linked [employer] account  Patient educated on insulin pump therapy, what a basal rate and bolus dose are, when to change pod, demonstrated how to prepare the syringe and pod for use with the pump, discussed ease of usage, basal and bolus, carbohydrate to insulin ratio, correction factors, approved areas to insert set, carbohydrate counting, error messages, explained the basal rates - patient will receive a little bit of insulin throughout 24 hours, bolus dose are delivered delivered by the inputting grams of carbohydrates, explained that patient will be counting carbohydrates and checking blood glucose before each meal, discussed when to change the pod, demonstrated how to fill the pod with insulin, how to apply pod and insert the needle, explained and demonstrated how to safely retract the needle and remove the pod, discussed ease of usage, carbohydrate counting, demonstrated applying device, inserting needle, administering bolus insulin, retracting needle, and removing/disposing of pod. Patient states understanding and performed return demonstration. Patient started first pod in office. Patient provided with written literature and CDE contact information      Pump training was provided per Omni Pod protocol.  Patient is new to insulin pump therapy.      All settings obtained from Nadja Valadez's last office visit note.   Basal:  12AM: 1.15 units/hr     Max basal rate: 5 u/hr      Bolus:  CHO ratio: 1:8  ISF: 1:35  Glucose target : 120 mg/dL  Correct  over: 120mg/dl  Active insulin time: 3.0 hours  Max bolus: 30 units     Low reservoir insulin alarm: 10 units  Change pod alarm: every 3 days      Details of pump therapy were covered included following controller features and programming, pod activation, pod site selection and rotation, automatic pod priming and insertion, setting & editing basal rates in manual mode, giving bolus and other features in the set-up menu.  The following regarding the Omnipod 5 was covered:  During your first Pod wear, since no recent history is available, the Omnipod 5 System uses only your active Basal Program from Manual Mode as a starting point to adjust your insulin. After 48 hours of history is collected, which usually happens with the first Pod wear, Smart[employer] technology stops adjusting insulin against your active Basal Program and starts using the Adaptive Basal Rate for your automated insulin delivery with your next Pod change. With each Pod change, insulin delivery information is sent and saved in the Omnipod 5 Narciso so that the next Pod that is started is updated with the new Adaptive Basal Rate. With each new Pod activation, the system adapts insulin delivery based on physiological needs and total daily insulin (TDI) delivered. After 2-3 Pod changes, adaptation generally stabilizes and automated insulin delivery is based on this adaptation.  Patient demonstrated ability to program controller, activate and insert pod using aseptic technique.  Patient demonstrated ability to program Dexcom transmitter into controller and start automated limited mode.    Instructed pt on use of basic pump features ie...give a bolus, pause insulin, switch from manual to automated mode.  Reviewed features available in manual mode verses automated mode.   Reviewed when and how to use activity function in automated mode.  Reviewed site selection of pods, rotation of sites  and hard stop to change pod every 72 hrs.   Instructed that insulin vial is good out of refrigeration for up to 28-30 days.   Reviewed treatment of hypoglycemia, hyperglycemia; sick day care, DKA, and troubleshooting of pump.  Advised to carry back-up supplies with her and discussed steps to take in case of connection loss.   Omni Pod 24-hour support line provided.       Racheal username: Nolan  Podder password: [email or web address]     Glooko username: [email or web address]  Glorachel password: [email or web address]   Lifestyle Coping/Support: No   Diabetes Disease Process/Treatment Options: No   Nutrition/Healthy Eating: No    Physical Activity/Exercise: No    Home Blood Glucose Monitoring: No Comparison of previous CGM data 12/17/2024 to current    Average Glucose 230 improved from 280  Standard Deviation 92 no change from 92  GMI 8.8 % improved from 10.0 %    Time in Range  41% of time patient was in Very High Range improved from 62%  23% of time patient was in High Range increased from 21%  34% of time patient was in Range improved from 16%  2% of time patient was in Low Range increased from 1%  <1% of time patient was in Very Low Range no change from <1%   Acute Complications: No    Chronic Complications: No       Today's interventions were provided through individual discussion, instruction, and written materials were provided.    Patient verbalized understanding of instruction and written materials.  Pt was able to return back demonstration of instructions today. Patient understood key points, needs reinforcement and further instruction.     Diabetes Self-Management Care Plan Review and Evaluation of Progress:    During today's follow-up Bhavna's Diabetes Self-Management Care Plan progress was reviewed and progress was evaluated including his/her input. Bhavna has agreed to continue his/her journey to improve/maintain overall diabetes control by continuing to set health goals. See care plan progress below.      Care Plan: Diabetes  Management   Updates made since 1/1/2024 12:00 AM        Problem: Healthy Eating         Goal: Eat 2-3 meals daily with 30-45g/2-3 servings of Carbohydrate per meal for the next 6 months    Start Date: 9/10/2024   Expected End Date: 3/10/2025   This Visit's Progress: On track   Recent Progress: Not met   Priority: High   Barriers: No Barriers Identified        Task: Reviewed the sources and role of Carbohydrate, Protein, and Fat and how each nutrient impacts blood sugar. Completed 9/10/2024        Task: Provided visual examples using dry measuring cups, food models, and other familiar objects such as computer mouse, deck or cards, tennis ball etc. to help with visualization of portions. Completed 9/10/2024        Task: Explained how to count carbohydrates using the food label and the use of dry measuring cups for accurate carb counting. Completed 9/10/2024        Task: Discussed strategies for choosing healthier menu options when dining out. Completed 9/10/2024        Task: Recommended replacing beverages containing high sugar content with noncaloric/sugar free options and/or water. Completed 9/10/2024        Task: Review the importance of balancing carbohydrates with each meal using portion control techniques to count servings of carbohydrate and label reading to identify serving size and amount of total carbs per serving. Completed 9/10/2024        Task: Provided Sample plate method and reviewed the use of the plate to estimate amounts of carbohydrate per meal. Completed 9/10/2024        Problem: Medications         Goal: Patient Agrees to bolus using the omnipod 5 fuentes before lunch and dinner each day for the next 4 weeks    Start Date: 12/31/2024   Expected End Date: 1/31/2025   This Visit's Progress: Deferred   Priority: High   Barriers: No Barriers Identified        Task: Instructed pt on use of basic pump features ie...give a bolus, pause insulin, switch from manual to automated mode. Completed 12/31/2024         Task: Patient demonstrated ability to program Dexcom transmitter into controller and start automated limited mode. Completed 12/31/2024        Task: patient demonstrated ability to program controller, activate and insert pod using aseptic technique. Completed 12/31/2024        Task: Explained Smart Adjust Technology Completed 12/31/2024        Task: Reviewed and reconciled current medication list today. Completed 12/31/2024        Task: Reviewed features available in manual mode verses automated mode. Completed 12/31/2024        Task: Reviewed when and how to use activity function in automated mode. Completed 12/31/2024        Task: Reviewed site selection of pods, rotation of sites and hard stop to change pod every 72 hrs. Completed 12/31/2024        Task: Instructed that insulin vial is good out of refrigeration for up to 28-30 days. Completed 12/31/2024        Task: Reviewed treatment of hypoglycemia, hyperglycemia; sick day care, DKA, and troubleshooting of pump. Completed 12/31/2024        Task: Advised to carry back-up supplies with them and discussed steps to take in case of connection loss. Completed 12/31/2024        Task: Details of pump therapy were covered including controller/fuentes features and programming and pod activation Completed 12/31/2024        Task: Reviewed pod site selection and rotation and automatic pod priming and insertion Completed 12/31/2024        Task: Reviewed setting & editing basal rates in manual mode, giving bolus and other features in the set-up menu. Completed 12/31/2024        Task: Patient provided with [employer] and [facility] [employer] usernames and passwords, also documented in chart note and blue sticky note in Epic Completed 12/31/2024        Task: Omni Pod 24-hour support line provided. Completed 12/31/2024        Task: Set up omnipod fuentes on patients phone, patient will be using fuentes rather than  Completed 12/31/2024        Task: Omnipod and Dexcom data was downloaded and  reviewed with patient and provider, any necessary adjustments were made           Follow Up Plan     Follow up in about 1 week (around 1/7/2025) for Insulin Pump Upload, Personal CGM Upload.    Today's care plan and follow up schedule was discussed with patient.  Bhavna verbalized understanding of the care plan, goals, and agrees to follow up plan.        The patient was encouraged to communicate with his/her health care provider/physician and care team regarding his/her condition(s) and treatment.  I provided the patient with my contact information today and encouraged to contact me via phone or [facility] Patient Portal as needed.     Length of Visit   Total Time: 93 Minutes

## 2025-01-07 ENCOUNTER — CLINICAL SUPPORT (OUTPATIENT)
Dept: DIABETES | Facility: CLINIC | Age: 65
End: 2025-01-07
Payer: COMMERCIAL

## 2025-01-07 DIAGNOSIS — E10.65 TYPE 1 DIABETES MELLITUS WITH HYPERGLYCEMIA: ICD-10-CM

## 2025-01-07 DIAGNOSIS — E10.649 TYPE 1 DIABETES MELLITUS WITH HYPOGLYCEMIA AND WITHOUT COMA: Primary | ICD-10-CM

## 2025-01-07 DIAGNOSIS — E10.3513 TYPE 1 DIABETES MELLITUS WITH PROLIFERATIVE RETINOPATHY OF BOTH EYES AND MACULAR EDEMA: ICD-10-CM

## 2025-01-07 PROCEDURE — G0108 DIAB MANAGE TRN  PER INDIV: HCPCS | Mod: S$GLB,,, | Performed by: DIETITIAN, REGISTERED

## 2025-01-07 PROCEDURE — 99999 PR PBB SHADOW E&M-EST. PATIENT-LVL II: CPT | Mod: PBBFAC,,, | Performed by: DIETITIAN, REGISTERED

## 2025-01-07 NOTE — PROGRESS NOTES
Diabetes Care Specialist Follow-up Note  Author: Shea Keller RD, CDE  Date: 1/7/2025    Intake    Program Intake  Reason for Diabetes Program Visit:: Intervention  Type of Intervention:: Individual  Individual: Education  Education: Pattern Management  Current diabetes risk level:: moderate (HgbA1c 8.4%)  In the last month, have you used the ER or been admitted to the hospital: No  Permission to speak with others about care:: no    Current Diabetes Treatment: Oral Medications, Insulin  Oral Medication Type/Dose: metformin 500mg a day  Method of insulin delivery?: Injections  Injection Type: Vial/Sryringe  Vial/Syringe Type/Dose: lantus 40 units a day and humalog 14 units with meals (takes 2 units for every 15 g of carbohydrates)    Continuous Glucose Monitoring  Patient has CGM: Yes  Personal CGM type:: Dexcom G6  GMI Date: 01/07/25  GMI Value: 7.7 %    Lab Results   Component Value Date    HGBA1C 9.4 (H) 12/17/2024     A1c Pre Diabetes Care Specialist Intervention:  8.4%    Physical activity/Exercise:   No change    SMBG: using the dexcom                    Lifestyle Coping Support & Clinical    Lifestyle/Coping/Support  Compared to other people your age, how would you rate your health?: Good  Psychosocial/Coping Skills Assessment Completed: : No  Assessment indicates:: Adequate understanding  Deffered due to:: Other (comment)  Area of need?: No    Problem Review  Active Comorbidities: Hypertension, Other (comment), Gastrointestinal Disorder (hypothyroidism)    Diabetes Self-Management Skills Assessment    Medication Skills Assessment  Patient is able to identify current diabetes medications, dosages, and appropriate timing of medications.: yes  Patient reports problems or concerns with current medication regimen.: no  Patient is  aware that some diabetes medications can cause low blood sugar?: Yes  Medication Skills Assessment Completed:: Yes  Assessment indicates:: Adequate understanding  Area of need?:  Yes    Diabetes Disease Process/Treatment Options  Diabetes Type?: Type I  Diabetes Disease Process/Treatment Options: Skills Assessment Completed: No  Assessment indicates:: Adequate understanding  Deferred due to:: Other (comment) (previously completed, there has been no change and patient has adequate understanding)  Area of need?: No    Nutrition/Healthy Eating  Meal Plan 24 Hour Recall - Breakfast: skipped  Meal Plan 24 Hour Recall - Lunch: sandwich  Meal Plan 24 Hour Recall - Dinner: dinner is about 11:30pm could be anything  Meal Plan 24 Hour Recall - Snack: nuts  Meal Plan 24 Hour Recall - Beverage: coffee, deit coke, occassionally water  Who shops/cooks?: self or   Patient can identify foods that impact blood sugar.: yes  Challenges to healthy eating:: other (see comments) (eating late at night and skipping meals)  Nutrition/Healthy Eating Skills Assessment Completed:: No  Assessment indicates:: Adequate understanding  Deffered due to:: Other (comment) (previously completed, there has been no change and patient has adequate understanding)  Area of need?: No    Physical Activity/Exercise  Patient's daily activity level:: moderately active  Patient formally exercises outside of work.: yes  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)  Patient can identify forms of physical activity.: yes  Physical Activity/Exercise Skills Assessment Completed: : No  Assessment indicates:: Adequate understanding  Deffered due to:: Other (comment) (previously completed, there has been no change and patient has adequate understanding)  Area of need?: No    Home Blood Glucose Monitoring  Patient states that blood sugar is checked at home daily.: yes  Monitoring Method:: personal continuous glucose monitor  Personal CGM type:: Dexcom G6   What is your current Time in Range?: 58%  What is your A1c Target?: 7.0 without hypoglycemia  Home Blood Glucose Monitoring Skills Assessment Completed: :  No  Assessment indicates:: Adequate understanding  Deferred due to:: Other (comment) (previously completed, there has been no change and patient has adequate understanding)  Area of need?: No    Acute Complications  Have you ever had hypoglycemia (low BG 70 or less)?: yes  How often and what are your symptoms?: recently felt terrible shaky, feeling faint, heart racing, panicy, terrible feeling  How do you treat hypoglycemia?: drank juice, ate sweets, used syrup  Have you ever had hyperglycemia (high  or more)?: no   Do you know the symptoms of high blood sugar and how to treat: tired, take insulin  Have you ever had DKA?: no  Do you ever test for ketones?: no  Do you have a sick day plan?: no  Acute Complications Skills Assessment Completed: : No  Assessment indicates:: Adequate understanding  Deffered due to:: Other (comment) (previously completed, there has been no change and patient has adequate understanding)  Area of need?: No    Chronic Complications  Reviewed health maintenance: yes  Has your doctor examined your feet?: no  Chronic Complications Skills Assessment Completed: : No  Assessment indicates:: Adequate understanding  Deferred due to:: Other (comment) (previously completed, there has been no change and patient has adequate understanding)  Area of need?: No      During today's follow-up visit,  the following areas required further assessment and content was provided/reviewed.    Based on today's diabetes care assessment, the following areas of need were identified:      Identified Areas of Need      Medication/Current Diabetes Treatment: Yes, patient adjusted ICR to 1:15 from 1:8 and ISF to 1:45 from 1:35    Original Settings  Basal:  12AM: 1.15 units/hr     Max basal rate: 5 u/hr     Bolus:  CHO ratio: 1:8  ISF: 1:35  Glucose target : 120 mg/dL  Correct  over: 120mg/dl  Active insulin time: 3.0 hours  Max bolus: 30 units    Patient getting 26.2 units from basal and 10.7 units from boluses in  automated mode  Staying in automated mode 99% of the time    Comparison of previous CGM data 12/31/2024 to current    Average Glucose 182 improved from 230  Standard Deviation 76 improved from 92  GMI 7.8 % improved from 8.8 %    Time in Range  18% of time patient was in Very High Range improved from 41%  23% of time patient was in High Range no change from 23%  58% of time patient was in Range improved from 34%  1% of time patient was in Low Range improved from 2%  <1% of time patient was in Very Low Range no change from <1%     Lifestyle Coping/Support: No   Diabetes Disease Process/Treatment Options: No   Nutrition/Healthy Eating: No    Physical Activity/Exercise: No    Home Blood Glucose Monitoring: No    Acute Complications: No    Chronic Complications: No       Today's interventions were provided through individual discussion, instruction, and written materials were provided.    Patient verbalized understanding of instruction and written materials.  Pt was able to return back demonstration of instructions today. Patient understood key points, needs reinforcement and further instruction.     Diabetes Self-Management Care Plan Review and Evaluation of Progress:    During today's follow-up Bhavna's Diabetes Self-Management Care Plan progress was reviewed and progress was evaluated including his/her input. Bhavna has agreed to continue his/her journey to improve/maintain overall diabetes control by continuing to set health goals. See care plan progress below.      Care Plan: Diabetes Management   Updates made since 1/8/2024 12:00 AM        Problem: Healthy Eating         Goal: Eat 2-3 meals daily with 30-45g/2-3 servings of Carbohydrate per meal for the next 6 months    Start Date: 9/10/2024   Expected End Date: 3/10/2025   This Visit's Progress: On track   Recent Progress: On track   Priority: High   Barriers: No Barriers Identified        Task: Reviewed the sources and role of Carbohydrate, Protein, and Fat and  how each nutrient impacts blood sugar. Completed 9/10/2024        Task: Provided visual examples using dry measuring cups, food models, and other familiar objects such as computer mouse, deck or cards, tennis ball etc. to help with visualization of portions. Completed 9/10/2024        Task: Explained how to count carbohydrates using the food label and the use of dry measuring cups for accurate carb counting. Completed 9/10/2024        Task: Discussed strategies for choosing healthier menu options when dining out. Completed 9/10/2024        Task: Recommended replacing beverages containing high sugar content with noncaloric/sugar free options and/or water. Completed 9/10/2024        Task: Review the importance of balancing carbohydrates with each meal using portion control techniques to count servings of carbohydrate and label reading to identify serving size and amount of total carbs per serving. Completed 9/10/2024        Task: Provided Sample plate method and reviewed the use of the plate to estimate amounts of carbohydrate per meal. Completed 9/10/2024        Problem: Medications Resolved 1/7/2025        Goal: Patient Agrees to bolus using the omnipod 5 fuentes before lunch and dinner each day for the next 4 weeks Completed 1/7/2025   Start Date: 12/31/2024   Expected End Date: 1/31/2025   This Visit's Progress: Met   Recent Progress: Deferred   Priority: High   Barriers: No Barriers Identified        Task: Instructed pt on use of basic pump features ie...give a bolus, pause insulin, switch from manual to automated mode. Completed 12/31/2024        Task: Patient demonstrated ability to program Dexcom transmitter into controller and start automated limited mode. Completed 12/31/2024        Task: patient demonstrated ability to program controller, activate and insert pod using aseptic technique. Completed 12/31/2024        Task: Explained Smart Adjust Technology Completed 12/31/2024        Task: Reviewed and reconciled  current medication list today. Completed 12/31/2024        Task: Reviewed features available in manual mode verses automated mode. Completed 12/31/2024        Task: Reviewed when and how to use activity function in automated mode. Completed 12/31/2024        Task: Reviewed site selection of pods, rotation of sites and hard stop to change pod every 72 hrs. Completed 12/31/2024        Task: Instructed that insulin vial is good out of refrigeration for up to 28-30 days. Completed 12/31/2024        Task: Reviewed treatment of hypoglycemia, hyperglycemia; sick day care, DKA, and troubleshooting of pump. Completed 12/31/2024        Task: Advised to carry back-up supplies with them and discussed steps to take in case of connection loss. Completed 12/31/2024        Task: Details of pump therapy were covered including controller/fuentes features and programming and pod activation Completed 12/31/2024        Task: Reviewed pod site selection and rotation and automatic pod priming and insertion Completed 12/31/2024        Task: Reviewed setting & editing basal rates in manual mode, giving bolus and other features in the set-up menu. Completed 12/31/2024        Task: Patient provided with [employer] and [employer] usernames and passwords, also documented in chart note and blue sticky note in Epic Completed 12/31/2024        Task: Omni Pod 24-hour support line provided. Completed 12/31/2024        Task: Set up omnipod fuentes on patients phone, patient will be using fuentes rather than  Completed 12/31/2024        Task: Omnipod and Dexcom data was downloaded and reviewed with patient and provider, any necessary adjustments were made Completed 1/7/2025          Follow Up Plan     Follow up in about 4 weeks (around 2/4/2025) for General Follow-up, Insulin Pump Upload, Personal CGM Upload.    Today's care plan and follow up schedule was discussed with patient.  Bhavna verbalized understanding of the care plan, goals, and agrees to follow up  plan.        The patient was encouraged to communicate with his/her health care provider/physician and care team regarding his/her condition(s) and treatment.  I provided the patient with my contact information today and encouraged to contact me via phone or [facility] Patient Portal as needed.     Length of Visit   Total Time: 30 Minutes

## 2025-01-09 NOTE — PROCEDURES
"Bhavna Hancock is a 60 y.o. female patient.    Temp: 97.6 °F (36.4 °C) (11/25/20 0746)  Pulse: 83 (11/25/20 0746)  Resp: 14 (11/25/20 0746)  BP: 132/68 (11/25/20 0746)  SpO2: 99 % (11/25/20 0746)  Weight: 77.1 kg (170 lb) (11/24/20 0533)  Height: 5' 5" (165.1 cm) (11/24/20 0533)    PICC  Date/Time: 11/25/2020 11:00 AM  Performed by: Heidi Roberts RN  Assisting provider: Blayne Barnett RN  Consent Done: Yes  Time out: Immediately prior to procedure a time out was called to verify the correct patient, procedure, equipment, support staff and site/side marked as required  Indications: med administration and vascular access  Anesthesia: local infiltration  Local anesthetic: lidocaine 1% without epinephrine  Anesthetic Total (mL): 3  Description of findings: PICC  Preparation: skin prepped with ChloraPrep  Skin prep agent dried: skin prep agent completely dried prior to procedure  Sterile barriers: all five maximum sterile barriers used - cap, mask, sterile gown, sterile gloves, and large sterile sheet  Hand hygiene: hand hygiene performed prior to central venous catheter insertion  Location details: right brachial  Catheter type: double lumen  Catheter size: 5 Fr  Catheter Length: 33cm    Ultrasound guidance: yes  Vessel Caliber: medium and patent, compressibility normal  Vascular Doppler: not done  Needle advanced into vessel with real time Ultrasound guidance.  Guidewire confirmed in vessel.  Image recorded and saved.  Sterile sheath used.  Number of attempts: 1  Post-procedure: blood return through all ports, chlorhexidine patch and sterile dressing applied  Technical procedures used: 3CG  Specimens: No  Implants: No  Assessment: placement verified by x-ray  Complications: none          Blayne Barnett  11/25/2020    " clears

## 2025-02-04 ENCOUNTER — OFFICE VISIT (OUTPATIENT)
Dept: PODIATRY | Facility: CLINIC | Age: 65
End: 2025-02-04
Payer: COMMERCIAL

## 2025-02-04 VITALS
SYSTOLIC BLOOD PRESSURE: 145 MMHG | HEART RATE: 84 BPM | DIASTOLIC BLOOD PRESSURE: 80 MMHG | BODY MASS INDEX: 31.92 KG/M2 | WEIGHT: 162.56 LBS | HEIGHT: 60 IN

## 2025-02-04 DIAGNOSIS — Z09 POSTOPERATIVE FOLLOW-UP: ICD-10-CM

## 2025-02-04 DIAGNOSIS — M21.372 ACQUIRED LEFT FOOT DROP: ICD-10-CM

## 2025-02-04 DIAGNOSIS — M20.42 ACQUIRED HAMMERTOE OF LEFT FOOT: ICD-10-CM

## 2025-02-04 DIAGNOSIS — S90.821A BLISTER OF PLANTAR ASPECT OF RIGHT FOOT, INITIAL ENCOUNTER: ICD-10-CM

## 2025-02-04 DIAGNOSIS — L03.115 CELLULITIS OF RIGHT FOOT WITHOUT TOES: ICD-10-CM

## 2025-02-04 DIAGNOSIS — M14.672 CHARCOT JOINT OF LEFT FOOT: ICD-10-CM

## 2025-02-04 DIAGNOSIS — R73.09 HEMOGLOBIN A1C GREATER THAN 9%, INDICATING POOR DIABETIC CONTROL: ICD-10-CM

## 2025-02-04 DIAGNOSIS — L02.611 ABSCESS OF RIGHT FOOT EXCLUDING TOES: Primary | ICD-10-CM

## 2025-02-04 DIAGNOSIS — E10.42 TYPE 1 DIABETES MELLITUS WITH DIABETIC POLYNEUROPATHY: ICD-10-CM

## 2025-02-04 PROCEDURE — 99999 PR PBB SHADOW E&M-EST. PATIENT-LVL V: CPT | Mod: PBBFAC,,, | Performed by: PODIATRIST

## 2025-02-04 PROCEDURE — 99204 OFFICE O/P NEW MOD 45 MIN: CPT | Mod: S$GLB,,, | Performed by: PODIATRIST

## 2025-02-04 RX ORDER — SODIUM CHLORIDE 0.9 % (FLUSH) 0.9 %
10 SYRINGE (ML) INJECTION
Status: CANCELLED | OUTPATIENT
Start: 2025-02-05

## 2025-02-04 RX ORDER — KETOROLAC TROMETHAMINE 30 MG/ML
30 INJECTION, SOLUTION INTRAMUSCULAR; INTRAVENOUS
Status: SHIPPED | OUTPATIENT
Start: 2025-02-05 | End: 2025-02-07

## 2025-02-04 RX ORDER — CIPROFLOXACIN 750 MG/1
750 TABLET, FILM COATED ORAL EVERY 12 HOURS
Qty: 10 TABLET | Refills: 0 | Status: SHIPPED | OUTPATIENT
Start: 2025-02-04

## 2025-02-04 RX ORDER — CIPROFLOXACIN 2 MG/ML
400 INJECTION, SOLUTION INTRAVENOUS
Status: CANCELLED | OUTPATIENT
Start: 2025-02-05

## 2025-02-04 NOTE — PROGRESS NOTES
Subjective:      Patient ID: Bhavna Hancock is a 64 y.o. female.    Chief Complaint: Diabetic Foot Exam and Foot Pain (Left foot blister)    Bhavna is a 64 y.o. female who presents new to the podiatry clinic w/ c/o pain R foot w/ drainage since Fri.,being on feet all day in [place] for grandchild's dance competition.  Already had this appointment scheduled to establish care as a diabetic so has not been to see anyone in regard to cc.  States did a lot walking in tennis shoes thinks they might not been supportive. No specific H/0 trauma but huge blister following day medial R foot/ arch that has subsided but very red & painful. Taking IB prn.s.  Has collapsed arch L foot  - needs supportive shoes.    Stays very busy on feet all the time as has horses.  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  Past Medical History:   Diagnosis Date    Abdominal pain     Diabetes     GERD (gastroesophageal reflux disease)     Hx of colonic polyps     Hyperlipidemia     Hypertension     Nausea and vomiting      Patient Active Problem List   Diagnosis    History of adenomatous polyp of colon    Chronic pain of both shoulders    Bilateral rotator cuff dysfunction    Closed displaced transverse fracture of left patella    Stiffness of left knee    Hypertension    Hypothyroidism    Elevated LFTs    Type 1 diabetes mellitus with hyperglycemia    Pre-op testing    Type 1 diabetes mellitus with hypoglycemia    Hypoglycemia unawareness associated with type 1 diabetes mellitus    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 Heterozygous hemoglobin variants (HbS, HgC, etc)do  not significantly interfere with this assay.   However, presence of multiple variants may affect accuracy.     01/20/2021 8.4 (H) 4.0 - 5.6 % Final     Comment:     ADA Screening Guidelines:  5.7-6.4%  Consistent with prediabetes  >or=6.5%  Consistent with diabetes  High levels of fetal hemoglobin interfere with the HbA1C  assay. Heterozygous hemoglobin variants (HbS, HgC, etc)do  not significantly interfere with this assay.   However, presence of multiple variants may affect accuracy.       Objective:      X-Ray Foot Complete Right  Order: [id number]  Status: Final result       Visible to patient: Yes (seen)       Next appt: 02/12/2025 at 01:00 PM in Lab (LAB, [facility])       Dx: Trauma    0 Result Notes  Details    Reading Physician Reading Date Result Priority   Brody Jeong MD  [phone] 8/18/2024 STAT     Narrative & Impression  EXAMINATION:  XR FOOT COMPLETE 3 VIEW RIGHT     CLINICAL HISTORY:  . Injury, unspecified, initial encounter     TECHNIQUE:  AP, lateral, and oblique views of the right foot were performed.     COMPARISON:  None     FINDINGS:  Three views right foot.     No acute displaced fracture or dislocation of the foot.  No radiopaque foreign body.  No significant edema.  There are degenerative changes of the calcaneus.     Impression:     1. No acute displaced fracture or dislocation of the foot.        Electronically signed by:Brody Jeong MD  Date:                                            08/18/2024  Time:                                           18:39   X-Ray Foot Complete Left  Order: [id number]  Status: Final result       Visible to patient: Yes (seen)       Next appt: 02/12/2025 at 01:00 PM in Lab (LAB, [facility])       Dx: Pain    0 Result  Notes  Details    Reading Physician Reading Date Result Priority   Basilio Love III, MD  [phone]  [phone] 6/29/2021 Routine     Narrative & Impression  EXAMINATION:  XR FOOT COMPLETE 3 VIEW LEFT     CLINICAL HISTORY:  Pain, unspecified     FINDINGS:  Complete three views foot.     No fracture dislocation bone destruction seen.  There is mild DJD.  There is a flatfoot deformity and spurs on the calcaneus.     Impression:     No acute process seen.        Electronically signed by:Basilio Love MD  Date:                                            06/29/2021  Time:                                           13:53   I independently reviewed the x-ray images & there was obvious disruption of midfoot contour at the level of the distal to the navicular@ met cuneiform L as compared w/ contralateral x-ray, consistent w/ clinical appearance of asymmetry B/L MLA .    Review of Systems   Constitutional: Negative for malaise/fatigue.   Cardiovascular:  Negative for claudication and leg swelling.   Skin:  Positive for color change.   Musculoskeletal:  Positive for joint pain and myalgias. Negative for falls.   Neurological:  Positive for focal weakness, loss of balance and sensory change. Negative for numbness and weakness.   Psychiatric/Behavioral:  Positive for depression. The patient is nervous/anxious.      Physical Exam  Vitals reviewed.   Constitutional:       General: She is not in acute distress.     Appearance: She is well-developed and overweight.   Cardiovascular:      Pulses: Normal pulses.           Dorsalis pedis pulses are 2+ on the right side and 2+ on the left side.   Musculoskeletal:         General: Swelling, tenderness and signs of injury present.      Right lower leg: No edema.      Left lower leg: No edema.      Right foot: Deformity present. No Charcot foot, foot drop or prominent metatarsal heads.      Left foot: Deformity (L>>R semirigid HTs), Charcot foot, foot drop and prominent metatarsal  heads present.        Feet:    Feet:      Right foot:      Skin integrity: No blister, erythema or warmth.      Left foot:      Skin integrity: Blister and erythema present. No warmth.   Skin:     General: Skin is warm and dry.      Capillary Refill: Capillary refill takes less than 2 seconds.      Findings: Erythema and lesion present. No bruising.      Comments: Erythema R forefoot  & plantar medial arch w/ evidence of blistering extending from plantar IMS proximally along the distal MLA; no underlying fluctuance but exudate/ drainage w/ no evidence of puncture wound.  No underlying palpable fluctuance nor crepitation.  No malodor.   Neurological:      Mental Status: She is alert and oriented to person, place, and time.      Sensory: Sensory deficit present.      Motor: Abnormal muscle tone present. No weakness.      Gait: Gait abnormal.   Psychiatric:         Mood and Affect: Mood and affect normal.         Behavior: Behavior normal. Behavior is cooperative.         Assessment:      Encounter Diagnoses   Name Primary?    Abscess of right foot excluding toes Yes    Type 1 diabetes mellitus with diabetic polyneuropathy     Blister of plantar aspect of right foot, initial encounter     Charcot joint of left foot     Cellulitis of right foot without toes     Hemoglobin A1C greater than 9%, indicating poor diabetic control     Acquired hammertoe of left foot     Acquired left foot drop     Postoperative follow-up        Problem List Items Addressed This Visit          Endocrine    Type 1 diabetes mellitus with diabetic polyneuropathy    Relevant Orders    DIABETIC SHOES FOR HOME USE     Other Visit Diagnoses       Abscess of right foot excluding toes    -  Primary    Relevant Medications    ciprofloxacin HCl (CIPRO) 750 MG tablet    Other Relevant Orders    Case Request Operating Room: INCISION AND DRAINAGE, FOOT (Completed)    CBC auto differential    SURGERY SHOE FOR HOME USE    Blister of plantar aspect of right  foot, initial encounter        Relevant Orders    Case Request Operating Room: INCISION AND DRAINAGE, FOOT (Completed)    CBC auto differential    DIABETIC SHOES FOR HOME USE    SURGERY SHOE FOR HOME USE    Charcot joint of left foot        Relevant Orders    DIABETIC SHOES FOR HOME USE    Cellulitis of right foot without toes        Hemoglobin A1C greater than 9%, indicating poor diabetic control        Acquired hammertoe of left foot        Relevant Orders    DIABETIC SHOES FOR HOME USE    Acquired left foot drop        Relevant Orders    DIABETIC SHOES FOR HOME USE    Postoperative follow-up        Relevant Orders    SURGERY SHOE FOR HOME USE          Plan:       Bhavna was seen today for diabetic foot exam and foot pain.    Diagnoses and all orders for this visit:    Abscess of right foot excluding toes  -     ciprofloxacin HCl (CIPRO) 750 MG tablet; Take 1 tablet (750 mg total) by mouth every 12 (twelve) hours.  -     Vital signs, per unit routine ; Standing  -     Diet NPO; Standing  -     Case Request Operating Room: INCISION AND DRAINAGE, FOOT  -     Place in Outpatient; Standing  -     POCT glucose; Standing  -     CBC auto differential; Future  -     SURGERY SHOE FOR HOME USE    Type 1 diabetes mellitus with diabetic polyneuropathy  -     DIABETIC SHOES FOR HOME USE    Blister of plantar aspect of right foot, initial encounter  -     Vital signs, per unit routine ; Standing  -     Diet NPO; Standing  -     Case Request Operating Room: INCISION AND DRAINAGE, FOOT  -     Place in Outpatient; Standing  -     POCT glucose; Standing  -     CBC auto differential; Future  -     DIABETIC SHOES FOR HOME USE  -     SURGERY SHOE FOR HOME USE    Charcot joint of left foot  -     DIABETIC SHOES FOR HOME USE    Cellulitis of right foot without toes    Hemoglobin A1C greater than 9%, indicating poor diabetic control    Acquired hammertoe of left foot  -     DIABETIC SHOES FOR HOME USE    Acquired left foot drop  -      DIABETIC SHOES FOR HOME USE    Postoperative follow-up  -     SURGERY SHOE FOR HOME USE    Other orders  -     sodium chloride 0.9% flush 10 mL  -     ciprofloxacin (CIPRO)400mg/200ml D5W IVPB 400 mg  -     ketorolac injection 30 mg    I counseled the patient on her conditions, their implications & medical mgmt.    - Shoe inspection. Diabetic Foot Education. Patient reminded of the importance of good blood sugar control to help prevent podiatric complications of diabetes. Patient instructed on proper foot hygeine. We discussed wearing proper shoe gear, daily foot inspections, never walking w/out protective shoe gear, annual DM foot exam, sooner p.r.n..      I reviewed imaging, clinical history, & diagnosis as above & d/w the patient. I attempted to use layman's terms to educate the patient.     -Discussed shoe choice.   The importance of wearing shoes w/ adequate room in toe box to accommodate deformities were discussed.   Recommended shoes w/ adequate arch supports to alleviate abnormal pressure & improve stability of foot while walking.   Avoid flat shoes or barefoot walking as these will exacerbate or worsen symptoms.  .   -Discussed non-prescription inserts vs other MLA such as PPT arch pads added to shoe gear/ inserts.  Dispensed Silipos gel strap for L MLA & we will address R when cellulitis resolved.  -Rx DM shoes w/ custom accommodative inserts including L Charcot & R reinforced MLA.    -to OR in a.m. for I & D R foot under LA w/ MAC sedation.  Start on Cipro; will culture intraop & adjust ABX accordingly when C&S obtained.    Sx shoe dispensed R.    Nature of the procedure & anesthesia were discussed, including possible risks & complications. All questions asked were answered. There are no apparent contraindications to proposed procedure pending final medical & anesthesia clearance. Consent form is reviewed & signed.         A total of 36 mins.was spent on chart review, patient visit & documentation.

## 2025-02-05 DIAGNOSIS — M71.071 ABSCESS OF BURSA OF RIGHT FOOT: ICD-10-CM

## 2025-02-05 DIAGNOSIS — Z09 POSTOPERATIVE FOLLOW-UP: Primary | ICD-10-CM

## 2025-02-09 DIAGNOSIS — Z22.322 MRSA (METHICILLIN RESISTANT STAPH AUREUS) CULTURE POSITIVE: Primary | ICD-10-CM

## 2025-02-09 RX ORDER — SULFAMETHOXAZOLE AND TRIMETHOPRIM 800; 160 MG/1; MG/1
1 TABLET ORAL 2 TIMES DAILY
Qty: 28 TABLET | Refills: 0 | Status: SHIPPED | OUTPATIENT
Start: 2025-02-09 | End: 2025-02-23

## 2025-02-10 ENCOUNTER — TELEPHONE (OUTPATIENT)
Dept: PODIATRY | Facility: CLINIC | Age: 65
End: 2025-02-10
Payer: COMMERCIAL

## 2025-02-10 NOTE — TELEPHONE ENCOUNTER
Spoke with patient and gave her results, patient will  RX today. Patient states she is having a lot of drainage and smells really bad. Instructed patient she can add bandages to the dressing but do not remove it. Verbalized understanding and no further issues discussed. ----- Message from Tata Durham DPM sent at 2/9/2025  5:53 PM CST -----  Please tell patient she has MRSA & I added Bactrim Rx - sent to [facility].  ----- Message -----  From: [facility]  Sent: 2/7/2025  10:21 AM CST  To: Tata Durham DPM

## 2025-02-11 ENCOUNTER — OFFICE VISIT (OUTPATIENT)
Dept: PODIATRY | Facility: CLINIC | Age: 65
End: 2025-02-11
Payer: COMMERCIAL

## 2025-02-11 VITALS
BODY MASS INDEX: 27 KG/M2 | HEIGHT: 65 IN | WEIGHT: 162.06 LBS | DIASTOLIC BLOOD PRESSURE: 71 MMHG | HEART RATE: 78 BPM | SYSTOLIC BLOOD PRESSURE: 132 MMHG

## 2025-02-11 DIAGNOSIS — R73.09 HEMOGLOBIN A1C GREATER THAN 9%, INDICATING POOR DIABETIC CONTROL: ICD-10-CM

## 2025-02-11 DIAGNOSIS — Z22.322 MRSA (METHICILLIN RESISTANT STAPH AUREUS) CULTURE POSITIVE: ICD-10-CM

## 2025-02-11 DIAGNOSIS — L02.611 ABSCESS OF RIGHT FOOT EXCLUDING TOES: Primary | ICD-10-CM

## 2025-02-11 DIAGNOSIS — Z09 POSTOPERATIVE FOLLOW-UP: ICD-10-CM

## 2025-02-11 DIAGNOSIS — S90.821D BLISTER (NONTHERMAL), RIGHT FOOT, SUBSEQUENT ENCOUNTER: ICD-10-CM

## 2025-02-12 ENCOUNTER — TELEPHONE (OUTPATIENT)
Dept: PODIATRY | Facility: CLINIC | Age: 65
End: 2025-02-12
Payer: COMMERCIAL

## 2025-02-12 ENCOUNTER — TELEPHONE (OUTPATIENT)
Dept: DIABETES | Facility: CLINIC | Age: 65
End: 2025-02-12
Payer: COMMERCIAL

## 2025-02-12 DIAGNOSIS — E10.65 TYPE 1 DIABETES MELLITUS WITH HYPERGLYCEMIA: Primary | ICD-10-CM

## 2025-02-12 NOTE — TELEPHONE ENCOUNTER
Spoke with patient and confirmed that her post-op is scheduled for 2/18/25 at 1:30 pm. Verbalized understanding and no further issues discussed.

## 2025-02-12 NOTE — TELEPHONE ENCOUNTER
Yes please add A1c    It looks like I wanted her to have an A1c, CMP, CBC, TSH, urine MAC   I can put the other orders in but yes we wanted all of that done

## 2025-02-18 ENCOUNTER — OFFICE VISIT (OUTPATIENT)
Dept: PODIATRY | Facility: CLINIC | Age: 65
End: 2025-02-18
Payer: COMMERCIAL

## 2025-02-18 ENCOUNTER — TELEPHONE (OUTPATIENT)
Dept: DIABETES | Facility: CLINIC | Age: 65
End: 2025-02-18
Payer: COMMERCIAL

## 2025-02-18 ENCOUNTER — RESULTS FOLLOW-UP (OUTPATIENT)
Dept: DIABETES | Facility: CLINIC | Age: 65
End: 2025-02-18

## 2025-02-18 VITALS
HEART RATE: 92 BPM | BODY MASS INDEX: 26.45 KG/M2 | SYSTOLIC BLOOD PRESSURE: 149 MMHG | DIASTOLIC BLOOD PRESSURE: 71 MMHG | HEIGHT: 65 IN | WEIGHT: 158.75 LBS

## 2025-02-18 DIAGNOSIS — Z51.89 VISIT FOR WOUND CHECK: Primary | ICD-10-CM

## 2025-02-18 DIAGNOSIS — Z09 POSTOPERATIVE FOLLOW-UP: ICD-10-CM

## 2025-02-18 DIAGNOSIS — S91.301D OPEN WOUND OF PLANTAR ASPECT OF FOOT, RIGHT, SUBSEQUENT ENCOUNTER: ICD-10-CM

## 2025-02-18 DIAGNOSIS — L02.611 ABSCESS OF RIGHT FOOT EXCLUDING TOES: ICD-10-CM

## 2025-02-18 DIAGNOSIS — Z22.322 MRSA (METHICILLIN RESISTANT STAPH AUREUS) CULTURE POSITIVE: ICD-10-CM

## 2025-02-18 DIAGNOSIS — E10.42 TYPE 1 DIABETES MELLITUS WITH DIABETIC POLYNEUROPATHY: ICD-10-CM

## 2025-02-18 PROCEDURE — 99213 OFFICE O/P EST LOW 20 MIN: CPT | Mod: 25,S$GLB,, | Performed by: PODIATRIST

## 2025-02-18 PROCEDURE — 99999 PR PBB SHADOW E&M-EST. PATIENT-LVL IV: CPT | Mod: PBBFAC,,, | Performed by: PODIATRIST

## 2025-02-18 PROCEDURE — 11042 DBRDMT SUBQ TIS 1ST 20SQCM/<: CPT | Mod: S$GLB,,, | Performed by: PODIATRIST

## 2025-02-18 NOTE — PROGRESS NOTES
Subjective:      Patient ID: Bhavna Hancock is a 64 y.o. female.    Chief Complaint: Post-op Evaluation    Patient is PO I&D R foot abscess s/p blister, DOS 2/5/25, POD 13 days. Initially placed on Cipro; cultures + for MRSA so placed on Bactrim DS bid per C&S. WBC is improving. A1c also improving from 9.4% 12/17 to 8.0% yesterday. Has  suture disruption medial 1st met.head area last visit, maintained w/ Steri-strips. Was to continue in orthowedge shoe & maintain R foot CDI. Pain & swelling significantly reduced.    CBC W/ AUTO DIFFERENTIAL  Order: [id number]   Status: Final result       Next appt: 02/18/2025 at 01:45 PM in Podiatry (Tata Durham DPM)       Dx: Postoperative follow-up; Abscess of b...    Test Result Released: Yes (seen)    0 Result Notes            Component  Ref Range & Units (hover) 12:54  (2/17/25) 12 d ago  (2/5/25) 2 mo ago  (12/17/24) 3 yr ago  (2/16/22) 3 yr ago  (4/2/21) 3 yr ago  (3/16/21) 3 yr ago  (3/9/21)   WBC 7.13 9.59 5.96 4.73 4.20 4.90 5.30   RBC 4.30 3.31 Low  4.42 4.50 4.18 4.34 4.25   Hemoglobin 12.2 9.7 Low  12.7 12.6 11.1 Low  11.2 Low  11.3 Low    Hematocrit 39.4 29.4 Low  40.2 39.7 34.1 Low  35.4 Low  34.9 Low    MCV 92 89 91 88 82 82 82   MCH 28.4 29.3 28.7 28.0 26.5 Low  25.9 Low  26.5 Low    MCHC 31.0 Low  33.0 31.6 Low  31.7 Low  32.5 31.7 Low  32.4   RDW 13.0 12.6 12.8 13.5 14.8 High  14.6 High  15.4 High    Platelets 500 High  274 252 202 258 390 High  R 504 High  R   MPV 10.6 10.4 10.9 10.9 9.6 9.1 Low  8.7 Low    Immature Granulocytes 0.3 1.4 High  0.2 0.4      Gran # (ANC) 4.3 7.4 3.7 2.8 2.0 1.8 2.2   Immature Grans (Abs) 0.02 0.13 High  CM 0.01 CM 0.02 CM      Comment: Mild elevation in immature granulocytes is non specific and  can be seen in a variety of conditions including stress response,  acute inflammation, trauma and pregnancy. Correlation with other  laboratory and clinical findings is essential.   Lymph # 1.8 0.9 Low  1.4 1.2 1.5 2.2 2.2   Mono # 0.5 1.0  0.5 0.4 0.3 0.6 0.5   Eos # 0.3 0.1 0.3 0.3 0.4 0.3 0.4   Baso # 0.11 0.04 0.05 0.06 0.10 0.10 0.10   nRBC 0 0 0 0      Gran % 60.6 76.7 High  61.9 58.6 46.5 35.6 Low  41.1   Lymph % 25.8 9.7 Low  22.8 26.0 34.8 45.0 41.3   Mono % 7.3 10.4 8.9 7.6 7.7 11.8 9.2   Eosinophil % 4.5 1.4 5.4 6.1 9.1 High  5.9 6.6   Basophil % 1.5 0.4 0.8 1.3 1.9 1.7 1.8   Differential Method Automated Automated Automated Automated Automated Automated Automated   Resulting Agency [employer] [facility]        Specimen Collected: 02/17/25 12:54 CST Last Resulted: 02/17/25 13:45 CST     2/11/25  Patient is here PO I&D R foot abscess, DOS 2/5/25, POD 6 days. Cultures + for MRSA so was placed on Bactrim DS bid per C&S. States feels much better int hat swelling & related pain resolved. Kept dressing CDI. In orthowedge shoe.  No leukocytosis per lab range, but WBC elevated compared to previous levels - will monitor.    Contains abnormal data CBC auto differential  Order: [id number]   Status: Final result       Next appt: 02/18/2025 at 01:45 PM in Podiatry (Tata Durham DPM)    Test Result Released: Yes (seen)    0 Result Notes   important suggestion  Newer results are available. Click to view them now.               Component  Ref Range & Units (hover) 12 d ago  (2/5/25) 2 mo ago  (12/17/24) 3 yr ago  (2/16/22) 3 yr ago  (4/2/21) 3 yr ago  (3/16/21) 3 yr ago  (3/9/21) 4 yr ago  (2/17/21)   WBC 9.59 5.96 4.73 4.20 4.90 5.30 4.10   RBC 3.31 Low  4.42 4.50 4.18 4.34 4.25 3.94 Low    Hemoglobin 9.7 Low  12.7 12.6 11.1 Low  11.2 Low  11.3 Low  10.6 Low    Hematocrit 29.4 Low  40.2 39.7 34.1 Low  35.4 Low  34.9 Low  32.4 Low    MCV 89 91 88 82 82 82 82   MCH 29.3 28.7 28.0 26.5 Low  25.9 Low  26.5 Low  26.9 Low    MCHC 33.0 31.6 Low  31.7 Low  32.5 31.7 Low  32.4 32.6   RDW 12.6 12.8 13.5 14.8 High  14.6 High  15.4 High  15.2 High    Platelets 274 252 202 258 390 High  R 504 High  R 316 R    MPV 10.4 10.9 10.9 9.6 9.1 Low  8.7 Low  8.6 Low    Immature Granulocytes 1.4 High  0.2 0.4       Gran # (ANC) 7.4 3.7 2.8 2.0 1.8 2.2 1.6 Low    Immature Grans (Abs) 0.13 High  0.01 CM 0.02 CM       Comment: Mild elevation in immature granulocytes is non specific and  can be seen in a variety of conditions including stress response,  acute inflammation, trauma and pregnancy. Correlation with other  laboratory and clinical findings is essential.   Lymph # 0.9 Low  1.4 1.2 1.5 2.2 2.2 1.5   Mono # 1.0 0.5 0.4 0.3 0.6 0.5 0.4   Eos # 0.1 0.3 0.3 0.4 0.3 0.4 0.5   Baso # 0.04 0.05 0.06 0.10 0.10 0.10 0.10   nRBC 0 0 0       Gran % 76.7 High  61.9 58.6 46.5 35.6 Low  41.1 39.2   Lymph % 9.7 Low  22.8 26.0 34.8 45.0 41.3 37.4   Mono % 10.4 8.9 7.6 7.7 11.8 9.2 10.7   Eosinophil % 1.4 5.4 6.1 9.1 High  5.9 6.6 11.1 High    Basophil % 0.4 0.8 1.3 1.9 1.7 1.8 1.6   Differential Method Automated Automated Automated Automated Automated Automated Automated   Resulting Agency [facility] [facility]             Specimen Collected: 02/05/25 13:45 CST Last Resulted: 02/05/25 14:00 CST     2/4/25  Bhavna is a 64 y.o. female who presents new to the podiatry clinic w/ c/o pain R foot w/ drainage since Fri.,being on feet all day in [place] for grandchild's dance competition.  Already had this appointment scheduled to establish care as a diabetic so has not been to see anyone in regard to cc.  States did a lot walking in tennis shoes thinks they might not been supportive. No specific H/0 trauma but huge blister following day medial R foot/ arch that has subsided but very red & painful. Taking IB prn.s.  Has collapsed arch L foot  - needs supportive shoes.    Stays very busy on feet all the time as has horses.  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 mgmt.: Nadja Valadez NP 12/17/24 due 2/19/25    L  knee surgery x 5 in 2020 for fungal infection s/p knee cap surgery.  Past Medical History:   Diagnosis Date    Abdominal pain     Diabetes     GERD (gastroesophageal reflux disease)     Hx of colonic polyps     Hyperlipidemia     Hypertension     Nausea and vomiting      Patient Active Problem List   Diagnosis    History of adenomatous polyp of colon    Chronic pain of both shoulders    Bilateral rotator cuff dysfunction    Closed displaced transverse fracture of left patella    Stiffness of left knee    Hypertension    Hypothyroidism    Elevated LFTs    Type 1 diabetes mellitus with hyperglycemia    Pre-op testing    Type 1 diabetes mellitus with hypoglycemia    Hypoglycemia unawareness associated with type 1 diabetes mellitus    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 Heterozygous hemoglobin variants (HbS, HgC, etc)do  not significantly interfere with this assay.   However, presence of multiple variants may affect accuracy.     12/17/2024 9.4 (H) 4.0 - 5.6 % Final     Comment:     ADA Screening Guidelines:  5.7-6.4%  Consistent with prediabetes  >or=6.5%  Consistent with diabetes    High levels of fetal hemoglobin interfere with the HbA1C  assay. Heterozygous hemoglobin variants (HbS, HgC, etc)do  not significantly interfere with this assay.   However, presence of multiple variants may affect accuracy.     01/20/2021 8.4 (H) 4.0 - 5.6 % Final     Comment:     ADA Screening Guidelines:  5.7-6.4%  Consistent with  prediabetes  >or=6.5%  Consistent with diabetes  High levels of fetal hemoglobin interfere with the HbA1C  assay. Heterozygous hemoglobin variants (HbS, HgC, etc)do  not significantly interfere with this assay.   However, presence of multiple variants may affect accuracy.       Objective:      X-Ray Foot Complete Right  Order: [id number]  Status: Final result       Visible to patient: Yes (seen)       Next appt: 02/12/2025 at 01:00 PM in Lab (LAB, [facility])       Dx: Trauma    0 Result Notes  Details    Reading Physician Reading Date Result Priority   Brody Jeong MD  [phone] 8/18/2024 STAT     Narrative & Impression  EXAMINATION:  XR FOOT COMPLETE 3 VIEW RIGHT     CLINICAL HISTORY:  . Injury, unspecified, initial encounter     TECHNIQUE:  AP, lateral, and oblique views of the right foot were performed.     COMPARISON:  None     FINDINGS:  Three views right foot.     No acute displaced fracture or dislocation of the foot.  No radiopaque foreign body.  No significant edema.  There are degenerative changes of the calcaneus.     Impression:     1. No acute displaced fracture or dislocation of the foot.        Electronically signed by:Brody Jeong MD  Date:                                            08/18/2024  Time:                                           18:39   X-Ray Foot Complete Left  Order: [id number]  Status: Final result       Visible to patient: Yes (seen)       Next appt: 02/12/2025 at 01:00 PM in Lab (LAB, [facility])       Dx: Pain    0 Result Notes  Details    Reading Physician Reading Date Result Priority   Basilio Love III, MD  [phone]  [phone] 6/29/2021 Routine     Narrative & Impression  EXAMINATION:  XR FOOT COMPLETE 3 VIEW LEFT     CLINICAL HISTORY:  Pain, unspecified     FINDINGS:  Complete three views foot.     No fracture dislocation bone destruction seen.  There is mild DJD.  There is a flatfoot deformity and spurs on the calcaneus.     Impression:     No acute process seen.         Electronically signed by:Basilio Love MD  Date:                                            06/29/2021  Time:                                           13:53   I independently reviewed the x-ray images & there was obvious disruption of midfoot contour at the level of the distal to the navicular@ met cuneiform L as compared w/ contralateral x-ray, consistent w/ clinical appearance of asymmetry B/L MLA .    Review of Systems   Constitutional: Negative for malaise/fatigue.   Cardiovascular:  Negative for claudication and leg swelling.   Skin:  Positive for color change.   Musculoskeletal:  Positive for joint pain and myalgias. Negative for falls.   Neurological:  Positive for focal weakness, loss of balance and sensory change. Negative for numbness and weakness.   Psychiatric/Behavioral:  Positive for depression. The patient is nervous/anxious.      Physical Exam  Vitals reviewed.   Constitutional:       General: She is not in acute distress.     Appearance: She is well-developed and overweight.   Cardiovascular:      Pulses: Normal pulses.           Dorsalis pedis pulses are 2+ on the right side and 2+ on the left side.   Musculoskeletal:         General: Tenderness and signs of injury present. No swelling.      Right lower leg: No edema.      Left lower leg: No edema.      Right foot: Deformity present. No Charcot foot, foot drop or prominent metatarsal heads.      Left foot: Deformity (L>>R semirigid HTs), Charcot foot, foot drop and prominent metatarsal heads present.        Feet:    Feet:      Right foot:      Skin integrity: No blister, erythema or warmth.      Left foot:      Skin integrity: Blister (PO) present. No erythema or warmth.   Skin:     General: Skin is warm and dry.      Capillary Refill: Capillary refill takes less than 2 seconds.      Findings: Abscess (PO) and lesion present. No bruising or erythema.      Comments: Resolution of erythema R forefoot  & plantar medial arch  No residual  underlying fluctuance nor edema.  Sutures intact along incision except for medial 1st met head - pull through in area of greatest injury; no active exudate nor drainage. No malodor. Wound disruption along 2 cm length, approximated w/ steri-strips.   Neurological:      Mental Status: She is alert and oriented to person, place, and time.      Sensory: Sensory deficit present.      Motor: Abnormal muscle tone present. No weakness.      Gait: Gait abnormal (orthowedge shoe R).   Psychiatric:         Mood and Affect: Mood and affect normal.         Behavior: Behavior normal. Behavior is cooperative.     t    Assessment:      Encounter Diagnoses   Name Primary?    Abscess of right foot excluding toes     MRSA (methicillin resistant staph aureus) culture positive     Postoperative follow-up     Type 1 diabetes mellitus with diabetic polyneuropathy     Visit for wound check Yes    Open wound of plantar aspect of foot, right, subsequent encounter        Problem List Items Addressed This Visit          Endocrine    Type 1 diabetes mellitus with diabetic polyneuropathy     Other Visit Diagnoses         Visit for wound check    -  Primary      Abscess of right foot excluding toes          MRSA (methicillin resistant staph aureus) culture positive          Postoperative follow-up          Open wound of plantar aspect of foot, right, subsequent encounter        Relevant Orders    Wound Debridement R medial 1st ray          Plan:       Bhavna was seen today for post-op evaluation.    Diagnoses and all orders for this visit:    Visit for wound check    Abscess of right foot excluding toes    MRSA (methicillin resistant staph aureus) culture positive    Postoperative follow-up    Type 1 diabetes mellitus with diabetic polyneuropathy    Open wound of plantar aspect of foot, right, subsequent encounter  -     Wound Debridement R medial 1st ray      I counseled the patient on her conditions, their implications & medical mgmt.    - Shoe  inspection. Diabetic Foot Education. Patient reminded of the importance of good blood sugar control to help prevent podiatric complications of diabetes. Patient instructed on proper foot hygeine. We discussed wearing proper shoe gear, daily foot inspections, never walking w/out protective shoe gear, annual DM foot exam, sooner p.r.n..      Continue Silipos gel strap for L MLA & we will address R when healed.  -Pending Rx DM shoes w/ custom accommodative inserts including L Charcot & R reinforced MLA.    -Continue Bactrim DS.    -Wound debrided. Steri-Strips applied & to be maintained.  Betadine wet-dry dressing applied R. To be kept CDI. May change qod.  WBAT in orthowedge shoe.    F/U 7-10 days, sooner prn.        A total of 30 mins.was spent on chart review, patient visit & documentation.

## 2025-02-18 NOTE — PROGRESS NOTES
CC:   Chief Complaint   Patient presents with    Diabetes Mellitus       HPI: Bhavna Hancock is a 64 y.o. female presents for a follow up visit today for the management of T1DM   She has been seen before by hospital endocrinology during that admission in 2021--for a left knee wound after ORIF --endocrinology manage her diabetes during that admission--no discharge follow-up      She  was diagnosed with Type 1 diabetes in 1990's after she had an 11lb baby in 1987. Started on       Family hx of diabetes: son ( dx with T1DM at 5 years old), daughter diagnosed at age 7 with T1DM, granddaughter was 11 years old   Hospitalized for diabetes: denies   Insulin therapy: since diagnosis       C-peptide level 1/2021--0.11      No personal or FH of thyroid cancer or personal of pancreatic cancer or pancreatitis.       TSH above goal == + hair loss-- LT4 increased to 88 mcg daily       Our last visit was in December of 2024  At that visit we adjusted her insulin  Had her meet with diabetes education for an insulin pump evaluation  She decided to move forward with the Omnipod 5 after meeting with diabetes education  She started her insulin pump on 12/31/2024  She followed up with diabetes education on 1/7/2025  Her A1c has gone from 9.4% to 8%  See attached Dexcom and Omnipod download which shows her readings are improving but are still above goal  She is liking the Omnipod 5 pump  She did have some issues with hypoglycemia when she 1st started with it and she self  Adjusted her insulin pump settings  She significantly cut back on her insulin to carbohydrate ratio from 1:8 to 1:15  Adjusted her correction factor from 1:35 to 1:45  Her numbers are running above goal  Does have prandial excursions                                  DIABETES COMPLICATIONS: retinopathy and peripheral neuropathy      Diabetes Management Status    ASA:  Yes - 81 mg daily     Statin: Taking--Crestor 10 mg  ACE/ARB: Taking--lisinopril 2.5 mg     The  "ASCVD Risk score (Greg CALLE, et al., 2019) failed to calculate for the following reasons:    Cannot find a previous HDL lab    Cannot find a previous total cholesterol lab      Screening or Prevention Patient's value Goal Complete/Controlled?   HgA1C Testing and Control   Lab Results   Component Value Date    HGBA1C 8.0 (H) 02/17/2025      Annually/Less than 8% No   Lipid profile : 08/26/2024 Annually No   LDL control No results found for: "LDLCALC" Annually/Less than 100 mg/dl  No   Nephropathy screening Lab Results   Component Value Date    LABMICR 7.0 02/17/2025     Lab Results   Component Value Date    PROTEINUA Negative 01/27/2021    Annually No   Blood pressure BP Readings from Last 1 Encounters:   02/19/25 121/60    Less than 140/90 Yes   Dilated retinal exam Most Recent Eye Exam Date: Not Found Annually Yes   Foot exam   : 10/08/2024 Annually Yes       CURRENT A1C:    Hemoglobin A1C   Date Value Ref Range Status   02/17/2025 8.0 (H) 4.0 - 5.6 % Final     Comment:     ADA Screening Guidelines:  5.7-6.4%  Consistent with prediabetes  >or=6.5%  Consistent with diabetes    High levels of fetal hemoglobin interfere with the HbA1C  assay. Heterozygous hemoglobin variants (HbS, HgC, etc)do  not significantly interfere with this assay.   However, presence of multiple variants may affect accuracy.     12/17/2024 9.4 (H) 4.0 - 5.6 % Final     Comment:     ADA Screening Guidelines:  5.7-6.4%  Consistent with prediabetes  >or=6.5%  Consistent with diabetes    High levels of fetal hemoglobin interfere with the HbA1C  assay. Heterozygous hemoglobin variants (HbS, HgC, etc)do  not significantly interfere with this assay.   However, presence of multiple variants may affect accuracy.     01/20/2021 8.4 (H) 4.0 - 5.6 % Final     Comment:     ADA Screening Guidelines:  5.7-6.4%  Consistent with prediabetes  >or=6.5%  Consistent with diabetes  High levels of fetal hemoglobin interfere with the HbA1C  assay. Heterozygous " hemoglobin variants (HbS, HgC, etc)do  not significantly interfere with this assay.   However, presence of multiple variants may affect accuracy.         GOAL A1C:  6.5% without hypoglycemia    DM MEDICATIONS USED IN THE PAST:  Lantus, Humalog, lispro   Metformin  Dexcom G7   Toujeo   Omnipod 5         CURRENT DIABETES MEDICATIONS:  Metformin 500 mg daily   Omnipod 5 with Humalog   Basal: 1.15 units/hr   ICR 1:15- she adjusted from 1:8 herself due to hypoglycemia   ISF: 1:45-- she self adjusted herself due to hypoglycemia   Target: 120  IOB: 3 hours        TDD- 41.5 units   80% basal   20% bolus     Carbs -84.5 grams   Boluses per day -2.2     Automated mode- 92%   2% automated limited   8% manual mode        Using her phone as the    Pump supplies- pharmacy         BLOOD GLUCOSE MONITORING:    Sensor type: Dexcom G6  Average BG readin  Time in range: 49%   34% high   17% very high   0% low   <1% very low   Site change:  q10 days  Estimated A1c 8%    2 weeks prior average blood sugar 233 in range 35% of the time estimated A1c 8.9%        Supplies from pharmacy       Sensor was downloaded in clinic today and reviewed with patient.   Please see attached document for download.         HYPOGLYCEMIA:  Yes  0% low   <1% low   2 weeks prior --0% low and <1% very low  Has gone as low as 30  Glucagon kit: Baqsimi   Medic alert bracelet: yes         MEALS: eating 2 meals per day   Feels confident with CHO counting   BF: skips  Lunch: Noon- sandwich   Dinner: 1130 PM   Snack:5 PM -- nuts or pecans   Drinks: coffee   Diet coke   Water - a little        CURRENT EXERCISE:  No formal -- has horses       Review of Systems  Review of Systems   Constitutional:  Negative for appetite change, fatigue and unexpected weight change.   HENT:  Negative for trouble swallowing.    Eyes:  Positive for visual disturbance.   Respiratory:  Negative for shortness of breath.    Cardiovascular:  Negative for chest pain.    Gastrointestinal:  Negative for nausea.   Endocrine: Negative for polydipsia, polyphagia and polyuria.   Genitourinary:         No Nocturia    Skin:  Positive for wound. Rash: right side of her foot - inner side - blister- had surgery.  Neurological:  Positive for numbness.   Psychiatric/Behavioral:  Positive for dysphoric mood.        Physical Exam   Physical Exam  Vitals and nursing note reviewed.   Constitutional:       General: She is not in acute distress.     Appearance: She is well-developed. She is not ill-appearing.      Comments: Overweight female   HENT:      Head: Normocephalic and atraumatic.      Right Ear: External ear normal.      Left Ear: External ear normal.      Nose: Nose normal.   Neck:      Thyroid: No thyromegaly.      Trachea: No tracheal deviation.   Cardiovascular:      Rate and Rhythm: Normal rate and regular rhythm.      Heart sounds: No murmur heard.  Pulmonary:      Effort: Pulmonary effort is normal. No respiratory distress.      Breath sounds: Normal breath sounds.   Abdominal:      Palpations: Abdomen is soft.      Tenderness: There is no abdominal tenderness.      Hernia: No hernia is present.   Musculoskeletal:      Cervical back: Normal range of motion and neck supple.   Skin:     General: Skin is warm and dry.      Capillary Refill: Capillary refill takes less than 2 seconds.      Findings: Wound (right foot wound- following with podiatry) present. No rash.      Comments: Omnipod 5  sites and dexcom G6 sites are normal appearing. No lipo hypertropthy or atrophy     Neurological:      Mental Status: She is alert and oriented to person, place, and time.      Cranial Nerves: No cranial nerve deficit.   Psychiatric:         Mood and Affect: Mood normal.         Behavior: Behavior normal.         Judgment: Judgment normal.               FOOT EXAMINATION: post op shoe to right foot s/p right foot surgery yesterday   Dressing in place   Tennis shoe to left foot           Lab Results    Component Value Date    TSH 2.582 02/17/2025           Type 1 diabetes mellitus with hyperglycemia  Uncontrolled  + prandial excursions  Fears hypoglycemia      -- Medication Changes:    Okay to continue the metformin 500 mg daily     Continue the Omnipod 5   Will try to change the Humalog to Fiasp   Basal: 1.15 units/hr   ICR 1:12-- discussed that we may need to cut this back to 1:11 or even 1:10  ISF: 1:45---discussed that we may need to adjust this to 1:40  Target: 120  IOB: 3 hours       Long discussion with patient about pump optimization  Discussed hitting the you sensor button  Stay in auto mode 100% of the time  Bolus before meals---discussed with the Humalog she needs to be bolusing at least 15-20 minutes before the meal  If we are able to switch over to the Humalog she can bolus right before the meal    -- Reviewed goals of therapy are to get the best control we can without hypoglycemia.  -- Reviewed patient's current insulin regimen. Clarified proper insulin dose and timing in relation to meals, etc. Insulin injection sites and proper rotation instructed.    -- Advised frequent self blood glucose monitoring.  Patient encouraged to document glucose results and bring them to every clinic visit. Rx for dexcom G6 to pharmacy -- continue--on the Omnipod 5 and using her I phone  -- Hypoglycemia precautions discussed. Instructed on precautions before driving.    -- Call for Bg repeatedly < 70 or > 180.   -- Close adherence to lifestyle changes recommended.   -- Periodic follow ups for eye evaluations, foot care and dental care suggested.  -- Refer to diabetes education-- follow up with diabetes education in 5-6 weeks for an insulin pump download and review---may need to make setting changes      Patient has diabetes mellitus and manages diabetes with intensive insulin regimen and uses prandial and basal insulin daily  Patient requires a therapeutic CGM and is willing to use therapeutic CGM for the necessary  frequent adjustments of insulin therapy.  I have completed an in-person visit during the previous 6 months and will continue to have in-person visits every 6 months to assess adherence to their CGM regimen and diabetes treatment plan.  Due to COVID pandemic and need for remote monitoring this tool is medically necessary          Type 1 diabetes mellitus with hypoglycemia  Has baqismi   Continue Dexcom G6   Remain in automated mode  Reviewed hypoglycemia management: Treat with 1/2 glass of juice, 1/2 can regular coke, or 4 glucose tablets.   Monitor and repeat treatment every 15 minutes until BG is >70   Then have a snack, which includes a complex carbohydrate and protein.      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   See above.   Follow-up with optometry    Type 1 diabetes mellitus with diabetic polyneuropathy  Optimize BG readings.   See above.   Continue to follow-up with Podiatry    Educated patient to check feet daily for any foreign objects and/or wounds. Discussed with patient the importance of wearing appropriate footwear at all times, not to walk barefoot ever, and to check shoes before putting them on feet. Instructed patient to keep feet dry by regularly changing shoes and socks and drying feet after baths and exercises. Also, instructed patient to report any new lesions, discolorations, or swelling to a healthcare professional.      Hypertension  BP goal is < 140/90.   Tolerating  ACEi  Controlled   Blood pressure goals discussed with patient      Dyslipidemia, goal LDL below 100  On statin per ADA recommendations  LDL goal < 100. LDL at goal. LFTs WNL. Continue statin.       Hypothyroidism  TSH level within normal limits  On LT4 88 mcg----taking 1 capsule daily   reinforced take by itself on empty stomach, first thing in the morning  and wait at least 30-60 minutes to eat, drink, or take other medications.        Insulin pump in place  See above    Insulin pump fitting or adjustment  See above        Regarding her diabetic foot ulcer--continue to follow up Podiatry  Optimize blood sugar readings to facilitate healing      I spent a total of 30 minutes on the day of the visit.This includes face to face time and non-face to face time preparing to see the patient (eg, review of tests), obtaining and/or reviewing separately obtained history, documenting clinical information in the electronic or other health record, independently interpreting results and communicating results to the patient/family/caregiver, or care coordinator.        Pump backup plan    If the insulin pump is non functional and discontinued for anticipated more than 20 hours, please give daily injections of:   Long acting insulin Toujeo  38-40 units daily   Short acting insulin Humalog insulin to car ratio - units for every 15 grams of carbs for meals according to carb ratios and sensitivity factor in the pump.     When the insulin pump is restarted, do not restart basal rates until at least 22 hours after the last long acting insulin injection. You can set a 0% temporary basal setting that will last until this time and use your pump to bolus for meals and correction.     For any technical insulin pump issues, please contact the insulin pump company; the toll free number is printed on the label on the back of the insulin pump.       If your sugar is running high for a few hours and does not respond to two correction doses from the insulin pump, it may mean that you have a bad pump site and the site should be changed           Follow up in about 3 months (around 5/19/2025).  See adelina in 5-6 weeks for pump download and review- see if setting changes are needed   See me in 3 months with labs prior       Orders Placed This Encounter   Procedures    Hemoglobin A1C     Standing Status:    Future     Expected Date:   5/19/2025     Expiration Date:   8/19/2026    Comprehensive Metabolic Panel     Standing Status:   Future     Expected Date:   5/19/2025     Expiration Date:   8/19/2026    Lipid Panel     Standing Status:   Future     Expected Date:   5/19/2025     Expiration Date:   8/19/2026    TSH     Standing Status:   Future     Expected Date:   5/19/2025     Expiration Date:   8/19/2026    Ambulatory referral/consult to Diabetes Education     Standing Status:   Future     Expected Date:   2/19/2025     Expiration Date:   8/19/2026     Referral Priority:   Routine     Referral Type:   Consultation     Referral Reason:   Specialty Services Required     Referred to Provider:   Shea Keller RD, CDE     Requested Specialty:   Diabetes     Number of Visits Requested:   1       Recommendations were discussed with the patient in detail  The patient verbalized understanding and agrees with the plan outlined as above.     This note was partly generated with [employer] voice recognition software. I apologize for any possible typographical errors.

## 2025-02-18 NOTE — PROGRESS NOTES
Subjective:      Patient ID: Bhavna Hancock is a 64 y.o. female.    Chief Complaint: Post-op Evaluation    Patient is here PO I&D R foot abscess, DOS 2/5/25, POD 6 days. Cultures + for MRSA so was placed on Bactrim DS bid per C&S. States feels much better int hat swelling & related pain resolved. Kept dressing CDI. In orthowedge shoe.  No leukocytosis per lab range, but WBC elevated compared to previous levels - will monitor.    Contains abnormal data CBC auto differential  Order: [id number]   Status: Final result       Next appt: 02/18/2025 at 01:45 PM in Podiatry (Tata Durham DPM)    Test Result Released: Yes (seen)    0 Result Notes   important suggestion  Newer results are available. Click to view them now.               Component  Ref Range & Units (hover) 12 d ago  (2/5/25) 2 mo ago  (12/17/24) 3 yr ago  (2/16/22) 3 yr ago  (4/2/21) 3 yr ago  (3/16/21) 3 yr ago  (3/9/21) 4 yr ago  (2/17/21)   WBC 9.59 5.96 4.73 4.20 4.90 5.30 4.10   RBC 3.31 Low  4.42 4.50 4.18 4.34 4.25 3.94 Low    Hemoglobin 9.7 Low  12.7 12.6 11.1 Low  11.2 Low  11.3 Low  10.6 Low    Hematocrit 29.4 Low  40.2 39.7 34.1 Low  35.4 Low  34.9 Low  32.4 Low    MCV 89 91 88 82 82 82 82   MCH 29.3 28.7 28.0 26.5 Low  25.9 Low  26.5 Low  26.9 Low    MCHC 33.0 31.6 Low  31.7 Low  32.5 31.7 Low  32.4 32.6   RDW 12.6 12.8 13.5 14.8 High  14.6 High  15.4 High  15.2 High    Platelets 274 252 202 258 390 High  R 504 High  R 316 R   MPV 10.4 10.9 10.9 9.6 9.1 Low  8.7 Low  8.6 Low    Immature Granulocytes 1.4 High  0.2 0.4       Gran # (ANC) 7.4 3.7 2.8 2.0 1.8 2.2 1.6 Low    Immature Grans (Abs) 0.13 High  0.01 CM 0.02 CM       Comment: Mild elevation in immature granulocytes is non specific and  can be seen in a variety of conditions including stress response,  acute inflammation, trauma and pregnancy. Correlation with other  laboratory and clinical findings is essential.   Lymph # 0.9 Low  1.4 1.2 1.5 2.2 2.2 1.5   Mono # 1.0 0.5 0.4 0.3 0.6 0.5 0.4    Eos # 0.1 0.3 0.3 0.4 0.3 0.4 0.5   Baso # 0.04 0.05 0.06 0.10 0.10 0.10 0.10   nRBC 0 0 0       Gran % 76.7 High  61.9 58.6 46.5 35.6 Low  41.1 39.2   Lymph % 9.7 Low  22.8 26.0 34.8 45.0 41.3 37.4   Mono % 10.4 8.9 7.6 7.7 11.8 9.2 10.7   Eosinophil % 1.4 5.4 6.1 9.1 High  5.9 6.6 11.1 High    Basophil % 0.4 0.8 1.3 1.9 1.7 1.8 1.6   Differential Method Automated Automated Automated Automated Automated Automated Automated   Resulting Agency [employer] [facility]             Specimen Collected: 02/05/25 13:45 CST Last Resulted: 02/05/25 14:00 CST     2/4/25  Bhavna is a 64 y.o. female who presents new to the podiatry clinic w/ c/o pain R foot w/ drainage since Fri.,being on feet all day in [place] for grandchild's dance competition.  Already had this appointment scheduled to establish care as a diabetic so has not been to see anyone in regard to cc.  States did a lot walking in tennis shoes thinks they might not been supportive. No specific H/0 trauma but huge blister following day medial R foot/ arch that has subsided but very red & painful. Taking IB prn.s.  Has collapsed arch L foot  - needs supportive shoes.    Stays very busy on feet all the time as has horses.  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  Past Medical History:   Diagnosis Date    Abdominal pain     Diabetes     GERD (gastroesophageal reflux disease)     Hx of colonic polyps     Hyperlipidemia     Hypertension     Nausea and vomiting      Patient Active Problem List   Diagnosis    History of adenomatous polyp of colon    Chronic pain of both shoulders    Bilateral rotator cuff dysfunction    Closed displaced transverse fracture of left patella    Stiffness of left knee    Hypertension    Hypothyroidism     Elevated LFTs    Type 1 diabetes mellitus with hyperglycemia    Pre-op testing    Type 1 diabetes mellitus with hypoglycemia    Hypoglycemia unawareness associated with type 1 diabetes mellitus    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 Heterozygous hemoglobin variants (HbS, HgC, etc)do  not significantly interfere with this assay.   However, presence of multiple variants may affect accuracy.     01/20/2021 8.4 (H) 4.0 - 5.6 % Final     Comment:     ADA Screening Guidelines:  5.7-6.4%  Consistent with prediabetes  >or=6.5%  Consistent with diabetes  High levels of fetal hemoglobin interfere with the HbA1C  assay. Heterozygous hemoglobin variants (HbS, HgC, etc)do  not significantly interfere with this assay.   However, presence of multiple variants may affect accuracy.       Objective:      X-Ray Foot Complete Right  Order: [id number]  Status: Final result       Visible to patient: Yes (seen)       Next appt: 02/12/2025 at 01:00 PM in Lab (LAB, [facility])       Dx: Trauma    0 Result Notes  Details    Reading Physician Reading Date Result Priority   Brody Jeong MD  [phone] 8/18/2024 STAT     Narrative & Impression  EXAMINATION:  XR FOOT COMPLETE 3 VIEW RIGHT     CLINICAL HISTORY:  . Injury, unspecified, initial encounter     TECHNIQUE:  AP, lateral, and oblique views of the right foot were performed.     COMPARISON:  None     FINDINGS:  Three views right foot.     No acute displaced fracture or dislocation of the foot.  No radiopaque foreign body.  No significant edema.  There are  degenerative changes of the calcaneus.     Impression:     1. No acute displaced fracture or dislocation of the foot.        Electronically signed by:Brody Jeong MD  Date:                                            08/18/2024  Time:                                           18:39   X-Ray Foot Complete Left  Order: [id number]  Status: Final result       Visible to patient: Yes (seen)       Next appt: 02/12/2025 at 01:00 PM in Lab (LAB, [facility])       Dx: Pain    0 Result Notes  Details    Reading Physician Reading Date Result Priority   Basilio Love III, MD  [phone]  [phone] 6/29/2021 Routine     Narrative & Impression  EXAMINATION:  XR FOOT COMPLETE 3 VIEW LEFT     CLINICAL HISTORY:  Pain, unspecified     FINDINGS:  Complete three views foot.     No fracture dislocation bone destruction seen.  There is mild DJD.  There is a flatfoot deformity and spurs on the calcaneus.     Impression:     No acute process seen.        Electronically signed by:Basilio Love MD  Date:                                            06/29/2021  Time:                                           13:53   I independently reviewed the x-ray images & there was obvious disruption of midfoot contour at the level of the distal to the navicular@ met cuneiform L as compared w/ contralateral x-ray, consistent w/ clinical appearance of asymmetry B/L MLA .    Review of Systems   Constitutional: Negative for malaise/fatigue.   Cardiovascular:  Negative for claudication and leg swelling.   Skin:  Positive for color change.   Musculoskeletal:  Positive for joint pain and myalgias. Negative for falls.   Neurological:  Positive for focal weakness, loss of balance and sensory change. Negative for numbness and weakness.   Psychiatric/Behavioral:  Positive for depression. The patient is nervous/anxious.      Physical Exam  Vitals reviewed.   Constitutional:       General: She is not in acute distress.     Appearance: She is well-developed and  overweight.   Cardiovascular:      Pulses: Normal pulses.           Dorsalis pedis pulses are 2+ on the right side and 2+ on the left side.   Musculoskeletal:         General: Swelling, tenderness and signs of injury present.      Right lower leg: No edema.      Left lower leg: No edema.      Right foot: Deformity present. No Charcot foot, foot drop or prominent metatarsal heads.      Left foot: Deformity (L>>R semirigid HTs), Charcot foot, foot drop and prominent metatarsal heads present.        Feet:    Feet:      Right foot:      Skin integrity: No blister, erythema or warmth.      Left foot:      Skin integrity: Blister (PO) present. No erythema or warmth.   Skin:     General: Skin is warm and dry.      Capillary Refill: Capillary refill takes less than 2 seconds.      Findings: Abscess (PO) and lesion present. No bruising or erythema.      Comments: Resolution of erythema R forefoot  & plantar medial arch  No residual underlying fluctuance nor edema.  Sutures intact along incision except for medial 1st met head - pull through in area of greatest injury; no active exudate nor drainage. No malodor.   Neurological:      Mental Status: She is alert and oriented to person, place, and time.      Sensory: Sensory deficit present.      Motor: Abnormal muscle tone present. No weakness.      Gait: Gait abnormal (orthowedge shoe R).   Psychiatric:         Mood and Affect: Mood and affect normal.         Behavior: Behavior normal. Behavior is cooperative.     post    Assessment:      Encounter Diagnoses   Name Primary?    Abscess of right foot excluding toes Yes    Blister (nonthermal), right foot, subsequent encounter     MRSA (methicillin resistant staph aureus) culture positive     Hemoglobin A1C greater than 9%, indicating poor diabetic control     Postoperative follow-up        Problem List Items Addressed This Visit    None  Visit Diagnoses         Abscess of right foot excluding toes    -  Primary      Blister  (nonthermal), right foot, subsequent encounter          MRSA (methicillin resistant staph aureus) culture positive          Hemoglobin A1C greater than 9%, indicating poor diabetic control          Postoperative follow-up              Plan:       Bhavna was seen today for post-op evaluation.    Diagnoses and all orders for this visit:    Abscess of right foot excluding toes    Blister (nonthermal), right foot, subsequent encounter    MRSA (methicillin resistant staph aureus) culture positive    Hemoglobin A1C greater than 9%, indicating poor diabetic control    Postoperative follow-up    I counseled the patient on her conditions, their implications & medical mgmt.    - Shoe inspection. Diabetic Foot Education. Patient reminded of the importance of good blood sugar control to help prevent podiatric complications of diabetes. Patient instructed on proper foot hygeine. We discussed wearing proper shoe gear, daily foot inspections, never walking w/out protective shoe gear, annual DM foot exam, sooner p.r.n..      Continue Silipos gel strap for L MLA & we will address R when healed.  -Pending Rx DM shoes w/ custom accommodative inserts including L Charcot & R reinforced MLA.    -Continue Bactrim DS.    -Dressing change R. To be kept CDI. WBAT in orthowedge shoe.    F/U 7-10 days, sooner prn.        A total of 30 mins.was spent on chart review, patient visit & documentation.

## 2025-02-19 ENCOUNTER — OFFICE VISIT (OUTPATIENT)
Dept: DIABETES | Facility: CLINIC | Age: 65
End: 2025-02-19
Payer: COMMERCIAL

## 2025-02-19 ENCOUNTER — TELEPHONE (OUTPATIENT)
Dept: DIABETES | Facility: CLINIC | Age: 65
End: 2025-02-19

## 2025-02-19 VITALS
DIASTOLIC BLOOD PRESSURE: 60 MMHG | WEIGHT: 160 LBS | BODY MASS INDEX: 26.66 KG/M2 | HEIGHT: 65 IN | SYSTOLIC BLOOD PRESSURE: 121 MMHG | HEART RATE: 85 BPM | OXYGEN SATURATION: 98 %

## 2025-02-19 DIAGNOSIS — E08.621 DIABETIC ULCER OF RIGHT MIDFOOT ASSOCIATED WITH DIABETES MELLITUS DUE TO UNDERLYING CONDITION, LIMITED TO BREAKDOWN OF SKIN: ICD-10-CM

## 2025-02-19 DIAGNOSIS — I10 PRIMARY HYPERTENSION: ICD-10-CM

## 2025-02-19 DIAGNOSIS — Z46.81 INSULIN PUMP FITTING OR ADJUSTMENT: ICD-10-CM

## 2025-02-19 DIAGNOSIS — L97.411 DIABETIC ULCER OF RIGHT MIDFOOT ASSOCIATED WITH DIABETES MELLITUS DUE TO UNDERLYING CONDITION, LIMITED TO BREAKDOWN OF SKIN: ICD-10-CM

## 2025-02-19 DIAGNOSIS — E03.9 HYPOTHYROIDISM, UNSPECIFIED TYPE: ICD-10-CM

## 2025-02-19 DIAGNOSIS — E10.3513 TYPE 1 DIABETES MELLITUS WITH PROLIFERATIVE RETINOPATHY OF BOTH EYES AND MACULAR EDEMA: ICD-10-CM

## 2025-02-19 DIAGNOSIS — E10.65 TYPE 1 DIABETES MELLITUS WITH HYPERGLYCEMIA: Primary | ICD-10-CM

## 2025-02-19 DIAGNOSIS — E10.649 HYPOGLYCEMIA UNAWARENESS ASSOCIATED WITH TYPE 1 DIABETES MELLITUS: ICD-10-CM

## 2025-02-19 DIAGNOSIS — E10.42 TYPE 1 DIABETES MELLITUS WITH DIABETIC POLYNEUROPATHY: ICD-10-CM

## 2025-02-19 DIAGNOSIS — E10.649 TYPE 1 DIABETES MELLITUS WITH HYPOGLYCEMIA AND WITHOUT COMA: ICD-10-CM

## 2025-02-19 DIAGNOSIS — Z96.41 INSULIN PUMP IN PLACE: ICD-10-CM

## 2025-02-19 DIAGNOSIS — E78.5 DYSLIPIDEMIA, GOAL LDL BELOW 100: ICD-10-CM

## 2025-02-19 RX ORDER — INSULIN LISPRO 100 [IU]/ML
INJECTION, SOLUTION INTRAVENOUS; SUBCUTANEOUS
Qty: 30 ML | Refills: 11 | Status: SHIPPED | OUTPATIENT
Start: 2025-02-19

## 2025-02-19 RX ORDER — INSULIN ASPART INJECTION 100 [IU]/ML
INJECTION, SOLUTION SUBCUTANEOUS
Qty: 30 ML | Refills: 11 | Status: SHIPPED | OUTPATIENT
Start: 2025-02-19

## 2025-02-19 RX ORDER — BLOOD-GLUCOSE TRANSMITTER
1 EACH MISCELLANEOUS
Qty: 1 EACH | Refills: 4 | Status: SHIPPED | OUTPATIENT
Start: 2025-02-19

## 2025-02-19 RX ORDER — BLOOD-GLUCOSE SENSOR
1 EACH MISCELLANEOUS
Qty: 3 EACH | Refills: 11 | Status: SHIPPED | OUTPATIENT
Start: 2025-02-19

## 2025-02-19 RX ORDER — INSULIN PMP CART,AUT,G6/7,CNTR
1 EACH SUBCUTANEOUS
Qty: 3 EACH | Refills: 11 | Status: SHIPPED | OUTPATIENT
Start: 2025-02-19

## 2025-02-19 NOTE — ASSESSMENT & PLAN NOTE
Optimize BG readings.   See above.   Continue to follow-up with Podiatry    Educated patient to check feet daily for any foreign objects and/or wounds. Discussed with patient the importance of wearing appropriate footwear at all times, not to walk barefoot ever, and to check shoes before putting them on feet. Instructed patient to keep feet dry by regularly changing shoes and socks and drying feet after baths and exercises. Also, instructed patient to report any new lesions, discolorations, or swelling to a healthcare professional.

## 2025-02-19 NOTE — PATIENT INSTRUCTIONS
Pump backup plan    If the insulin pump is non functional and discontinued for anticipated more than 20 hours, please give daily injections of:   Long acting insulin Toujeo  38-40 units daily   Short acting insulin Humalog insulin to car ratio - units for every 15 grams of carbs for meals according to carb ratios and sensitivity factor in the pump.     When the insulin pump is restarted, do not restart basal rates until at least 22 hours after the last long acting insulin injection. You can set a 0% temporary basal setting that will last until this time and use your pump to bolus for meals and correction.     For any technical insulin pump issues, please contact the insulin pump company; the toll free number is printed on the label on the back of the insulin pump.       If your sugar is running high for a few hours and does not respond to two correction doses from the insulin pump, it may mean that you have a bad pump site and the site should be changed

## 2025-02-19 NOTE — ASSESSMENT & PLAN NOTE
TSH level within normal limits  On LT4 88 mcg----taking 1 capsule daily   reinforced take by itself on empty stomach, first thing in the morning and wait at least 30-60 minutes to eat, drink, or take other medications.

## 2025-02-19 NOTE — Clinical Note
Seeing you in 5-6 weeks- we may need to adjust her insulin to carbohydrate ratio more She adjusted her settings to 1:15---due to hypoglycemia Today I cut it back to 1:12---a told her it may need to be 1:11 or 1:10  Also her ISF may need to be adjusted-- but I wanted to see her use it more

## 2025-02-19 NOTE — ASSESSMENT & PLAN NOTE
Uncontrolled  + prandial excursions  Fears hypoglycemia      -- Medication Changes:    Okay to continue the metformin 500 mg daily     Continue the Omnipod 5   Will try to change the Humalog to Fiasp   Basal: 1.15 units/hr   ICR 1:12-- discussed that we may need to cut this back to 1:11 or even 1:10  ISF: 1:45---discussed that we may need to adjust this to 1:40  Target: 120  IOB: 3 hours       Long discussion with patient about pump optimization  Discussed hitting the you sensor button  Stay in auto mode 100% of the time  Bolus before meals---discussed with the Humalog she needs to be bolusing at least 15-20 minutes before the meal  If we are able to switch over to the Humalog she can bolus right before the meal    -- Reviewed goals of therapy are to get the best control we can without hypoglycemia.  -- Reviewed patient's current insulin regimen. Clarified proper insulin dose and timing in relation to meals, etc. Insulin injection sites and proper rotation instructed.    -- Advised frequent self blood glucose monitoring.  Patient encouraged to document glucose results and bring them to every clinic visit. Rx for dexcom G6 to pharmacy -- continue--on the Omnipod 5 and using her I phone  -- Hypoglycemia precautions discussed. Instructed on precautions before driving.    -- Call for Bg repeatedly < 70 or > 180.   -- Close adherence to lifestyle changes recommended.   -- Periodic follow ups for eye evaluations, foot care and dental care suggested.  -- Refer to diabetes education-- follow up with diabetes education in 5-6 weeks for an insulin pump download and review---may need to make setting changes      Patient has diabetes mellitus and manages diabetes with intensive insulin regimen and uses prandial and basal insulin daily  Patient requires a therapeutic CGM and is willing to use therapeutic CGM for the necessary frequent adjustments of insulin therapy.  I have completed an in-person visit during the previous 6  months and will continue to have in-person visits every 6 months to assess adherence to their CGM regimen and diabetes treatment plan.  Due to COVID pandemic and need for remote monitoring this tool is medically necessary

## 2025-02-19 NOTE — ASSESSMENT & PLAN NOTE
Has jacques   Continue Dexcom G6   Remain in automated mode  Reviewed hypoglycemia management: Treat with 1/2 glass of juice, 1/2 can regular coke, or 4 glucose tablets.   Monitor and repeat treatment every 15 minutes until BG is >70   Then have a snack, which includes a complex carbohydrate and protein.

## 2025-02-27 ENCOUNTER — OFFICE VISIT (OUTPATIENT)
Dept: PODIATRY | Facility: CLINIC | Age: 65
End: 2025-02-27
Payer: COMMERCIAL

## 2025-02-27 VITALS
HEIGHT: 65 IN | HEART RATE: 81 BPM | SYSTOLIC BLOOD PRESSURE: 122 MMHG | WEIGHT: 160.63 LBS | BODY MASS INDEX: 26.76 KG/M2 | DIASTOLIC BLOOD PRESSURE: 67 MMHG

## 2025-02-27 DIAGNOSIS — L02.611 ABSCESS OF RIGHT FOOT EXCLUDING TOES: ICD-10-CM

## 2025-02-27 DIAGNOSIS — E10.42 TYPE 1 DIABETES MELLITUS WITH DIABETIC POLYNEUROPATHY: Primary | ICD-10-CM

## 2025-02-27 DIAGNOSIS — S91.301D OPEN WOUND OF PLANTAR ASPECT OF FOOT, RIGHT, SUBSEQUENT ENCOUNTER: ICD-10-CM

## 2025-02-27 PROCEDURE — 99213 OFFICE O/P EST LOW 20 MIN: CPT | Mod: 25,S$GLB,, | Performed by: PODIATRIST

## 2025-02-27 PROCEDURE — 11042 DBRDMT SUBQ TIS 1ST 20SQCM/<: CPT | Mod: S$GLB,,, | Performed by: PODIATRIST

## 2025-02-27 PROCEDURE — 99999 PR PBB SHADOW E&M-EST. PATIENT-LVL IV: CPT | Mod: PBBFAC,,, | Performed by: PODIATRIST

## 2025-02-27 NOTE — PROCEDURES
Wound Debridement R medial 1st ray    Date/Time: 2/18/2025 1:45 PM    Performed by: Tata Durham DPM  Authorized by: Tata Durham DPM    Consent Done?:  Yes (Verbal)    Wound Details:    Location:  Right foot    Location:  Right 1st Metatarsal Head    Type of Debridement:  Excisional       Length (cm):  2       Width (cm):  0.5       Depth (cm):  0.2       Area (sq cm):  0.79       Percent Debrided (%):  100       Total Area Debrided (sq cm):  0.79    Depth of debridement:  Subcutaneous tissue    Tissue debrided:  Epidermis and Subcutaneous    Devitalized tissue debrided:  Fibrin    Instruments:  Blade and Nippers  Bleeding:  None  Patient tolerance:  Patient tolerated the procedure well with no immediate complications

## 2025-02-27 NOTE — PROGRESS NOTES
Subjective:      Patient ID: Bhavna Hancock is a 64 y.o. female.    Chief Complaint: Post-op Evaluation    Patient is here for wound check PO I&D  R foot abscess. Minimal pain.  2/18/25  Patient is PO I&D R foot abscess s/p blister, DOS 2/5/25, POD 13 days. Initially placed on Cipro; cultures + for MRSA so placed on Bactrim DS bid per C&S. WBC is improving. A1c also improving from 9.4% 12/17 to 8.0% yesterday. Has  suture disruption medial 1st met.head area last visit, maintained w/ Steri-strips. Was to continue in orthowedge shoe & maintain R foot CDI. Pain & swelling significantly reduced.    CBC W/ AUTO DIFFERENTIAL  Order: [id number]   Status: Final result       Next appt: 02/18/2025 at 01:45 PM in Podiatry (Tata Durham DPM)       Dx: Postoperative follow-up; Abscess of b...    Test Result Released: Yes (seen)    0 Result Notes            Component  Ref Range & Units (hover) 12:54  (2/17/25) 12 d ago  (2/5/25) 2 mo ago  (12/17/24) 3 yr ago  (2/16/22) 3 yr ago  (4/2/21) 3 yr ago  (3/16/21) 3 yr ago  (3/9/21)   WBC 7.13 9.59 5.96 4.73 4.20 4.90 5.30   RBC 4.30 3.31 Low  4.42 4.50 4.18 4.34 4.25   Hemoglobin 12.2 9.7 Low  12.7 12.6 11.1 Low  11.2 Low  11.3 Low    Hematocrit 39.4 29.4 Low  40.2 39.7 34.1 Low  35.4 Low  34.9 Low    MCV 92 89 91 88 82 82 82   MCH 28.4 29.3 28.7 28.0 26.5 Low  25.9 Low  26.5 Low    MCHC 31.0 Low  33.0 31.6 Low  31.7 Low  32.5 31.7 Low  32.4   RDW 13.0 12.6 12.8 13.5 14.8 High  14.6 High  15.4 High    Platelets 500 High  274 252 202 258 390 High  R 504 High  R   MPV 10.6 10.4 10.9 10.9 9.6 9.1 Low  8.7 Low    Immature Granulocytes 0.3 1.4 High  0.2 0.4      Gran # (ANC) 4.3 7.4 3.7 2.8 2.0 1.8 2.2   Immature Grans (Abs) 0.02 0.13 High  CM 0.01 CM 0.02 CM      Comment: Mild elevation in immature granulocytes is non specific and  can be seen in a variety of conditions including stress response,  acute inflammation, trauma and pregnancy. Correlation with other  laboratory and clinical  findings is essential.   Lymph # 1.8 0.9 Low  1.4 1.2 1.5 2.2 2.2   Mono # 0.5 1.0 0.5 0.4 0.3 0.6 0.5   Eos # 0.3 0.1 0.3 0.3 0.4 0.3 0.4   Baso # 0.11 0.04 0.05 0.06 0.10 0.10 0.10   nRBC 0 0 0 0      Gran % 60.6 76.7 High  61.9 58.6 46.5 35.6 Low  41.1   Lymph % 25.8 9.7 Low  22.8 26.0 34.8 45.0 41.3   Mono % 7.3 10.4 8.9 7.6 7.7 11.8 9.2   Eosinophil % 4.5 1.4 5.4 6.1 9.1 High  5.9 6.6   Basophil % 1.5 0.4 0.8 1.3 1.9 1.7 1.8   Differential Method Automated Automated Automated Automated Automated Automated Automated   Resulting Agency [facility]        Specimen Collected: 02/17/25 12:54 CST Last Resulted: 02/17/25 13:45 CST     2/11/25  Patient is here PO I&D R foot abscess, DOS 2/5/25, POD 6 days. Cultures + for MRSA so was placed on Bactrim DS bid per C&S. States feels much better int hat swelling & related pain resolved. Kept dressing CDI. In orthowedge shoe.  No leukocytosis per lab range, but WBC elevated compared to previous levels - will monitor.    Contains abnormal data CBC auto differential  Order: [id number]   Status: Final result       Next appt: 02/18/2025 at 01:45 PM in Podiatry (Tata Durham DPM)    Test Result Released: Yes (seen)    0 Result Notes   important suggestion  Newer results are available. Click to view them now.               Component  Ref Range & Units (hover) 12 d ago  (2/5/25) 2 mo ago  (12/17/24) 3 yr ago  (2/16/22) 3 yr ago  (4/2/21) 3 yr ago  (3/16/21) 3 yr ago  (3/9/21) 4 yr ago  (2/17/21)   WBC 9.59 5.96 4.73 4.20 4.90 5.30 4.10   RBC 3.31 Low  4.42 4.50 4.18 4.34 4.25 3.94 Low    Hemoglobin 9.7 Low  12.7 12.6 11.1 Low  11.2 Low  11.3 Low  10.6 Low    Hematocrit 29.4 Low  40.2 39.7 34.1 Low  35.4 Low  34.9 Low  32.4 Low    MCV 89 91 88 82 82 82 82   MCH 29.3 28.7 28.0 26.5 Low  25.9 Low  26.5 Low  26.9 Low    MCHC 33.0 31.6 Low  31.7 Low  32.5 31.7 Low  32.4 32.6   RDW 12.6 12.8 13.5 14.8 High  14.6 High   15.4 High  15.2 High    Platelets 274 252 202 258 390 High  R 504 High  R 316 R   MPV 10.4 10.9 10.9 9.6 9.1 Low  8.7 Low  8.6 Low    Immature Granulocytes 1.4 High  0.2 0.4       Gran # (ANC) 7.4 3.7 2.8 2.0 1.8 2.2 1.6 Low    Immature Grans (Abs) 0.13 High  0.01 CM 0.02 CM       Comment: Mild elevation in immature granulocytes is non specific and  can be seen in a variety of conditions including stress response,  acute inflammation, trauma and pregnancy. Correlation with other  laboratory and clinical findings is essential.   Lymph # 0.9 Low  1.4 1.2 1.5 2.2 2.2 1.5   Mono # 1.0 0.5 0.4 0.3 0.6 0.5 0.4   Eos # 0.1 0.3 0.3 0.4 0.3 0.4 0.5   Baso # 0.04 0.05 0.06 0.10 0.10 0.10 0.10   nRBC 0 0 0       Gran % 76.7 High  61.9 58.6 46.5 35.6 Low  41.1 39.2   Lymph % 9.7 Low  22.8 26.0 34.8 45.0 41.3 37.4   Mono % 10.4 8.9 7.6 7.7 11.8 9.2 10.7   Eosinophil % 1.4 5.4 6.1 9.1 High  5.9 6.6 11.1 High    Basophil % 0.4 0.8 1.3 1.9 1.7 1.8 1.6   Differential Method Automated Automated Automated Automated Automated Automated Automated   Resulting Agency [facility]        Specimen Collected: 02/05/25 13:45 CST Last Resulted: 02/05/25 14:00 CST     2/4/25  Bhavna is a 64 y.o. female who presents new to the podiatry clinic w/ c/o pain R foot w/ drainage since Fri.,being on feet all day in [place] for grandchild's dance competition.  Already had this appointment scheduled to establish care as a diabetic so has not been to see anyone in regard to cc.  States did a lot walking in tennis shoes thinks they might not been supportive. No specific H/0 trauma but huge blister following day medial R foot/ arch that has subsided but very red & painful. Taking IB prn.  Has collapsed arch L foot  - needs supportive shoes.    Stays very busy on feet all the time as has horses.  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  Past Medical History:   Diagnosis Date    Abdominal pain     Diabetes     GERD (gastroesophageal reflux disease)     Hx of colonic polyps     Hyperlipidemia     Hypertension     Nausea and vomiting      Patient Active Problem List   Diagnosis    History of adenomatous polyp of colon    Chronic pain of both shoulders    Bilateral rotator cuff dysfunction    Closed displaced transverse fracture of left patella    Stiffness of left knee    Hypertension    Hypothyroidism    Elevated LFTs    Type 1 diabetes mellitus with hyperglycemia    Pre-op testing    Type 1 diabetes mellitus with hypoglycemia    Hypoglycemia unawareness associated with type 1 diabetes mellitus    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 Heterozygous hemoglobin variants (HbS, HgC, etc)do  not significantly interfere with this assay.   However, presence of multiple variants may affect accuracy.     12/17/2024 9.4 (H) 4.0 - 5.6 % Final     Comment:     ADA Screening Guidelines:  5.7-6.4%  Consistent with prediabetes  >or=6.5%  Consistent with diabetes    High levels of fetal hemoglobin interfere with the HbA1C  assay. Heterozygous hemoglobin variants (HbS, HgC, etc)do  not significantly interfere with this assay.   However, presence of multiple variants may affect accuracy.     01/20/2021 8.4 (H) 4.0 - 5.6 % Final     Comment:     ADA  Screening Guidelines:  5.7-6.4%  Consistent with prediabetes  >or=6.5%  Consistent with diabetes  High levels of fetal hemoglobin interfere with the HbA1C  assay. Heterozygous hemoglobin variants (HbS, HgC, etc)do  not significantly interfere with this assay.   However, presence of multiple variants may affect accuracy.       Objective:      X-Ray Foot Complete Right  Order: [id number]  Status: Final result       Visible to patient: Yes (seen)       Next appt: 02/12/2025 at 01:00 PM in Lab (LAB, [facility])       Dx: Trauma    0 Result Notes  Details    Reading Physician Reading Date Result Priority   Brody Jeong MD  [phone] 8/18/2024 STAT     Narrative & Impression  EXAMINATION:  XR FOOT COMPLETE 3 VIEW RIGHT     CLINICAL HISTORY:  . Injury, unspecified, initial encounter     TECHNIQUE:  AP, lateral, and oblique views of the right foot were performed.     COMPARISON:  None     FINDINGS:  Three views right foot.     No acute displaced fracture or dislocation of the foot.  No radiopaque foreign body.  No significant edema.  There are degenerative changes of the calcaneus.     Impression:     1. No acute displaced fracture or dislocation of the foot.        Electronically signed by:Brody Jeong MD  Date:                                            08/18/2024  Time:                                           18:39   X-Ray Foot Complete Left  Order: [id number]  Status: Final result       Visible to patient: Yes (seen)       Next appt: 02/12/2025 at 01:00 PM in Lab (LAB, [facility])       Dx: Pain    0 Result Notes  Details    Reading Physician Reading Date Result Priority   Basiilo Love III, MD  [phone]  [phone] 6/29/2021 Routine     Narrative & Impression  EXAMINATION:  XR FOOT COMPLETE 3 VIEW LEFT     CLINICAL HISTORY:  Pain, unspecified     FINDINGS:  Complete three views foot.     No fracture dislocation bone destruction seen.  There is mild DJD.  There is a flatfoot deformity and spurs on the  calcaneus.     Impression:     No acute process seen.        Electronically signed by:Basilio Love MD  Date:                                            06/29/2021  Time:                                           13:53   I independently reviewed the x-ray images & there was obvious disruption of midfoot contour at the level of the distal to the navicular@ met cuneiform L as compared w/ contralateral x-ray, consistent w/ clinical appearance of asymmetry B/L MLA .    Review of Systems   Constitutional: Negative for malaise/fatigue.   Cardiovascular:  Negative for claudication and leg swelling.   Skin:  Positive for color change.   Musculoskeletal:  Positive for joint pain and myalgias. Negative for falls.   Neurological:  Positive for focal weakness, loss of balance and sensory change. Negative for numbness and weakness.   Psychiatric/Behavioral:  Positive for depression. The patient is nervous/anxious.      Physical Exam  Vitals reviewed.   Constitutional:       General: She is not in acute distress.     Appearance: She is well-developed and overweight.   Cardiovascular:      Pulses: Normal pulses.           Dorsalis pedis pulses are 2+ on the right side and 2+ on the left side.   Musculoskeletal:         General: Tenderness and signs of injury present. No swelling.      Right lower leg: No edema.      Left lower leg: No edema.      Right foot: Deformity present. No Charcot foot, foot drop or prominent metatarsal heads.      Left foot: Deformity (L>>R semirigid HTs), Charcot foot, foot drop and prominent metatarsal heads present.        Feet:    Feet:      Right foot:      Skin integrity: No blister, erythema or warmth.      Left foot:      Skin integrity: Blister (PO) present. No erythema or warmth.   Skin:     General: Skin is warm and dry.      Capillary Refill: Capillary refill takes less than 2 seconds.      Findings: Abscess (PO) and lesion present. No bruising or erythema.      Comments: Resolution of  erythema R forefoot  & plantar medial arch  No residual underlying fluctuance nor edema.  Sutures intact along incision except for medial 1st met head - pull through in area of greatest injury; no active exudate nor drainage. No malodor. Wound disruption along 2 cm length, approximated w/ steri-strips.   Neurological:      Mental Status: She is alert and oriented to person, place, and time.      Sensory: Sensory deficit present.      Motor: Abnormal muscle tone present. No weakness.      Gait: Gait abnormal (orthowedge shoe R).   Psychiatric:         Mood and Affect: Mood and affect normal.         Behavior: Behavior normal. Behavior is cooperative.         Assessment:      Encounter Diagnoses   Name Primary?    Type 1 diabetes mellitus with diabetic polyneuropathy Yes    Open wound of plantar aspect of foot, right, subsequent encounter     Abscess of right foot excluding toes        Problem List Items Addressed This Visit          Endocrine    Type 1 diabetes mellitus with diabetic polyneuropathy - Primary     Other Visit Diagnoses         Open wound of plantar aspect of foot, right, subsequent encounter        Relevant Orders    Wound Debridement R (Completed)      Abscess of right foot excluding toes              Plan:       Bhavna was seen today for post-op evaluation.    Diagnoses and all orders for this visit:    Type 1 diabetes mellitus with diabetic polyneuropathy    Open wound of plantar aspect of foot, right, subsequent encounter  -     Wound Debridement R    Abscess of right foot excluding toes    I counseled the patient on her conditions, their implications & medical mgmt.    - Shoe inspection. Diabetic Foot Education. Patient reminded of the importance of good blood sugar control to help prevent podiatric complications of diabetes. Patient instructed on proper foot hygeine. We discussed wearing proper shoe gear, daily foot inspections, never walking w/out protective shoe gear, annual DM foot exam,  sooner p.r.n..      Continue Silipos gel strap for L MLA & we will address R when healed.  -Pending Rx DM shoes w/ custom accommodative inserts including L Charcot & R reinforced MLA.    -Continue Bactrim DS.    -Wound debrided sharply. Steri-Strips applied & to be maintained w/ gap < 0.5 cm width.  Betadine wet-dry dressing applied R. To be kept CDI. May change qod.  WBAT in orthowedge shoe.    F/U 7-10 days, sooner prn.        A total of 31 mins.was spent on chart review, patient visit & documentation.

## 2025-03-11 NOTE — PROCEDURES
"Bhavna Hancock is a 64 y.o. female patient.    Pulse: 81 (02/27/25 1445)  BP: 122/67 (02/27/25 1445)  Weight: 72.8 kg (160 lb 9.7 oz) (02/27/25 1445)  Height: 5' 5" (165.1 cm) (02/27/25 1445)       Procedures    3/10/2025    "

## 2025-03-11 NOTE — PROGRESS NOTES
Subjective:      Patient ID: Bhavna Hancock is a 64 y.o. female.    Chief Complaint: Post-op Evaluation    Patient is PO I&D abscess R w/.wound check. Spraying w/ Betadine & applying alginate. Occasionally using MediHoney. States continuing to be getting better. When healed, wants to have hammertoe corrected.  Completed Bactrim DS 2/23/25.  Pic not uploading.  2/27/25  Patient is here for wound check PO I&D  R foot abscess. Minimal pain.  2/18/25  Patient is PO I&D R foot abscess s/p blister, DOS 2/5/25, POD 13 days. Initially placed on Cipro; cultures + for MRSA so placed on Bactrim DS bid per C&S. WBC is improving. A1c also improving from 9.4% 12/17 to 8.0% yesterday. Has  suture disruption medial 1st met.head area last visit, maintained w/ Steri-strips. Was to continue in orthowedge shoe & maintain R foot CDI. Pain & swelling significantly reduced.    CBC W/ AUTO DIFFERENTIAL  Order: [id number]   Status: Final result       Next appt: 02/18/2025 at 01:45 PM in Podiatry (Tata Durham DPM)       Dx: Postoperative follow-up; Abscess of b...    Test Result Released: Yes (seen)    0 Result Notes            Component  Ref Range & Units (hover) 12:54  (2/17/25) 12 d ago  (2/5/25) 2 mo ago  (12/17/24) 3 yr ago  (2/16/22) 3 yr ago  (4/2/21) 3 yr ago  (3/16/21) 3 yr ago  (3/9/21)   WBC 7.13 9.59 5.96 4.73 4.20 4.90 5.30   RBC 4.30 3.31 Low  4.42 4.50 4.18 4.34 4.25   Hemoglobin 12.2 9.7 Low  12.7 12.6 11.1 Low  11.2 Low  11.3 Low    Hematocrit 39.4 29.4 Low  40.2 39.7 34.1 Low  35.4 Low  34.9 Low    MCV 92 89 91 88 82 82 82   MCH 28.4 29.3 28.7 28.0 26.5 Low  25.9 Low  26.5 Low    MCHC 31.0 Low  33.0 31.6 Low  31.7 Low  32.5 31.7 Low  32.4   RDW 13.0 12.6 12.8 13.5 14.8 High  14.6 High  15.4 High    Platelets 500 High  274 252 202 258 390 High  R 504 High  R   MPV 10.6 10.4 10.9 10.9 9.6 9.1 Low  8.7 Low    Immature Granulocytes 0.3 1.4 High  0.2 0.4      Gran # (ANC) 4.3 7.4 3.7 2.8 2.0 1.8 2.2   Immature Grans (Abs) 0.02  0.13 High  CM 0.01 CM 0.02 CM      Comment: Mild elevation in immature granulocytes is non specific and  can be seen in a variety of conditions including stress response,  acute inflammation, trauma and pregnancy. Correlation with other  laboratory and clinical findings is essential.   Lymph # 1.8 0.9 Low  1.4 1.2 1.5 2.2 2.2   Mono # 0.5 1.0 0.5 0.4 0.3 0.6 0.5   Eos # 0.3 0.1 0.3 0.3 0.4 0.3 0.4   Baso # 0.11 0.04 0.05 0.06 0.10 0.10 0.10   nRBC 0 0 0 0      Gran % 60.6 76.7 High  61.9 58.6 46.5 35.6 Low  41.1   Lymph % 25.8 9.7 Low  22.8 26.0 34.8 45.0 41.3   Mono % 7.3 10.4 8.9 7.6 7.7 11.8 9.2   Eosinophil % 4.5 1.4 5.4 6.1 9.1 High  5.9 6.6   Basophil % 1.5 0.4 0.8 1.3 1.9 1.7 1.8   Differential Method Automated Automated Automated Automated Automated Automated Automated   Resulting Agency [employer] [facility]        Specimen Collected: 02/17/25 12:54 CST Last Resulted: 02/17/25 13:45 CST     2/11/25  Patient is here PO I&D R foot abscess, DOS 2/5/25, POD 6 days. Cultures + for MRSA so was placed on Bactrim DS bid per C&S. States feels much better int hat swelling & related pain resolved. Kept dressing CDI. In orthowedge shoe.  No leukocytosis per lab range, but WBC elevated compared to previous levels - will monitor.    Contains abnormal data CBC auto differential  Order: [id number]   Status: Final result       Next appt: 02/18/2025 at 01:45 PM in Podiatry (Tata Durham DPM)    Test Result Released: Yes (seen)    0 Result Notes   important suggestion  Newer results are available. Click to view them now.               Component  Ref Range & Units (hover) 12 d ago  (2/5/25) 2 mo ago  (12/17/24) 3 yr ago  (2/16/22) 3 yr ago  (4/2/21) 3 yr ago  (3/16/21) 3 yr ago  (3/9/21) 4 yr ago  (2/17/21)   WBC 9.59 5.96 4.73 4.20 4.90 5.30 4.10   RBC 3.31 Low  4.42 4.50 4.18 4.34 4.25 3.94 Low    Hemoglobin 9.7 Low  12.7 12.6 11.1 Low  11.2 Low  11.3 Low  10.6 Low     Hematocrit 29.4 Low  40.2 39.7 34.1 Low  35.4 Low  34.9 Low  32.4 Low    MCV 89 91 88 82 82 82 82   MCH 29.3 28.7 28.0 26.5 Low  25.9 Low  26.5 Low  26.9 Low    MCHC 33.0 31.6 Low  31.7 Low  32.5 31.7 Low  32.4 32.6   RDW 12.6 12.8 13.5 14.8 High  14.6 High  15.4 High  15.2 High    Platelets 274 252 202 258 390 High  R 504 High  R 316 R   MPV 10.4 10.9 10.9 9.6 9.1 Low  8.7 Low  8.6 Low    Immature Granulocytes 1.4 High  0.2 0.4       Gran # (ANC) 7.4 3.7 2.8 2.0 1.8 2.2 1.6 Low    Immature Grans (Abs) 0.13 High  0.01 CM 0.02 CM       Comment: Mild elevation in immature granulocytes is non specific and  can be seen in a variety of conditions including stress response,  acute inflammation, trauma and pregnancy. Correlation with other  laboratory and clinical findings is essential.   Lymph # 0.9 Low  1.4 1.2 1.5 2.2 2.2 1.5   Mono # 1.0 0.5 0.4 0.3 0.6 0.5 0.4   Eos # 0.1 0.3 0.3 0.4 0.3 0.4 0.5   Baso # 0.04 0.05 0.06 0.10 0.10 0.10 0.10   nRBC 0 0 0       Gran % 76.7 High  61.9 58.6 46.5 35.6 Low  41.1 39.2   Lymph % 9.7 Low  22.8 26.0 34.8 45.0 41.3 37.4   Mono % 10.4 8.9 7.6 7.7 11.8 9.2 10.7   Eosinophil % 1.4 5.4 6.1 9.1 High  5.9 6.6 11.1 High    Basophil % 0.4 0.8 1.3 1.9 1.7 1.8 1.6   Differential Method Automated Automated Automated Automated Automated Automated Automated   Resulting Agency [employer] [facility]        Specimen Collected: 02/05/25 13:45 CST Last Resulted: 02/05/25 14:00 CST     2/4/25  Bhavna is a 64 y.o. female who presents new to the podiatry clinic w/ c/o pain R foot w/ drainage since Fri.,being on feet all day in [place] for grandchild's dance competition.  Already had this appointment scheduled to establish care as a diabetic so has not been to see anyone in regard to cc.  States did a lot walking in tennis shoes thinks they might not been supportive. No specific H/0 trauma but huge blister following day medial R foot/ arch  that has subsided but very red & painful. Taking IB prn.  Has collapsed arch L foot  - needs supportive shoes.    Stays very busy on feet all the time as has horses.  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  Past Medical History:   Diagnosis Date    Abdominal pain     Diabetes     GERD (gastroesophageal reflux disease)     Hx of colonic polyps     Hyperlipidemia     Hypertension     Nausea and vomiting      Patient Active Problem List   Diagnosis    History of adenomatous polyp of colon    Chronic pain of both shoulders    Bilateral rotator cuff dysfunction    Closed displaced transverse fracture of left patella    Stiffness of left knee    Hypertension    Hypothyroidism    Elevated LFTs    Type 1 diabetes mellitus with hyperglycemia    Pre-op testing    Type 1 diabetes mellitus with hypoglycemia    Hypoglycemia unawareness associated with type 1 diabetes mellitus    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 Heterozygous hemoglobin variants (HbS, HgC, etc)do  not significantly interfere with this assay.   However, presence of multiple variants may affect accuracy.     12/17/2024 9.4 (H) 4.0 - 5.6 % Final     Comment:     ADA Screening Guidelines:  5.7-6.4%  Consistent with prediabetes  >or=6.5%  Consistent with diabetes    High levels of fetal hemoglobin  interfere with the HbA1C  assay. Heterozygous hemoglobin variants (HbS, HgC, etc)do  not significantly interfere with this assay.   However, presence of multiple variants may affect accuracy.     01/20/2021 8.4 (H) 4.0 - 5.6 % Final     Comment:     ADA Screening Guidelines:  5.7-6.4%  Consistent with prediabetes  >or=6.5%  Consistent with diabetes  High levels of fetal hemoglobin interfere with the HbA1C  assay. Heterozygous hemoglobin variants (HbS, HgC, etc)do  not significantly interfere with this assay.   However, presence of multiple variants may affect accuracy.       Objective:      X-Ray Foot Complete Right  Order: [id number]  Status: Final result       Visible to patient: Yes (seen)       Next appt: 02/12/2025 at 01:00 PM in Lab (LAB, [facility] [place])       Dx: Trauma    0 Result Notes  Details    Reading Physician Reading Date Result Priority   Brody Jeong MD  [phone] 8/18/2024 STAT     Narrative & Impression  EXAMINATION:  XR FOOT COMPLETE 3 VIEW RIGHT     CLINICAL HISTORY:  . Injury, unspecified, initial encounter     TECHNIQUE:  AP, lateral, and oblique views of the right foot were performed.     COMPARISON:  None     FINDINGS:  Three views right foot.     No acute displaced fracture or dislocation of the foot.  No radiopaque foreign body.  No significant edema.  There are degenerative changes of the calcaneus.     Impression:     1. No acute displaced fracture or dislocation of the foot.        Electronically signed by:Brody Jeong MD  Date:                                            08/18/2024  Time:                                           18:39   X-Ray Foot Complete Left  Order: [id number]  Status: Final result       Visible to patient: Yes (seen)       Next appt: 02/12/2025 at 01:00 PM in Lab (LAB, [facility] [place])       Dx: Pain    0 Result Notes  Details    Reading Physician Reading Date Result Priority   Basilio Love III, MD  [phone] 6/29/2021 Routine     Narrative &  Impression  EXAMINATION:  XR FOOT COMPLETE 3 VIEW LEFT     CLINICAL HISTORY:  Pain, unspecified     FINDINGS:  Complete three views foot.     No fracture dislocation bone destruction seen.  There is mild DJD.  There is a flatfoot deformity and spurs on the calcaneus.     Impression:     No acute process seen.        Electronically signed by:Basilio Love MD  Date:                                            06/29/2021  Time:                                           13:53   I independently reviewed the x-ray images & there was obvious disruption of midfoot contour at the level of the distal to the navicular@ met cuneiform L as compared w/ contralateral x-ray, consistent w/ clinical appearance of asymmetry B/L MLA .    Review of Systems   Constitutional: Negative for malaise/fatigue.   Cardiovascular:  Negative for claudication and leg swelling.   Skin:  Positive for color change.   Musculoskeletal:  Positive for joint pain and myalgias. Negative for falls.   Neurological:  Positive for focal weakness, loss of balance and sensory change. Negative for numbness and weakness.   Psychiatric/Behavioral:  Positive for depression. The patient is nervous/anxious.      Physical Exam  Vitals reviewed.   Constitutional:       General: She is not in acute distress.     Appearance: She is well-developed and overweight.   Cardiovascular:      Pulses: Normal pulses.           Dorsalis pedis pulses are 2+ on the right side and 2+ on the left side.   Musculoskeletal:         General: Tenderness and signs of injury present. No swelling.      Right lower leg: No edema.      Left lower leg: No edema.      Right foot: Deformity present. No Charcot foot, foot drop or prominent metatarsal heads.      Left foot: Deformity (L>>R semirigid HTs), Charcot foot, foot drop and prominent metatarsal heads present.        Feet:    Feet:      Right foot:      Skin integrity: No blister, erythema or warmth.      Left foot:      Skin integrity: Blister  (PO) present. No erythema or warmth.   Skin:     General: Skin is warm and dry.      Capillary Refill: Capillary refill takes less than 2 seconds.      Findings: Abscess (PO) and lesion present. No bruising or erythema.      Comments: Resolution of erythema R forefoot  & plantar medial arch  No residual underlying fluctuance nor edema.  No active exudate nor drainage. No malodor.   Wound 3.2 x 1.2 cm sub 1st met.R extending medial along MLA 4.3 x 3 cm w/ ganular appearance. Only small amount of tendon/capsule seen medial distal 1/2 cm area.   Neurological:      Mental Status: She is alert and oriented to person, place, and time.      Sensory: Sensory deficit present.      Motor: Abnormal muscle tone present. No weakness.      Gait: Gait abnormal (orthowedge shoe R).   Psychiatric:         Mood and Affect: Mood and affect normal.         Behavior: Behavior normal. Behavior is cooperative.         Assessment:      Encounter Diagnoses   Name Primary?    Type 1 diabetes mellitus with diabetic polyneuropathy Yes    Abscess of right foot excluding toes     Postoperative follow-up     MRSA (methicillin resistant staph aureus) culture positive     Open wound of plantar aspect of foot, right, subsequent encounter     Visit for wound check        Problem List Items Addressed This Visit          Endocrine    Type 1 diabetes mellitus with diabetic polyneuropathy - Primary     Other Visit Diagnoses         Abscess of right foot excluding toes          Postoperative follow-up          MRSA (methicillin resistant staph aureus) culture positive          Open wound of plantar aspect of foot, right, subsequent encounter          Visit for wound check              Plan:       Bhavna was seen today for post-op evaluation.    Diagnoses and all orders for this visit:    Type 1 diabetes mellitus with diabetic polyneuropathy    Abscess of right foot excluding toes    Postoperative follow-up    MRSA (methicillin resistant staph aureus)  culture positive    Open wound of plantar aspect of foot, right, subsequent encounter    Visit for wound check    I counseled the patient on her conditions, their implications & medical mgmt.    - Shoe inspection. Diabetic Foot Education. Patient reminded of the importance of good blood sugar control to help prevent podiatric complications of diabetes. Patient instructed on proper foot hygeine. We discussed wearing proper shoe gear, daily foot inspections, never walking w/out protective shoe gear, annual DM foot exam, sooner p.r.n..      Continue Silipos gel strap for L MLA & we will address R when healed.  -Pending Rx DM shoes w/ custom accommodative inserts including L Charcot & R reinforced MLA.    -Wound debrided. Steri-Strips applied & to be maintained.  Betadine wet-dry dressing applied R. To be kept CDI. May change qod as above &/ or w/ Medihoney.  WBAT in orthowedge shoe.    F/U 7-10 days, sooner prn.        A total of 30 mins.was spent on chart review, patient visit & documentation.

## 2025-03-11 NOTE — PROCEDURES
Wound Debridement R    Date/Time: 2/27/2025 2:30 PM    Performed by: Tata Durham DPM  Authorized by: Tata Durham DPM    Consent Done?:  Yes (Verbal)    Wound Details:    Location:  Right foot    Type of Debridement:  Excisional       Length (cm):  2       Width (cm):  2       Depth (cm):  0.5       Area (sq cm):  3.14       Percent Debrided (%):  100       Total Area Debrided (sq cm):  3.14    Depth of debridement:  Subcutaneous tissue    Tissue debrided:  Dermis and Subcutaneous    Devitalized tissue debrided:  Fibrin    Instruments:  Scissors and Blade  Bleeding:  Minimal  Hemostasis Achieved: Yes  Method Used:  Pressure  Patient tolerance:  Patient tolerated the procedure well with no immediate complications

## 2025-03-12 ENCOUNTER — OFFICE VISIT (OUTPATIENT)
Dept: PODIATRY | Facility: CLINIC | Age: 65
End: 2025-03-12
Payer: COMMERCIAL

## 2025-03-12 VITALS
HEART RATE: 89 BPM | BODY MASS INDEX: 26.7 KG/M2 | WEIGHT: 160.25 LBS | SYSTOLIC BLOOD PRESSURE: 134 MMHG | DIASTOLIC BLOOD PRESSURE: 78 MMHG | HEIGHT: 65 IN

## 2025-03-12 DIAGNOSIS — Z09 POSTOPERATIVE FOLLOW-UP: ICD-10-CM

## 2025-03-12 DIAGNOSIS — S91.301D OPEN WOUND OF PLANTAR ASPECT OF FOOT, RIGHT, SUBSEQUENT ENCOUNTER: ICD-10-CM

## 2025-03-12 DIAGNOSIS — E10.42 TYPE 1 DIABETES MELLITUS WITH DIABETIC POLYNEUROPATHY: Primary | ICD-10-CM

## 2025-03-12 DIAGNOSIS — Z22.322 MRSA (METHICILLIN RESISTANT STAPH AUREUS) CULTURE POSITIVE: ICD-10-CM

## 2025-03-12 DIAGNOSIS — L02.611 ABSCESS OF RIGHT FOOT EXCLUDING TOES: ICD-10-CM

## 2025-03-12 DIAGNOSIS — Z51.89 VISIT FOR WOUND CHECK: ICD-10-CM

## 2025-03-12 PROCEDURE — 99999 PR PBB SHADOW E&M-EST. PATIENT-LVL IV: CPT | Mod: PBBFAC,,, | Performed by: PODIATRIST

## 2025-03-12 RX ORDER — CYANOCOBALAMIN (VITAMIN B-12) 2500 MCG
TABLET, SUBLINGUAL SUBLINGUAL
COMMUNITY

## 2025-03-12 RX ORDER — LEVOTHYROXINE SODIUM 88 UG/1
88 TABLET ORAL EVERY MORNING
COMMUNITY

## 2025-03-21 NOTE — PROCEDURES
Wound Debridement R    Date/Time: 3/12/2025 9:15 AM    Performed by: Tata Durham DPM  Authorized by: Tata Durham DPM    Consent Done?:  Yes (Verbal)    Wound Details:    Location:  Right foot    Location:  Right 1st Metatarsal Head    Type of Debridement:  Excisional       Length (cm):  3.2       Width (cm):  1.2       Depth (cm):  0.2       Area (sq cm):  3.02       Percent Debrided (%):  100       Total Area Debrided (sq cm):  3.02    Depth of debridement:  Subcutaneous tissue    Tissue debrided:  Epidermis and Dermis    Devitalized tissue debrided:  Slough    Instruments:  Nippers and Other  Bleeding:  Minimal  Hemostasis Achieved: Yes  Method Used:  Pressure    Additional wounds:  1    2nd Wound Details:     Location:  Right foot    Location:  Right Midfoot    Location:  Right Midfoot    Type of Debridement:  Excisional       Length (cm):  4.3       Area (sq cm):  10.13       Width (cm):  3       Percent Debrided (%):  100       Depth (cm):  0.2       Total Area Debrided (sq cm):  10.13    Depth of debridement:  Muscle/fascia/tendon    Tissue debrided:  Epidermis, Dermis and Tendon    Devitalized tissue debrided:  Slough    Instruments:  Nippers and Other  Bleeding:  Minimal  Hemostasis Achieved: Yes    Method Used:  Pressure  Patient tolerance:  Patient tolerated the procedure well with no immediate complications

## 2025-03-21 NOTE — PROGRESS NOTES
Subjective:      Patient ID: Bhavna Hancock is a 64 y.o. female.    Chief Complaint: Wound Check (Right foot)              3/12/25  Patient is PO I&D abscess R w/.wound check. Spraying w/ Betadine & applying alginate. Occasionally using MediHoney. States continuing to be getting better. When healed, wants to have hammertoe corrected.  Completed Bactrim DS 2/23/25.  Pic not uploading.  2/27/25  Patient is here for wound check PO I&D  R foot abscess. Minimal pain.  2/18/25  Patient is PO I&D R foot abscess s/p blister, DOS 2/5/25, POD 13 days. Initially placed on Cipro; cultures + for MRSA so placed on Bactrim DS bid per C&S. WBC is improving. A1c also improving from 9.4% 12/17 to 8.0% yesterday. Has  suture disruption medial 1st met.head area last visit, maintained w/ Steri-strips. Was to continue in orthowedge shoe & maintain R foot CDI. Pain & swelling significantly reduced.    CBC W/ AUTO DIFFERENTIAL  Order: [id number]   Status: Final result       Next appt: 02/18/2025 at 01:45 PM in Podiatry (Tata Durham DPM)       Dx: Postoperative follow-up; Abscess of b...    Test Result Released: Yes (seen)    0 Result Notes            Component  Ref Range & Units (hover) 12:54  (2/17/25) 12 d ago  (2/5/25) 2 mo ago  (12/17/24) 3 yr ago  (2/16/22) 3 yr ago  (4/2/21) 3 yr ago  (3/16/21) 3 yr ago  (3/9/21)   WBC 7.13 9.59 5.96 4.73 4.20 4.90 5.30   RBC 4.30 3.31 Low  4.42 4.50 4.18 4.34 4.25   Hemoglobin 12.2 9.7 Low  12.7 12.6 11.1 Low  11.2 Low  11.3 Low    Hematocrit 39.4 29.4 Low  40.2 39.7 34.1 Low  35.4 Low  34.9 Low    MCV 92 89 91 88 82 82 82   MCH 28.4 29.3 28.7 28.0 26.5 Low  25.9 Low  26.5 Low    MCHC 31.0 Low  33.0 31.6 Low  31.7 Low  32.5 31.7 Low  32.4   RDW 13.0 12.6 12.8 13.5 14.8 High  14.6 High  15.4 High    Platelets 500 High  274 252 202 258 390 High  R 504 High  R   MPV 10.6 10.4 10.9 10.9 9.6 9.1 Low  8.7 Low    Immature Granulocytes 0.3 1.4 High  0.2 0.4      Gran # (ANC) 4.3 7.4 3.7 2.8 2.0 1.8 2.2    Immature Grans (Abs) 0.02 0.13 High  CM 0.01 CM 0.02 CM      Comment: Mild elevation in immature granulocytes is non specific and  can be seen in a variety of conditions including stress response,  acute inflammation, trauma and pregnancy. Correlation with other  laboratory and clinical findings is essential.   Lymph # 1.8 0.9 Low  1.4 1.2 1.5 2.2 2.2   Mono # 0.5 1.0 0.5 0.4 0.3 0.6 0.5   Eos # 0.3 0.1 0.3 0.3 0.4 0.3 0.4   Baso # 0.11 0.04 0.05 0.06 0.10 0.10 0.10   nRBC 0 0 0 0      Gran % 60.6 76.7 High  61.9 58.6 46.5 35.6 Low  41.1   Lymph % 25.8 9.7 Low  22.8 26.0 34.8 45.0 41.3   Mono % 7.3 10.4 8.9 7.6 7.7 11.8 9.2   Eosinophil % 4.5 1.4 5.4 6.1 9.1 High  5.9 6.6   Basophil % 1.5 0.4 0.8 1.3 1.9 1.7 1.8   Differential Method Automated Automated Automated Automated Automated Automated Automated   Resulting Agency [facility]        Specimen Collected: 02/17/25 12:54 CST Last Resulted: 02/17/25 13:45 CST     2/11/25  Patient is here PO I&D R foot abscess, DOS 2/5/25, POD 6 days. Cultures + for MRSA so was placed on Bactrim DS bid per C&S. States feels much better int hat swelling & related pain resolved. Kept dressing CDI. In orthowedge shoe.  No leukocytosis per lab range, but WBC elevated compared to previous levels - will monitor.    Contains abnormal data CBC auto differential  Order: [id number]   Status: Final result       Next appt: 02/18/2025 at 01:45 PM in Podiatry (Tata Durham DPM)    Test Result Released: Yes (seen)    0 Result Notes   important suggestion  Newer results are available. Click to view them now.               Component  Ref Range & Units (hover) 12 d ago  (2/5/25) 2 mo ago  (12/17/24) 3 yr ago  (2/16/22) 3 yr ago  (4/2/21) 3 yr ago  (3/16/21) 3 yr ago  (3/9/21) 4 yr ago  (2/17/21)   WBC 9.59 5.96 4.73 4.20 4.90 5.30 4.10   RBC 3.31 Low  4.42 4.50 4.18 4.34 4.25 3.94 Low    Hemoglobin 9.7 Low  12.7 12.6 11.1 Low   11.2 Low  11.3 Low  10.6 Low    Hematocrit 29.4 Low  40.2 39.7 34.1 Low  35.4 Low  34.9 Low  32.4 Low    MCV 89 91 88 82 82 82 82   MCH 29.3 28.7 28.0 26.5 Low  25.9 Low  26.5 Low  26.9 Low    MCHC 33.0 31.6 Low  31.7 Low  32.5 31.7 Low  32.4 32.6   RDW 12.6 12.8 13.5 14.8 High  14.6 High  15.4 High  15.2 High    Platelets 274 252 202 258 390 High  R 504 High  R 316 R   MPV 10.4 10.9 10.9 9.6 9.1 Low  8.7 Low  8.6 Low    Immature Granulocytes 1.4 High  0.2 0.4       Gran # (ANC) 7.4 3.7 2.8 2.0 1.8 2.2 1.6 Low    Immature Grans (Abs) 0.13 High  0.01 CM 0.02 CM       Comment: Mild elevation in immature granulocytes is non specific and  can be seen in a variety of conditions including stress response,  acute inflammation, trauma and pregnancy. Correlation with other  laboratory and clinical findings is essential.   Lymph # 0.9 Low  1.4 1.2 1.5 2.2 2.2 1.5   Mono # 1.0 0.5 0.4 0.3 0.6 0.5 0.4   Eos # 0.1 0.3 0.3 0.4 0.3 0.4 0.5   Baso # 0.04 0.05 0.06 0.10 0.10 0.10 0.10   nRBC 0 0 0       Gran % 76.7 High  61.9 58.6 46.5 35.6 Low  41.1 39.2   Lymph % 9.7 Low  22.8 26.0 34.8 45.0 41.3 37.4   Mono % 10.4 8.9 7.6 7.7 11.8 9.2 10.7   Eosinophil % 1.4 5.4 6.1 9.1 High  5.9 6.6 11.1 High    Basophil % 0.4 0.8 1.3 1.9 1.7 1.8 1.6   Differential Method Automated Automated Automated Automated Automated Automated Automated   Resulting Agency [facility]        Specimen Collected: 02/05/25 13:45 CST Last Resulted: 02/05/25 14:00 CST     2/4/25  Bhavna is a 64 y.o. female who presents new to the podiatry clinic w/ c/o pain R foot w/ drainage since Fri.,being on feet all day in [place] for grandchild's dance competition.  Already had this appointment scheduled to establish care as a diabetic so has not been to see anyone in regard to cc.  States did a lot walking in tennis shoes thinks they might not been supportive. No specific H/0 trauma but huge blister  following day medial R foot/ arch that has subsided but very red & painful. Taking IB prn.  Has collapsed arch L foot  - needs supportive shoes.    Stays very busy on feet all the time as has horses.  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  Past Medical History:   Diagnosis Date    Abdominal pain     Diabetes     GERD (gastroesophageal reflux disease)     Hx of colonic polyps     Hyperlipidemia     Hypertension     Nausea and vomiting      Patient Active Problem List   Diagnosis    History of adenomatous polyp of colon    Chronic pain of both shoulders    Bilateral rotator cuff dysfunction    Closed displaced transverse fracture of left patella    Stiffness of left knee    Hypertension    Hypothyroidism    Elevated LFTs    Type 1 diabetes mellitus with hyperglycemia    Pre-op testing    Type 1 diabetes mellitus with hypoglycemia    Hypoglycemia unawareness associated with type 1 diabetes mellitus    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 Heterozygous hemoglobin variants (HbS, HgC, etc)do  not significantly interfere with this assay.   However, presence of multiple variants may affect accuracy.     12/17/2024 9.4 (H) 4.0 - 5.6 % Final     Comment:     ADA Screening Guidelines:  5.7-6.4%  Consistent with prediabetes  >or=6.5%  Consistent with  diabetes    High levels of fetal hemoglobin interfere with the HbA1C  assay. Heterozygous hemoglobin variants (HbS, HgC, etc)do  not significantly interfere with this assay.   However, presence of multiple variants may affect accuracy.     01/20/2021 8.4 (H) 4.0 - 5.6 % Final     Comment:     ADA Screening Guidelines:  5.7-6.4%  Consistent with prediabetes  >or=6.5%  Consistent with diabetes  High levels of fetal hemoglobin interfere with the HbA1C  assay. Heterozygous hemoglobin variants (HbS, HgC, etc)do  not significantly interfere with this assay.   However, presence of multiple variants may affect accuracy.       Objective:      X-Ray Foot Complete Right  Order: [id number]  Status: Final result       Visible to patient: Yes (seen)       Next appt: 02/12/2025 at 01:00 PM in Lab (LAB, [facility])       Dx: Trauma    0 Result Notes  Details    Reading Physician Reading Date Result Priority   Brody Jeong MD  [phone] 8/18/2024 STAT     Narrative & Impression  EXAMINATION:  XR FOOT COMPLETE 3 VIEW RIGHT     CLINICAL HISTORY:  . Injury, unspecified, initial encounter     TECHNIQUE:  AP, lateral, and oblique views of the right foot were performed.     COMPARISON:  None     FINDINGS:  Three views right foot.     No acute displaced fracture or dislocation of the foot.  No radiopaque foreign body.  No significant edema.  There are degenerative changes of the calcaneus.     Impression:     1. No acute displaced fracture or dislocation of the foot.        Electronically signed by:Brody Jeong MD  Date:                                            08/18/2024  Time:                                           18:39   X-Ray Foot Complete Left  Order: [id number]  Status: Final result       Visible to patient: Yes (seen)       Next appt: 02/12/2025 at 01:00 PM in Lab (LAB, [facility])       Dx: Pain    0 Result Notes  Details    Reading Physician Reading Date Result Priority   Basilio Love III, MD  [phone]  6/29/2021 Routine     Narrative & Impression  EXAMINATION:  XR FOOT COMPLETE 3 VIEW LEFT     CLINICAL HISTORY:  Pain, unspecified     FINDINGS:  Complete three views foot.     No fracture dislocation bone destruction seen.  There is mild DJD.  There is a flatfoot deformity and spurs on the calcaneus.     Impression:     No acute process seen.        Electronically signed by:Basilio Love MD  Date:                                            06/29/2021  Time:                                           13:53   I independently reviewed the x-ray images & there was obvious disruption of midfoot contour at the level of the distal to the navicular@ met cuneiform L as compared w/ contralateral x-ray, consistent w/ clinical appearance of asymmetry B/L MLA .    Review of Systems   Constitutional: Negative for malaise/fatigue.   Cardiovascular:  Negative for claudication and leg swelling.   Skin:  Positive for color change.   Musculoskeletal:  Positive for joint pain and myalgias. Negative for falls.   Neurological:  Positive for focal weakness, loss of balance and sensory change. Negative for numbness and weakness.   Psychiatric/Behavioral:  Positive for depression. The patient is nervous/anxious.      Physical Exam  Vitals reviewed.   Constitutional:       General: She is not in acute distress.     Appearance: She is well-developed and overweight.   Cardiovascular:      Pulses: Normal pulses.           Dorsalis pedis pulses are 2+ on the right side and 2+ on the left side.   Musculoskeletal:         General: Tenderness and signs of injury present. No swelling.      Right lower leg: No edema.      Left lower leg: No edema.      Right foot: Deformity present. No Charcot foot, foot drop or prominent metatarsal heads.      Left foot: Deformity (L>>R semirigid HTs), Charcot foot, foot drop and prominent metatarsal heads present.        Feet:    Feet:      Right foot:      Skin integrity: No blister, erythema or warmth.      Left  foot:      Skin integrity: Blister (PO) present. No erythema or warmth.   Skin:     General: Skin is warm and dry.      Capillary Refill: Capillary refill takes less than 2 seconds.      Findings: Abscess (PO) and lesion present. No bruising or erythema.      Comments: Resolution of erythema R forefoot  & plantar medial arch  No residual underlying fluctuance nor edema.  No active exudate nor drainage. No malodor.   Wound last visit 3.2 x 1.2 cm sub 1st met.R has decreased by >1/2, to 2.5 x 0.6 cm, extending medial along MLA from 4.3 x 3 cm down to current measurement of almost 1/2 @ 4.4 x 1.8 cm w/ ganular appearance. Only small amount of tendon/capsule seen medial distal 1/2 cm area.   Neurological:      Mental Status: She is alert and oriented to person, place, and time.      Sensory: Sensory deficit present.      Motor: Abnormal muscle tone present. No weakness.      Gait: Gait abnormal (orthowedge shoe R).   Psychiatric:         Mood and Affect: Mood and affect normal.         Behavior: Behavior normal. Behavior is cooperative.         Assessment:      Encounter Diagnoses   Name Primary?    Visit for wound check Yes    Open wound of plantar aspect of foot, right, subsequent encounter     Type 1 diabetes mellitus with diabetic polyneuropathy     Open wound of right foot with tendon involvement, subsequent encounter          Problem List Items Addressed This Visit          Endocrine    Type 1 diabetes mellitus with diabetic polyneuropathy     Other Visit Diagnoses         Visit for wound check    -  Primary      Open wound of plantar aspect of foot, right, subsequent encounter        Relevant Orders    WOund debridement R foot      Open wound of right foot with tendon involvement, subsequent encounter        Relevant Orders    WOund debridement R foot            Plan:       Bhavna was seen today for wound check.    Diagnoses and all orders for this visit:    Visit for wound check    Open wound of plantar aspect  of foot, right, subsequent encounter  -     WOund debridement R foot    Type 1 diabetes mellitus with diabetic polyneuropathy    Open wound of right foot with tendon involvement, subsequent encounter  -     WOund debridement R foot      I counseled the patient on her conditions, their implications & medical mgmt.    - Shoe inspection. Diabetic Foot Education. Patient reminded of the importance of good blood sugar control to help prevent podiatric complications of diabetes. Patient instructed on proper foot hygeine. We discussed wearing proper shoe gear, daily foot inspections, never walking w/out protective shoe gear, annual DM foot exam, sooner p.r.n..      Continue Silipos gel strap for L MLA & we will address R when healed.  -Pending Rx DM shoes w/ custom accommodative inserts including L Charcot & R reinforced MLA.    -Wound debrided. Steri-Strips applied & to be maintained.  Betadine wet-dry dressing applied R. To be kept CDI. May change qod as above &/ or w/ Medihoney.  WBAT in orthowedge shoe.    F/U 7-10 days, sooner prn.        A total of 29 mins.was spent on chart review, patient visit & documentation.

## 2025-03-27 ENCOUNTER — OFFICE VISIT (OUTPATIENT)
Dept: PODIATRY | Facility: CLINIC | Age: 65
End: 2025-03-27
Payer: COMMERCIAL

## 2025-03-27 VITALS
SYSTOLIC BLOOD PRESSURE: 130 MMHG | HEIGHT: 65 IN | DIASTOLIC BLOOD PRESSURE: 73 MMHG | WEIGHT: 160.5 LBS | HEART RATE: 81 BPM | BODY MASS INDEX: 26.74 KG/M2

## 2025-03-27 DIAGNOSIS — S91.301D OPEN WOUND OF RIGHT FOOT WITH TENDON INVOLVEMENT, SUBSEQUENT ENCOUNTER: ICD-10-CM

## 2025-03-27 DIAGNOSIS — S91.301D OPEN WOUND OF PLANTAR ASPECT OF FOOT, RIGHT, SUBSEQUENT ENCOUNTER: ICD-10-CM

## 2025-03-27 DIAGNOSIS — E10.42 TYPE 1 DIABETES MELLITUS WITH DIABETIC POLYNEUROPATHY: ICD-10-CM

## 2025-03-27 DIAGNOSIS — S96.901D OPEN WOUND OF RIGHT FOOT WITH TENDON INVOLVEMENT, SUBSEQUENT ENCOUNTER: ICD-10-CM

## 2025-03-27 DIAGNOSIS — Z51.89 VISIT FOR WOUND CHECK: Primary | ICD-10-CM

## 2025-03-27 PROCEDURE — 99999 PR PBB SHADOW E&M-EST. PATIENT-LVL V: CPT | Mod: PBBFAC,,, | Performed by: PODIATRIST

## 2025-03-27 PROCEDURE — 11042 DBRDMT SUBQ TIS 1ST 20SQCM/<: CPT | Mod: XS,S$GLB,, | Performed by: PODIATRIST

## 2025-03-27 PROCEDURE — 11043 DBRDMT MUSC&/FSCA 1ST 20/<: CPT | Mod: S$GLB,,, | Performed by: PODIATRIST

## 2025-03-27 PROCEDURE — 99213 OFFICE O/P EST LOW 20 MIN: CPT | Mod: 25,S$GLB,, | Performed by: PODIATRIST

## 2025-04-01 NOTE — PROCEDURES
WOund debridement R foot    Date/Time: 3/27/2025 1:45 PM    Performed by: Tata Durham DPM  Authorized by: Tata Durham DPM    Consent Done?:  Yes (Verbal)    Wound Details:    Location:  Right foot    Location:  Right Plantar    Type of Debridement:  Excisional       Length (cm):  2.5       Width (cm):  0.6       Depth (cm):  0.2       Area (sq cm):  1.18       Percent Debrided (%):  100       Total Area Debrided (sq cm):  1.18    Depth of debridement:  Subcutaneous tissue    Tissue debrided:  Epidermis, Dermis and Subcutaneous    Devitalized tissue debrided:  Callus and Slough    Instruments:  Nippers  Bleeding:  Minimal  Hemostasis Achieved: Yes  Method Used:  Pressure    Additional wounds:  1    2nd Wound Details:     Location:  Right foot    Location:  Right 1st Metatarsal Head    Location:  Right 1st Metatarsal Head    Type of Debridement:  Excisional       Length (cm):  4.4       Area (sq cm):  6.22       Width (cm):  1.8       Percent Debrided (%):  100       Depth (cm):  0.2       Total Area Debrided (sq cm):  6.22    Depth of debridement:  Muscle/fascia/tendon    Tissue debrided:  Tendon, Epidermis and Dermis    Devitalized tissue debrided:  Callus and Slough    Instruments:  Nippers and Blade  Bleeding:  Minimal  Hemostasis Achieved: Yes    Method Used:  Pressure  Patient tolerance:  Patient tolerated the procedure well with no immediate complications

## 2025-04-08 NOTE — PROGRESS NOTES
Subjective:      Patient ID: Bhavna Hancock is a 64 y.o. female.    Chief Complaint: Wound Care    Patient presents for wound check R foot s/p blister & MRSA. Had I&D R foot abscess, DOS 2/5/25. Initially on Cipro; then Bactrim DS bid per C&S. Continuing w/ local wound care & CDI while maintaining normal ADLs. States ordered Mepilex Ag but was sent w/out Ag. No c/o pain.        3/27/25          3/12/25  Patient is PO I&D abscess R w/.wound check. Spraying w/ Betadine & applying alginate. Occasionally using MediHoney. States continuing to be getting better. When healed, wants to have hammertoe corrected.  Completed Bactrim DS 2/23/25.  Pic not uploading.  2/27/25  Patient is here for wound check PO I&D  R foot abscess. Minimal pain.  2/18/25  Patient is PO I&D R foot abscess s/p blister, DOS 2/5/25, POD 13 days. Initially placed on Cipro; cultures + for MRSA so placed on Bactrim DS bid per C&S. WBC is improving. A1c also improving from 9.4% 12/17 to 8.0% yesterday. Has  suture disruption medial 1st met.head area last visit, maintained w/ Steri-strips. Was to continue in orthowedge shoe & maintain R foot CDI. Pain & swelling significantly reduced.    CBC W/ AUTO DIFFERENTIAL  Order: [id number]   Status: Final result       Next appt: 02/18/2025 at 01:45 PM in Podiatry (Tata Durham DPM)       Dx: Postoperative follow-up; Abscess of b...    Test Result Released: Yes (seen)    0 Result Notes            Component  Ref Range & Units (hover) 12:54  (2/17/25) 12 d ago  (2/5/25) 2 mo ago  (12/17/24) 3 yr ago  (2/16/22) 3 yr ago  (4/2/21) 3 yr ago  (3/16/21) 3 yr ago  (3/9/21)   WBC 7.13 9.59 5.96 4.73 4.20 4.90 5.30   RBC 4.30 3.31 Low  4.42 4.50 4.18 4.34 4.25   Hemoglobin 12.2 9.7 Low  12.7 12.6 11.1 Low  11.2 Low  11.3 Low    Hematocrit 39.4 29.4 Low  40.2 39.7 34.1 Low  35.4 Low  34.9 Low    MCV 92 89 91 88 82 82 82   MCH 28.4 29.3 28.7 28.0 26.5 Low  25.9 Low  26.5 Low    MCHC 31.0 Low  33.0 31.6 Low  31.7 Low  32.5  31.7 Low  32.4   RDW 13.0 12.6 12.8 13.5 14.8 High  14.6 High  15.4 High    Platelets 500 High  274 252 202 258 390 High  R 504 High  R   MPV 10.6 10.4 10.9 10.9 9.6 9.1 Low  8.7 Low    Immature Granulocytes 0.3 1.4 High  0.2 0.4      Gran # (ANC) 4.3 7.4 3.7 2.8 2.0 1.8 2.2   Immature Grans (Abs) 0.02 0.13 High  CM 0.01 CM 0.02 CM      Comment: Mild elevation in immature granulocytes is non specific and  can be seen in a variety of conditions including stress response,  acute inflammation, trauma and pregnancy. Correlation with other  laboratory and clinical findings is essential.   Lymph # 1.8 0.9 Low  1.4 1.2 1.5 2.2 2.2   Mono # 0.5 1.0 0.5 0.4 0.3 0.6 0.5   Eos # 0.3 0.1 0.3 0.3 0.4 0.3 0.4   Baso # 0.11 0.04 0.05 0.06 0.10 0.10 0.10   nRBC 0 0 0 0      Gran % 60.6 76.7 High  61.9 58.6 46.5 35.6 Low  41.1   Lymph % 25.8 9.7 Low  22.8 26.0 34.8 45.0 41.3   Mono % 7.3 10.4 8.9 7.6 7.7 11.8 9.2   Eosinophil % 4.5 1.4 5.4 6.1 9.1 High  5.9 6.6   Basophil % 1.5 0.4 0.8 1.3 1.9 1.7 1.8   Differential Method Automated Automated Automated Automated Automated Automated Automated   Resulting Agency [facility]        Specimen Collected: 02/17/25 12:54 CST Last Resulted: 02/17/25 13:45 CST     2/11/25  Patient is here PO I&D R foot abscess, DOS 2/5/25, POD 6 days. Cultures + for MRSA so was placed on Bactrim DS bid per C&S. States feels much better int hat swelling & related pain resolved. Kept dressing CDI. In orthowedge shoe.  No leukocytosis per lab range, but WBC elevated compared to previous levels - will monitor.    Contains abnormal data CBC auto differential  Order: [id number]   Status: Final result       Next appt: 02/18/2025 at 01:45 PM in Podiatry (Tata Durham DPM)    Test Result Released: Yes (seen)    0 Result Notes   important suggestion  Newer results are available. Click to view them now.               Component  Ref Range & Units  (elizabeth) 12 d ago  (2/5/25) 2 mo ago  (12/17/24) 3 yr ago  (2/16/22) 3 yr ago  (4/2/21) 3 yr ago  (3/16/21) 3 yr ago  (3/9/21) 4 yr ago  (2/17/21)   WBC 9.59 5.96 4.73 4.20 4.90 5.30 4.10   RBC 3.31 Low  4.42 4.50 4.18 4.34 4.25 3.94 Low    Hemoglobin 9.7 Low  12.7 12.6 11.1 Low  11.2 Low  11.3 Low  10.6 Low    Hematocrit 29.4 Low  40.2 39.7 34.1 Low  35.4 Low  34.9 Low  32.4 Low    MCV 89 91 88 82 82 82 82   MCH 29.3 28.7 28.0 26.5 Low  25.9 Low  26.5 Low  26.9 Low    MCHC 33.0 31.6 Low  31.7 Low  32.5 31.7 Low  32.4 32.6   RDW 12.6 12.8 13.5 14.8 High  14.6 High  15.4 High  15.2 High    Platelets 274 252 202 258 390 High  R 504 High  R 316 R   MPV 10.4 10.9 10.9 9.6 9.1 Low  8.7 Low  8.6 Low    Immature Granulocytes 1.4 High  0.2 0.4       Gran # (ANC) 7.4 3.7 2.8 2.0 1.8 2.2 1.6 Low    Immature Grans (Abs) 0.13 High  0.01 CM 0.02 CM       Comment: Mild elevation in immature granulocytes is non specific and  can be seen in a variety of conditions including stress response,  acute inflammation, trauma and pregnancy. Correlation with other  laboratory and clinical findings is essential.   Lymph # 0.9 Low  1.4 1.2 1.5 2.2 2.2 1.5   Mono # 1.0 0.5 0.4 0.3 0.6 0.5 0.4   Eos # 0.1 0.3 0.3 0.4 0.3 0.4 0.5   Baso # 0.04 0.05 0.06 0.10 0.10 0.10 0.10   nRBC 0 0 0       Gran % 76.7 High  61.9 58.6 46.5 35.6 Low  41.1 39.2   Lymph % 9.7 Low  22.8 26.0 34.8 45.0 41.3 37.4   Mono % 10.4 8.9 7.6 7.7 11.8 9.2 10.7   Eosinophil % 1.4 5.4 6.1 9.1 High  5.9 6.6 11.1 High    Basophil % 0.4 0.8 1.3 1.9 1.7 1.8 1.6   Differential Method Automated Automated Automated Automated Automated Automated Automated   Resulting Agency [facility]        Specimen Collected: 02/05/25 13:45 CST Last Resulted: 02/05/25 14:00 CST     2/4/25  Bhavna is a 64 y.o. female who presents new to the podiatry clinic w/ c/o pain R foot w/ drainage since Fri.,being on feet all day in [place]  for grandchild's dance competition.  Already had this appointment scheduled to establish care as a diabetic so has not been to see anyone in regard to cc.  States did a lot walking in tennis shoes thinks they might not been supportive. No specific H/0 trauma but huge blister following day medial R foot/ arch that has subsided but very red & painful. Taking IB prn.  Has collapsed arch L foot  - needs supportive shoes.    Stays very busy on feet all the time as has horses.  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  Past Medical History:   Diagnosis Date    Abdominal pain     Diabetes     GERD (gastroesophageal reflux disease)     Hx of colonic polyps     Hyperlipidemia     Hypertension     Nausea and vomiting      Patient Active Problem List   Diagnosis    History of adenomatous polyp of colon    Chronic pain of both shoulders    Bilateral rotator cuff dysfunction    Closed displaced transverse fracture of left patella    Stiffness of left knee    Hypertension    Hypothyroidism    Elevated LFTs    Type 1 diabetes mellitus with hyperglycemia    Pre-op testing    Type 1 diabetes mellitus with hypoglycemia    Hypoglycemia unawareness associated with type 1 diabetes mellitus    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 Heterozygous  hemoglobin variants (HbS, HgC, etc)do  not significantly interfere with this assay.   However, presence of multiple variants may affect accuracy.     12/17/2024 9.4 (H) 4.0 - 5.6 % Final     Comment:     ADA Screening Guidelines:  5.7-6.4%  Consistent with prediabetes  >or=6.5%  Consistent with diabetes    High levels of fetal hemoglobin interfere with the HbA1C  assay. Heterozygous hemoglobin variants (HbS, HgC, etc)do  not significantly interfere with this assay.   However, presence of multiple variants may affect accuracy.     01/20/2021 8.4 (H) 4.0 - 5.6 % Final     Comment:     ADA Screening Guidelines:  5.7-6.4%  Consistent with prediabetes  >or=6.5%  Consistent with diabetes  High levels of fetal hemoglobin interfere with the HbA1C  assay. Heterozygous hemoglobin variants (HbS, HgC, etc)do  not significantly interfere with this assay.   However, presence of multiple variants may affect accuracy.       Objective:      X-Ray Foot Complete Right  Order: [id number]  Status: Final result       Visible to patient: Yes (seen)       Next appt: 02/12/2025 at 01:00 PM in Lab (LAB, [facility] [place])       Dx: Trauma    0 Result Notes  Details    Reading Physician Reading Date Result Priority   Brody Jeong MD  [phone] 8/18/2024 STAT     Narrative & Impression  EXAMINATION:  XR FOOT COMPLETE 3 VIEW RIGHT     CLINICAL HISTORY:  . Injury, unspecified, initial encounter     TECHNIQUE:  AP, lateral, and oblique views of the right foot were performed.     COMPARISON:  None     FINDINGS:  Three views right foot.     No acute displaced fracture or dislocation of the foot.  No radiopaque foreign body.  No significant edema.  There are degenerative changes of the calcaneus.     Impression:     1. No acute displaced fracture or dislocation of the foot.        Electronically signed by:Brody Jeong MD  Date:                                            08/18/2024  Time:                                           18:39   X-Ray Foot  Complete Left  Order: [id number]  Status: Final result       Visible to patient: Yes (seen)       Next appt: 02/12/2025 at 01:00 PM in Lab (LAB, [facility])       Dx: Pain    0 Result Notes  Details    Reading Physician Reading Date Result Priority   Basilio Love III, MD  [phone]  [phone] 6/29/2021 Routine     Narrative & Impression  EXAMINATION:  XR FOOT COMPLETE 3 VIEW LEFT     CLINICAL HISTORY:  Pain, unspecified     FINDINGS:  Complete three views foot.     No fracture dislocation bone destruction seen.  There is mild DJD.  There is a flatfoot deformity and spurs on the calcaneus.     Impression:     No acute process seen.        Electronically signed by:Basilio Love MD  Date:                                            06/29/2021  Time:                                           13:53   I independently reviewed the x-ray images & there was obvious disruption of midfoot contour at the level of the distal to the navicular@ met cuneiform L as compared w/ contralateral x-ray, consistent w/ clinical appearance of asymmetry B/L MLA .    Review of Systems   Constitutional: Negative for malaise/fatigue.   Cardiovascular:  Negative for claudication and leg swelling.   Skin:  Positive for color change.   Musculoskeletal:  Positive for joint pain and myalgias. Negative for falls.   Neurological:  Positive for focal weakness, loss of balance and sensory change. Negative for numbness and weakness.   Psychiatric/Behavioral:  Positive for depression. The patient is nervous/anxious.      Physical Exam  Vitals reviewed.   Constitutional:       General: She is not in acute distress.     Appearance: She is well-developed and overweight.   Cardiovascular:      Pulses: Normal pulses.           Dorsalis pedis pulses are 2+ on the right side and 2+ on the left side.   Musculoskeletal:         General: Signs of injury present. No swelling or tenderness.      Right lower leg: No edema.      Left lower leg: No edema.      Right  foot: Deformity present. No Charcot foot, foot drop or prominent metatarsal heads.      Left foot: Deformity (L>>R semirigid HTs), Charcot foot, foot drop and prominent metatarsal heads present.        Feet:    Feet:      Right foot:      Skin integrity: No blister, erythema or warmth.      Left foot:      Skin integrity: Blister (PO) present. No erythema or warmth.   Skin:     General: Skin is warm and dry.      Capillary Refill: Capillary refill takes less than 2 seconds.      Findings: Abscess (PO) and lesion present. No bruising or erythema.      Comments: Resolution of erythema R foot. No residual fluctuance nor edema.  No active exudate nor drainage. No malodor.     See pix:  Wound last visit 2.5 x 0.6 cm, is now 2.5 x 0.3 cm,  extending medial along MLA from 4.4 x 1.8 cm to 3.3 x 2.3 most medially & narrowing to 0.5 cm laterally, w/ granular appearance. Minimal residual tendon seen medial wound.   Neurological:      Mental Status: She is alert and oriented to person, place, and time.      Sensory: Sensory deficit present.      Motor: Abnormal muscle tone present. No weakness.      Gait: Gait abnormal (orthowedge shoe R).   Psychiatric:         Mood and Affect: Mood and affect normal.         Behavior: Behavior normal. Behavior is cooperative.         Assessment:      Encounter Diagnoses   Name Primary?    Visit for wound check Yes    Open wound of right foot with tendon involvement, subsequent encounter     MRSA (methicillin resistant staph aureus) culture positive     Blister (nonthermal), right foot, subsequent encounter     Type 1 diabetes mellitus with diabetic polyneuropathy            Problem List Items Addressed This Visit          Endocrine    Type 1 diabetes mellitus with diabetic polyneuropathy     Other Visit Diagnoses         Visit for wound check    -  Primary      Open wound of right foot with tendon involvement, subsequent encounter          MRSA (methicillin resistant staph aureus) culture  positive          Blister (nonthermal), right foot, subsequent encounter                  Plan:       Bhavna was seen today for wound care.    Diagnoses and all orders for this visit:    Visit for wound check    Open wound of right foot with tendon involvement, subsequent encounter    MRSA (methicillin resistant staph aureus) culture positive    Blister (nonthermal), right foot, subsequent encounter    Type 1 diabetes mellitus with diabetic polyneuropathy        I counseled the patient on her conditions, their implications & medical mgmt.    - Shoe inspection. Diabetic Foot Education. Patient reminded of the importance of good blood sugar control to help prevent podiatric complications of diabetes. Patient instructed on proper foot hygeine. We discussed wearing proper shoe gear, daily foot inspections, never walking w/out protective shoe gear, annual DM foot exam, sooner p.r.n..      Continue Silipos gel strap for L MLA & we will address R when healed.  -Pending Rx DM shoes w/ custom accommodative inserts including L Charcot & R reinforced MLA.    -Wound debrided. Mepilex & Betadine wet-dry dressing applied R. To be kept CDI. May change qod as above &/ or w/ Medihoney.  WBAT in orthowedge shoe.    F/U 7-10 days, sooner prn.        A total of 29 mins.was spent on chart review, patient visit & documentation.

## 2025-04-10 ENCOUNTER — TELEPHONE (OUTPATIENT)
Dept: PODIATRY | Facility: CLINIC | Age: 65
End: 2025-04-10
Payer: COMMERCIAL

## 2025-04-10 ENCOUNTER — OFFICE VISIT (OUTPATIENT)
Dept: PODIATRY | Facility: CLINIC | Age: 65
End: 2025-04-10
Payer: COMMERCIAL

## 2025-04-10 VITALS
HEIGHT: 65 IN | BODY MASS INDEX: 26.81 KG/M2 | HEART RATE: 81 BPM | DIASTOLIC BLOOD PRESSURE: 63 MMHG | WEIGHT: 160.94 LBS | SYSTOLIC BLOOD PRESSURE: 131 MMHG

## 2025-04-10 DIAGNOSIS — Z51.89 VISIT FOR WOUND CHECK: Primary | ICD-10-CM

## 2025-04-10 DIAGNOSIS — S90.821D BLISTER (NONTHERMAL), RIGHT FOOT, SUBSEQUENT ENCOUNTER: ICD-10-CM

## 2025-04-10 DIAGNOSIS — S96.901D OPEN WOUND OF RIGHT FOOT WITH TENDON INVOLVEMENT, SUBSEQUENT ENCOUNTER: ICD-10-CM

## 2025-04-10 DIAGNOSIS — E10.42 TYPE 1 DIABETES MELLITUS WITH DIABETIC POLYNEUROPATHY: ICD-10-CM

## 2025-04-10 DIAGNOSIS — S91.301D OPEN WOUND OF RIGHT FOOT WITH TENDON INVOLVEMENT, SUBSEQUENT ENCOUNTER: ICD-10-CM

## 2025-04-10 DIAGNOSIS — Z22.322 MRSA (METHICILLIN RESISTANT STAPH AUREUS) CULTURE POSITIVE: ICD-10-CM

## 2025-04-10 PROCEDURE — 99999 PR PBB SHADOW E&M-EST. PATIENT-LVL III: CPT | Mod: PBBFAC,,, | Performed by: PODIATRIST

## 2025-04-10 PROCEDURE — 11043 DBRDMT MUSC&/FSCA 1ST 20/<: CPT | Mod: S$GLB,,, | Performed by: PODIATRIST

## 2025-04-10 PROCEDURE — 99213 OFFICE O/P EST LOW 20 MIN: CPT | Mod: 25,S$GLB,, | Performed by: PODIATRIST

## 2025-04-10 PROCEDURE — 97597 DBRDMT OPN WND 1ST 20 CM/<: CPT | Mod: XS,S$GLB,, | Performed by: PODIATRIST

## 2025-04-10 NOTE — TELEPHONE ENCOUNTER
Spoke with Tej burns/[facility] and they don't take [employer] private insurance.  ----- Message from Miltonalkaarmand sent at 4/10/2025 10:54 AM CDT -----  Regarding: Medical Supplies  Contact: Agustina [phone]  Agustina with [facility] [employer] is calling to make provider aware pt's insurance [employer]  is out of network so supplies will not be covered. Additional Information: Agustina [phone]

## 2025-04-14 NOTE — PROGRESS NOTES
Subjective:      Patient ID: Bhavna Hancock is a 64 y.o. female.    Chief Complaint: Wound Care    Patient is here for wound check R foot. Continues to notice improvement in appearance w/ local wound care; using Mepilex w/out Ag. In surgical orthoewedge shoe still.  Did L arch pain again.  Had Rx for DM shoes through [employer] but they apparently don't do custom inserts so Rx 2/4/25 changed accordingly 4/25/25.          4/10/25  Patient presents for wound check R foot s/p blister & MRSA. Had I&D R foot abscess, DOS 2/5/25. Initially on Cipro; then Bactrim DS bid per C&S. Continuing w/ local wound care & CDI while maintaining normal ADLs. States ordered Mepilex Ag but was sent w/out Ag. No c/o pain.        3/27/25          3/12/25  Patient is PO I&D abscess R w/.wound check. Spraying w/ Betadine & applying alginate. Occasionally using MediHoney. States continuing to be getting better. When healed, wants to have hammertoe corrected.  Completed Bactrim DS 2/23/25.  Pic not uploading.  2/27/25  Patient is here for wound check PO I&D  R foot abscess. Minimal pain.  2/18/25  Patient is PO I&D R foot abscess s/p blister, DOS 2/5/25, POD 13 days. Initially placed on Cipro; cultures + for MRSA so placed on Bactrim DS bid per C&S. WBC is improving. A1c also improving from 9.4% 12/17 to 8.0% yesterday. Has  suture disruption medial 1st met.head area last visit, maintained w/ Steri-strips. Was to continue in orthowedge shoe & maintain R foot CDI. Pain & swelling significantly reduced.    CBC W/ AUTO DIFFERENTIAL  Order: [id number]   Status: Final result       Next appt: 02/18/2025 at 01:45 PM in Podiatry (Tata Durham DPM)       Dx: Postoperative follow-up; Abscess of b...    Test Result Released: Yes (seen)    0 Result Notes            Component  Ref Range & Units (hover) 12:54  (2/17/25) 12 d ago  (2/5/25) 2 mo ago  (12/17/24) 3 yr ago  (2/16/22) 3 yr ago  (4/2/21) 3 yr ago  (3/16/21) 3 yr ago  (3/9/21)   WBC  7.13 9.59 5.96 4.73 4.20 4.90 5.30   RBC 4.30 3.31 Low  4.42 4.50 4.18 4.34 4.25   Hemoglobin 12.2 9.7 Low  12.7 12.6 11.1 Low  11.2 Low  11.3 Low    Hematocrit 39.4 29.4 Low  40.2 39.7 34.1 Low  35.4 Low  34.9 Low    MCV 92 89 91 88 82 82 82   MCH 28.4 29.3 28.7 28.0 26.5 Low  25.9 Low  26.5 Low    MCHC 31.0 Low  33.0 31.6 Low  31.7 Low  32.5 31.7 Low  32.4   RDW 13.0 12.6 12.8 13.5 14.8 High  14.6 High  15.4 High    Platelets 500 High  274 252 202 258 390 High  R 504 High  R   MPV 10.6 10.4 10.9 10.9 9.6 9.1 Low  8.7 Low    Immature Granulocytes 0.3 1.4 High  0.2 0.4      Gran # (ANC) 4.3 7.4 3.7 2.8 2.0 1.8 2.2   Immature Grans (Abs) 0.02 0.13 High  CM 0.01 CM 0.02 CM      Comment: Mild elevation in immature granulocytes is non specific and  can be seen in a variety of conditions including stress response,  acute inflammation, trauma and pregnancy. Correlation with other  laboratory and clinical findings is essential.   Lymph # 1.8 0.9 Low  1.4 1.2 1.5 2.2 2.2   Mono # 0.5 1.0 0.5 0.4 0.3 0.6 0.5   Eos # 0.3 0.1 0.3 0.3 0.4 0.3 0.4   Baso # 0.11 0.04 0.05 0.06 0.10 0.10 0.10   nRBC 0 0 0 0      Gran % 60.6 76.7 High  61.9 58.6 46.5 35.6 Low  41.1   Lymph % 25.8 9.7 Low  22.8 26.0 34.8 45.0 41.3   Mono % 7.3 10.4 8.9 7.6 7.7 11.8 9.2   Eosinophil % 4.5 1.4 5.4 6.1 9.1 High  5.9 6.6   Basophil % 1.5 0.4 0.8 1.3 1.9 1.7 1.8   Differential Method Automated Automated Automated Automated Automated Automated Automated   Resulting Agency [facility]        Specimen Collected: 02/17/25 12:54 CST Last Resulted: 02/17/25 13:45 CST     2/11/25  Patient is here PO I&D R foot abscess, DOS 2/5/25, POD 6 days. Cultures + for MRSA so was placed on Bactrim DS bid per C&S. States feels much better int hat swelling & related pain resolved. Kept dressing CDI. In orthowedge shoe.  No leukocytosis per lab range, but WBC elevated compared to previous levels - will  monitor.    Contains abnormal data CBC auto differential  Order: [id number]   Status: Final result       Next appt: 02/18/2025 at 01:45 PM in Podiatry (Tata Durham DPM)    Test Result Released: Yes (seen)    0 Result Notes   important suggestion  Newer results are available. Click to view them now.               Component  Ref Range & Units (hover) 12 d ago  (2/5/25) 2 mo ago  (12/17/24) 3 yr ago  (2/16/22) 3 yr ago  (4/2/21) 3 yr ago  (3/16/21) 3 yr ago  (3/9/21) 4 yr ago  (2/17/21)   WBC 9.59 5.96 4.73 4.20 4.90 5.30 4.10   RBC 3.31 Low  4.42 4.50 4.18 4.34 4.25 3.94 Low    Hemoglobin 9.7 Low  12.7 12.6 11.1 Low  11.2 Low  11.3 Low  10.6 Low    Hematocrit 29.4 Low  40.2 39.7 34.1 Low  35.4 Low  34.9 Low  32.4 Low    MCV 89 91 88 82 82 82 82   MCH 29.3 28.7 28.0 26.5 Low  25.9 Low  26.5 Low  26.9 Low    MCHC 33.0 31.6 Low  31.7 Low  32.5 31.7 Low  32.4 32.6   RDW 12.6 12.8 13.5 14.8 High  14.6 High  15.4 High  15.2 High    Platelets 274 252 202 258 390 High  R 504 High  R 316 R   MPV 10.4 10.9 10.9 9.6 9.1 Low  8.7 Low  8.6 Low    Immature Granulocytes 1.4 High  0.2 0.4       Gran # (ANC) 7.4 3.7 2.8 2.0 1.8 2.2 1.6 Low    Immature Grans (Abs) 0.13 High  0.01 CM 0.02 CM       Comment: Mild elevation in immature granulocytes is non specific and  can be seen in a variety of conditions including stress response,  acute inflammation, trauma and pregnancy. Correlation with other  laboratory and clinical findings is essential.   Lymph # 0.9 Low  1.4 1.2 1.5 2.2 2.2 1.5   Mono # 1.0 0.5 0.4 0.3 0.6 0.5 0.4   Eos # 0.1 0.3 0.3 0.4 0.3 0.4 0.5   Baso # 0.04 0.05 0.06 0.10 0.10 0.10 0.10   nRBC 0 0 0       Gran % 76.7 High  61.9 58.6 46.5 35.6 Low  41.1 39.2   Lymph % 9.7 Low  22.8 26.0 34.8 45.0 41.3 37.4   Mono % 10.4 8.9 7.6 7.7 11.8 9.2 10.7   Eosinophil % 1.4 5.4 6.1 9.1 High  5.9 6.6 11.1 High    Basophil % 0.4 0.8 1.3 1.9 1.7 1.8 1.6   Differential Method Automated Automated Automated Automated Automated Automated  Automated   Resulting Agency [facility]        Specimen Collected: 02/05/25 13:45 CST Last Resulted: 02/05/25 14:00 CST     2/4/25  Bhavna is a 64 y.o. female who presents new to the podiatry clinic w/ c/o pain R foot w/ drainage since Fri.,being on feet all day in [place] for grandchild's dance competition.  Already had this appointment scheduled to establish care as a diabetic so has not been to see anyone in regard to cc.  States did a lot walking in tennis shoes thinks they might not been supportive. No specific H/0 trauma but huge blister following day medial R foot/ arch that has subsided but very red & painful. Taking IB prn.  Has collapsed arch L foot  - needs supportive shoes.    Stays very busy on feet all the time as has horses.  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  Past Medical History:   Diagnosis Date    Abdominal pain     Diabetes     GERD (gastroesophageal reflux disease)     Hx of colonic polyps     Hyperlipidemia     Hypertension     Nausea and vomiting      Patient Active Problem List   Diagnosis    History of adenomatous polyp of colon    Chronic pain of both shoulders    Bilateral rotator cuff dysfunction    Closed displaced transverse fracture of left patella    Stiffness of left knee    Hypertension    Hypothyroidism    Elevated LFTs    Type 1 diabetes mellitus with hyperglycemia    Pre-op testing    Type 1 diabetes mellitus with hypoglycemia    Hypoglycemia unawareness associated with type 1 diabetes mellitus    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 Heterozygous hemoglobin variants (HbS, HgC, etc)do  not significantly interfere with this assay.   However, presence of multiple variants may affect accuracy.     12/17/2024 9.4 (H) 4.0 - 5.6 % Final     Comment:     ADA Screening Guidelines:  5.7-6.4%  Consistent with prediabetes  >or=6.5%  Consistent with diabetes    High levels of fetal hemoglobin interfere with the HbA1C  assay. Heterozygous hemoglobin variants (HbS, HgC, etc)do  not significantly interfere with this assay.   However, presence of multiple variants may affect accuracy.     01/20/2021 8.4 (H) 4.0 - 5.6 % Final     Comment:     ADA Screening Guidelines:  5.7-6.4%  Consistent with prediabetes  >or=6.5%  Consistent with diabetes  High levels of fetal hemoglobin interfere with the HbA1C  assay. Heterozygous hemoglobin variants (HbS, HgC, etc)do  not significantly interfere with this assay.   However, presence of multiple variants may affect accuracy.       Objective:      X-Ray Foot Complete Right  Order: [id number]  Status: Final result       Visible to patient: Yes (seen)       Next appt: 02/12/2025 at 01:00 PM in Lab (LAB, [facility])       Dx: Trauma    0 Result Notes  Details    Reading Physician Reading Date Result Priority   Brody Jeong MD  [phone] 8/18/2024 STAT     Narrative & Impression  EXAMINATION:  XR FOOT COMPLETE 3 VIEW RIGHT     CLINICAL HISTORY:  . Injury, unspecified, initial encounter     TECHNIQUE:  AP, lateral, and oblique views of the right foot were performed.     COMPARISON:  None     FINDINGS:  Three views right foot.     No acute displaced fracture or dislocation of the foot.  No radiopaque  foreign body.  No significant edema.  There are degenerative changes of the calcaneus.     Impression:     1. No acute displaced fracture or dislocation of the foot.        Electronically signed by:Brody Jeong MD  Date:                                            08/18/2024  Time:                                           18:39   X-Ray Foot Complete Left  Order: [id number]  Status: Final result       Visible to patient: Yes (seen)       Next appt: 02/12/2025 at 01:00 PM in Lab (LAB, [facility])       Dx: Pain    0 Result Notes  Details    Reading Physician Reading Date Result Priority   Basilio Love III, MD  [phone]  [phone] 6/29/2021 Routine     Narrative & Impression  EXAMINATION:  XR FOOT COMPLETE 3 VIEW LEFT     CLINICAL HISTORY:  Pain, unspecified     FINDINGS:  Complete three views foot.     No fracture dislocation bone destruction seen.  There is mild DJD.  There is a flatfoot deformity and spurs on the calcaneus.     Impression:     No acute process seen.        Electronically signed by:Basilio Love MD  Date:                                            06/29/2021  Time:                                           13:53   I independently reviewed the x-ray images & there was obvious disruption of midfoot contour at the level of the distal to the navicular@ met cuneiform L as compared w/ contralateral x-ray, consistent w/ clinical appearance of asymmetry B/L MLA .    Review of Systems   Constitutional: Negative for malaise/fatigue.   Cardiovascular:  Negative for claudication and leg swelling.   Skin:  Positive for color change.   Musculoskeletal:  Positive for joint pain and myalgias. Negative for falls.   Neurological:  Positive for focal weakness, loss of balance and sensory change. Negative for numbness and weakness.   Psychiatric/Behavioral:  Positive for depression. The patient is nervous/anxious.      Physical Exam  Vitals reviewed.   Constitutional:       General: She is not in acute  distress.     Appearance: She is well-developed and overweight.   Cardiovascular:      Pulses: Normal pulses.           Dorsalis pedis pulses are 2+ on the right side and 2+ on the left side.   Musculoskeletal:         General: Tenderness and signs of injury present. No swelling.      Right lower leg: No edema.      Left lower leg: No edema.      Right foot: Deformity present. No Charcot foot, foot drop or prominent metatarsal heads.      Left foot: Deformity (L>>R semirigid HTs), Charcot foot, foot drop and prominent metatarsal heads present.        Feet:    Feet:      Right foot:      Skin integrity: No blister, erythema or warmth.      Left foot:      Skin integrity: No blister, erythema or warmth.   Skin:     General: Skin is warm and dry.      Capillary Refill: Capillary refill takes less than 2 seconds.      Findings: Abscess (PO), lesion and wound present. No bruising or erythema.      Comments: No active exudate nor drainage. NSI.    See pix:    (Initial incision along 9cm plantar MLA & wound extending medial & dorsal from 1st IMS/ met.neck 7.5cm x 2cm).    Plantar proximal 2 cm dehiscence 1st met.neck healed as has remaining proximal MLA incision (9 cm MLA to 1st IMS)      Wound continues to heal [last visit 2.5 x 0.3 cm plantarly   extending medial from anterior MLA 3.3 x 2.3 dorsally to 0.5 cm laterally]. Remaining plantar wound healed w/ light eschar while dorsomedial wound only measures 1.9 x 1.7 cm respectively. No exposed tendon. Granular base w/ debridement.   Neurological:      Mental Status: She is alert and oriented to person, place, and time.      Sensory: Sensory deficit present.      Motor: Abnormal muscle tone (drop foot L) present. No weakness.      Gait: Gait abnormal (orthowedge shoe R).   Psychiatric:         Mood and Affect: Mood and affect normal.         Behavior: Behavior normal. Behavior is cooperative.         Assessment:      Encounter Diagnoses   Name Primary?    Visit for wound  check Yes    Open wound of plantar aspect of foot, right, subsequent encounter     Type 1 diabetes mellitus with diabetic polyneuropathy     Open wound of right foot with tendon involvement, subsequent encounter     Plantar fasciitis     Acquired left foot drop     Charcot joint of left foot        Problem List Items Addressed This Visit          Endocrine    Type 1 diabetes mellitus with diabetic polyneuropathy     Other Visit Diagnoses         Visit for wound check    -  Primary      Open wound of plantar aspect of foot, right, subsequent encounter        Relevant Orders    Wound Debridement R medial (Completed)      Open wound of right foot with tendon involvement, subsequent encounter          Plantar fasciitis          Acquired left foot drop          Charcot joint of left foot              Plan:       Bhavna was seen today for wound care.    Diagnoses and all orders for this visit:    Visit for wound check    Open wound of plantar aspect of foot, right, subsequent encounter  -     Wound Debridement R medial    Type 1 diabetes mellitus with diabetic polyneuropathy    Open wound of right foot with tendon involvement, subsequent encounter    Plantar fasciitis    Acquired left foot drop    Charcot joint of left foot    I counseled the patient on her conditions, their implications & medical mgmt.    - Shoe inspection. Diabetic Foot Education. Patient reminded of the importance of good blood sugar control to help prevent podiatric complications of diabetes. Patient instructed on proper foot hygeine. We discussed wearing proper shoe gear, daily foot inspections, never walking w/out protective shoe gear, annual DM foot exam, sooner p.r.n..      -Wound debrided. Mepilex & Betadine wet-dry dressing applied R. To be kept CDI. May change qod as above Mepilex.  WBAT in surgical shoe (has @ home)- may D/C orthowedge shoe.    F/U 7-10 days, sooner prn.    Continue Silipos gel strap for L MLA & we will address R when healed.  May apply gelstrap R now that plantar wound healed.  -Pending Rx DM shoes w/ custom accommodative inserts including L Charcot & R reinforced MLA.        A total of 28 mins.was spent on chart review, patient visit & documentation.

## 2025-04-14 NOTE — PROCEDURES
Wound Debridement R foot    Date/Time: 4/10/2025 9:30 AM    Performed by: Tata Durham DPM  Authorized by: Tata Durham DPM    Consent Done?:  Yes (Verbal)    Wound Details:    Location:  Right foot    Location:  Right Midfoot    Type of Debridement:  Excisional       Length (cm):  3.3       Width (cm):  2.3       Depth (cm):  0.2       Area (sq cm):  5.96       Percent Debrided (%):  100       Total Area Debrided (sq cm):  5.96    Depth of debridement:  Muscle/fascia/tendon    Tissue debrided:  Epidermis, Dermis and Tendon    Devitalized tissue debrided:  Slough    Instruments:  Nippers  Bleeding:  Minimal  Hemostasis Achieved: Yes  Method Used:  Pressure    Additional wounds:  1    2nd Wound Details:     Location:  Right foot    Location:  Right Plantar    Location:  Right Plantar    Type of Debridement:  Excisional       Length (cm):  2.5       Area (sq cm):  0.59       Width (cm):  0.3       Percent Debrided (%):  100       Depth (cm):  0.2       Total Area Debrided (sq cm):  0.59    Depth of debridement:  Epidermis/Dermis    Tissue debrided:  Epidermis and Dermis    Devitalized tissue debrided:  Slough and Callus    Instruments:  Nippers  Bleeding:  Minimal  Hemostasis Achieved: Yes    Method Used:  Pressure  Patient tolerance:  Patient tolerated the procedure well with no immediate complications

## 2025-04-22 ENCOUNTER — TELEPHONE (OUTPATIENT)
Dept: DIABETES | Facility: CLINIC | Age: 65
End: 2025-04-22
Payer: COMMERCIAL

## 2025-04-28 ENCOUNTER — OFFICE VISIT (OUTPATIENT)
Dept: PODIATRY | Facility: CLINIC | Age: 65
End: 2025-04-28
Payer: COMMERCIAL

## 2025-04-28 VITALS
HEART RATE: 76 BPM | BODY MASS INDEX: 26.57 KG/M2 | DIASTOLIC BLOOD PRESSURE: 72 MMHG | SYSTOLIC BLOOD PRESSURE: 138 MMHG | HEIGHT: 65 IN | WEIGHT: 159.5 LBS

## 2025-04-28 DIAGNOSIS — M21.372 ACQUIRED LEFT FOOT DROP: ICD-10-CM

## 2025-04-28 DIAGNOSIS — Z51.89 VISIT FOR WOUND CHECK: Primary | ICD-10-CM

## 2025-04-28 DIAGNOSIS — S91.301D OPEN WOUND OF RIGHT FOOT WITH TENDON INVOLVEMENT, SUBSEQUENT ENCOUNTER: ICD-10-CM

## 2025-04-28 DIAGNOSIS — M72.2 PLANTAR FASCIITIS: ICD-10-CM

## 2025-04-28 DIAGNOSIS — M14.672 CHARCOT JOINT OF LEFT FOOT: ICD-10-CM

## 2025-04-28 DIAGNOSIS — S96.901D OPEN WOUND OF RIGHT FOOT WITH TENDON INVOLVEMENT, SUBSEQUENT ENCOUNTER: ICD-10-CM

## 2025-04-28 DIAGNOSIS — E10.42 TYPE 1 DIABETES MELLITUS WITH DIABETIC POLYNEUROPATHY: ICD-10-CM

## 2025-04-28 DIAGNOSIS — S91.301D OPEN WOUND OF PLANTAR ASPECT OF FOOT, RIGHT, SUBSEQUENT ENCOUNTER: ICD-10-CM

## 2025-04-28 PROCEDURE — 99999 PR PBB SHADOW E&M-EST. PATIENT-LVL IV: CPT | Mod: PBBFAC,,, | Performed by: PODIATRIST

## 2025-04-28 RX ORDER — SODIUM FLUORIDE 6 MG/ML
PASTE, DENTIFRICE DENTAL 2 TIMES DAILY
COMMUNITY
Start: 2025-04-07

## 2025-05-02 NOTE — PROCEDURES
Wound Debridement R medial    Date/Time: 4/28/2025 9:15 AM    Performed by: Tata Durham DPM  Authorized by: Tata Durham DPM    Consent Done?:  Yes (Verbal)    Wound Details:    Location:  Right foot    Location:  Right Dorsal Foot    Type of Debridement:  Excisional       Length (cm):  1.9       Width (cm):  1.7       Depth (cm):  0.1       Area (sq cm):  2.54       Percent Debrided (%):  100       Total Area Debrided (sq cm):  2.54    Depth of debridement:  Epidermis/Dermis    Tissue debrided:  Epidermis and Dermis    Devitalized tissue debrided:  Slough    Instruments:  Nippers  Bleeding:  None  Patient tolerance:  Patient tolerated the procedure well with no immediate complications

## 2025-05-04 NOTE — PROGRESS NOTES
Subjective:      Patient ID: Bhavna Hancock is a 64 y.o. female.    Chief Complaint: Wound Care (Right foot) and Toe Injury (Left great toe)    Patient is here for R foot wound - healed plantarly & remaining dorsomedial. Continuing to improve.  New problem - L great toe injury. Tripped in socks over rug & fell 5 days ago. Put alcohol on it - tried not to pick it but toe has always been bigger w/ dry peeling from where she walks on the end of toe. Lost nail yrs.ago. Wants Abx Bactrim as L great toe swelling as is leg.  If needs opened up, wants it done as soon as possible.  Pending fitting for DM shoes w/ accommodation for Charcot L midfoot (navicular/ cuneiform/ 2nd met.) & drop foot, as well as B/L plantar fasciitis.  In the meantime, Silipos gel strap for B/L MLA , now that R plantar wound healed.    X-Ray Foot Complete Left  Order: [id number]   Status: Final result       Next appt: 05/19/2025 at 01:00 PM in Diabetes (Shea Keller RD, [facility])       Dx: Cellulitis and abscess of toe of left...    Test Result Released: Yes (not seen)    0 Result Notes  Details    Reading Physician Reading Date Result Priority   Doroteo Han MD  [phone]  5/15/2025 Routine     Narrative & Impression  EXAMINATION:  XR FOOT COMPLETE 3 VIEW LEFT     CLINICAL HISTORY:  .  Cellulitis of left toe     TECHNIQUE:  AP, lateral and oblique views of the left foot were performed.     FINDINGS:  There is partial absence of the distal phalanx of the 1st toe.  This may be as result of postoperative change, trauma, or infection.  There is osseous spur at the plantar fascia attachment to the calcaneus.  There is degenerative change throughout the midfoot.     Impression:     As above.        Electronically signed by:Doroteo Han MD  Date:                                            05/15/2025  Time:                                           10:47   I independently reviewed the x-ray images.  Findings osteolysis consistent with area of cc  -wound distal digital tuft with purulence.    4/28/25  Patient is here for wound check R foot. Continues to notice improvement in appearance w/ local wound care; using Mepilex w/out Ag. In surgical orthowedge shoe still.  Did have L arch pain again.  Had Rx for DM shoes through [employer] but they apparently don't do custom inserts so Rx 2/4/25 changed accordingly 4/25/25.          4/10/25  Patient presents for wound check R foot s/p blister & MRSA. Had I&D R foot abscess, DOS 2/5/25. Initially on Cipro; then Bactrim DS bid per C&S. Continuing w/ local wound care & CDI while maintaining normal ADLs. States ordered Mepilex Ag but was sent w/out Ag. No c/o pain.        3/27/25          3/12/25  Patient is PO I&D abscess R w/.wound check. Spraying w/ Betadine & applying alginate. Occasionally using MediHoney. States continuing to be getting better. When healed, wants to have hammertoe corrected.  Completed Bactrim DS 2/23/25.  Pic not uploading.  2/27/25  Patient is here for wound check PO I&D  R foot abscess. Minimal pain.  2/18/25  Patient is PO I&D R foot abscess s/p blister, DOS 2/5/25, POD 13 days. Initially placed on Cipro; cultures + for MRSA so placed on Bactrim DS bid per C&S. WBC is improving. A1c also improving from 9.4% 12/17 to 8.0% yesterday. Has  suture disruption medial 1st met.head area last visit, maintained w/ Steri-strips. Was to continue in orthowedge shoe & maintain R foot CDI. Pain & swelling significantly reduced.    CBC W/ AUTO DIFFERENTIAL  Order: [id number]   Status: Final result       Next appt: 02/18/2025 at 01:45 PM in Podiatry (Tata Durham DPM)       Dx: Postoperative follow-up; Abscess of b...    Test Result Released: Yes (seen)    0 Result Notes            Component  Ref Range & Units (hover) 12:54  (2/17/25) 12 d ago  (2/5/25) 2 mo ago  (12/17/24) 3 yr ago  (2/16/22) 3 yr ago  (4/2/21) 3 yr ago  (3/16/21) 3 yr ago  (3/9/21)   WBC 7.13 9.59 5.96 4.73 4.20 4.90 5.30   RBC 4.30  3.31 Low  4.42 4.50 4.18 4.34 4.25   Hemoglobin 12.2 9.7 Low  12.7 12.6 11.1 Low  11.2 Low  11.3 Low    Hematocrit 39.4 29.4 Low  40.2 39.7 34.1 Low  35.4 Low  34.9 Low    MCV 92 89 91 88 82 82 82   MCH 28.4 29.3 28.7 28.0 26.5 Low  25.9 Low  26.5 Low    MCHC 31.0 Low  33.0 31.6 Low  31.7 Low  32.5 31.7 Low  32.4   RDW 13.0 12.6 12.8 13.5 14.8 High  14.6 High  15.4 High    Platelets 500 High  274 252 202 258 390 High  R 504 High  R   MPV 10.6 10.4 10.9 10.9 9.6 9.1 Low  8.7 Low    Immature Granulocytes 0.3 1.4 High  0.2 0.4      Gran # (ANC) 4.3 7.4 3.7 2.8 2.0 1.8 2.2   Immature Grans (Abs) 0.02 0.13 High  CM 0.01 CM 0.02 CM      Comment: Mild elevation in immature granulocytes is non specific and  can be seen in a variety of conditions including stress response,  acute inflammation, trauma and pregnancy. Correlation with other  laboratory and clinical findings is essential.   Lymph # 1.8 0.9 Low  1.4 1.2 1.5 2.2 2.2   Mono # 0.5 1.0 0.5 0.4 0.3 0.6 0.5   Eos # 0.3 0.1 0.3 0.3 0.4 0.3 0.4   Baso # 0.11 0.04 0.05 0.06 0.10 0.10 0.10   nRBC 0 0 0 0      Gran % 60.6 76.7 High  61.9 58.6 46.5 35.6 Low  41.1   Lymph % 25.8 9.7 Low  22.8 26.0 34.8 45.0 41.3   Mono % 7.3 10.4 8.9 7.6 7.7 11.8 9.2   Eosinophil % 4.5 1.4 5.4 6.1 9.1 High  5.9 6.6   Basophil % 1.5 0.4 0.8 1.3 1.9 1.7 1.8   Differential Method Automated Automated Automated Automated Automated Automated Automated   Resulting Agency [facility]        Specimen Collected: 02/17/25 12:54 CST Last Resulted: 02/17/25 13:45 CST     2/11/25  Patient is here PO I&D R foot abscess, DOS 2/5/25, POD 6 days. Cultures + for MRSA so was placed on Bactrim DS bid per C&S. States feels much better int hat swelling & related pain resolved. Kept dressing CDI. In orthowedge shoe.  No leukocytosis per lab range, but WBC elevated compared to previous levels - will monitor.    Contains abnormal data CBC auto  differential  Order: [id number]   Status: Final result       Next appt: 02/18/2025 at 01:45 PM in Podiatry (Tata Durham DPM)    Test Result Released: Yes (seen)    0 Result Notes   important suggestion  Newer results are available. Click to view them now.               Component  Ref Range & Units (hover) 12 d ago  (2/5/25) 2 mo ago  (12/17/24) 3 yr ago  (2/16/22) 3 yr ago  (4/2/21) 3 yr ago  (3/16/21) 3 yr ago  (3/9/21) 4 yr ago  (2/17/21)   WBC 9.59 5.96 4.73 4.20 4.90 5.30 4.10   RBC 3.31 Low  4.42 4.50 4.18 4.34 4.25 3.94 Low    Hemoglobin 9.7 Low  12.7 12.6 11.1 Low  11.2 Low  11.3 Low  10.6 Low    Hematocrit 29.4 Low  40.2 39.7 34.1 Low  35.4 Low  34.9 Low  32.4 Low    MCV 89 91 88 82 82 82 82   MCH 29.3 28.7 28.0 26.5 Low  25.9 Low  26.5 Low  26.9 Low    MCHC 33.0 31.6 Low  31.7 Low  32.5 31.7 Low  32.4 32.6   RDW 12.6 12.8 13.5 14.8 High  14.6 High  15.4 High  15.2 High    Platelets 274 252 202 258 390 High  R 504 High  R 316 R   MPV 10.4 10.9 10.9 9.6 9.1 Low  8.7 Low  8.6 Low    Immature Granulocytes 1.4 High  0.2 0.4       Gran # (ANC) 7.4 3.7 2.8 2.0 1.8 2.2 1.6 Low    Immature Grans (Abs) 0.13 High  0.01 CM 0.02 CM       Comment: Mild elevation in immature granulocytes is non specific and  can be seen in a variety of conditions including stress response,  acute inflammation, trauma and pregnancy. Correlation with other  laboratory and clinical findings is essential.   Lymph # 0.9 Low  1.4 1.2 1.5 2.2 2.2 1.5   Mono # 1.0 0.5 0.4 0.3 0.6 0.5 0.4   Eos # 0.1 0.3 0.3 0.4 0.3 0.4 0.5   Baso # 0.04 0.05 0.06 0.10 0.10 0.10 0.10   nRBC 0 0 0       Gran % 76.7 High  61.9 58.6 46.5 35.6 Low  41.1 39.2   Lymph % 9.7 Low  22.8 26.0 34.8 45.0 41.3 37.4   Mono % 10.4 8.9 7.6 7.7 11.8 9.2 10.7   Eosinophil % 1.4 5.4 6.1 9.1 High  5.9 6.6 11.1 High    Basophil % 0.4 0.8 1.3 1.9 1.7 1.8 1.6   Differential Method Automated Automated Automated Automated Automated Automated Automated   Resulting Agency [facility]  [facility]        Specimen Collected: 02/05/25 13:45 CST Last Resulted: 02/05/25 14:00 CST     2/4/25  Bhavna is a 64 y.o. female who presents new to the podiatry clinic w/ c/o pain R foot w/ drainage since Fri.,being on feet all day in [place] for grandchild's dance competition.  Already had this appointment scheduled to establish care as a diabetic so has not been to see anyone in regard to cc.  States did a lot walking in tennis shoes thinks they might not been supportive. No specific H/0 trauma but huge blister following day medial R foot/ arch that has subsided but very red & painful. Taking IB prn.  Has collapsed arch L foot  - needs supportive shoes.    Stays very busy on feet all the time as has horses.  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  Past Medical History:   Diagnosis Date    Abdominal pain     Diabetes     GERD (gastroesophageal reflux disease)     Hx of colonic polyps     Hyperlipidemia     Hypertension     Nausea and vomiting      Patient Active Problem List   Diagnosis    History of adenomatous polyp of colon    Chronic pain of both shoulders    Bilateral rotator cuff dysfunction    Closed displaced transverse fracture of left patella    Stiffness of left knee    Hypertension    Hypothyroidism    Elevated LFTs    Type 1 diabetes mellitus with hyperglycemia    Pre-op testing    Type 1 diabetes mellitus with hypoglycemia    Hypoglycemia unawareness associated with type 1 diabetes mellitus    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 Heterozygous hemoglobin variants (HbS, HgC, etc)do  not significantly interfere with this assay.   However, presence of multiple variants may affect accuracy.     12/17/2024 9.4 (H) 4.0 - 5.6 % Final     Comment:     ADA Screening Guidelines:  5.7-6.4%  Consistent with prediabetes  >or=6.5%  Consistent with diabetes    High levels of fetal hemoglobin interfere with the HbA1C  assay. Heterozygous hemoglobin variants (HbS, HgC, etc)do  not significantly interfere with this assay.   However, presence of multiple variants may affect accuracy.     01/20/2021 8.4 (H) 4.0 - 5.6 % Final     Comment:     ADA Screening Guidelines:  5.7-6.4%  Consistent with prediabetes  >or=6.5%  Consistent with diabetes  High levels of fetal hemoglobin interfere with the HbA1C  assay. Heterozygous hemoglobin variants (HbS, HgC, etc)do  not significantly interfere with this assay.   However, presence of multiple variants may affect accuracy.       Objective:      X-Ray Foot Complete Right  Order: [id number]  Status: Final result       Visible to patient: Yes (seen)       Next appt: 02/12/2025 at 01:00 PM in Lab (LAB, [facility])       Dx: Trauma    0 Result Notes  Details    Reading Physician Reading Date Result Priority   Brody Jeong MD  [phone] 8/18/2024 STAT     Narrative & Impression  EXAMINATION:  XR FOOT COMPLETE 3 VIEW RIGHT     CLINICAL HISTORY:  . Injury, unspecified, initial encounter     TECHNIQUE:  AP, lateral, and oblique views of the right foot were performed.     COMPARISON:  None     FINDINGS:  Three views right foot.     No acute displaced fracture or dislocation of the foot.  No radiopaque foreign body.  No significant edema.   There are degenerative changes of the calcaneus.     Impression:     1. No acute displaced fracture or dislocation of the foot.        Electronically signed by:Brody Jeong MD  Date:                                            08/18/2024  Time:                                           18:39   X-Ray Foot Complete Left  Order: [id number]  Status: Final result       Visible to patient: Yes (seen)       Next appt: 02/12/2025 at 01:00 PM in Lab (LAB, [facility])       Dx: Pain    0 Result Notes  Details    Reading Physician Reading Date Result Priority   Basilio Love III, MD  [phone]  [phone] 6/29/2021 Routine     Narrative & Impression  EXAMINATION:  XR FOOT COMPLETE 3 VIEW LEFT     CLINICAL HISTORY:  Pain, unspecified     FINDINGS:  Complete three views foot.     No fracture dislocation bone destruction seen.  There is mild DJD.  There is a flatfoot deformity and spurs on the calcaneus.     Impression:     No acute process seen.        Electronically signed by:Basilio Love MD  Date:                                            06/29/2021  Time:                                           13:53   I independently reviewed the x-ray images & there was obvious disruption of midfoot contour at the level of the distal to the navicular@ met cuneiform L as compared w/ contralateral x-ray, consistent w/ clinical appearance of asymmetry B/L MLA .    Review of Systems   Constitutional: Negative for malaise/fatigue.   Cardiovascular:  Negative for claudication and leg swelling.   Skin:  Positive for color change.   Musculoskeletal:  Positive for joint pain and myalgias. Negative for falls.   Neurological:  Positive for focal weakness, loss of balance and sensory change. Negative for numbness and weakness.   Psychiatric/Behavioral:  Positive for depression. The patient is nervous/anxious.      Physical Exam  Vitals reviewed.   Constitutional:       General: She is not in acute distress.     Appearance: She is  well-developed and overweight.   Cardiovascular:      Pulses: Normal pulses.           Dorsalis pedis pulses are 2+ on the right side and 2+ on the left side.   Musculoskeletal:         General: Tenderness and signs of injury present. No swelling.      Right lower leg: No edema.      Left lower leg: No edema.      Right foot: Deformity (Hallux malleus L> R) present. No Charcot foot, foot drop or prominent metatarsal heads.      Left foot: Deformity (L>>R semirigid HTs), Charcot foot, foot drop and prominent metatarsal heads present.        Feet:    Feet:      Right foot:      Skin integrity: No blister, erythema, warmth or dry skin.      Left foot:      Skin integrity: Skin breakdown, erythema, warmth and dry skin present. No blister.   Skin:     General: Skin is warm and dry.      Capillary Refill: Capillary refill takes less than 2 seconds.      Findings: Lesion present. No abscess, bruising or erythema.      Comments: L:   Swelling LLE w/ calor 1st ray as well as erythema & enlargement of great toe.  Distal digital tuft wound w/ purulence on expression/ pressure surrounding distal phalanx.  Desquamating skin.  Absent nail plate.    R:  (Initial incision along 9cm plantar MLA & wound extending medial & dorsal from 1st IMS/ met.neck 7.5cm x 2cm).    Wound continues to heal 1.3 x 1.2 cm. Granular base.   Neurological:      Mental Status: She is alert and oriented to person, place, and time.      Sensory: Sensory deficit present.      Motor: Abnormal muscle tone (drop foot L) present. No weakness.      Gait: Gait abnormal (Surgical shoe R).   Psychiatric:         Mood and Affect: Mood and affect normal.         Behavior: Behavior normal. Behavior is cooperative.         Assessment:      Encounter Diagnoses   Name Primary?    Visit for wound check Yes    Open wound of right foot, subsequent encounter     Type 1 diabetes mellitus with diabetic polyneuropathy     Acquired left foot drop     Charcot joint of left foot      Cellulitis and abscess of toe of left foot     Fall on same level from tripping as cause of accidental injury     Cellulitis and abscess of left lower extremity     Localized swelling of left lower leg     Osteomyelitis of great toe of left foot        Problem List Items Addressed This Visit          Endocrine    Type 1 diabetes mellitus with diabetic polyneuropathy     Other Visit Diagnoses         Visit for wound check    -  Primary      Open wound of right foot, subsequent encounter        Relevant Orders    Wound Debridement R MLA      Acquired left foot drop          Charcot joint of left foot          Cellulitis and abscess of toe of left foot        Relevant Medications    sulfamethoxazole-trimethoprim 800-160mg (BACTRIM DS) 800-160 mg Tab    ciprofloxacin HCl (CIPRO) 750 MG tablet    Other Relevant Orders    X-Ray Foot Complete Left (Completed)    Aerobic culture    Culture, Anaerobic    Case Request Operating Room: AMPUTATION, TOE, INCISION AND DRAINAGE, FOOT (Completed)    Incision and Drainage L distal hallux      Fall on same level from tripping as cause of accidental injury          Cellulitis and abscess of left lower extremity        Relevant Orders    Case Request Operating Room: AMPUTATION, TOE, INCISION AND DRAINAGE, FOOT (Completed)      Localized swelling of left lower leg          Osteomyelitis of great toe of left foot        Relevant Orders    Case Request Operating Room: AMPUTATION, TOE, INCISION AND DRAINAGE, FOOT (Completed)          Plan:       Bhavna was seen today for wound care and toe injury.    Diagnoses and all orders for this visit:    Visit for wound check    Open wound of right foot, subsequent encounter  -     Wound Debridement R MLA    Type 1 diabetes mellitus with diabetic polyneuropathy    Acquired left foot drop    Charcot joint of left foot    Cellulitis and abscess of toe of left foot  -     X-Ray Foot Complete Left; Future  -     Aerobic culture  -     Culture,  Anaerobic  -     sulfamethoxazole-trimethoprim 800-160mg (BACTRIM DS) 800-160 mg Tab; Take 1 tablet by mouth 2 (two) times daily.  -     ciprofloxacin HCl (CIPRO) 750 MG tablet; Take 1 tablet (750 mg total) by mouth every 12 (twelve) hours.  -     Vital signs, per unit routine ; Standing  -     Diet NPO; Standing  -     Case Request Operating Room: AMPUTATION, TOE, INCISION AND DRAINAGE, FOOT  -     Place in Outpatient; Standing  -     Incision and Drainage L distal hallux    Fall on same level from tripping as cause of accidental injury    Cellulitis and abscess of left lower extremity  -     Vital signs, per unit routine ; Standing  -     Diet NPO; Standing  -     Case Request Operating Room: AMPUTATION, TOE, INCISION AND DRAINAGE, FOOT  -     Place in Outpatient; Standing    Localized swelling of left lower leg    Osteomyelitis of great toe of left foot  -     Vital signs, per unit routine ; Standing  -     Diet NPO; Standing  -     Case Request Operating Room: AMPUTATION, TOE, INCISION AND DRAINAGE, FOOT  -     Place in Outpatient; Standing    Other orders  -     sodium chloride 0.9% flush 10 mL  -     ciprofloxacin (CIPRO)400mg/200ml D5W IVPB 400 mg  -     ketorolac injection 30 mg    I counseled the patient on her conditions, their implications & medical mgmt.    - Shoe inspection. Diabetic Foot Education. Patient reminded of the importance of good blood sugar control to help prevent podiatric complications of diabetes. Patient instructed on proper foot hygeine. We discussed wearing proper shoe gear, daily foot inspections, never walking w/out protective shoe gear, annual DM foot exam, sooner p.r.n..      -Wound debrided R. chemical cauterization with silver nitrate.  Dressing applied.  Continue Mepilex &/or Betadine wet-dry dressing R. To be kept CDI. May change qod.  WBAT in surgical shoe.    L great toe I&D with cultures from wound sent to micro for aerobic, anaerobic.  X-rays to rule out  osteomyelitis.  Discuss I&D in OR including great toe and foot, pending x-ray findings.    X-rays ordered.  Rx Bactrim DS b.i.d. and Cipro 750 mg b.i.d. pending C&S.    Nature of the procedure & anesthesia were discussed, including possible risks & complications. All questions asked were answered. There are no apparent contraindications to proposed procedure pending final medical & anesthesia clearance. Consent form is reviewed & signed.   Procedure initially to include I&D L great toe and foot now to be change to distal Syme's amputation of osteo myelitis distal phalanx L with I&D LLE under local with MAC sedation.    PO check in a week to 10 days L.  Wound check in 2-3 weeks R foot, sooner p.r.n.        A total of 33 mins.was spent on chart review, patient visit & documentation.

## 2025-05-12 ENCOUNTER — TELEPHONE (OUTPATIENT)
Dept: DIABETES | Facility: CLINIC | Age: 65
End: 2025-05-12
Payer: COMMERCIAL

## 2025-05-12 ENCOUNTER — PATIENT MESSAGE (OUTPATIENT)
Dept: DIABETES | Facility: CLINIC | Age: 65
End: 2025-05-12
Payer: COMMERCIAL

## 2025-05-12 DIAGNOSIS — E10.65 TYPE 1 DIABETES MELLITUS WITH HYPERGLYCEMIA: ICD-10-CM

## 2025-05-12 RX ORDER — INSULIN LISPRO 100 [IU]/ML
INJECTION, SOLUTION INTRAVENOUS; SUBCUTANEOUS
Qty: 15 ML | Refills: 6 | Status: SHIPPED | OUTPATIENT
Start: 2025-05-12

## 2025-05-12 NOTE — TELEPHONE ENCOUNTER
Spoke to pt informed pt that she has 11 refills on file for fiasp insulin, stated she is still using the omnipod and using fiasp vials within the pump

## 2025-05-15 ENCOUNTER — OFFICE VISIT (OUTPATIENT)
Dept: PODIATRY | Facility: CLINIC | Age: 65
End: 2025-05-15
Payer: COMMERCIAL

## 2025-05-15 ENCOUNTER — TELEPHONE (OUTPATIENT)
Dept: PODIATRY | Facility: CLINIC | Age: 65
End: 2025-05-15

## 2025-05-15 VITALS
HEART RATE: 77 BPM | SYSTOLIC BLOOD PRESSURE: 116 MMHG | HEIGHT: 65 IN | DIASTOLIC BLOOD PRESSURE: 68 MMHG | WEIGHT: 161.06 LBS | BODY MASS INDEX: 26.84 KG/M2

## 2025-05-15 DIAGNOSIS — L03.116 CELLULITIS AND ABSCESS OF LEFT LOWER EXTREMITY: ICD-10-CM
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
## 2025-05-15 DIAGNOSIS — L02.416 CELLULITIS AND ABSCESS OF LEFT LOWER EXTREMITY: ICD-10-CM

## 2025-05-15 DIAGNOSIS — M86.9 OSTEOMYELITIS OF GREAT TOE OF LEFT FOOT: ICD-10-CM

## 2025-05-15 DIAGNOSIS — L03.032 CELLULITIS AND ABSCESS OF TOE OF LEFT FOOT: ICD-10-CM

## 2025-05-15 DIAGNOSIS — L02.612 CELLULITIS AND ABSCESS OF TOE OF LEFT FOOT: ICD-10-CM

## 2025-05-15 DIAGNOSIS — M14.672 CHARCOT JOINT OF LEFT FOOT: ICD-10-CM

## 2025-05-15 DIAGNOSIS — E10.42 TYPE 1 DIABETES MELLITUS WITH DIABETIC POLYNEUROPATHY: ICD-10-CM

## 2025-05-15 DIAGNOSIS — R22.42 LOCALIZED SWELLING OF LEFT LOWER LEG: ICD-10-CM

## 2025-05-15 DIAGNOSIS — M21.372 ACQUIRED LEFT FOOT DROP: ICD-10-CM

## 2025-05-15 DIAGNOSIS — L92.9 HYPERGRANULATION: ICD-10-CM

## 2025-05-15 DIAGNOSIS — S91.301D OPEN WOUND OF RIGHT FOOT, SUBSEQUENT ENCOUNTER: ICD-10-CM

## 2025-05-15 DIAGNOSIS — Z51.89 VISIT FOR WOUND CHECK: Primary | ICD-10-CM

## 2025-05-15 PROCEDURE — 99999 PR PBB SHADOW E&M-EST. PATIENT-LVL V: CPT | Mod: PBBFAC,,, | Performed by: PODIATRIST

## 2025-05-15 RX ORDER — SULFAMETHOXAZOLE AND TRIMETHOPRIM 800; 160 MG/1; MG/1
1 TABLET ORAL 2 TIMES DAILY
Qty: 20 TABLET | Refills: 0 | Status: SHIPPED | OUTPATIENT
Start: 2025-05-15

## 2025-05-15 RX ORDER — CIPROFLOXACIN 750 MG/1
750 TABLET, FILM COATED ORAL EVERY 12 HOURS
Qty: 10 TABLET | Refills: 0 | Status: SHIPPED | OUTPATIENT
Start: 2025-05-15

## 2025-05-15 RX ORDER — CIPROFLOXACIN 2 MG/ML
400 INJECTION, SOLUTION INTRAVENOUS ONCE
Status: CANCELLED | OUTPATIENT
Start: 2025-05-16

## 2025-05-15 RX ORDER — SODIUM CHLORIDE 0.9 % (FLUSH) 0.9 %
10 SYRINGE (ML) INJECTION
Status: CANCELLED | OUTPATIENT
Start: 2025-05-16

## 2025-05-15 RX ORDER — KETOROLAC TROMETHAMINE 30 MG/ML
30 INJECTION, SOLUTION INTRAMUSCULAR; INTRAVENOUS ONCE
Status: SHIPPED | OUTPATIENT
Start: 2025-05-16 | End: 2025-05-18

## 2025-05-15 NOTE — TELEPHONE ENCOUNTER
Spoke with patient and her PCP states she would need to be seen to get clearance for surgery.  I will inform Dr. Durham. Pt verbalized understanding and no further issues discussed.----- Message from [place] sent at 5/15/2025  4:23 PM CDT -----  Regarding: Call requested  Contact: [phone]  Hi,Who called:The pt Reason: Pt called to request a call form the office to discuss her PCP call. Pls call the pt at  [phone]'s name:Dr. Sanchezditional Information: Thank you.

## 2025-05-15 NOTE — TELEPHONE ENCOUNTER
Spoke with patient and I will fax over clearance paper for them to sign. Pt verbalized understanding and no further issues discussed.----- Message from [place] sent at 5/15/2025 12:55 PM CDT -----  Regarding: PCP Notified  Contact: [phone]  Hi,Who called:The pt Reason: Hi, pt called to say her PCP has been notified of the procedure tomorrow. Pls call the pt at  [phone] with questions. Provider's name:Dr. Durham Additional Information: Thank you.

## 2025-05-15 NOTE — PROCEDURES
Wound Debridement R MLA    Date/Time: 5/15/2025 9:45 AM    Performed by: Tata Durham DPM  Authorized by: Tata Durham DPM    Consent Done?:  Yes (Verbal)    Wound Details:    Location:  Right foot    Location:  Right Plantar    Type of Debridement:  Excisional       Length (cm):  1.3       Width (cm):  1.2       Depth (cm):  0       Area (sq cm):  1.23       Percent Debrided (%):  100       Total Area Debrided (sq cm):  1.23    Depth of debridement:  Epidermis/Dermis    Tissue debrided:  Hypergranulation    Instruments:  Other  Bleeding:  None  Patient tolerance:  Patient tolerated the procedure well with no immediate complications     Chemical cauterization of granulation tissue after slough cleaned with gauze.  Incision and Drainage L distal hallux    Date/Time: 5/15/2025 9:45 AM    Performed by: Tata Durham DPM  Authorized by: Tata Durham DPM    Consent Done?:  Yes (Verbal)    Type:  Abscess  Body area:  Lower extremity  Location details:  Left big toe  Complexity:  Simple  Drainage:  Pus  Drainage amount:  Scant  Patient tolerance:  Patient tolerated the procedure well with no immediate complications

## 2025-05-16 ENCOUNTER — TELEPHONE (OUTPATIENT)
Dept: DIABETES | Facility: CLINIC | Age: 65
End: 2025-05-16
Payer: COMMERCIAL

## 2025-05-19 ENCOUNTER — CLINICAL SUPPORT (OUTPATIENT)
Dept: DIABETES | Facility: CLINIC | Age: 65
End: 2025-05-19
Payer: COMMERCIAL

## 2025-05-19 ENCOUNTER — TELEPHONE (OUTPATIENT)
Dept: PODIATRY | Facility: CLINIC | Age: 65
End: 2025-05-19
Payer: COMMERCIAL

## 2025-05-19 ENCOUNTER — RESULTS FOLLOW-UP (OUTPATIENT)
Dept: PODIATRY | Facility: CLINIC | Age: 65
End: 2025-05-19

## 2025-05-19 DIAGNOSIS — L03.032 CELLULITIS AND ABSCESS OF TOE OF LEFT FOOT: ICD-10-CM

## 2025-05-19 DIAGNOSIS — M86.9 OSTEOMYELITIS OF GREAT TOE OF LEFT FOOT: Primary | ICD-10-CM

## 2025-05-19 DIAGNOSIS — E10.65 TYPE 1 DIABETES MELLITUS WITH HYPERGLYCEMIA: Primary | ICD-10-CM

## 2025-05-19 DIAGNOSIS — Z22.322 MRSA (METHICILLIN RESISTANT STAPH AUREUS) CULTURE POSITIVE: ICD-10-CM

## 2025-05-19 DIAGNOSIS — L02.612 CELLULITIS AND ABSCESS OF TOE OF LEFT FOOT: ICD-10-CM

## 2025-05-19 PROCEDURE — 99999 PR PBB SHADOW E&M-EST. PATIENT-LVL II: CPT | Mod: PBBFAC,,, | Performed by: DIETITIAN, REGISTERED

## 2025-05-19 PROCEDURE — G0108 DIAB MANAGE TRN  PER INDIV: HCPCS | Mod: S$GLB,,, | Performed by: DIETITIAN, REGISTERED

## 2025-05-19 RX ORDER — BLOOD SUGAR DIAGNOSTIC
1 STRIP MISCELLANEOUS
Qty: 100 STRIP | Refills: 11 | Status: SHIPPED | OUTPATIENT
Start: 2025-05-19

## 2025-05-19 RX ORDER — SULFAMETHOXAZOLE AND TRIMETHOPRIM 800; 160 MG/1; MG/1
1 TABLET ORAL 2 TIMES DAILY
Qty: 64 TABLET | Refills: 0 | Status: SHIPPED | OUTPATIENT
Start: 2025-05-19 | End: 2025-06-20

## 2025-05-19 NOTE — PROGRESS NOTES
Aerobic culture  Order: [id number]   Status: Final result       Next appt: Today at 01:00 PM in Diabetes (Shea Keller RD, [facility])       Dx: Cellulitis and abscess of toe of left...    Test Result Released: Yes (not seen)    Specimen Information: Toe, Left Foot; Tissue   0 Result Notes  CULTURE, AEROBIC Many Methicillin resistant Staphylococcus aureus Abnormal         Resulting Agency: [facility]     Susceptibility     Methicillin resistant Staphylococcus aureus     COLLEEN     Clindamycin <=0.5 µg/ml Sensitive     Daptomycin <=0.5 µg/ml Sensitive     Erythromycin <=0.5 µg/ml Sensitive     Linezolid 4 µg/ml Sensitive     Oxacillin >2 µg/ml Resistant     Penicillin >8 µg/ml Resistant     Tetracycline <=4 µg/ml Sensitive     Trimeth/Sulfa <=0.5/9.5 µ... Sensitive     Vancomycin 1 µg/ml Sensitive               Linear View        Specimen Collected: 05/16/25 14:18 CDT Last Resulted: 05/19/25 12:07 CDT     Patient already on Bactrim.  This will be extended for a total of 6 weeks given osteomyelitis L great toe.

## 2025-05-19 NOTE — PROGRESS NOTES
Diabetes Care Specialist Follow-up Note  Author: Shea Keller RD, [facility]  Date: 5/19/2025    Intake    Program Intake  Reason for Diabetes Program Visit:: Intervention  Type of Intervention:: Individual  Individual: Education  Education: Pattern Management  Current diabetes risk level:: moderate (HgbA1c 8.4%)  In the last month, have you used the ER or been admitted to the hospital: No  Permission to speak with others about care:: no    Current Diabetes Treatment: Oral Medications, Insulin  Oral Medication Type/Dose: metformin 500mg a day  Method of insulin delivery?: Insulin Pump  Type of Pump: omnipod 5  Does patient have back-up plan?: Yes (lantus 40 units a day and humalog 14 units with meals (takes 2 units for every 15 g of carbohydrates))  Any problems obtaining supplies?: No    Continuous Glucose Monitoring  Patient has CGM: Yes  Personal CGM type:: Dexcom G6  GMI Date: 05/19/25  GMI Value: 7.7 %    Lab Results   Component Value Date    HGBA1C 8.0 (H) 02/17/2025     A1c Pre Diabetes Care Specialist Intervention:  8.4%      Physical activity/Exercise:   Slight change  Patient has an infection in toe  Seeing foot doctor    SMBG: see attached dexcom download          Occasionally put out of automated mode and has to do a correction bolus to get back into auto mode  States that Nadja told her she may make changes at follow-up    Lifestyle Coping Support & Clinical    Lifestyle/Coping/Support  Compared to other people your age, how would you rate your health?: Good  Psychosocial/Coping Skills Assessment Completed: : No  Assessment indicates:: Adequate understanding  Deffered due to:: Other (comment)  Area of need?: No    Problem Review  Active Comorbidities: Hypertension, Other (comment), Gastrointestinal Disorder (hypothyroidism)    Diabetes Self-Management Skills Assessment    Medication Skills Assessment  Patient is able to identify current diabetes medications, dosages, and appropriate timing of medications.:  yes  Patient reports problems or concerns with current medication regimen.: no  Patient is  aware that some diabetes medications can cause low blood sugar?: Yes  Medication Skills Assessment Completed:: Yes  Assessment indicates:: Adequate understanding  Area of need?: No    Diabetes Disease Process/Treatment Options  Diabetes Type?: Type I  Diabetes Disease Process/Treatment Options: Skills Assessment Completed: No  Assessment indicates:: Adequate understanding  Deferred due to:: Other (comment) (previously completed, there has been no change and patient has adequate understanding)  Area of need?: No    Nutrition/Healthy Eating  Meal Plan 24 Hour Recall - Breakfast: skipped  Meal Plan 24 Hour Recall - Lunch: sandwich  Meal Plan 24 Hour Recall - Dinner: dinner is about 11:30pm could be anything  Meal Plan 24 Hour Recall - Snack: nuts  Patient can identify foods that impact blood sugar.: yes  Challenges to healthy eating:: other (see comments) (eating late at night and skipping meals)  Nutrition/Healthy Eating Skills Assessment Completed:: No  Assessment indicates:: Adequate understanding  Deffered due to:: Other (comment) (previously completed, there has been no change and patient has adequate understanding)  Area of need?: No    Physical Activity/Exercise  Patient's daily activity level:: moderately active  Patient formally exercises outside of work.: yes  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)  Patient can identify forms of physical activity.: yes  Physical Activity/Exercise Skills Assessment Completed: : No  Assessment indicates:: Adequate understanding  Deffered due to:: Other (comment) (previously completed, there has been no change and patient has adequate understanding)  Area of need?: No    Home Blood Glucose Monitoring  Patient states that blood sugar is checked at home daily.: yes  Monitoring Method:: personal continuous glucose monitor  Personal CGM type:: Dexcom G6    What is your current Time in Range?: 48%  What is your A1c Target?: 7.0 without hypoglycemia  Home Blood Glucose Monitoring Skills Assessment Completed: : No  Assessment indicates:: Adequate understanding  Deferred due to:: Other (comment) (previously completed, there has been no change and patient has adequate understanding)  Area of need?: No    Acute Complications  Have you ever had hypoglycemia (low BG 70 or less)?: yes  How often and what are your symptoms?: recently felt terrible shaky, feeling faint, heart racing, panicy, terrible feeling  How do you treat hypoglycemia?: drank juice, ate sweets, used syrup  Have you ever had hyperglycemia (high  or more)?: no   Do you know the symptoms of high blood sugar and how to treat: tired, take insulin  Have you ever had DKA?: no  Do you ever test for ketones?: no  Do you have a sick day plan?: no  Acute Complications Skills Assessment Completed: : No  Assessment indicates:: Adequate understanding  Deffered due to:: Other (comment) (previously completed, there has been no change and patient has adequate understanding)  Area of need?: No    Chronic Complications  Reviewed health maintenance: yes  Has your doctor examined your feet?: no  Chronic Complications Skills Assessment Completed: : No  Assessment indicates:: Adequate understanding  Deferred due to:: Other (comment) (previously completed, there has been no change and patient has adequate understanding)  Area of need?: No      During today's follow-up visit,  the following areas required further assessment and content was provided/reviewed.    Based on today's diabetes care assessment, the following areas of need were identified:      Identified Areas of Need      Medication/Current Diabetes Treatment: No   Lifestyle Coping/Support: No   Diabetes Disease Process/Treatment Options: No   Nutrition/Healthy Eating: No    Physical Activity/Exercise: No    Home Blood Glucose Monitoring: No Comparison of previous  CGM data 1/7/25 to current    Average Glucose 201 increased from 182  Standard Deviation 75 decreased slightly from 76  GMI 8.1 % increased from 7.8 %    Time in Range  23% of time patient was in Very High Range increased from 18%  29% of time patient was in High Range increased from 23%  48% of time patient was in Range decreased from 58%  0% of time patient was in Low Range improved from 1%  0% of time patient was in Very Low Range improved from <1%     Acute Complications: No    Chronic Complications: No       Today's interventions were provided through individual discussion, instruction, and written materials were provided.    Patient verbalized understanding of instruction and written materials.  Pt was able to return back demonstration of instructions today. Patient understood key points, needs reinforcement and further instruction.     Diabetes Self-Management Care Plan Review and Evaluation of Progress:    During today's follow-up Bhavna's Diabetes Self-Management Care Plan progress was reviewed and progress was evaluated including his/her input. Bhavna has agreed to continue his/her journey to improve/maintain overall diabetes control by continuing to set health goals. See care plan progress below.      Care Plan: Diabetes Management   Updates made since 5/19/2024 12:00 AM        Problem: Healthy Eating         Goal: Eat 2-3 meals daily with 30-45g/2-3 servings of Carbohydrate per meal for the next 6 months    Start Date: 9/10/2024   Expected End Date: 3/10/2025   This Visit's Progress: On track   Recent Progress: On track   Priority: High   Barriers: No Barriers Identified        Task: Reviewed the sources and role of Carbohydrate, Protein, and Fat and how each nutrient impacts blood sugar. Completed 9/10/2024        Task: Provided visual examples using dry measuring cups, food models, and other familiar objects such as computer mouse, deck or cards, tennis ball etc. to help with visualization of portions.  Completed 9/10/2024        Task: Explained how to count carbohydrates using the food label and the use of dry measuring cups for accurate carb counting. Completed 9/10/2024        Task: Discussed strategies for choosing healthier menu options when dining out. Completed 9/10/2024        Task: Recommended replacing beverages containing high sugar content with noncaloric/sugar free options and/or water. Completed 9/10/2024        Task: Review the importance of balancing carbohydrates with each meal using portion control techniques to count servings of carbohydrate and label reading to identify serving size and amount of total carbs per serving. Completed 9/10/2024        Task: Provided Sample plate method and reviewed the use of the plate to estimate amounts of carbohydrate per meal. Completed 9/10/2024        Problem: Medications Resolved 1/7/2025        Goal: Patient Agrees to bolus using the omnipod 5 fuentes before lunch and dinner each day for the next 4 weeks Completed 1/7/2025   Start Date: 12/31/2024   Expected End Date: 1/31/2025   This Visit's Progress: Met   Recent Progress: Deferred   Priority: High   Barriers: No Barriers Identified        Task: Instructed pt on use of basic pump features ie...give a bolus, pause insulin, switch from manual to automated mode. Completed 12/31/2024        Task: Patient demonstrated ability to program Dexcom transmitter into controller and start automated limited mode. Completed 12/31/2024        Task: patient demonstrated ability to program controller, activate and insert pod using aseptic technique. Completed 12/31/2024        Task: Explained Smart Adjust Technology Completed 12/31/2024        Task: Reviewed and reconciled current medication list today. Completed 12/31/2024        Task: Reviewed features available in manual mode verses automated mode. Completed 12/31/2024        Task: Reviewed when and how to use activity function in automated mode. Completed 12/31/2024         Task: Reviewed site selection of pods, rotation of sites and hard stop to change pod every 72 hrs. Completed 12/31/2024        Task: Instructed that insulin vial is good out of refrigeration for up to 28-30 days. Completed 12/31/2024        Task: Reviewed treatment of hypoglycemia, hyperglycemia; sick day care, DKA, and troubleshooting of pump. Completed 12/31/2024        Task: Advised to carry back-up supplies with them and discussed steps to take in case of connection loss. Completed 12/31/2024        Task: Details of pump therapy were covered including controller/fuentes features and programming and pod activation Completed 12/31/2024        Task: Reviewed pod site selection and rotation and automatic pod priming and insertion Completed 12/31/2024        Task: Reviewed setting & editing basal rates in manual mode, giving bolus and other features in the set-up menu. Completed 12/31/2024        Task: Patient provided with [employer] and [employer] usernames and passwords, also documented in chart note and blue sticky note in Epic Completed 12/31/2024        Task: Omni Pod 24-hour support line provided. Completed 12/31/2024        Task: Set up omnipod fuentes on patients phone, patient will be using fuentes rather than  Completed 12/31/2024        Task: Omnipod and Dexcom data was downloaded and reviewed with patient and provider, any necessary adjustments were made Completed 1/7/2025          Follow Up Plan     Follow up in about 3 months (around 8/19/2025) for Personal CGM Upload, Insulin Pump Upload.    Today's care plan and follow up schedule was discussed with patient.  Bhavna verbalized understanding of the care plan, goals, and agrees to follow up plan.        The patient was encouraged to communicate with his/her health care provider/physician and care team regarding his/her condition(s) and treatment.  I provided the patient with my contact information today and encouraged to contact me via phone or [facility]'s  Patient Portal as needed.     Length of Visit   Total Time: 60 Minutes

## 2025-05-19 NOTE — TELEPHONE ENCOUNTER
Per Dr. Durham,Patient has MRSA. It is sensitive to Bactrim so she can continue to take that and I will extend prescription for 6 weeks.Pt verbalized understanding and no further issues discussed. ----- Message from Tata Durham DPM sent at 5/19/2025 12:12 PM CDT -----  Regarding: Culture results  Patient has MRSA.  It is sensitive to Bactrim so she can continue to take that and I will extend prescription.  ----- Message -----  From: Lab, Background User  Sent: 5/18/2025  10:08 AM CDT  To: Tata Durham DPM

## 2025-05-21 ENCOUNTER — PATIENT MESSAGE (OUTPATIENT)
Dept: DIABETES | Facility: CLINIC | Age: 65
End: 2025-05-21
Payer: COMMERCIAL

## 2025-05-21 ENCOUNTER — TELEPHONE (OUTPATIENT)
Dept: DIABETES | Facility: CLINIC | Age: 65
End: 2025-05-21
Payer: COMMERCIAL

## 2025-05-21 NOTE — TELEPHONE ENCOUNTER
Please let patient know that I reviewed her Omnipod 5 download from Shea  Her readings are running high  Her average is 193 and she is in target range only 54% of the time  She is having some significant elevations in the middle of the night like between the hours of midnight to 4:00 a.m.  She eating overnight??  It does look like sometimes she is bolusing which makes me think that she is eating and then sometimes it looks like she is not bolusing---and the readings are running very high  Is she carbohydrate counting??  Because if she is it does look like we may need to adjust her insulin to carbohydrate ratio    I would like to make some insulin pump setting changes  Is she able to come in to see Shea to get this adjusted  Or a nurse visit o get settings adjusted     ICR: 1:10  Target 110-120

## 2025-05-21 NOTE — PROGRESS NOTES
Subjective:      Patient ID: Bhavna Hancock is a 64 y.o. female.    Chief Complaint: Post-op Evaluation    Patient presents for wound check R foot as well as suture removal s/p distal Syme's amp L great toe.  No pain.  05/26/2025  Patient is here PO 10 days, DOS 5/16/25 distal Symes amp.L great toe d/t new injury w/ chronic osteomyelitis per Xray. C&S pending on Bactrim & Cipro; +MRSA so Rx extended.   Also, wound check R MLA.           bn  Aerobic culture  Order: [id number]   Status: Final result       Next appt: Today at 01:00 PM in Diabetes (Shea Keller RD, [facility])       Dx: Cellulitis and abscess of toe of left...    Test Result Released: Yes (not seen)    Specimen Information: Toe, Left Foot; Tissue   0 Result Notes  CULTURE, AEROBIC Many Methicillin resistant Staphylococcus aureus Abnormal         Resulting Agency: [facility]     Susceptibility     Methicillin resistant Staphylococcus aureus     COLLEEN     Clindamycin <=0.5 µg/ml Sensitive     Daptomycin <=0.5 µg/ml Sensitive     Erythromycin <=0.5 µg/ml Sensitive     Linezolid 4 µg/ml Sensitive     Oxacillin >2 µg/ml Resistant     Penicillin >8 µg/ml Resistant     Tetracycline <=4 µg/ml Sensitive     Trimeth/Sulfa <=0.5/9.5 µ... Sensitive     Vancomycin 1 µg/ml Sensitive               Linear View        Specimen Collected: 05/16/25 14:18 CDT Last Resulted: 05/19/25 12:07 CDT     Aerobic culture  Order: [id number]   Status: Preliminary result       Next appt: 05/19/2025 at 01:00 PM in Diabetes (Shea Keller RD, [facility])       Dx: Cellulitis and abscess of toe of left...    Test Result Released: No    Specimen Information: Toe, Left Foot; Tissue   0 Result Notes  CULTURE, AEROBIC Many Staphylococcus aureus Abnormal    Susceptibility pending        Resulting Agency: [facility]     Specimen Collected: 05/16/25 14:18 CDT Last Resulte   Culture, Anaerobic  Order: [id number]   Status: Preliminary result       Next appt: 05/19/2025 at 01:00 PM in Diabetes (Shea Keller RD,  [facility])       Dx: Cellulitis and abscess of toe of left...    Test Result Released: No    Specimen Information: Toe, Left Foot; Tissue   0 Result Notes  Anaerobe Culture Culture In Progress        Resulting Agency: [facility]     Specimen Collected: 05/16/25 14:18 CDT Last Resulted: 05/18/25 14:46 CDT     X-Ray Toe 2 or More Views Left  Order: [id number]   Status: Final result       Next appt: 05/19/2025 at 01:00 PM in Diabetes (Shea Keller RD, [facility])    Test Result Released: Yes (not seen)    0 Result Notes  Details    Reading Physician Reading Date Result Priority   Phillip Duke MD  [phone]  5/16/2025 Routine     Narrative & Impression  EXAMINATION:  XR TOE 2 OR MORE VIEWS LEFT     CLINICAL HISTORY:  post operative follow up;     TECHNIQUE:  Three views of the left toes were performed     COMPARISON:  Radiographs 02/15/2025.     FINDINGS:  Interval amputation of the 1st distal phalanx.  Osseous erosive changes of the 2nd through 4th distal phalanges, not well evaluated on this exam.  Suspected remote healed fracture of the 5th proximal phalanx.  No dislocation.  Bipartite medial hallux sesamoid.  No radiopaque foreign bodies.     Impression:     As above.        Electronically signed by:Phillip Duke MD  Date:                                            05/16/2025  Time:                                           16:31   I independently reviewed the x-ray images. Resected distal phalanx hallux as expected PO.    5/15/25  Patient is here for R foot wound - healed plantarly & remaining dorsomedial. Continuing to improve.  New problem - L great toe injury. Tripped in socks over rug & fell 5 days ago. Put alcohol on it - tried not to pick it but toe has always been bigger w/ dry peeling from where she walks on the end of toe. Lost nail yrs.ago. Wants Abx Bactrim as L great toe swelling as is leg.  If needs opened up, wants it done as soon as possible.  Pending fitting for DM shoes w/ accommodation for Charcot L  midfoot (navicular/ cuneiform/ 2nd met.) & drop foot, as well as B/L plantar fasciitis.  In the meantime, Silipos gel strap for B/L MLA , now that R plantar wound healed.    X-Ray Foot Complete Left  Order: [id number]   Status: Final result       Next appt: 05/19/2025 at 01:00 PM in Diabetes (Shea Keller RD, [facility])       Dx: Cellulitis and abscess of toe of left...    Test Result Released: Yes (not seen)    0 Result Notes  Details    Reading Physician Reading Date Result Priority   Doroteo Han MD  [phone]  5/15/2025 Routine     Narrative & Impression  EXAMINATION:  XR FOOT COMPLETE 3 VIEW LEFT     CLINICAL HISTORY:  .  Cellulitis of left toe     TECHNIQUE:  AP, lateral and oblique views of the left foot were performed.     FINDINGS:  There is partial absence of the distal phalanx of the 1st toe.  This may be as result of postoperative change, trauma, or infection.  There is osseous spur at the plantar fascia attachment to the calcaneus.  There is degenerative change throughout the midfoot.     Impression:     As above.        Electronically signed by:Doroteo Han MD  Date:                                            05/15/2025  Time:                                           10:47   I independently reviewed the x-ray images.  Findings osteolysis consistent with area of cc -wound distal digital tuft with purulence.    4/28/25  Patient is here for wound check R foot. Continues to notice improvement in appearance w/ local wound care; using Mepilex w/out Ag. In surgical orthowedge shoe still.  Did have L arch pain again.  Had Rx for DM shoes through [employer] but they apparently don't do custom inserts so Rx 2/4/25 changed accordingly 4/25/25.          4/10/25  Patient presents for wound check R foot s/p blister & MRSA. Had I&D R foot abscess, DOS 2/5/25. Initially on Cipro; then Bactrim DS bid per C&S. Continuing w/ local wound care & CDI while maintaining normal ADLs. States ordered Mepilex Ag  but was sent w/out Ag. No c/o pain.        3/27/25          3/12/25  Patient is PO I&D abscess R w/.wound check. Spraying w/ Betadine & applying alginate. Occasionally using MediHoney. States continuing to be getting better. When healed, wants to have hammertoe corrected.  Completed Bactrim DS 2/23/25.  Pic not uploading.  2/27/25  Patient is here for wound check PO I&D  R foot abscess. Minimal pain.  2/18/25  Patient is PO I&D R foot abscess s/p blister, DOS 2/5/25, POD 13 days. Initially placed on Cipro; cultures + for MRSA so placed on Bactrim DS bid per C&S. WBC is improving. A1c also improving from 9.4% 12/17 to 8.0% yesterday. Has  suture disruption medial 1st met.head area last visit, maintained w/ Steri-strips. Was to continue in orthowedge shoe & maintain R foot CDI. Pain & swelling significantly reduced.    CBC W/ AUTO DIFFERENTIAL  Order: [id number]   Status: Final result       Next appt: 02/18/2025 at 01:45 PM in Podiatry (Tata Durham DPM)       Dx: Postoperative follow-up; Abscess of b...    Test Result Released: Yes (seen)    0 Result Notes            Component  Ref Range & Units (hover) 12:54  (2/17/25) 12 d ago  (2/5/25) 2 mo ago  (12/17/24) 3 yr ago  (2/16/22) 3 yr ago  (4/2/21) 3 yr ago  (3/16/21) 3 yr ago  (3/9/21)   WBC 7.13 9.59 5.96 4.73 4.20 4.90 5.30   RBC 4.30 3.31 Low  4.42 4.50 4.18 4.34 4.25   Hemoglobin 12.2 9.7 Low  12.7 12.6 11.1 Low  11.2 Low  11.3 Low    Hematocrit 39.4 29.4 Low  40.2 39.7 34.1 Low  35.4 Low  34.9 Low    MCV 92 89 91 88 82 82 82   MCH 28.4 29.3 28.7 28.0 26.5 Low  25.9 Low  26.5 Low    MCHC 31.0 Low  33.0 31.6 Low  31.7 Low  32.5 31.7 Low  32.4   RDW 13.0 12.6 12.8 13.5 14.8 High  14.6 High  15.4 High    Platelets 500 High  274 252 202 258 390 High  R 504 High  R   MPV 10.6 10.4 10.9 10.9 9.6 9.1 Low  8.7 Low    Immature Granulocytes 0.3 1.4 High  0.2 0.4      Gran # (ANC) 4.3 7.4 3.7 2.8 2.0 1.8 2.2   Immature Grans (Abs) 0.02 0.13 High  CM 0.01 CM 0.02 CM       Comment: Mild elevation in immature granulocytes is non specific and  can be seen in a variety of conditions including stress response,  acute inflammation, trauma and pregnancy. Correlation with other  laboratory and clinical findings is essential.   Lymph # 1.8 0.9 Low  1.4 1.2 1.5 2.2 2.2   Mono # 0.5 1.0 0.5 0.4 0.3 0.6 0.5   Eos # 0.3 0.1 0.3 0.3 0.4 0.3 0.4   Baso # 0.11 0.04 0.05 0.06 0.10 0.10 0.10   nRBC 0 0 0 0      Gran % 60.6 76.7 High  61.9 58.6 46.5 35.6 Low  41.1   Lymph % 25.8 9.7 Low  22.8 26.0 34.8 45.0 41.3   Mono % 7.3 10.4 8.9 7.6 7.7 11.8 9.2   Eosinophil % 4.5 1.4 5.4 6.1 9.1 High  5.9 6.6   Basophil % 1.5 0.4 0.8 1.3 1.9 1.7 1.8   Differential Method Automated Automated Automated Automated Automated Automated Automated   Resulting Agency [facility]        Specimen Collected: 02/17/25 12:54 CST Last Resulted: 02/17/25 13:45 CST     2/11/25  Patient is here PO I&D R foot abscess, DOS 2/5/25, POD 6 days. Cultures + for MRSA so was placed on Bactrim DS bid per C&S. States feels much better int hat swelling & related pain resolved. Kept dressing CDI. In orthowedge shoe.  No leukocytosis per lab range, but WBC elevated compared to previous levels - will monitor.    Contains abnormal data CBC auto differential  Order: [id number]   Status: Final result       Next appt: 02/18/2025 at 01:45 PM in Podiatry (Tata Durham DPM)    Test Result Released: Yes (seen)    0 Result Notes   important suggestion  Newer results are available. Click to view them now.               Component  Ref Range & Units (hover) 12 d ago  (2/5/25) 2 mo ago  (12/17/24) 3 yr ago  (2/16/22) 3 yr ago  (4/2/21) 3 yr ago  (3/16/21) 3 yr ago  (3/9/21) 4 yr ago  (2/17/21)   WBC 9.59 5.96 4.73 4.20 4.90 5.30 4.10   RBC 3.31 Low  4.42 4.50 4.18 4.34 4.25 3.94 Low    Hemoglobin 9.7 Low  12.7 12.6 11.1 Low  11.2 Low  11.3 Low  10.6 Low    Hematocrit 29.4 Low  40.2 39.7  34.1 Low  35.4 Low  34.9 Low  32.4 Low    MCV 89 91 88 82 82 82 82   MCH 29.3 28.7 28.0 26.5 Low  25.9 Low  26.5 Low  26.9 Low    MCHC 33.0 31.6 Low  31.7 Low  32.5 31.7 Low  32.4 32.6   RDW 12.6 12.8 13.5 14.8 High  14.6 High  15.4 High  15.2 High    Platelets 274 252 202 258 390 High  R 504 High  R 316 R   MPV 10.4 10.9 10.9 9.6 9.1 Low  8.7 Low  8.6 Low    Immature Granulocytes 1.4 High  0.2 0.4       Gran # (ANC) 7.4 3.7 2.8 2.0 1.8 2.2 1.6 Low    Immature Grans (Abs) 0.13 High  0.01 CM 0.02 CM       Comment: Mild elevation in immature granulocytes is non specific and  can be seen in a variety of conditions including stress response,  acute inflammation, trauma and pregnancy. Correlation with other  laboratory and clinical findings is essential.   Lymph # 0.9 Low  1.4 1.2 1.5 2.2 2.2 1.5   Mono # 1.0 0.5 0.4 0.3 0.6 0.5 0.4   Eos # 0.1 0.3 0.3 0.4 0.3 0.4 0.5   Baso # 0.04 0.05 0.06 0.10 0.10 0.10 0.10   nRBC 0 0 0       Gran % 76.7 High  61.9 58.6 46.5 35.6 Low  41.1 39.2   Lymph % 9.7 Low  22.8 26.0 34.8 45.0 41.3 37.4   Mono % 10.4 8.9 7.6 7.7 11.8 9.2 10.7   Eosinophil % 1.4 5.4 6.1 9.1 High  5.9 6.6 11.1 High    Basophil % 0.4 0.8 1.3 1.9 1.7 1.8 1.6   Differential Method Automated Automated Automated Automated Automated Automated Automated   Resulting Agency [facility]        Specimen Collected: 02/05/25 13:45 CST Last Resulted: 02/05/25 14:00 CST     2/4/25  Bhavna is a 64 y.o. female who presents new to the podiatry clinic w/ c/o pain R foot w/ drainage since Fri.,being on feet all day in [place] for grandchild's dance competition.  Already had this appointment scheduled to establish care as a diabetic so has not been to see anyone in regard to cc.  States did a lot walking in tennis shoes thinks they might not been supportive. No specific H/0 trauma but huge blister following day medial R foot/ arch that has subsided but very red &  painful. Taking IB prn.  Has collapsed arch L foot  - needs supportive shoes.    Stays very busy on feet all the time as has horses.  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  Past Medical History:   Diagnosis Date    Abdominal pain     Diabetes     GERD (gastroesophageal reflux disease)     Hx of colonic polyps     Hyperlipidemia     Hypertension     Nausea and vomiting     Thyroid disease      Patient Active Problem List   Diagnosis    History of adenomatous polyp of colon    Chronic pain of both shoulders    Bilateral rotator cuff dysfunction    Closed displaced transverse fracture of left patella    Stiffness of left knee    Hypertension    Hypothyroidism    Elevated LFTs    Type 1 diabetes mellitus with hyperglycemia    Pre-op testing    Type 1 diabetes mellitus with hypoglycemia    Hypoglycemia unawareness associated with type 1 diabetes mellitus    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 Heterozygous hemoglobin variants (HbS, HgC, etc)do  not significantly interfere with this assay.   However, presence of multiple variants may affect accuracy.     12/17/2024 9.4 (H) 4.0 - 5.6 % Final     Comment:     ADA Screening Guidelines:  5.7-6.4%  Consistent with prediabetes  >or=6.5%  Consistent with diabetes    High levels of fetal  hemoglobin interfere with the HbA1C  assay. Heterozygous hemoglobin variants (HbS, HgC, etc)do  not significantly interfere with this assay.   However, presence of multiple variants may affect accuracy.     01/20/2021 8.4 (H) 4.0 - 5.6 % Final     Comment:     ADA Screening Guidelines:  5.7-6.4%  Consistent with prediabetes  >or=6.5%  Consistent with diabetes  High levels of fetal hemoglobin interfere with the HbA1C  assay. Heterozygous hemoglobin variants (HbS, HgC, etc)do  not significantly interfere with this assay.   However, presence of multiple variants may affect accuracy.       Hemoglobin A1c   Date Value Ref Range Status   05/28/2025 7.4 (H) 4.0 - 5.6 % Final     Comment:     ADA Screening Guidelines:  5.7-6.4%  Consistent with prediabetes  >=6.5%  Consistent with diabetes    High levels of fetal hemoglobin interfere with the HbA1C  assay. Heterozygous hemoglobin variants (HbS, HgC, etc)do  not significantly interfere with this assay.   However, presence of multiple variants may affect accuracy.     Objective:      X-Ray Foot Complete Right  Order: [id number]  Status: Final result       Visible to patient: Yes (seen)       Next appt: 02/12/2025 at 01:00 PM in Lab (LAB, [facility])       Dx: Trauma    0 Result Notes  Details    Reading Physician Reading Date Result Priority   Brody Jeong MD  [phone] 8/18/2024 STAT     Narrative & Impression  EXAMINATION:  XR FOOT COMPLETE 3 VIEW RIGHT     CLINICAL HISTORY:  . Injury, unspecified, initial encounter     TECHNIQUE:  AP, lateral, and oblique views of the right foot were performed.     COMPARISON:  None     FINDINGS:  Three views right foot.     No acute displaced fracture or dislocation of the foot.  No radiopaque foreign body.  No significant edema.  There are degenerative changes of the calcaneus.     Impression:     1. No acute displaced fracture or dislocation of the foot.        Electronically signed by:Brody Jeong MD  Date:                                             08/18/2024  Time:                                           18:39   X-Ray Foot Complete Left  Order: [id number]  Status: Final result       Visible to patient: Yes (seen)       Next appt: 02/12/2025 at 01:00 PM in Lab (LAB, [facility])       Dx: Pain    0 Result Notes  Details    Reading Physician Reading Date Result Priority   Basilio Love III, MD  [phone]  [phone] 6/29/2021 Routine     Narrative & Impression  EXAMINATION:  XR FOOT COMPLETE 3 VIEW LEFT     CLINICAL HISTORY:  Pain, unspecified     FINDINGS:  Complete three views foot.     No fracture dislocation bone destruction seen.  There is mild DJD.  There is a flatfoot deformity and spurs on the calcaneus.     Impression:     No acute process seen.        Electronically signed by:Basilio Love MD  Date:                                            06/29/2021  Time:                                           13:53   I independently reviewed the x-ray images & there was obvious disruption of midfoot contour at the level of the distal to the navicular@ met cuneiform L as compared w/ contralateral x-ray, consistent w/ clinical appearance of asymmetry B/L MLA .    Review of Systems   Constitutional: Negative for malaise/fatigue.   Cardiovascular:  Negative for claudication and leg swelling.   Skin:  Positive for color change.   Musculoskeletal:  Positive for joint pain and myalgias. Negative for falls.   Neurological:  Positive for focal weakness, loss of balance and sensory change. Negative for numbness and weakness.   Psychiatric/Behavioral:  Positive for depression. The patient is nervous/anxious.      Physical Exam  Vitals reviewed.   Constitutional:       General: She is not in acute distress.     Appearance: She is well-developed and overweight.   Cardiovascular:      Pulses: Normal pulses.           Dorsalis pedis pulses are 2+ on the right side and 2+ on the left side.   Musculoskeletal:         General: Signs of injury present.  No swelling or tenderness.      Right lower leg: No edema.      Left lower leg: No edema.      Right foot: Deformity (Hallux malleus L> R) present. No Charcot foot, foot drop or prominent metatarsal heads.      Left foot: Deformity (L>>R semirigid HTs), Charcot foot, foot drop and prominent metatarsal heads present.        Feet:    Feet:      Right foot:      Skin integrity: No blister, erythema, warmth or dry skin.      Left foot:      Skin integrity: Skin breakdown, erythema, warmth and dry skin present. No blister.   Skin:     General: Skin is warm and dry.      Capillary Refill: Capillary refill takes less than 2 seconds.      Findings: Lesion present. No abscess, bruising or erythema.      Comments: L:   Resolution of swelling LLE 1st ray; no erythema nor edema of great toe. Desquamating skin.  Incision maintained w/ sutures intact amp.  stump great toe, healed well by primary intention.  R:  (Initial incision along 9cm plantar MLA & wound extending medial & dorsal from 1st IMS/ met.neck 7.5cm x 2cm).  Wound continues to heal 0.8 x 0.5 cm & <0.1 cm depth. Granular base.   Neurological:      Mental Status: She is alert and oriented to person, place, and time.      Sensory: Sensory deficit present.      Motor: Abnormal muscle tone (drop foot L) present. No weakness.      Gait: Gait abnormal (Surgical shoe R).   Psychiatric:         Mood and Affect: Mood and affect normal.         Behavior: Behavior normal. Behavior is cooperative.         Assessment:      Encounter Diagnoses   Name Primary?    Postoperative follow-up Yes    Visit for suture removal     Osteomyelitis of great toe of left foot     Visit for wound check     Open wound of right foot, subsequent encounter     Type 1 diabetes mellitus with diabetic polyneuropathy            Problem List Items Addressed This Visit          Endocrine    Type 1 diabetes mellitus with diabetic polyneuropathy     Other Visit Diagnoses         Postoperative follow-up    -   Primary      Visit for suture removal          Osteomyelitis of great toe of left foot          Visit for wound check          Open wound of right foot, subsequent encounter                  Plan:       Bhavna was seen today for post-op evaluation.    Diagnoses and all orders for this visit:    Postoperative follow-up    Visit for suture removal    Osteomyelitis of great toe of left foot    Visit for wound check    Open wound of right foot, subsequent encounter    Type 1 diabetes mellitus with diabetic polyneuropathy      I counseled the patient on her conditions, their implications & medical mgmt.    - Shoe inspection. Diabetic Foot Education. Patient reminded of the importance of good blood sugar control to help prevent podiatric complications of diabetes. Patient instructed on proper foot hygeine. We discussed wearing proper shoe gear, daily foot inspections, never walking w/out protective shoe gear, annual DM foot exam, sooner p.r.n..      -Wound debrided R.  Dressing applied.  Continue Mepilex &/or Betadine wet-dry dressing R. To be kept CDI. May change qod.  WBAT in surgical shoe.    L great toe I&D healing well w/ sutures intact.  Dressing change applied. TO be maintained CDI.        A total of 28 mins.was spent on chart review, patient visit & documentation.

## 2025-05-26 ENCOUNTER — OFFICE VISIT (OUTPATIENT)
Dept: PODIATRY | Facility: CLINIC | Age: 65
End: 2025-05-26
Payer: COMMERCIAL

## 2025-05-26 VITALS
HEART RATE: 95 BPM | DIASTOLIC BLOOD PRESSURE: 71 MMHG | HEIGHT: 65 IN | SYSTOLIC BLOOD PRESSURE: 120 MMHG | WEIGHT: 154.19 LBS | BODY MASS INDEX: 25.69 KG/M2

## 2025-05-26 DIAGNOSIS — Z22.322 MRSA (METHICILLIN RESISTANT STAPH AUREUS) CULTURE POSITIVE: Primary | ICD-10-CM

## 2025-05-26 DIAGNOSIS — Z89.412 HISTORY OF PARTIAL RAY AMPUTATION OF FIRST TOE OF LEFT FOOT: ICD-10-CM

## 2025-05-26 DIAGNOSIS — M86.9 OSTEOMYELITIS OF GREAT TOE OF LEFT FOOT: ICD-10-CM

## 2025-05-26 DIAGNOSIS — L02.416 CELLULITIS AND ABSCESS OF LEFT LOWER EXTREMITY: ICD-10-CM

## 2025-05-26 DIAGNOSIS — S91.301A OPEN WOUND OF PLANTAR ASPECT OF RIGHT FOOT: ICD-10-CM

## 2025-05-26 DIAGNOSIS — L03.116 CELLULITIS AND ABSCESS OF LEFT LOWER EXTREMITY: ICD-10-CM

## 2025-05-26 PROCEDURE — 99213 OFFICE O/P EST LOW 20 MIN: CPT | Mod: 25,S$GLB,, | Performed by: PODIATRIST

## 2025-05-26 PROCEDURE — 97597 DBRDMT OPN WND 1ST 20 CM/<: CPT | Mod: S$GLB,,, | Performed by: PODIATRIST

## 2025-05-26 PROCEDURE — 99024 POSTOP FOLLOW-UP VISIT: CPT | Mod: S$GLB,,, | Performed by: PODIATRIST

## 2025-05-26 PROCEDURE — 99999 PR PBB SHADOW E&M-EST. PATIENT-LVL V: CPT | Mod: PBBFAC,,, | Performed by: PODIATRIST

## 2025-05-28 ENCOUNTER — TELEPHONE (OUTPATIENT)
Dept: DIABETES | Facility: CLINIC | Age: 65
End: 2025-05-28
Payer: COMMERCIAL

## 2025-05-28 NOTE — TELEPHONE ENCOUNTER
----- Message from Nurse Lin sent at 5/28/2025  8:35 AM CDT -----  Pt stated that she is eating late at night, she is trying to work on this stated that she didn't want to schedule nurse visit or with Diabetes Education right  now stated she will just wait to see you to make the adjustments   Hospitalist History and Physical   Admit Date:  2023  1:38 PM   Name:  Carlos Manuel Lyman   Age:  80 y.o. Sex:  male  :  1940   MRN:  [id number]   Room:  [facility]    Presenting/Chief Complaint: pneumothorax   Reason(s) for Admission: Pneumothorax [J93.9]  Pneumothorax after biopsy [J95.811]     History of Present Illness:   Bety Teran is a 80 y.o. male with medical history of severe COPD on 2L baseline, BPH, dementia, who presented to outpatient IR for biopsy of RUL lung nodule. Patient had a chest CT on 8-3-2023 which showed a new 1.4 cm spiculated pulmonary nodule in the anterior aspect of the right upper lobe concerning for malignancy. Follow-up PET scan on 2023 showed moderate metabolic activity within the right upper lobe pulmonary nodule. No evidence of marco antonio or distant metastatic disease. After biopsy pt was noted to have pneumothorax so chest tube was placed and hospitalists asked to admit. Pt denies complaints; history limited due to dementia and hearing loss. wife reports he is restless and needs his dose of seroquel early; says the PRN ativan did not help enough. No other complaints or concerns      Assessment & Plan:       Pneumothorax after biopsy  -admit to inpatient. Unlikely patient will be discharged tomorrow. -repeat CXR in AM  -defer chest tube management to IR      Pulmonary nodules  -await pathology report.   -Discussed with IR and they think prelim report will be in by tomorrow  -possible repeat biopsy needed, if the initial biopsy yielded insufficient results in pathology      COPD, severe ([address])  -not in exacerbation   -cont home meds or formulary substitute      DNR (do not resuscitate)  -noted      Benign prostatic hyperplasia with urinary frequency  -cont home flomax      Alzheimer's dementia ([address])  -cont home aricept      PT/OT evals and PPD needed/ordered? Therapy ordered  Diet: ADULT DIET;  Regular  VTE prophylaxis: Lovenox  Code status: barrier technique (including:  cap, mask, sterile gloves, sterile sheet, hand hygiene, and cutaneous antisepsis) were used. With the patient supine a preliminary CT scan through the upper chest was performed with radio-opaque markers on the skin. Following routine prep and drape, a local field block with lidocaine was achieved. Using intermittent CT technique, a 17-gauge needle was advanced into the pneumothorax from the right anterolateral approach. Guidewire advanced into the pneumothorax. Axial images confirm proper position. With Seldinger technique a 10 Divehi chest tube advanced. It was secured to the skin and axial images confirm proper position with near complete resolution of pneumothorax. Patient will be monitored in the radiology department and transferred to the floor. Hemostasis was achieved with manual compression. A bandage was applied. Complications: None. Radiation Exposure Indices: Total Exam DLP = reported with the biopsy dictation Medications: Local anesthesia only. Continuous pulse oximetry, heart rate, and blood pressure monitoring was performed with an independent, trained observer present. Uncomplicated CT guided right small bore chest tube placement Plan: Check pathology results possible repeat biopsy needed       Signed:  Sarah Smith MD    Part of this note may have been written by using a voice dictation software. The note has been proof read but may still contain some grammatical/other typographical errors.

## 2025-05-28 NOTE — TELEPHONE ENCOUNTER
Spoke to pt, Torsten Saez   Reviewed Omnipod 5 download from [facility]   Readings are running high   Average is 193 and she is in target range only 54% of the time   She is having some significant elevations in the middle of the night like between the hours of midnight to 4:00 a.m.   She eating overnight?? Pt stated she has been eating late at night  It does look like sometimes she is bolusing which makes me think that she is eating and then sometimes it looks like she is not bolusing---and the readings are running very high   Is she carbohydrate counting??   Because if she is it does look like we may need to adjust her insulin to carbohydrate ratio      I would like to make some insulin pump setting changes   Is she able to come in to see [facility] to get this adjusted   Or a nurse visit o get settings adjusted      ICR: 1:10   Target 110-120     Pt refused to scheduled visit to see Diabetes Education or nurse visit,  stated she will just wait to see provider to make changes on 6/9

## 2025-05-29 ENCOUNTER — RESULTS FOLLOW-UP (OUTPATIENT)
Dept: DIABETES | Facility: CLINIC | Age: 65
End: 2025-05-29

## 2025-06-09 ENCOUNTER — OFFICE VISIT (OUTPATIENT)
Dept: DIABETES | Facility: CLINIC | Age: 65
End: 2025-06-09
Payer: COMMERCIAL

## 2025-06-09 VITALS
WEIGHT: 150 LBS | DIASTOLIC BLOOD PRESSURE: 61 MMHG | HEIGHT: 65 IN | SYSTOLIC BLOOD PRESSURE: 103 MMHG | HEART RATE: 103 BPM | BODY MASS INDEX: 24.99 KG/M2 | OXYGEN SATURATION: 96 %

## 2025-06-09 DIAGNOSIS — I10 PRIMARY HYPERTENSION: ICD-10-CM

## 2025-06-09 DIAGNOSIS — Z96.41 INSULIN PUMP IN PLACE: ICD-10-CM

## 2025-06-09 DIAGNOSIS — E10.649 TYPE 1 DIABETES MELLITUS WITH HYPOGLYCEMIA AND WITHOUT COMA: ICD-10-CM

## 2025-06-09 DIAGNOSIS — E10.65 TYPE 1 DIABETES MELLITUS WITH HYPERGLYCEMIA: Primary | ICD-10-CM

## 2025-06-09 DIAGNOSIS — F32.A DEPRESSION, UNSPECIFIED DEPRESSION TYPE: ICD-10-CM

## 2025-06-09 DIAGNOSIS — E78.5 DYSLIPIDEMIA, GOAL LDL BELOW 100: ICD-10-CM

## 2025-06-09 DIAGNOSIS — Z71.9 HEALTH EDUCATION/COUNSELING: ICD-10-CM

## 2025-06-09 DIAGNOSIS — E03.9 HYPOTHYROIDISM, UNSPECIFIED TYPE: ICD-10-CM

## 2025-06-09 DIAGNOSIS — E10.649 HYPOGLYCEMIA UNAWARENESS ASSOCIATED WITH TYPE 1 DIABETES MELLITUS: ICD-10-CM

## 2025-06-09 DIAGNOSIS — E10.42 TYPE 1 DIABETES MELLITUS WITH DIABETIC POLYNEUROPATHY: ICD-10-CM

## 2025-06-09 DIAGNOSIS — Z46.81 INSULIN PUMP FITTING OR ADJUSTMENT: ICD-10-CM

## 2025-06-09 DIAGNOSIS — E10.3513 TYPE 1 DIABETES MELLITUS WITH PROLIFERATIVE RETINOPATHY OF BOTH EYES AND MACULAR EDEMA: ICD-10-CM

## 2025-06-09 PROCEDURE — 99999 PR PBB SHADOW E&M-EST. PATIENT-LVL V: CPT | Mod: PBBFAC,,, | Performed by: NURSE PRACTITIONER

## 2025-06-09 PROCEDURE — 95251 CONT GLUC MNTR ANALYSIS I&R: CPT | Mod: S$GLB,,, | Performed by: NURSE PRACTITIONER

## 2025-06-09 PROCEDURE — 99214 OFFICE O/P EST MOD 30 MIN: CPT | Mod: S$GLB,,, | Performed by: NURSE PRACTITIONER

## 2025-06-09 RX ORDER — METFORMIN HYDROCHLORIDE 500 MG/1
500 TABLET ORAL DAILY
Qty: 90 TABLET | Refills: 2 | Status: SHIPPED | OUTPATIENT
Start: 2025-06-09

## 2025-06-09 RX ORDER — INSULIN ASPART INJECTION 100 [IU]/ML
INJECTION, SOLUTION SUBCUTANEOUS
Qty: 30 ML | Refills: 11 | Status: SHIPPED | OUTPATIENT
Start: 2025-06-09

## 2025-06-09 RX ORDER — INSULIN LISPRO 100 [IU]/ML
INJECTION, SOLUTION INTRAVENOUS; SUBCUTANEOUS
Qty: 15 ML | Refills: 6 | Status: SHIPPED | OUTPATIENT
Start: 2025-06-09

## 2025-06-09 RX ORDER — BLOOD-GLUCOSE SENSOR
1 EACH MISCELLANEOUS
Qty: 3 EACH | Refills: 11 | Status: SHIPPED | OUTPATIENT
Start: 2025-06-09

## 2025-06-09 RX ORDER — INSULIN PMP CART,AUT,G6/7,CNTR
1 EACH SUBCUTANEOUS
Qty: 3 EACH | Refills: 11 | Status: SHIPPED | OUTPATIENT
Start: 2025-06-09

## 2025-06-09 RX ORDER — BLOOD-GLUCOSE TRANSMITTER
1 EACH MISCELLANEOUS
Qty: 1 EACH | Refills: 4 | Status: SHIPPED | OUTPATIENT
Start: 2025-06-09

## 2025-06-09 RX ORDER — INSULIN GLARGINE 300 [IU]/ML
30-34 INJECTION, SOLUTION SUBCUTANEOUS DAILY
Qty: 4.5 ML | Refills: 6 | Status: SHIPPED | OUTPATIENT
Start: 2025-06-09 | End: 2026-06-09

## 2025-06-09 NOTE — PATIENT INSTRUCTIONS
Pump backup plan    If the insulin pump is non functional and discontinued for anticipated more than 20 hours, please give daily injections of:   Long acting insulin Toujeo  30-34 units daily   Short acting insulin Humalog insulin to car ratio - units for every 1:10 grams of carbs for meals according to carb ratios and sensitivity factor in the pump.     When the insulin pump is restarted, do not restart basal rates until at least 22 hours after the last long acting insulin injection. You can set a 0% temporary basal setting that will last until this time and use your pump to bolus for meals and correction.     For any technical insulin pump issues, please contact the insulin pump company; the toll free number is printed on the label on the back of the insulin pump.       If your sugar is running high for a few hours and does not respond to two correction doses from the insulin pump, it may mean that you have a bad pump site and the site should be changed

## 2025-06-09 NOTE — PROGRESS NOTES
CC:   Chief Complaint   Patient presents with    Diabetes Mellitus       HPI: Bhavna Hancock is a 64 y.o. female presents for a follow up visit today for the management of T1DM   She has been seen before by hospital endocrinology during that admission in 2021--for a left knee wound after ORIF --endocrinology manage her diabetes during that admission--no discharge follow-up      She  was diagnosed with Type 1 diabetes in 1990's after she had an 11lb baby in 1987. Started on       Family hx of diabetes: son ( dx with T1DM at 5 years old), daughter diagnosed at age 7 with T1DM, granddaughter was 11 years old   Hospitalized for diabetes: denies   Insulin therapy: since diagnosis       C-peptide level 1/2021--0.11      No personal or FH of thyroid cancer or personal of pancreatic cancer or pancreatitis.         Our last visit was in February of 2025  At that visit we continued the metformin 500 mg daily  We continued her Omnipod 5---will try to change the Humalog to Fiasp   Adjusted her insulin to carbohydrate ratio to- 1:12---but discussed that we may need to make her a 1:11 or even 1:10  I continued her correction factor but discussed that we may need to adjust it to 1:40  Long discussion with patient about pump optimization at the visit  Encouraged her to remain in automated mode 100% of the time  Reviewed how to give correction throughout the day  Discussed that she must bolus 15-20 minutes before the meal if she is on the Humalog  Continue Dexcom G6  A1c has improved to 7.4% from 8%  She recently adjusted the ICR to 1:10   She was able to switch to the Fiasp insulin                         BG in clinic today         DIABETES COMPLICATIONS: retinopathy and peripheral neuropathy      Diabetes Management Status    ASA:  Yes - 81 mg daily     Statin: Taking--Crestor 10 mg  ACE/ARB: Taking--lisinopril 2.5 mg       The 10-year ASCVD risk score (Greg CALLE, et al., 2019) is: 6.7%    Values used to calculate the  score:      Age: 64 years      Sex: Female      Is Non- : No      Diabetic: Yes      Tobacco smoker: No      Systolic Blood Pressure: 103 mmHg      Is BP treated: Yes      HDL Cholesterol: 66 mg/dL      Total Cholesterol: 157 mg/dL      Screening or Prevention Patient's value Goal Complete/Controlled?   HgA1C Testing and Control   Lab Results   Component Value Date    HGBA1C 7.4 (H) 05/28/2025      Annually/Less than 8% No   Lipid profile : 05/28/2025 Annually No   LDL control Lab Results   Component Value Date    LDLCALC 80.8 05/28/2025    Annually/Less than 100 mg/dl  No   Nephropathy screening Lab Results   Component Value Date    LABMICR 7.0 02/17/2025     Lab Results   Component Value Date    PROTEINUA Negative 01/27/2021    Annually No   Blood pressure BP Readings from Last 1 Encounters:   06/09/25 103/61    Less than 140/90 Yes   Dilated retinal exam Most Recent Eye Exam Date: Not Found Annually Yes   Foot exam   : 10/08/2024 Annually Yes       CURRENT A1C:    Hemoglobin A1C   Date Value Ref Range Status   02/17/2025 8.0 (H) 4.0 - 5.6 % Final     Comment:     ADA Screening Guidelines:  5.7-6.4%  Consistent with prediabetes  >or=6.5%  Consistent with diabetes    High levels of fetal hemoglobin interfere with the HbA1C  assay. Heterozygous hemoglobin variants (HbS, HgC, etc)do  not significantly interfere with this assay.   However, presence of multiple variants may affect accuracy.     12/17/2024 9.4 (H) 4.0 - 5.6 % Final     Comment:     ADA Screening Guidelines:  5.7-6.4%  Consistent with prediabetes  >or=6.5%  Consistent with diabetes    High levels of fetal hemoglobin interfere with the HbA1C  assay. Heterozygous hemoglobin variants (HbS, HgC, etc)do  not significantly interfere with this assay.   However, presence of multiple variants may affect accuracy.     01/20/2021 8.4 (H) 4.0 - 5.6 % Final     Comment:     ADA Screening Guidelines:  5.7-6.4%  Consistent with  prediabetes  >or=6.5%  Consistent with diabetes  High levels of fetal hemoglobin interfere with the HbA1C  assay. Heterozygous hemoglobin variants (HbS, HgC, etc)do  not significantly interfere with this assay.   However, presence of multiple variants may affect accuracy.       Hemoglobin A1c   Date Value Ref Range Status   2025 7.4 (H) 4.0 - 5.6 % Final     Comment:     ADA Screening Guidelines:  5.7-6.4%  Consistent with prediabetes  >=6.5%  Consistent with diabetes    High levels of fetal hemoglobin interfere with the HbA1C  assay. Heterozygous hemoglobin variants (HbS, HgC, etc)do  not significantly interfere with this assay.   However, presence of multiple variants may affect accuracy.       GOAL A1C:  6.5% without hypoglycemia      DM MEDICATIONS USED IN THE PAST:  Lantus, Humalog, lispro   Metformin  Dexcom G7   Toujeo   Omnipod 5         CURRENT DIABETES MEDICATIONS:  Metformin 500 mg daily   Omnipod 5 with Fiasp   Basal: 1.15 units/hr   ICR 1:10- recently adjusted to this   ISF: 1:45--  Target: 120  IOB: 3 hours        TDD-35.6 units   73% basal   27% bolus     Carbs -91.5grams   Boluses per day -2.2     Automated mode-100%   4% automated limited   0% manual mode        Using her phone as the    Pump supplies- pharmacy         BLOOD GLUCOSE MONITORING:    Sensor type: Dexcom G6  Average BG readin  Time in range: 67%   27% high   5% very high   1% low   <1% very low   Site change:  q10 days  Estimated A1c 7.2%    2 weeks prior average blood sugar 182 in range 59% of the time estimated A1c 7.7%        Supplies from pharmacy       Sensor was downloaded in clinic today and reviewed with patient.   Please see attached document for download.         HYPOGLYCEMIA:  Yes  1% low   <1% low   2 weeks prior --0% low and <1% very low    Has gone as low as 30 in the past   Glucagon kit: Baqsimi   Medic alert bracelet: yes         MEALS: eating 2 meals per day   Feels confident with CHO counting   BF:  skips  Lunch: Noon- sandwich   Dinner: 1130 PM   Snack:5 PM -- nuts or pecans   Drinks: coffee   Diet coke   Water - a little        CURRENT EXERCISE:  No formal -- has horses       Review of Systems  Review of Systems   Constitutional:  Negative for appetite change, fatigue and unexpected weight change.   HENT:  Negative for trouble swallowing.    Eyes:  Positive for visual disturbance.   Respiratory:  Negative for shortness of breath.    Cardiovascular:  Negative for chest pain.   Gastrointestinal:  Negative for nausea.   Endocrine: Negative for polydipsia, polyphagia and polyuria.   Genitourinary:         No Nocturia    Skin:  Positive for wound (left foot big toe - partial amputation- following with podiatry).   Neurological:  Positive for numbness.   Psychiatric/Behavioral:  Positive for dysphoric mood.        Physical Exam   Physical Exam  Vitals and nursing note reviewed.   Constitutional:       General: She is not in acute distress.     Appearance: She is well-developed. She is not ill-appearing.      Comments: Overweight female   HENT:      Head: Normocephalic and atraumatic.      Right Ear: External ear normal.      Left Ear: External ear normal.      Nose: Nose normal.   Neck:      Thyroid: No thyromegaly.      Trachea: No tracheal deviation.   Cardiovascular:      Rate and Rhythm: Normal rate and regular rhythm.      Heart sounds: No murmur heard.  Pulmonary:      Effort: Pulmonary effort is normal. No respiratory distress.      Breath sounds: Normal breath sounds.   Abdominal:      Palpations: Abdomen is soft.      Tenderness: There is no abdominal tenderness.      Hernia: No hernia is present.   Musculoskeletal:      Cervical back: Normal range of motion and neck supple.   Skin:     General: Skin is warm and dry.      Capillary Refill: Capillary refill takes less than 2 seconds.      Findings: Wound (right foot wound- following with podiatry) present. No rash.      Comments: Omnipod 5  sites and dexcom  G6 sites are normal appearing. No lipo hypertropthy or atrophy     Neurological:      Mental Status: She is alert and oriented to person, place, and time.      Cranial Nerves: No cranial nerve deficit.   Psychiatric:         Mood and Affect: Mood normal.         Behavior: Behavior normal.         Judgment: Judgment normal.               FOOT EXAMINATION: post op shoe to left foot s/p  foot surgery   Dressing in place   Tennis shoe to left foot   Following with Dr. Durham with podiatry           Lab Results   Component Value Date    TSH 1.423 05/28/2025             Type 1 diabetes mellitus with hyperglycemia  Uncontrolled  Improving but still above goal  She recently tightened her insulin to carbohydrate ratio  Fears hypoglycemia      -- Medication Changes:    Okay to continue the metformin 500 mg daily     Continue the Omnipod 5   Continue Fiasp insulin  Basal: 1.15 units/hr   ICR 1:10- recently tightened her ratio to this  ISF: 1:45--  Target: 110--tightened  IOB: 3 hours       Long discussion with patient about pump optimization  Discussed hitting the you sensor button when sugars are high   Stay in auto mode 100% of the time  Bolus before meals-      -- Reviewed goals of therapy are to get the best control we can without hypoglycemia.  -- Reviewed patient's current insulin regimen. Clarified proper insulin dose and timing in relation to meals, etc. Insulin injection sites and proper rotation instructed.    -- Advised frequent self blood glucose monitoring.  Patient encouraged to document glucose results and bring them to every clinic visit. Rx for dexcom G6 to pharmacy -- continue--on the Omnipod 5 and using her I phone  -- Hypoglycemia precautions discussed. Instructed on precautions before driving.    -- Call for Bg repeatedly < 70 or > 180.   -- Close adherence to lifestyle changes recommended.   -- Periodic follow ups for eye evaluations, foot care and dental care suggested.  -- Refer to diabetes education--as  needed for assistance with insulin pump      Patient has diabetes mellitus and manages diabetes with intensive insulin regimen and uses prandial and basal insulin daily  Patient requires a therapeutic CGM and is willing to use therapeutic CGM for the necessary frequent adjustments of insulin therapy.  I have completed an in-person visit during the previous 6 months and will continue to have in-person visits every 6 months to assess adherence to their CGM regimen and diabetes treatment plan.  Due to COVID pandemic and need for remote monitoring this tool is medically necessary          Type 1 diabetes mellitus with hypoglycemia  Has baqismi   Continue Dexcom G6   Remain in automated mode  Reviewed hypoglycemia management: Treat with 1/2 glass of juice, 1/2 can regular coke, or 4 glucose tablets.   Monitor and repeat treatment every 15 minutes until BG is >70   Then have a snack, which includes a complex carbohydrate and protein.      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   See above.   Follow-up with optometry    Type 1 diabetes mellitus with diabetic polyneuropathy  Optimize BG readings.   See above.   Continue to follow-up with Podiatry    Educated patient to check feet daily for any foreign objects and/or wounds. Discussed with patient the importance of wearing appropriate footwear at all times, not to walk barefoot ever, and to check shoes before putting them on feet. Instructed patient to keep feet dry by regularly changing shoes and socks and drying feet after baths and exercises. Also, instructed patient to report any new lesions, discolorations, or swelling to a healthcare professional.      Hypertension  BP goal is < 140/90.   Tolerating  ACEi  Controlled   Blood pressure goals discussed with patient      Dyslipidemia, goal LDL below  100  On statin per ADA recommendations  LDL goal < 100. LDL at goal. LFTs WNL. Continue statin.       Hypothyroidism  TSH level within normal limits now  On LT4 88 mcg----taking 1 capsule daily   reinforced take by itself on empty stomach, first thing in the morning and wait at least 30-60 minutes to eat, drink, or take other medications.    Continue current dose    Depression  Reports that she is taking Prozac and Wellbutrin  May hinder diabetes management    Insulin pump in place  See above    Insulin pump fitting or adjustment  See above          I spent a total of 30 minutes on the day of the visit.This includes face to face time and non-face to face time preparing to see the patient (eg, review of tests), obtaining and/or reviewing separately obtained history, documenting clinical information in the electronic or other health record, independently interpreting results and communicating results to the patient/family/caregiver, or care coordinator.          Pump backup plan    If the insulin pump is non functional and discontinued for anticipated more than 20 hours, please give daily injections of:   Long acting insulin Toujeo  30-34 units daily   Short acting insulin Humalog insulin to car ratio - units for every 1:10 grams of carbs for meals according to carb ratios and sensitivity factor in the pump.     When the insulin pump is restarted, do not restart basal rates until at least 22 hours after the last long acting insulin injection. You can set a 0% temporary basal setting that will last until this time and use your pump to bolus for meals and correction.     For any technical insulin pump issues, please contact the insulin pump company; the toll free number is printed on the label on the back of the insulin pump.       If your sugar is running high for a few hours and does not respond to two correction doses from the insulin pump, it may mean that you have a bad pump site and the site should be changed            Follow up in about 3 months (around 9/9/2025).  Follow up with me in 3 months with labs prior   External eye records- Dr. Chaidez         Orders Placed This Encounter   Procedures    Hemoglobin A1C     Standing Status:   Future     Expected Date:   9/9/2025     Expiration Date:   12/9/2026    Comprehensive Metabolic Panel     Standing Status:   Future     Expected Date:   9/9/2025     Expiration Date:   12/9/2026    TSH     Standing Status:   Future     Expected Date:   9/9/2025     Expiration Date:   12/9/2026    Vitamin D     Standing Status:   Future     Expected Date:   9/9/2025     Expiration Date:   12/9/2026    Microalbumin/Creatinine Ratio, Urine     Standing Status:   Future     Expected Date:   9/9/2025     Expiration Date:   12/9/2026     Specimen Source:   Urine       Recommendations were discussed with the patient in detail  The patient verbalized understanding and agrees with the plan outlined as above.     This note was partly generated with [employer] voice recognition software. I apologize for any possible typographical errors.

## 2025-06-09 NOTE — ASSESSMENT & PLAN NOTE
Uncontrolled  Improving but still above goal  She recently tightened her insulin to carbohydrate ratio  Fears hypoglycemia      -- Medication Changes:    Okay to continue the metformin 500 mg daily     Continue the Omnipod 5   Continue Fiasp insulin  Basal: 1.15 units/hr   ICR 1:10- recently tightened her ratio to this  ISF: 1:45--  Target: 110--tightened  IOB: 3 hours       Long discussion with patient about pump optimization  Discussed hitting the you sensor button when sugars are high   Stay in auto mode 100% of the time  Bolus before meals-      -- Reviewed goals of therapy are to get the best control we can without hypoglycemia.  -- Reviewed patient's current insulin regimen. Clarified proper insulin dose and timing in relation to meals, etc. Insulin injection sites and proper rotation instructed.    -- Advised frequent self blood glucose monitoring.  Patient encouraged to document glucose results and bring them to every clinic visit. Rx for dexcom G6 to pharmacy -- continue--on the Omnipod 5 and using her I phone  -- Hypoglycemia precautions discussed. Instructed on precautions before driving.    -- Call for Bg repeatedly < 70 or > 180.   -- Close adherence to lifestyle changes recommended.   -- Periodic follow ups for eye evaluations, foot care and dental care suggested.  -- Refer to diabetes education--as needed for assistance with insulin pump      Patient has diabetes mellitus and manages diabetes with intensive insulin regimen and uses prandial and basal insulin daily  Patient requires a therapeutic CGM and is willing to use therapeutic CGM for the necessary frequent adjustments of insulin therapy.  I have completed an in-person visit during the previous 6 months and will continue to have in-person visits every 6 months to assess adherence to their CGM regimen and diabetes treatment plan.  Due to COVID pandemic and need for remote monitoring this tool is medically necessary

## 2025-06-09 NOTE — ASSESSMENT & PLAN NOTE
TSH level within normal limits now  On LT4 88 mcg----taking 1 capsule daily   reinforced take by itself on empty stomach, first thing in the morning and wait at least 30-60 minutes to eat, drink, or take other medications.    Continue current dose

## 2025-06-10 ENCOUNTER — OFFICE VISIT (OUTPATIENT)
Dept: PODIATRY | Facility: CLINIC | Age: 65
End: 2025-06-10
Payer: COMMERCIAL

## 2025-06-10 VITALS
DIASTOLIC BLOOD PRESSURE: 67 MMHG | SYSTOLIC BLOOD PRESSURE: 112 MMHG | BODY MASS INDEX: 25.68 KG/M2 | HEIGHT: 65 IN | WEIGHT: 154.13 LBS | HEART RATE: 81 BPM

## 2025-06-10 DIAGNOSIS — Z51.89 VISIT FOR WOUND CHECK: ICD-10-CM

## 2025-06-10 DIAGNOSIS — Z48.02 VISIT FOR SUTURE REMOVAL: ICD-10-CM

## 2025-06-10 DIAGNOSIS — S91.301D OPEN WOUND OF RIGHT FOOT, SUBSEQUENT ENCOUNTER: ICD-10-CM

## 2025-06-10 DIAGNOSIS — M86.9 OSTEOMYELITIS OF GREAT TOE OF LEFT FOOT: ICD-10-CM

## 2025-06-10 DIAGNOSIS — E10.42 TYPE 1 DIABETES MELLITUS WITH DIABETIC POLYNEUROPATHY: ICD-10-CM

## 2025-06-10 DIAGNOSIS — Z09 POSTOPERATIVE FOLLOW-UP: Primary | ICD-10-CM

## 2025-06-10 PROCEDURE — 99213 OFFICE O/P EST LOW 20 MIN: CPT | Mod: 25,S$GLB,, | Performed by: PODIATRIST

## 2025-06-10 PROCEDURE — 99024 POSTOP FOLLOW-UP VISIT: CPT | Mod: S$GLB,,, | Performed by: PODIATRIST

## 2025-06-10 PROCEDURE — 99999 PR PBB SHADOW E&M-EST. PATIENT-LVL IV: CPT | Mod: PBBFAC,,, | Performed by: PODIATRIST

## 2025-06-10 PROCEDURE — 97597 DBRDMT OPN WND 1ST 20 CM/<: CPT | Mod: S$GLB,,, | Performed by: PODIATRIST

## 2025-06-18 NOTE — PROCEDURES
Wound Debridement R anterior MLA    Date/Time: 6/10/2025 1:45 PM    Performed by: Tata Durham DPM  Authorized by: Tata Durham DPM    Consent Done?:  Yes (Verbal)    Wound Details:    Location:  Right foot    Location:  Right Plantar    Type of Debridement:  Excisional       Length (cm):  0.8       Width (cm):  0.5       Depth (cm):  0.1       Area (sq cm):  0.31       Percent Debrided (%):  100       Total Area Debrided (sq cm):  0.31    Depth of debridement:  Epidermis/Dermis    Tissue debrided:  Epidermis and Dermis    Instruments:  Nippers  Bleeding:  None  Patient tolerance:  Patient tolerated the procedure well with no immediate complications

## 2025-07-02 NOTE — PROGRESS NOTES
Subjective:      Patient ID: Bhavna Hancock is a 64 y.o. female.    Chief Complaint: Post-op Evaluation    Patient is here for PO L distal Syme's amp.hallux; sutures removed last visit as well as debridement healing wound R foot MLA. C/o pain distal 3rd toe L.  06/10/2025  Patient presents for wound check R foot as well as suture removal s/p distal Syme's amp L great toe.  No pain.  05/26/2025  Patient is here PO 10 days, DOS 5/16/25 distal Symes amp.L great toe d/t new injury w/ chronic osteomyelitis per Xray. C&S pending on Bactrim & Cipro; +MRSA so Rx extended.   Also, wound check R MLA.           bn  Aerobic culture  Order: [id number]   Status: Final result       Next appt: Today at 01:00 PM in Diabetes (Shea Keller RD, [facility])       Dx: Cellulitis and abscess of toe of left...    Test Result Released: Yes (not seen)    Specimen Information: Toe, Left Foot; Tissue   0 Result Notes  CULTURE, AEROBIC Many Methicillin resistant Staphylococcus aureus Abnormal         Resulting Agency: [facility]     Susceptibility     Methicillin resistant Staphylococcus aureus     COLLEEN     Clindamycin <=0.5 µg/ml Sensitive     Daptomycin <=0.5 µg/ml Sensitive     Erythromycin <=0.5 µg/ml Sensitive     Linezolid 4 µg/ml Sensitive     Oxacillin >2 µg/ml Resistant     Penicillin >8 µg/ml Resistant     Tetracycline <=4 µg/ml Sensitive     Trimeth/Sulfa <=0.5/9.5 µ... Sensitive     Vancomycin 1 µg/ml Sensitive               Linear View        Specimen Collected: 05/16/25 14:18 CDT Last Resulted: 05/19/25 12:07 CDT     Aerobic culture  Order: [id number]   Status: Preliminary result       Next appt: 05/19/2025 at 01:00 PM in Diabetes (Shea Keller RD, [facility])       Dx: Cellulitis and abscess of toe of left...    Test Result Released: No    Specimen Information: Toe, Left Foot; Tissue   0 Result Notes  CULTURE, AEROBIC Many Staphylococcus aureus Abnormal    Susceptibility pending        Resulting Agency: [facility]     Specimen Collected:  05/16/25 14:18 CDT Last Resulte   Culture, Anaerobic  Order: [id number]   Status: Preliminary result       Next appt: 05/19/2025 at 01:00 PM in Diabetes (Shea Keller RD, [facility])       Dx: Cellulitis and abscess of toe of left...    Test Result Released: No    Specimen Information: Toe, Left Foot; Tissue   0 Result Notes  Anaerobe Culture Culture In Progress        Resulting Agency: [facility]     Specimen Collected: 05/16/25 14:18 CDT Last Resulted: 05/18/25 14:46 CDT     X-Ray Toe 2 or More Views Left  Order: [id number]   Status: Final result       Next appt: 05/19/2025 at 01:00 PM in Diabetes (Shea Keller RD, [facility])    Test Result Released: Yes (not seen)    0 Result Notes  Details    Reading Physician Reading Date Result Priority   Phillip Duke MD  [phone]  5/16/2025 Routine     Narrative & Impression  EXAMINATION:  XR TOE 2 OR MORE VIEWS LEFT     CLINICAL HISTORY:  post operative follow up;     TECHNIQUE:  Three views of the left toes were performed     COMPARISON:  Radiographs 02/15/2025.     FINDINGS:  Interval amputation of the 1st distal phalanx.  Osseous erosive changes of the 2nd through 4th distal phalanges, not well evaluated on this exam.  Suspected remote healed fracture of the 5th proximal phalanx.  No dislocation.  Bipartite medial hallux sesamoid.  No radiopaque foreign bodies.     Impression:     As above.        Electronically signed by:hPillip Duke MD  Date:                                            05/16/2025  Time:                                           16:31   I independently reviewed the x-ray images. Resected distal phalanx hallux as expected PO.    5/15/25  Patient is here for R foot wound - healed plantarly & remaining dorsomedial. Continuing to improve.  New problem - L great toe injury. Tripped in socks over rug & fell 5 days ago. Put alcohol on it - tried not to pick it but toe has always been bigger w/ dry peeling from where she walks on the end of toe. Lost nail  yrs.ago. Wants Abx Bactrim as L great toe swelling as is leg.  If needs opened up, wants it done as soon as possible.  Pending fitting for DM shoes w/ accommodation for Charcot L midfoot (navicular/ cuneiform/ 2nd met.) & drop foot, as well as B/L plantar fasciitis.  In the meantime, Silipos gel strap for B/L MLA , now that R plantar wound healed.    X-Ray Foot Complete Left  Order: [id number]   Status: Final result       Next appt: 05/19/2025 at 01:00 PM in Diabetes (Shea Keller RD, [facility])       Dx: Cellulitis and abscess of toe of left...    Test Result Released: Yes (not seen)    0 Result Notes  Details    Reading Physician Reading Date Result Priority   Doroteo Han MD  [phone]  5/15/2025 Routine     Narrative & Impression  EXAMINATION:  XR FOOT COMPLETE 3 VIEW LEFT     CLINICAL HISTORY:  .  Cellulitis of left toe     TECHNIQUE:  AP, lateral and oblique views of the left foot were performed.     FINDINGS:  There is partial absence of the distal phalanx of the 1st toe.  This may be as result of postoperative change, trauma, or infection.  There is osseous spur at the plantar fascia attachment to the calcaneus.  There is degenerative change throughout the midfoot.     Impression:     As above.        Electronically signed by:Doroteo Han MD  Date:                                            05/15/2025  Time:                                           10:47   I independently reviewed the x-ray images.  Findings osteolysis consistent with area of cc -wound distal digital tuft with purulence.    4/28/25  Patient is here for wound check R foot. Continues to notice improvement in appearance w/ local wound care; using Mepilex w/out Ag. In surgical orthowedge shoe still.  Did have L arch pain again.  Had Rx for DM shoes through [facility] but they apparently don't do custom inserts so Rx 2/4/25 changed accordingly 4/25/25.          4/10/25  Patient presents for wound check R foot s/p blister & MRSA.  Had I&D R foot abscess, DOS 2/5/25. Initially on Cipro; then Bactrim DS bid per C&S. Continuing w/ local wound care & CDI while maintaining normal ADLs. States ordered Mepilex Ag but was sent w/out Ag. No c/o pain.        3/27/25          3/12/25  Patient is PO I&D abscess R w/.wound check. Spraying w/ Betadine & applying alginate. Occasionally using MediHoney. States continuing to be getting better. When healed, wants to have hammertoe corrected.  Completed Bactrim DS 2/23/25.  Pic not uploading.  2/27/25  Patient is here for wound check PO I&D  R foot abscess. Minimal pain.  2/18/25  Patient is PO I&D R foot abscess s/p blister, DOS 2/5/25, POD 13 days. Initially placed on Cipro; cultures + for MRSA so placed on Bactrim DS bid per C&S. WBC is improving. A1c also improving from 9.4% 12/17 to 8.0% yesterday. Has  suture disruption medial 1st met.head area last visit, maintained w/ Steri-strips. Was to continue in orthowedge shoe & maintain R foot CDI. Pain & swelling significantly reduced.    CBC W/ AUTO DIFFERENTIAL  Order: [id number]   Status: Final result       Next appt: 02/18/2025 at 01:45 PM in Podiatry (Tata Durham DPM)       Dx: Postoperative follow-up; Abscess of b...    Test Result Released: Yes (seen)    0 Result Notes            Component  Ref Range & Units (hover) 12:54  (2/17/25) 12 d ago  (2/5/25) 2 mo ago  (12/17/24) 3 yr ago  (2/16/22) 3 yr ago  (4/2/21) 3 yr ago  (3/16/21) 3 yr ago  (3/9/21)   WBC 7.13 9.59 5.96 4.73 4.20 4.90 5.30   RBC 4.30 3.31 Low  4.42 4.50 4.18 4.34 4.25   Hemoglobin 12.2 9.7 Low  12.7 12.6 11.1 Low  11.2 Low  11.3 Low    Hematocrit 39.4 29.4 Low  40.2 39.7 34.1 Low  35.4 Low  34.9 Low    MCV 92 89 91 88 82 82 82   MCH 28.4 29.3 28.7 28.0 26.5 Low  25.9 Low  26.5 Low    MCHC 31.0 Low  33.0 31.6 Low  31.7 Low  32.5 31.7 Low  32.4   RDW 13.0 12.6 12.8 13.5 14.8 High  14.6 High  15.4 High    Platelets 500 High  274 252 202 258 390 High  R 504 High  R   MPV 10.6 10.4 10.9 10.9 9.6  9.1 Low  8.7 Low    Immature Granulocytes 0.3 1.4 High  0.2 0.4      Gran # (ANC) 4.3 7.4 3.7 2.8 2.0 1.8 2.2   Immature Grans (Abs) 0.02 0.13 High  CM 0.01 CM 0.02 CM      Comment: Mild elevation in immature granulocytes is non specific and  can be seen in a variety of conditions including stress response,  acute inflammation, trauma and pregnancy. Correlation with other  laboratory and clinical findings is essential.   Lymph # 1.8 0.9 Low  1.4 1.2 1.5 2.2 2.2   Mono # 0.5 1.0 0.5 0.4 0.3 0.6 0.5   Eos # 0.3 0.1 0.3 0.3 0.4 0.3 0.4   Baso # 0.11 0.04 0.05 0.06 0.10 0.10 0.10   nRBC 0 0 0 0      Gran % 60.6 76.7 High  61.9 58.6 46.5 35.6 Low  41.1   Lymph % 25.8 9.7 Low  22.8 26.0 34.8 45.0 41.3   Mono % 7.3 10.4 8.9 7.6 7.7 11.8 9.2   Eosinophil % 4.5 1.4 5.4 6.1 9.1 High  5.9 6.6   Basophil % 1.5 0.4 0.8 1.3 1.9 1.7 1.8   Differential Method Automated Automated Automated Automated Automated Automated Automated   Resulting Agency [facility]        Specimen Collected: 02/17/25 12:54 CST Last Resulted: 02/17/25 13:45 CST     2/11/25  Patient is here PO I&D R foot abscess, DOS 2/5/25, POD 6 days. Cultures + for MRSA so was placed on Bactrim DS bid per C&S. States feels much better int hat swelling & related pain resolved. Kept dressing CDI. In orthowedge shoe.  No leukocytosis per lab range, but WBC elevated compared to previous levels - will monitor.    Contains abnormal data CBC auto differential  Order: [id number]   Status: Final result       Next appt: 02/18/2025 at 01:45 PM in Podiatry (Tata Durham DPM)    Test Result Released: Yes (seen)    0 Result Notes   important suggestion  Newer results are available. Click to view them now.               Component  Ref Range & Units (hover) 12 d ago  (2/5/25) 2 mo ago  (12/17/24) 3 yr ago  (2/16/22) 3 yr ago  (4/2/21) 3 yr ago  (3/16/21) 3 yr ago  (3/9/21) 4 yr ago  (2/17/21)   WBC 9.59 5.96 4.73 4.20  4.90 5.30 4.10   RBC 3.31 Low  4.42 4.50 4.18 4.34 4.25 3.94 Low    Hemoglobin 9.7 Low  12.7 12.6 11.1 Low  11.2 Low  11.3 Low  10.6 Low    Hematocrit 29.4 Low  40.2 39.7 34.1 Low  35.4 Low  34.9 Low  32.4 Low    MCV 89 91 88 82 82 82 82   MCH 29.3 28.7 28.0 26.5 Low  25.9 Low  26.5 Low  26.9 Low    MCHC 33.0 31.6 Low  31.7 Low  32.5 31.7 Low  32.4 32.6   RDW 12.6 12.8 13.5 14.8 High  14.6 High  15.4 High  15.2 High    Platelets 274 252 202 258 390 High  R 504 High  R 316 R   MPV 10.4 10.9 10.9 9.6 9.1 Low  8.7 Low  8.6 Low    Immature Granulocytes 1.4 High  0.2 0.4       Gran # (ANC) 7.4 3.7 2.8 2.0 1.8 2.2 1.6 Low    Immature Grans (Abs) 0.13 High  0.01 CM 0.02 CM       Comment: Mild elevation in immature granulocytes is non specific and  can be seen in a variety of conditions including stress response,  acute inflammation, trauma and pregnancy. Correlation with other  laboratory and clinical findings is essential.   Lymph # 0.9 Low  1.4 1.2 1.5 2.2 2.2 1.5   Mono # 1.0 0.5 0.4 0.3 0.6 0.5 0.4   Eos # 0.1 0.3 0.3 0.4 0.3 0.4 0.5   Baso # 0.04 0.05 0.06 0.10 0.10 0.10 0.10   nRBC 0 0 0       Gran % 76.7 High  61.9 58.6 46.5 35.6 Low  41.1 39.2   Lymph % 9.7 Low  22.8 26.0 34.8 45.0 41.3 37.4   Mono % 10.4 8.9 7.6 7.7 11.8 9.2 10.7   Eosinophil % 1.4 5.4 6.1 9.1 High  5.9 6.6 11.1 High    Basophil % 0.4 0.8 1.3 1.9 1.7 1.8 1.6   Differential Method Automated Automated Automated Automated Automated Automated Automated   Resulting Agency [facility]        Specimen Collected: 02/05/25 13:45 CST Last Resulted: 02/05/25 14:00 CST     2/4/25  Bhavna is a 64 y.o. female who presents new to the podiatry clinic w/ c/o pain R foot w/ drainage since Fri.,being on feet all day in [place] for grandchild's dance competition.  Already had this appointment scheduled to establish care as a diabetic so has not been to see anyone in regard to cc.  States did a  lot walking in tennis shoes thinks they might not been supportive. No specific H/0 trauma but huge blister following day medial R foot/ arch that has subsided but very red & painful. Taking IB prn.  Has collapsed arch L foot  - needs supportive shoes.    Stays very busy on feet all the time as has horses.  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  Past Medical History:   Diagnosis Date    Abdominal pain     Diabetes     GERD (gastroesophageal reflux disease)     Hx of colonic polyps     Hyperlipidemia     Hypertension     Nausea and vomiting     Thyroid disease      Patient Active Problem List   Diagnosis    History of adenomatous polyp of colon    Chronic pain of both shoulders    Bilateral rotator cuff dysfunction    Closed displaced transverse fracture of left patella    Stiffness of left knee    Hypertension    Hypothyroidism    Elevated LFTs    Type 1 diabetes mellitus with hyperglycemia    Pre-op testing    Type 1 diabetes mellitus with hypoglycemia    Hypoglycemia unawareness associated with type 1 diabetes mellitus    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 Heterozygous hemoglobin variants (HbS, HgC, etc)do  not significantly interfere with this assay.   However, presence of multiple variants may affect accuracy.     12/17/2024 9.4 (H) 4.0  - 5.6 % Final     Comment:     ADA Screening Guidelines:  5.7-6.4%  Consistent with prediabetes  >or=6.5%  Consistent with diabetes    High levels of fetal hemoglobin interfere with the HbA1C  assay. Heterozygous hemoglobin variants (HbS, HgC, etc)do  not significantly interfere with this assay.   However, presence of multiple variants may affect accuracy.     01/20/2021 8.4 (H) 4.0 - 5.6 % Final     Comment:     ADA Screening Guidelines:  5.7-6.4%  Consistent with prediabetes  >or=6.5%  Consistent with diabetes  High levels of fetal hemoglobin interfere with the HbA1C  assay. Heterozygous hemoglobin variants (HbS, HgC, etc)do  not significantly interfere with this assay.   However, presence of multiple variants may affect accuracy.       Hemoglobin A1c   Date Value Ref Range Status   05/28/2025 7.4 (H) 4.0 - 5.6 % Final     Comment:     ADA Screening Guidelines:  5.7-6.4%  Consistent with prediabetes  >=6.5%  Consistent with diabetes    High levels of fetal hemoglobin interfere with the HbA1C  assay. Heterozygous hemoglobin variants (HbS, HgC, etc)do  not significantly interfere with this assay.   However, presence of multiple variants may affect accuracy.     Objective:      X-Ray Foot Complete Right  Order: [id number]  Status: Final result       Visible to patient: Yes (seen)       Next appt: 02/12/2025 at 01:00 PM in Lab (LAB, [facility])       Dx: Trauma    0 Result Notes  Details    Reading Physician Reading Date Result Priority   Brody Jeong MD  [phone] 8/18/2024 STAT     Narrative & Impression  EXAMINATION:  XR FOOT COMPLETE 3 VIEW RIGHT     CLINICAL HISTORY:  . Injury, unspecified, initial encounter     TECHNIQUE:  AP, lateral, and oblique views of the right foot were performed.     COMPARISON:  None     FINDINGS:  Three views right foot.     No acute displaced fracture or dislocation of the foot.  No radiopaque foreign body.  No significant edema.  There are degenerative changes of the calcaneus.      Impression:     1. No acute displaced fracture or dislocation of the foot.        Electronically signed by:Brody Jeong MD  Date:                                            08/18/2024  Time:                                           18:39   X-Ray Foot Complete Left  Order: [id number]  Status: Final result       Visible to patient: Yes (seen)       Next appt: 02/12/2025 at 01:00 PM in Lab (LAB, [place])       Dx: Pain    0 Result Notes  Details    Reading Physician Reading Date Result Priority   Basilio Love III, MD  [phone]  [phone] 6/29/2021 Routine     Narrative & Impression  EXAMINATION:  XR FOOT COMPLETE 3 VIEW LEFT     CLINICAL HISTORY:  Pain, unspecified     FINDINGS:  Complete three views foot.     No fracture dislocation bone destruction seen.  There is mild DJD.  There is a flatfoot deformity and spurs on the calcaneus.     Impression:     No acute process seen.        Electronically signed by:Basilio Love MD  Date:                                            06/29/2021  Time:                                           13:53   I independently reviewed the x-ray images & there was obvious disruption of midfoot contour at the level of the distal to the navicular@ met cuneiform L as compared w/ contralateral x-ray, consistent w/ clinical appearance of asymmetry B/L MLA .    Review of Systems   Constitutional: Negative for malaise/fatigue.   Cardiovascular:  Negative for claudication and leg swelling.   Skin:  Positive for color change.   Musculoskeletal:  Positive for joint pain and myalgias. Negative for falls.   Neurological:  Positive for focal weakness, loss of balance and sensory change. Negative for numbness and weakness.   Psychiatric/Behavioral:  Positive for depression. The patient is nervous/anxious.      Physical Exam  Vitals reviewed.   Constitutional:       General: She is not in acute distress.     Appearance: She is well-developed and overweight.   Cardiovascular:      Pulses:  Normal pulses.           Dorsalis pedis pulses are 2+ on the right side and 2+ on the left side.   Musculoskeletal:         General: Signs of injury present. No swelling or tenderness.      Right lower leg: No edema.      Left lower leg: No edema.      Right foot: Deformity (Hallux malleus L> R) present. No Charcot foot, foot drop or prominent metatarsal heads.      Left foot: Deformity (L>>R semirigid HTs), Charcot foot, foot drop and prominent metatarsal heads present.        Feet:    Feet:      Right foot:      Skin integrity: No blister, erythema, warmth or dry skin.      Left foot:      Skin integrity: No blister, erythema, warmth, callus or dry skin.   Skin:     General: Skin is warm and dry.      Capillary Refill: Capillary refill takes less than 2 seconds.      Findings: Lesion present. No abscess, bruising or erythema.      Comments: L:   Resolution of swelling LLE 1st ray; no erythema nor edema of great toe. Desquamating skin.  Incision stump great toe, healed well by primary intention.  R:  (Initial incision along 9cm plantar MLA & wound extending medial & dorsal from 1st IMS/ met.neck 7.5cm x 2cm).  Wound continues to heal - last visit :0.8 x 0.5 cm & <0.1 cm depth, granular base; today: healed pink skin anteromedial midfoot/ MLA R.   Neurological:      Mental Status: She is alert and oriented to person, place, and time.      Sensory: Sensory deficit present.      Motor: Abnormal muscle tone (drop foot L) present. No weakness.      Gait: Gait abnormal (Surgical shoe R).   Psychiatric:         Mood and Affect: Mood and affect normal.         Behavior: Behavior normal. Behavior is cooperative.         Assessment:      Encounter Diagnoses   Name Primary?    Chronic toe ulcer, left, limited to breakdown of skin Yes    Acquired hammertoe of left foot     Healed wound     Open wound of plantar aspect of right foot     Type 1 diabetes mellitus with diabetic polyneuropathy        Problem List Items Addressed  This Visit          Endocrine    Type 1 diabetes mellitus with diabetic polyneuropathy    Relevant Medications    insulin lispro 100 unit/mL injection     Other Visit Diagnoses         Chronic toe ulcer, left, limited to breakdown of skin    -  Primary      Acquired hammertoe of left foot        Relevant Orders    CREST PADS FOR HOME USE      Healed wound          Open wound of plantar aspect of right foot                    Plan:       Bhavna was seen today for post-op evaluation.    Diagnoses and all orders for this visit:    Chronic toe ulcer, left, limited to breakdown of skin    Acquired hammertoe of left foot  -     CREST PADS FOR HOME USE    Healed wound    Open wound of plantar aspect of right foot    Type 1 diabetes mellitus with diabetic polyneuropathy        I counseled the patient on her conditions, their implications & medical mgmt.    - Shoe inspection. Diabetic Foot Education. Patient reminded of the importance of good blood sugar control to help prevent podiatric complications of diabetes. Patient instructed on proper foot hygeine. We discussed wearing proper shoe gear, daily foot inspections, never walking w/out protective shoe gear, annual DM foot exam, sooner p.r.n..      May D/C dressing change R MLA.  WBAT in surgical shoe & transition to regular shoe gear.    L great toe healing well. Dressing change applied. TO be maintained CDI.    Dispensed crest pads for L toes.        A total of 22 mins.was spent on chart review, patient visit & documentation.

## 2025-07-03 ENCOUNTER — PATIENT MESSAGE (OUTPATIENT)
Dept: DIABETES | Facility: CLINIC | Age: 65
End: 2025-07-03
Payer: COMMERCIAL

## 2025-07-03 ENCOUNTER — OFFICE VISIT (OUTPATIENT)
Dept: PODIATRY | Facility: CLINIC | Age: 65
End: 2025-07-03
Payer: COMMERCIAL

## 2025-07-03 VITALS
DIASTOLIC BLOOD PRESSURE: 62 MMHG | SYSTOLIC BLOOD PRESSURE: 134 MMHG | WEIGHT: 161.38 LBS | HEIGHT: 65 IN | BODY MASS INDEX: 26.89 KG/M2 | HEART RATE: 80 BPM

## 2025-07-03 DIAGNOSIS — Z87.828 HEALED WOUND: ICD-10-CM

## 2025-07-03 DIAGNOSIS — S91.301A OPEN WOUND OF PLANTAR ASPECT OF RIGHT FOOT: ICD-10-CM

## 2025-07-03 DIAGNOSIS — L97.521 CHRONIC TOE ULCER, LEFT, LIMITED TO BREAKDOWN OF SKIN: Primary | ICD-10-CM

## 2025-07-03 DIAGNOSIS — E10.42 TYPE 1 DIABETES MELLITUS WITH DIABETIC POLYNEUROPATHY: ICD-10-CM

## 2025-07-03 DIAGNOSIS — M20.42 ACQUIRED HAMMERTOE OF LEFT FOOT: ICD-10-CM

## 2025-07-03 PROCEDURE — 99213 OFFICE O/P EST LOW 20 MIN: CPT | Mod: S$GLB,,, | Performed by: PODIATRIST

## 2025-07-03 PROCEDURE — 99999 PR PBB SHADOW E&M-EST. PATIENT-LVL IV: CPT | Mod: PBBFAC,,, | Performed by: PODIATRIST

## 2025-07-03 RX ORDER — INSULIN LISPRO 100 [IU]/ML
50 INJECTION, SOLUTION INTRAVENOUS; SUBCUTANEOUS
COMMUNITY
Start: 2025-06-25

## 2025-07-07 ENCOUNTER — CLINICAL SUPPORT (OUTPATIENT)
Dept: DIABETES | Facility: CLINIC | Age: 65
End: 2025-07-07
Payer: COMMERCIAL

## 2025-07-07 DIAGNOSIS — E10.65 TYPE 1 DIABETES MELLITUS WITH HYPERGLYCEMIA: Primary | ICD-10-CM

## 2025-07-07 DIAGNOSIS — E10.649 TYPE 1 DIABETES MELLITUS WITH HYPOGLYCEMIA AND WITHOUT COMA: ICD-10-CM

## 2025-07-07 PROCEDURE — G0108 DIAB MANAGE TRN  PER INDIV: HCPCS | Mod: S$GLB,,, | Performed by: DIETITIAN, REGISTERED

## 2025-07-07 PROCEDURE — 99999 PR PBB SHADOW E&M-EST. PATIENT-LVL I: CPT | Mod: PBBFAC,,, | Performed by: DIETITIAN, REGISTERED

## 2025-07-10 NOTE — PROGRESS NOTES
Diabetes Care Specialist Follow-up Note  Author: Shea Keller RD, [facility]  Date: 7/10/2025    Intake    Program Intake  Reason for Diabetes Program Visit:: Intervention  Type of Intervention:: Individual  Individual: Education  Education: Pattern Management  Current diabetes risk level:: moderate (HgbA1c 8.4%)  In the last month, have you used the ER or been admitted to the hospital: No  Permission to speak with others about care:: no    Current Diabetes Treatment: Oral Medications, Insulin  Oral Medication Type/Dose: metformin 500mg a day  Method of insulin delivery?: Insulin Pump  Type of Pump: omnipod 5  Does patient have back-up plan?: Yes (lantus 40 units a day and humalog 14 units with meals (takes 2 units for every 15 g of carbohydrates))  Any problems obtaining supplies?: No    Continuous Glucose Monitoring  Patient has CGM: Yes  Personal CGM type:: Dexcom G6  GMI Date: 05/19/25  GMI Value: 7.7 %    Lab Results   Component Value Date    HGBA1C 7.4 (H) 05/28/2025     A1c Pre Diabetes Care Specialist Intervention:  8.4%      Physical activity/Exercise:   Slight change  Patient has an infection in toe  Seeing foot doctor    SMBG: see attached dexcom download        Occasionally put out of automated mode and has to do a correction bolus to get back into auto mode  States that Nadja told her she may make changes at follow-up    Lifestyle Coping Support & Clinical    Lifestyle/Coping/Support  Compared to other people your age, how would you rate your health?: Good  Psychosocial/Coping Skills Assessment Completed: : No  Assessment indicates:: Adequate understanding  Deffered due to:: Other (comment)  Area of need?: No    Problem Review  Active Comorbidities: Hypertension, Other (comment), Gastrointestinal Disorder (hypothyroidism)    Diabetes Self-Management Skills Assessment    Medication Skills Assessment  Patient is able to identify current diabetes medications, dosages, and appropriate timing of medications.:  yes  Patient reports problems or concerns with current medication regimen.: no  Patient is  aware that some diabetes medications can cause low blood sugar?: Yes  Medication Skills Assessment Completed:: Yes  Assessment indicates:: Adequate understanding  Area of need?: Yes    Diabetes Disease Process/Treatment Options  Diabetes Type?: Type I  Diabetes Disease Process/Treatment Options: Skills Assessment Completed: No  Assessment indicates:: Adequate understanding  Deferred due to:: Other (comment) (previously completed, there has been no change and patient has adequate understanding)  Area of need?: No    Nutrition/Healthy Eating  Meal Plan 24 Hour Recall - Breakfast: skipped  Meal Plan 24 Hour Recall - Lunch: sandwich  Meal Plan 24 Hour Recall - Dinner: dinner is about 11:30pm could be anything  Meal Plan 24 Hour Recall - Snack: nuts  Patient can identify foods that impact blood sugar.: yes  Challenges to healthy eating:: other (see comments) (eating late at night and skipping meals)  Nutrition/Healthy Eating Skills Assessment Completed:: No  Assessment indicates:: Adequate understanding  Deffered due to:: Other (comment) (previously completed, there has been no change and patient has adequate understanding)  Area of need?: No    Physical Activity/Exercise  Patient's daily activity level:: moderately active  Patient formally exercises outside of work.: yes  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)  Patient can identify forms of physical activity.: yes  Physical Activity/Exercise Skills Assessment Completed: : No  Assessment indicates:: Adequate understanding  Deffered due to:: Other (comment) (previously completed, there has been no change and patient has adequate understanding)  Area of need?: No    Home Blood Glucose Monitoring  Patient states that blood sugar is checked at home daily.: yes  Monitoring Method:: personal continuous glucose monitor  Personal CGM type:: Dexcom G6    What is your current Time in Range?: 48%  What is your A1c Target?: 7.0 without hypoglycemia  Home Blood Glucose Monitoring Skills Assessment Completed: : No  Assessment indicates:: Adequate understanding  Deferred due to:: Other (comment) (previously completed, there has been no change and patient has adequate understanding)  Area of need?: No    Acute Complications  Have you ever had hypoglycemia (low BG 70 or less)?: yes  How often and what are your symptoms?: recently felt terrible shaky, feeling faint, heart racing, panicy, terrible feeling  How do you treat hypoglycemia?: drank juice, ate sweets, used syrup  Have you ever had hyperglycemia (high  or more)?: no   Do you know the symptoms of high blood sugar and how to treat: tired, take insulin  Have you ever had DKA?: no  Do you ever test for ketones?: no  Do you have a sick day plan?: no  Acute Complications Skills Assessment Completed: : No  Assessment indicates:: Adequate understanding  Deffered due to:: Other (comment) (previously completed, there has been no change and patient has adequate understanding)  Area of need?: No    Chronic Complications  Reviewed health maintenance: yes  Has your doctor examined your feet?: no  Chronic Complications Skills Assessment Completed: : No  Assessment indicates:: Adequate understanding  Deferred due to:: Other (comment) (previously completed, there has been no change and patient has adequate understanding)  Area of need?: No      During today's follow-up visit,  the following areas required further assessment and content was provided/reviewed.    Based on today's diabetes care assessment, the following areas of need were identified:      Identified Areas of Need      Medication/Current Diabetes Treatment: Yes, updated settings in insulin pump   Lifestyle Coping/Support: No   Diabetes Disease Process/Treatment Options: No   Nutrition/Healthy Eating: No    Physical Activity/Exercise: No    Home Blood Glucose  Monitoring: No    Acute Complications: No    Chronic Complications: No       Today's interventions were provided through individual discussion, instruction, and written materials were provided.    Patient verbalized understanding of instruction and written materials.  Pt was able to return back demonstration of instructions today. Patient understood key points, needs reinforcement and further instruction.     Diabetes Self-Management Care Plan Review and Evaluation of Progress:    During today's follow-up Bhavna's Diabetes Self-Management Care Plan progress was reviewed and progress was evaluated including his/her input. Bhavna has agreed to continue his/her journey to improve/maintain overall diabetes control by continuing to set health goals. See care plan progress below.      Care Plan: Diabetes Management   Updates made since 7/10/2024 12:00 AM        Problem: Healthy Eating         Goal: Eat 2-3 meals daily with 30-45g/2-3 servings of Carbohydrate per meal for the next 6 months Completed 7/7/2025   Start Date: 9/10/2024   Expected End Date: 3/10/2025   This Visit's Progress: Met   Recent Progress: On track   Priority: High   Barriers: No Barriers Identified        Task: Reviewed the sources and role of Carbohydrate, Protein, and Fat and how each nutrient impacts blood sugar. Completed 9/10/2024        Task: Provided visual examples using dry measuring cups, food models, and other familiar objects such as computer mouse, deck or cards, tennis ball etc. to help with visualization of portions. Completed 9/10/2024        Task: Explained how to count carbohydrates using the food label and the use of dry measuring cups for accurate carb counting. Completed 9/10/2024        Task: Discussed strategies for choosing healthier menu options when dining out. Completed 9/10/2024        Task: Recommended replacing beverages containing high sugar content with noncaloric/sugar free options and/or water. Completed 9/10/2024         Task: Review the importance of balancing carbohydrates with each meal using portion control techniques to count servings of carbohydrate and label reading to identify serving size and amount of total carbs per serving. Completed 9/10/2024        Task: Provided Sample plate method and reviewed the use of the plate to estimate amounts of carbohydrate per meal. Completed 9/10/2024        Problem: Medications Resolved 1/7/2025        Goal: Patient Agrees to bolus using the omnipod 5 fuentes before lunch and dinner each day for the next 4 weeks Completed 1/7/2025   Start Date: 12/31/2024   Expected End Date: 1/31/2025   This Visit's Progress: Met   Recent Progress: Deferred   Priority: High   Barriers: No Barriers Identified        Task: Instructed pt on use of basic pump features ie...give a bolus, pause insulin, switch from manual to automated mode. Completed 12/31/2024        Task: Patient demonstrated ability to program Dexcom transmitter into controller and start automated limited mode. Completed 12/31/2024        Task: patient demonstrated ability to program controller, activate and insert pod using aseptic technique. Completed 12/31/2024        Task: Explained Smart Adjust Technology Completed 12/31/2024        Task: Reviewed and reconciled current medication list today. Completed 12/31/2024        Task: Reviewed features available in manual mode verses automated mode. Completed 12/31/2024        Task: Reviewed when and how to use activity function in automated mode. Completed 12/31/2024        Task: Reviewed site selection of pods, rotation of sites and hard stop to change pod every 72 hrs. Completed 12/31/2024        Task: Instructed that insulin vial is good out of refrigeration for up to 28-30 days. Completed 12/31/2024        Task: Reviewed treatment of hypoglycemia, hyperglycemia; sick day care, DKA, and troubleshooting of pump. Completed 12/31/2024        Task: Advised to carry back-up supplies with them  and discussed steps to take in case of connection loss. Completed 12/31/2024        Task: Details of pump therapy were covered including controller/fuentes features and programming and pod activation Completed 12/31/2024        Task: Reviewed pod site selection and rotation and automatic pod priming and insertion Completed 12/31/2024        Task: Reviewed setting & editing basal rates in manual mode, giving bolus and other features in the set-up menu. Completed 12/31/2024        Task: Patient provided with [employer] and Vaughn [employer] usernames and passwords, also documented in chart note and blue sticky note in Epic Completed 12/31/2024        Task: Omni Pod 24-hour support line provided. Completed 12/31/2024        Task: Set up omnipod fuentes on patients phone, patient will be using fuentes rather than  Completed 12/31/2024        Task: Omnipod and Dexcom data was downloaded and reviewed with patient and provider, any necessary adjustments were made Completed 1/7/2025          Follow Up Plan     Follow up in about 3 months (around 10/7/2025) for Insulin Pump Upload, Personal CGM Upload.    Today's care plan and follow up schedule was discussed with patient.  Bhavna verbalized understanding of the care plan, goals, and agrees to follow up plan.        The patient was encouraged to communicate with his/her health care provider/physician and care team regarding his/her condition(s) and treatment.  I provided the patient with my contact information today and encouraged to contact me via phone or [facility] Patient Portal as needed.     Length of Visit   Total Time: 40 Minutes

## 2025-07-11 ENCOUNTER — PATIENT OUTREACH (OUTPATIENT)
Dept: DIABETES | Facility: CLINIC | Age: 65
End: 2025-07-11
Payer: COMMERCIAL

## 2025-07-11 ENCOUNTER — TELEPHONE (OUTPATIENT)
Dept: DIABETES | Facility: CLINIC | Age: 65
End: 2025-07-11

## 2025-07-11 ENCOUNTER — PATIENT MESSAGE (OUTPATIENT)
Dept: DIABETES | Facility: CLINIC | Age: 65
End: 2025-07-11

## 2025-07-11 DIAGNOSIS — Z46.81 INSULIN PUMP FITTING OR ADJUSTMENT: ICD-10-CM

## 2025-07-11 DIAGNOSIS — E10.65 TYPE 1 DIABETES MELLITUS WITH HYPERGLYCEMIA: Primary | ICD-10-CM

## 2025-07-11 NOTE — TELEPHONE ENCOUNTER
----- Message from Nadja Valadez NP sent at 7/11/2025  3:40 PM CDT -----  Please let her know I reviewed her Omnipod download from Shea in her blood sugar readings are running high  I noticed that she is not consistently bolusing with her meals and she needs to be  Please remind her that when she eats she is to be bolusing  I believe she is carbohydrate counting so she should enter in the carbohydrate that she is eating  She wants to make sure that she does this before she eats and not after  See if she feels like she needs to meet with Shea again to review more pump teaching

## 2025-07-11 NOTE — PROGRESS NOTES
Continuous Glucose Sensor Report of Personal Device    Bhavna Hancock is a 64 y.o.female with type 1 diabetes     Interpretation of data from Dexcom G7-- review the download on her Omnipod 5 insulin pump    Reason for review: Currently on anti-hyperglycemic therapy and failing to achieve glycemic goals.    Lab Results   Component Value Date    HGBA1C 7.4 (H) 05/28/2025         Current diabetes medications and doses:   Omnipod 5 - see attached settings     ________________________________________________________________    SUMMARY of KEY FINDINGS:      Average glucose: 193  Above 180 mg/dL:  24%  (Above 250 mg/dL:  24%)  Within  mg/dL:  49%  Below 70 mg/dL:  2%  (Below 54 mg/dL:  1%)      CGM data reviewed and notable for the following trends:   Blood sugar readings are above goal with significant prandial excursions  Staying in automated mode only 86% of the time manual mode 14% of the time  Very basal heavy was 73% of her total daily dose coming from basal and only 27 from bolus      Will make the following changes based on review of data:  She needs to work on bolusing with her meals    Nadaj Valadez NP

## 2025-07-11 NOTE — Clinical Note
Please let her know I reviewed her Omnipod download from Shea in her blood sugar readings are running high I noticed that she is not consistently bolusing with her meals and she needs to be Please remind her that when she eats she is to be bolusing I believe she is carbohydrate counting so she should enter in the carbohydrate that she is eating She wants to make sure that she does this before she eats and not after See if she feels like she needs to meet with Shea again to review more pump teaching

## 2025-08-20 ENCOUNTER — TELEPHONE (OUTPATIENT)
Dept: PODIATRY | Facility: CLINIC | Age: 65
End: 2025-08-20
Payer: COMMERCIAL

## 2025-08-21 ENCOUNTER — OFFICE VISIT (OUTPATIENT)
Dept: PODIATRY | Facility: CLINIC | Age: 65
End: 2025-08-21
Payer: COMMERCIAL

## 2025-08-21 VITALS
HEART RATE: 91 BPM | DIASTOLIC BLOOD PRESSURE: 64 MMHG | WEIGHT: 161.69 LBS | SYSTOLIC BLOOD PRESSURE: 138 MMHG | BODY MASS INDEX: 26.94 KG/M2 | HEIGHT: 65 IN

## 2025-08-21 DIAGNOSIS — E10.42 TYPE 1 DIABETES MELLITUS WITH DIABETIC POLYNEUROPATHY: ICD-10-CM

## 2025-08-21 DIAGNOSIS — L03.031 CELLULITIS OF TOE OF RIGHT FOOT: ICD-10-CM

## 2025-08-21 DIAGNOSIS — L89.893 PRESSURE ULCER OF TOE OF RIGHT FOOT, STAGE 3: Primary | ICD-10-CM

## 2025-08-21 DIAGNOSIS — M20.41 ACQUIRED HAMMERTOE OF RIGHT FOOT: ICD-10-CM

## 2025-08-21 DIAGNOSIS — M21.372 ACQUIRED LEFT FOOT DROP: ICD-10-CM

## 2025-08-21 DIAGNOSIS — M20.5X2 ACQUIRED ADDUCTOVARUS ROTATION OF TOE OF LEFT FOOT: ICD-10-CM

## 2025-08-21 DIAGNOSIS — L84 CLAVUS: ICD-10-CM

## 2025-08-21 PROCEDURE — 99999 PR PBB SHADOW E&M-EST. PATIENT-LVL V: CPT | Mod: PBBFAC,,, | Performed by: PODIATRIST

## 2025-08-21 RX ORDER — SULFAMETHOXAZOLE AND TRIMETHOPRIM 800; 160 MG/1; MG/1
1 TABLET ORAL 2 TIMES DAILY
Qty: 20 TABLET | Refills: 0 | Status: SHIPPED | OUTPATIENT
Start: 2025-08-21

## 2025-08-22 RX ORDER — KETOROLAC TROMETHAMINE 30 MG/ML
30 INJECTION, SOLUTION INTRAMUSCULAR; INTRAVENOUS ONCE
Status: SHIPPED | OUTPATIENT
Start: 2025-08-29 | End: 2025-08-31

## 2025-08-22 RX ORDER — SODIUM CHLORIDE 0.9 % (FLUSH) 0.9 %
10 SYRINGE (ML) INJECTION
OUTPATIENT
Start: 2025-08-29

## (undated) DEVICE — LAVAGE WOUND BACTISURE 1L BAG

## (undated) DEVICE — DRAPE PLASTIC U 60X72

## (undated) DEVICE — SUT VICRYL PLUS 3-0 SH 18IN

## (undated) DEVICE — SUT SILK 2-0 PS 18IN BLACK

## (undated) DEVICE — SEE MEDLINE ITEM 146417

## (undated) DEVICE — TOURNIQUET SB QC DP 34X4IN

## (undated) DEVICE — TRAY MINOR GEN SURG

## (undated) DEVICE — GOWN AERO CHROME W/ TOWEL XL

## (undated) DEVICE — SHEET DRAPE FAN-FOLDED 3/4

## (undated) DEVICE — PAD ABD 8X10 STERILE

## (undated) DEVICE — GAUZE SPONGE 4X4 12PLY

## (undated) DEVICE — SEE MEDLINE ITEM 152530

## (undated) DEVICE — SET IRR URLGY 2LINE UNIV SPIKE

## (undated) DEVICE — BRACE KNEE T SCOPE PREMIER

## (undated) DEVICE — DRESSING AQUACEL AG 3.5X10IN

## (undated) DEVICE — SUT 0 VICRYL / CT-1

## (undated) DEVICE — Device

## (undated) DEVICE — APPLICATOR CHLORAPREP ORN 26ML

## (undated) DEVICE — APPLIER CLIP LIAGCLIP 9.375IN

## (undated) DEVICE — GUIDE MICRO-GRID SIL GRN

## (undated) DEVICE — SPONGE DERMACEA GAUZE 4X4

## (undated) DEVICE — KIT DRESSING PREVENA PLUS

## (undated) DEVICE — PENCIL EDGE SMOKE ROCKER

## (undated) DEVICE — WARMER DRAPE STERILE LF

## (undated) DEVICE — SPONGE LAP 18X18 PREWASHED

## (undated) DEVICE — SUT ETHILON 3/0 18IN PS-1

## (undated) DEVICE — KIT SAHARA DRAPE DRAW/LIFT

## (undated) DEVICE — PULSAVAC ZIMMER

## (undated) DEVICE — SEE L#152161

## (undated) DEVICE — DRESSING AQUACEL SACRAL 9 X 9

## (undated) DEVICE — POWDER ARISTA AH 1GM

## (undated) DEVICE — BANDAGE ACE ELASTIC 6"

## (undated) DEVICE — CATH PROVENA MIDLINE 3FR 20CM

## (undated) DEVICE — SUT MONOCRYL 3-0 PS-2 UND

## (undated) DEVICE — DRAPE C ARM 42 X 120 10/BX

## (undated) DEVICE — SOL NACL 0.9% INJ 500ML BG

## (undated) DEVICE — DRAPE STERI U-SHAPED 47X51IN

## (undated) DEVICE — SEE MEDLINE ITEM 157128

## (undated) DEVICE — TAPE SURG DURAPORE 2 X10YD

## (undated) DEVICE — KIT PREVENA PLUS

## (undated) DEVICE — SET TUR BLADDER IRRIG Y TYPE

## (undated) DEVICE — SUT 5 30IN ETHIBOND GRN BR

## (undated) DEVICE — SEE MEDLINE ITEM 157131

## (undated) DEVICE — KIT COLLECTION E SWAB REGULAR

## (undated) DEVICE — CLAMP SINGLE MICRO.

## (undated) DEVICE — BLADE SURG #15 CARBON STEEL

## (undated) DEVICE — DRAPE STERILE Z BACK TABLE

## (undated) DEVICE — DRESSING XEROFORM FOIL PK 1X8

## (undated) DEVICE — SUT VICRYL 2-0 36 CT-1

## (undated) DEVICE — SEE MEDLINE ITEM 157216

## (undated) DEVICE — SUCTION SURGICAL STR 7FR

## (undated) DEVICE — CLIP DOUBLE MICRO.

## (undated) DEVICE — TRAY MINOR ORTHO

## (undated) DEVICE — SUT 9/0 5IN ETHILON BLK MON

## (undated) DEVICE — ELECTRODE REM PLYHSV RETURN 9

## (undated) DEVICE — NDL 22GA X1 1/2 REG BEVEL

## (undated) DEVICE — SOL IRR NACL .9% 3000ML

## (undated) DEVICE — BLADE SURG CARBON STEEL #10

## (undated) DEVICE — SEE MEDLINE ITEM 152487

## (undated) DEVICE — BOOT AIR FLUID HEEL ADLT STD

## (undated) DEVICE — SEE MEDLINE ITEM 152622

## (undated) DEVICE — KWIRE NTHRD 1.25X150MM
Type: IMPLANTABLE DEVICE | Site: PATELLA | Status: NON-FUNCTIONAL
Removed: 2020-10-05

## (undated) DEVICE — NDL HYPO REG 25G X 1 1/2

## (undated) DEVICE — SUT ETHILON 2-0 PSLX 30IN

## (undated) DEVICE — GOWN SURGICAL X-LARGE

## (undated) DEVICE — CARTRIDGE MICROCLIP SFINE BLUE

## (undated) DEVICE — APPLIER LIGACLIP SM 9.38IN

## (undated) DEVICE — HOOK STAY ELAS 5MM 8EA/PK

## (undated) DEVICE — BANDAGE ESMARK 6X12

## (undated) DEVICE — STAPLER SKIN ROTATING HEAD

## (undated) DEVICE — TRAY FOLEY 16FR INFECTION CONT

## (undated) DEVICE — DRAPE C-ARMOR EQUIPMENT COVER

## (undated) DEVICE — SEE MEDLINE ITEM 157150

## (undated) DEVICE — SEE MEDLINE ITEM 146298

## (undated) DEVICE — PAD K-THERMIA 24IN X 60IN

## (undated) DEVICE — SKINMARKER & RULER REGULAR X-F

## (undated) DEVICE — SEE MEDLINE ITEM 154981

## (undated) DEVICE — BIT DRILL CANN QC 2.7X160 SS

## (undated) DEVICE — SUT PROLENE 5-0 PS-2 18

## (undated) DEVICE — DRAPE STERI INSTRUMENT 1018

## (undated) DEVICE — MANIFOLD 4 PORT

## (undated) DEVICE — SEE MEDLINE ITEM 152572

## (undated) DEVICE — SUT VICRYL PLUS 0 CT1 18IN

## (undated) DEVICE — SYR 3CC LUER LOC

## (undated) DEVICE — PACK UNIVERSAL SPLIT II

## (undated) DEVICE — LUBRICANT SURGILUBE 2 OZ

## (undated) DEVICE — KIT MEROCEL INSTRUMENT WIPE

## (undated) DEVICE — CATH IV INTROCAN 20G X 1.1

## (undated) DEVICE — CORD BIPOLAR 12 FOOT

## (undated) DEVICE — CUP MEDICINE STERILE 2OZ

## (undated) DEVICE — CATH IV INTROCAN 22G X 1

## (undated) DEVICE — BOVIE SUCTION

## (undated) DEVICE — CATH SUCTION 10FR

## (undated) DEVICE — SET DECANTER MEDICHOICE

## (undated) DEVICE — SUT VICRYL+ 2-0 SH 18IN

## (undated) DEVICE — MICRO CLIP

## (undated) DEVICE — PROBE CATH TEMP 16 FRFOLEY 400

## (undated) DEVICE — SEE MEDLINE ITEM 152529

## (undated) DEVICE — SUT MCRYL PLUS 4-0 PS2 27IN

## (undated) DEVICE — SEE MEDLINE ITEM 152512

## (undated) DEVICE — CANISTER INFOV.A.C WOUND 500ML

## (undated) DEVICE — DRESSING AQUACEL AG RBBN 2X45

## (undated) DEVICE — SPEAR EYE WECK-CEL CELLULOSE

## (undated) DEVICE — TUBE PENROSE DRAIN 18INX5/8IN